# Patient Record
Sex: MALE | Race: ASIAN | NOT HISPANIC OR LATINO | URBAN - METROPOLITAN AREA
[De-identification: names, ages, dates, MRNs, and addresses within clinical notes are randomized per-mention and may not be internally consistent; named-entity substitution may affect disease eponyms.]

---

## 2023-02-21 ENCOUNTER — INPATIENT (INPATIENT)
Facility: HOSPITAL | Age: 68
LOS: 21 days | Discharge: NURSING, REHAB OR LONG-TERM CARE | End: 2023-03-15
Attending: NEUROLOGICAL SURGERY | Admitting: NEUROLOGICAL SURGERY
Payer: MEDICAID

## 2023-02-21 VITALS
HEART RATE: 74 BPM | DIASTOLIC BLOOD PRESSURE: 49 MMHG | RESPIRATION RATE: 18 BRPM | TEMPERATURE: 97 F | OXYGEN SATURATION: 99 % | SYSTOLIC BLOOD PRESSURE: 101 MMHG

## 2023-02-21 DIAGNOSIS — Z29.9 ENCOUNTER FOR PROPHYLACTIC MEASURES, UNSPECIFIED: ICD-10-CM

## 2023-02-21 DIAGNOSIS — D64.9 ANEMIA, UNSPECIFIED: ICD-10-CM

## 2023-02-21 DIAGNOSIS — G93.40 ENCEPHALOPATHY, UNSPECIFIED: ICD-10-CM

## 2023-02-21 DIAGNOSIS — N17.9 ACUTE KIDNEY FAILURE, UNSPECIFIED: ICD-10-CM

## 2023-02-21 DIAGNOSIS — Z79.899 OTHER LONG TERM (CURRENT) DRUG THERAPY: ICD-10-CM

## 2023-02-21 DIAGNOSIS — Z95.1 PRESENCE OF AORTOCORONARY BYPASS GRAFT: Chronic | ICD-10-CM

## 2023-02-21 DIAGNOSIS — E11.9 TYPE 2 DIABETES MELLITUS WITHOUT COMPLICATIONS: ICD-10-CM

## 2023-02-21 DIAGNOSIS — N18.30 CHRONIC KIDNEY DISEASE, STAGE 3 UNSPECIFIED: ICD-10-CM

## 2023-02-21 DIAGNOSIS — I25.10 ATHEROSCLEROTIC HEART DISEASE OF NATIVE CORONARY ARTERY WITHOUT ANGINA PECTORIS: ICD-10-CM

## 2023-02-21 DIAGNOSIS — M54.9 DORSALGIA, UNSPECIFIED: ICD-10-CM

## 2023-02-21 LAB
ALBUMIN SERPL ELPH-MCNC: 4 G/DL — SIGNIFICANT CHANGE UP (ref 3.3–5)
ALP SERPL-CCNC: 62 U/L — SIGNIFICANT CHANGE UP (ref 40–120)
ALT FLD-CCNC: 19 U/L — SIGNIFICANT CHANGE UP (ref 4–41)
ANION GAP SERPL CALC-SCNC: 13 MMOL/L — SIGNIFICANT CHANGE UP (ref 7–14)
ANION GAP SERPL CALC-SCNC: 9 MMOL/L — SIGNIFICANT CHANGE UP (ref 7–14)
APPEARANCE UR: CLEAR — SIGNIFICANT CHANGE UP
AST SERPL-CCNC: 22 U/L — SIGNIFICANT CHANGE UP (ref 4–40)
B-OH-BUTYR SERPL-SCNC: <0 MMOL/L — SIGNIFICANT CHANGE UP (ref 0–0.4)
BACTERIA # UR AUTO: NEGATIVE — SIGNIFICANT CHANGE UP
BASOPHILS # BLD AUTO: 0.05 K/UL — SIGNIFICANT CHANGE UP (ref 0–0.2)
BASOPHILS NFR BLD AUTO: 0.5 % — SIGNIFICANT CHANGE UP (ref 0–2)
BILIRUB SERPL-MCNC: 0.4 MG/DL — SIGNIFICANT CHANGE UP (ref 0.2–1.2)
BILIRUB UR-MCNC: NEGATIVE — SIGNIFICANT CHANGE UP
BUN SERPL-MCNC: 43 MG/DL — HIGH (ref 7–23)
BUN SERPL-MCNC: 44 MG/DL — HIGH (ref 7–23)
CALCIUM SERPL-MCNC: 9.3 MG/DL — SIGNIFICANT CHANGE UP (ref 8.4–10.5)
CALCIUM SERPL-MCNC: 9.4 MG/DL — SIGNIFICANT CHANGE UP (ref 8.4–10.5)
CHLORIDE SERPL-SCNC: 103 MMOL/L — SIGNIFICANT CHANGE UP (ref 98–107)
CHLORIDE SERPL-SCNC: 106 MMOL/L — SIGNIFICANT CHANGE UP (ref 98–107)
CO2 SERPL-SCNC: 22 MMOL/L — SIGNIFICANT CHANGE UP (ref 22–31)
CO2 SERPL-SCNC: 23 MMOL/L — SIGNIFICANT CHANGE UP (ref 22–31)
COLOR SPEC: YELLOW — SIGNIFICANT CHANGE UP
CREAT SERPL-MCNC: 2.21 MG/DL — HIGH (ref 0.5–1.3)
CREAT SERPL-MCNC: 2.31 MG/DL — HIGH (ref 0.5–1.3)
DIFF PNL FLD: NEGATIVE — SIGNIFICANT CHANGE UP
EGFR: 30 ML/MIN/1.73M2 — LOW
EGFR: 31 ML/MIN/1.73M2 — LOW
EOSINOPHIL # BLD AUTO: 0.43 K/UL — SIGNIFICANT CHANGE UP (ref 0–0.5)
EOSINOPHIL NFR BLD AUTO: 4.5 % — SIGNIFICANT CHANGE UP (ref 0–6)
EPI CELLS # UR: 3 /HPF — SIGNIFICANT CHANGE UP (ref 0–5)
FLUAV AG NPH QL: SIGNIFICANT CHANGE UP
FLUBV AG NPH QL: SIGNIFICANT CHANGE UP
GLUCOSE BLDC GLUCOMTR-MCNC: 121 MG/DL — HIGH (ref 70–99)
GLUCOSE SERPL-MCNC: 125 MG/DL — HIGH (ref 70–99)
GLUCOSE SERPL-MCNC: 172 MG/DL — HIGH (ref 70–99)
GLUCOSE UR QL: NEGATIVE — SIGNIFICANT CHANGE UP
HCT VFR BLD CALC: 35.3 % — LOW (ref 39–50)
HGB BLD-MCNC: 11.5 G/DL — LOW (ref 13–17)
HYALINE CASTS # UR AUTO: 0 /LPF — SIGNIFICANT CHANGE UP (ref 0–7)
IANC: 6.12 K/UL — SIGNIFICANT CHANGE UP (ref 1.8–7.4)
IMM GRANULOCYTES NFR BLD AUTO: 0.4 % — SIGNIFICANT CHANGE UP (ref 0–0.9)
KETONES UR-MCNC: NEGATIVE — SIGNIFICANT CHANGE UP
LEUKOCYTE ESTERASE UR-ACNC: NEGATIVE — SIGNIFICANT CHANGE UP
LYMPHOCYTES # BLD AUTO: 2.02 K/UL — SIGNIFICANT CHANGE UP (ref 1–3.3)
LYMPHOCYTES # BLD AUTO: 21.3 % — SIGNIFICANT CHANGE UP (ref 13–44)
MAGNESIUM SERPL-MCNC: 0.9 MG/DL — CRITICAL LOW (ref 1.6–2.6)
MAGNESIUM SERPL-MCNC: 1.7 MG/DL — SIGNIFICANT CHANGE UP (ref 1.6–2.6)
MCHC RBC-ENTMCNC: 29.6 PG — SIGNIFICANT CHANGE UP (ref 27–34)
MCHC RBC-ENTMCNC: 32.6 GM/DL — SIGNIFICANT CHANGE UP (ref 32–36)
MCV RBC AUTO: 90.7 FL — SIGNIFICANT CHANGE UP (ref 80–100)
MONOCYTES # BLD AUTO: 0.83 K/UL — SIGNIFICANT CHANGE UP (ref 0–0.9)
MONOCYTES NFR BLD AUTO: 8.7 % — SIGNIFICANT CHANGE UP (ref 2–14)
NEUTROPHILS # BLD AUTO: 6.12 K/UL — SIGNIFICANT CHANGE UP (ref 1.8–7.4)
NEUTROPHILS NFR BLD AUTO: 64.6 % — SIGNIFICANT CHANGE UP (ref 43–77)
NITRITE UR-MCNC: NEGATIVE — SIGNIFICANT CHANGE UP
NRBC # BLD: 0 /100 WBCS — SIGNIFICANT CHANGE UP (ref 0–0)
NRBC # FLD: 0 K/UL — SIGNIFICANT CHANGE UP (ref 0–0)
PH UR: 6 — SIGNIFICANT CHANGE UP (ref 5–8)
PHOSPHATE SERPL-MCNC: 3.6 MG/DL — SIGNIFICANT CHANGE UP (ref 2.5–4.5)
PLATELET # BLD AUTO: 267 K/UL — SIGNIFICANT CHANGE UP (ref 150–400)
POTASSIUM SERPL-MCNC: 4.2 MMOL/L — SIGNIFICANT CHANGE UP (ref 3.5–5.3)
POTASSIUM SERPL-MCNC: 4.5 MMOL/L — SIGNIFICANT CHANGE UP (ref 3.5–5.3)
POTASSIUM SERPL-SCNC: 4.2 MMOL/L — SIGNIFICANT CHANGE UP (ref 3.5–5.3)
POTASSIUM SERPL-SCNC: 4.5 MMOL/L — SIGNIFICANT CHANGE UP (ref 3.5–5.3)
PROT SERPL-MCNC: 6.9 G/DL — SIGNIFICANT CHANGE UP (ref 6–8.3)
PROT UR-MCNC: ABNORMAL
RBC # BLD: 3.89 M/UL — LOW (ref 4.2–5.8)
RBC # FLD: 13.2 % — SIGNIFICANT CHANGE UP (ref 10.3–14.5)
RBC CASTS # UR COMP ASSIST: 2 /HPF — SIGNIFICANT CHANGE UP (ref 0–4)
RSV RNA NPH QL NAA+NON-PROBE: SIGNIFICANT CHANGE UP
SARS-COV-2 RNA SPEC QL NAA+PROBE: SIGNIFICANT CHANGE UP
SODIUM SERPL-SCNC: 138 MMOL/L — SIGNIFICANT CHANGE UP (ref 135–145)
SODIUM SERPL-SCNC: 138 MMOL/L — SIGNIFICANT CHANGE UP (ref 135–145)
SP GR SPEC: 1.02 — SIGNIFICANT CHANGE UP (ref 1.01–1.05)
UROBILINOGEN FLD QL: SIGNIFICANT CHANGE UP
WBC # BLD: 9.49 K/UL — SIGNIFICANT CHANGE UP (ref 3.8–10.5)
WBC # FLD AUTO: 9.49 K/UL — SIGNIFICANT CHANGE UP (ref 3.8–10.5)
WBC UR QL: 1 /HPF — SIGNIFICANT CHANGE UP (ref 0–5)

## 2023-02-21 PROCEDURE — 72125 CT NECK SPINE W/O DYE: CPT | Mod: 26,MA

## 2023-02-21 PROCEDURE — 72170 X-RAY EXAM OF PELVIS: CPT | Mod: 26

## 2023-02-21 PROCEDURE — 99223 1ST HOSP IP/OBS HIGH 75: CPT

## 2023-02-21 PROCEDURE — 70450 CT HEAD/BRAIN W/O DYE: CPT | Mod: 26,MA

## 2023-02-21 PROCEDURE — 99285 EMERGENCY DEPT VISIT HI MDM: CPT

## 2023-02-21 PROCEDURE — 72100 X-RAY EXAM L-S SPINE 2/3 VWS: CPT | Mod: 26

## 2023-02-21 RX ORDER — DEXTROSE 50 % IN WATER 50 %
25 SYRINGE (ML) INTRAVENOUS ONCE
Refills: 0 | Status: DISCONTINUED | OUTPATIENT
Start: 2023-02-21 | End: 2023-03-06

## 2023-02-21 RX ORDER — ACETAMINOPHEN 500 MG
650 TABLET ORAL EVERY 6 HOURS
Refills: 0 | Status: DISCONTINUED | OUTPATIENT
Start: 2023-02-21 | End: 2023-03-01

## 2023-02-21 RX ORDER — INSULIN LISPRO 100/ML
VIAL (ML) SUBCUTANEOUS
Refills: 0 | Status: DISCONTINUED | OUTPATIENT
Start: 2023-02-21 | End: 2023-02-28

## 2023-02-21 RX ORDER — ONDANSETRON 8 MG/1
4 TABLET, FILM COATED ORAL EVERY 8 HOURS
Refills: 0 | Status: DISCONTINUED | OUTPATIENT
Start: 2023-02-21 | End: 2023-02-22

## 2023-02-21 RX ORDER — DEXTROSE 50 % IN WATER 50 %
12.5 SYRINGE (ML) INTRAVENOUS ONCE
Refills: 0 | Status: DISCONTINUED | OUTPATIENT
Start: 2023-02-21 | End: 2023-03-06

## 2023-02-21 RX ORDER — ACETAMINOPHEN 500 MG
975 TABLET ORAL ONCE
Refills: 0 | Status: COMPLETED | OUTPATIENT
Start: 2023-02-21 | End: 2023-02-21

## 2023-02-21 RX ORDER — CYCLOBENZAPRINE HYDROCHLORIDE 10 MG/1
10 TABLET, FILM COATED ORAL ONCE
Refills: 0 | Status: COMPLETED | OUTPATIENT
Start: 2023-02-21 | End: 2023-02-21

## 2023-02-21 RX ORDER — GLUCAGON INJECTION, SOLUTION 0.5 MG/.1ML
1 INJECTION, SOLUTION SUBCUTANEOUS ONCE
Refills: 0 | Status: DISCONTINUED | OUTPATIENT
Start: 2023-02-21 | End: 2023-02-22

## 2023-02-21 RX ORDER — DEXTROSE 50 % IN WATER 50 %
15 SYRINGE (ML) INTRAVENOUS ONCE
Refills: 0 | Status: DISCONTINUED | OUTPATIENT
Start: 2023-02-21 | End: 2023-03-06

## 2023-02-21 RX ORDER — LANOLIN ALCOHOL/MO/W.PET/CERES
3 CREAM (GRAM) TOPICAL AT BEDTIME
Refills: 0 | Status: DISCONTINUED | OUTPATIENT
Start: 2023-02-21 | End: 2023-02-22

## 2023-02-21 RX ORDER — SODIUM CHLORIDE 9 MG/ML
1000 INJECTION, SOLUTION INTRAVENOUS
Refills: 0 | Status: DISCONTINUED | OUTPATIENT
Start: 2023-02-21 | End: 2023-02-22

## 2023-02-21 RX ORDER — LIDOCAINE 4 G/100G
1 CREAM TOPICAL ONCE
Refills: 0 | Status: COMPLETED | OUTPATIENT
Start: 2023-02-21 | End: 2023-02-21

## 2023-02-21 RX ORDER — MAGNESIUM SULFATE 500 MG/ML
2 VIAL (ML) INJECTION ONCE
Refills: 0 | Status: COMPLETED | OUTPATIENT
Start: 2023-02-21 | End: 2023-02-21

## 2023-02-21 RX ADMIN — LIDOCAINE 1 PATCH: 4 CREAM TOPICAL at 13:20

## 2023-02-21 RX ADMIN — Medication 25 GRAM(S): at 15:59

## 2023-02-21 RX ADMIN — Medication 975 MILLIGRAM(S): at 13:19

## 2023-02-21 RX ADMIN — CYCLOBENZAPRINE HYDROCHLORIDE 10 MILLIGRAM(S): 10 TABLET, FILM COATED ORAL at 14:34

## 2023-02-21 RX ADMIN — Medication 975 MILLIGRAM(S): at 13:49

## 2023-02-21 NOTE — H&P ADULT - PROBLEM SELECTOR PLAN 4
FS elevated to 240 on presentation   -Decreased to 170s   -Repeat FS   -C/w ISS and FS   -NPO for now

## 2023-02-21 NOTE — H&P ADULT - PROBLEM SELECTOR PLAN 1
Patient with acute onset of encephalopathy   -Pt is currently somnolent and AAOx1-2. At baseline, pt is AAOx3 as per son   -DDx include infectious, metabolic, toxic or structural etiology   -CT head showed no acute stroke or bleeding.   -F/u with UA to rule out UTI. No fever, cough or diarrhea as per son   -F/u with FS, BMP   -Pt received flexeril 10 mg in the ED which can also contribute to his encephalopathy   -If mental status worsens, repeat CT head

## 2023-02-21 NOTE — ED ADULT NURSE NOTE - OBJECTIVE STATEMENT
A&Ox4. PMH: DM and open heart surgery. Patient came in with lower back pain. A&Ox4. PMH: DM and open heart surgery. Patient came in with lower back pain. Denies N/V, dizziness, or SOB. Respirations even and unlabored, sating 100% on RA, Pain in lower back described as a 2. 20 gauge IV placed in right AC. Safety precautions in place, bed in lowest position, and oriented to call bell. labs obtained, awaiting results.

## 2023-02-21 NOTE — H&P ADULT - ASSESSMENT
67 yo M with DMII, CAD s/p CABG, CKD, chronic back pain, and HTN presents to the ED with worsening back pain, found to have encephalopathy due to unclear etiology.

## 2023-02-21 NOTE — ED ADULT NURSE NOTE - CHIEF COMPLAINT QUOTE
Pt arrives from [facility]  2 weeks ago co lower back pain x a few years with difficulty walking pain becoming worse. .  Pt normally  ambulates with cane. pt states cant walk .

## 2023-02-21 NOTE — H&P ADULT - PROBLEM SELECTOR PLAN 2
Acute on chronic back pain   -Nontender on exam   -Xray lumbar spine showed no acute lesion or fracture   -S/p flexiril and lidocaine patch   -Tylenol PRN

## 2023-02-21 NOTE — H&P ADULT - NSHPPHYSICALEXAM_GEN_ALL_CORE
Vital Signs Last 24 Hrs  T(C): 36.3 (21 Feb 2023 18:45), Max: 36.3 (21 Feb 2023 13:12)  T(F): 97.3 (21 Feb 2023 18:45), Max: 97.4 (21 Feb 2023 13:12)  HR: 96 (21 Feb 2023 18:45) (54 - 96)  BP: 99/69 (21 Feb 2023 18:45) (99/69 - 110/42)  BP(mean): --  RR: 18 (21 Feb 2023 18:45) (18 - 18)  SpO2: 99% (21 Feb 2023 18:45) (99% - 100%)    Parameters below as of 21 Feb 2023 18:45  Patient On (Oxygen Delivery Method): room air    GENERAL: Somnolent, arousable to verbal commands, follows some commands, AAOx2  HEENT:  Atraumatic, Normocephalic,Conjunctiva and sclera clear, oral mucosa moist, clear w/o any exudate   NECK: Supple, No JVD  CHEST/LUNG: Clear to auscultation bilaterally; No wheeze  HEART: Regular rate and rhythm; No murmurs, rubs, or gallops  ABDOMEN: Soft, Nontender, Distender; Bowel sounds present  EXTREMITIES:  2+ Peripheral Pulses, No clubbing, cyanosis, or edema  PSYCH: AAOx2, somnolent   NEUROLOGY: non-focal, moving all extremities spontaneously  SKIN: No rashes or lesions

## 2023-02-21 NOTE — ED ADULT TRIAGE NOTE - CHIEF COMPLAINT QUOTE
Pt arrives from [facility]  2 weeks ago co lower back pain x a few years with difficulty walking pain becoming worse. .  Pt normally  ambulates with cane. pt states cant walk .

## 2023-02-21 NOTE — ED PROVIDER NOTE - OBJECTIVE STATEMENT
Pt is a 68y M with PMHx DM on insulin presenting for acute on chronic lower back pain exacerbation. Pt states he has had chronic back pain for past few years with occasional episodes where the pain worsens, becomes incontinent, and unable to ambulate due to pain - states that he takes medication for it and previous episodes resolved in few days. Yesterday, pt states pain worsened and he is currently unable to ambulate and remains in bed. Denies CP/SOB/change in mentation/fever/chills. Pt at baseline ambulates with cane with assist. Pt is a 68y M with PMHx DM on insulin presenting for acute on chronic lower back pain exacerbation. Pt states he has had chronic back pain for past few years with occasional episodes where the pain worsens, becomes incontinent, and unable to ambulate due to pain - states that he takes medication for it and previous episodes resolved in few days. Yesterday, pt states pain worsened and he is currently unable to ambulate and remains in bed. Denies CP/SOB/change in mentation/fever/chills. Pt at baseline ambulates with cane with assist but falls often - denies recent trauma prior to onset of current pain episode. Pt is a 68y M with PMHx DM on insulin presenting for acute on chronic lower back pain exacerbation. Pt states he has had chronic back pain for past few years with occasional episodes where the pain worsens, becomes incontinent, and unable to ambulate due to pain - states that he takes medication for it and previous episodes resolved in few days. Yesterday, pt states pain worsened and he is currently unable to ambulate and remains in bed. Denies CP/SOB/change in mentation/fever/chills. Pt at baseline ambulates with cane with assist but falls often - denies recent trauma prior to onset of current pain episode.    Michelle Randallchrissieaz DO (PGY2): 68-year-old male past medical history of diabetes on insulin presenting for acute on chronic right-sided lumbosacral pain for the past day.  As per patient and son at bedside, patient has been suffering from back pain for the last 2 years.  Patient has episodes of urinary incontinence and falls due to being unsteady on his feet.  Unclear as to whether this unsteadiness is due to pain.    Patient normally ambulates with a cane but only goes as far as a few feet. No acute change in walking status today. Pt coming in due to worsening pain. No paresthesiasPatient had a few recent falls over the past week. Pt is a 68y M with PMHx DM on insulin presenting for acute on chronic lower back pain exacerbation. Pt states he has had chronic back pain for past few years with occasional episodes where the pain worsens, becomes incontinent, and unable to ambulate due to pain - states that he takes medication for it and previous episodes resolved in few days. Yesterday, pt states pain worsened and he is currently unable to ambulate and remains in bed. Denies CP/SOB/change in mentation/fever/chills. Pt at baseline ambulates with cane with assist but falls often - denies recent trauma prior to onset of current pain episode.    Michelle Wise DO (PGY2): 68-year-old male past medical history of diabetes on insulin presenting for acute on chronic right-sided lumbosacral pain for the past day.  As per patient and son at bedside, patient has been suffering from back pain for the last 2 years.  Patient has episodes of urinary incontinence and falls due to being unsteady on his feet.  Unclear as to whether this unsteadiness is due to pain.    Patient normally ambulates with a cane but only goes as far as a few feet. No acute change in walking status today. Pt coming in due to worsening pain. No paresthesias/weakness. Patient had a few recent falls over the past week, unclear head trauma. Pt reports some lightheadedness on standing. No chest pain or sob. No n/v/d/c, abdominal pain, headache.

## 2023-02-21 NOTE — ED PROVIDER NOTE - NS ED ROS FT
Gen: No fever, normal appetite  Eyes: No eye irritation or discharge  ENT: No ear pain, congestion, sore throat  Resp: No cough or trouble breathing  Cardiovascular: No chest pain or palpitation  Rosalina+incontinent  MS: +lower back pain midline with radiation down b/l buttocks  Skin: No rashes  Neuro: No headache; no abnormal movements  Remainder negative, except as per the HPI

## 2023-02-21 NOTE — H&P ADULT - PROBLEM SELECTOR PLAN 5
Patient recently came from [facility]   -Son did not have the list of medication when called. Please f/u in the AM for the full list.

## 2023-02-21 NOTE — ED PROVIDER NOTE - PROGRESS NOTE DETAILS
Michelle Wise DO (PGY2): Patient's imaging without acute findings.  Labs showing an SANDRA.  Given patient unsteady gait and unable to walk on his own, patient needs to come into the hospital for concern for NPH with SANDRA patient will need physical therapy and pain control. Pt also with uncontrolled diabetes. Spoke with hospitalist will admit to medicine.

## 2023-02-21 NOTE — ED PROVIDER NOTE - PHYSICAL EXAMINATION
Gen: +Pt in bed unable to ambulate   HEENT: NC/AT, PERRLA, EOMI, MMM, Throat clear, no LAD   Neck: Supple  Heart: RRR, S1S2+, no murmur  Lungs: normal effort, CTAB  Abd: soft, NT, ND, BS present, no HSM  Genitourinary: +incontinence during similar episodes  Ext: +FROM but limited due to pain, sensation of b/l LE intact but R>L  Neuro: no focal deficits  Psych: AAOx3  Skin:  intact, no induration; No rash, no lesions  MSK: +pelvic girdle firm and intact but cannot assess gait as pt unstable standing up. No midline tenderness but pt states pain along lumbosacral area sacrum > lumbar region

## 2023-02-21 NOTE — ED PROVIDER NOTE - ATTENDING CONTRIBUTION TO CARE
GEN - NAD; frail, nontoxic appearing; A+O x3   HEAD - NC/AT   EYES- PERRL, EOMI  ENT: Airway patent, mmm, Oral cavity and pharynx normal. No inflammation, swelling, exudate, or lesions.  NECK: Neck supple  PULMONARY - CTA b/l, symmetric breath sounds.   CARDIAC -s1s2, RRR, no M,G,R  ABDOMEN - +BS, ND, NT, soft, no guarding, no rebound, no masses   BACK - no CVA tenderness, no midline spinal ttp or deformity, mild ttp along b/l buttock regions   EXTREMITIES - FROM, symmetric pulses, capillary refill < 2 seconds, no edema   SKIN - no rash or bruising   NEUROLOGIC - alert, speech clear, no focal deficits, unable to assess gait  a/p-Patient son at bedside translating and patient speaking in English as well, declined interpreting services.  Patient reports he has had ongoing back pain for several years, has waxing and waning acute exacerbations, occasionally becomes incontinent which then resolves, has been worked up for this before and had MRI approximately 6 months ago though does not recall what his diagnosis was but states he was told that he could have surgery to fix his pain.  He reports that this time around, he noticed his pain worsened over the last day or 2, reports it is primarily located across his buttocks/pelvis region, makes him unable to ambulate due to the pain. Baseline patient reports he uses a cane to ambulate though he notes he falls very often, last fall was just a few days ago, does not think he hit his head.  No headache, vision changes, neck pain, upper or middle back pain, bowel or bladder incontinence currently, saddle anesthesia, fevers, chills, cough, chest pain or shortness of breath, nausea vomiting or diarrhea.  No dysuria or hematuria.  On exam he appears somewhat frail, nontoxic, conversational, unlabored breathing, clear lungs, abdomen soft and nontender, no midline spinal tenderness however has some tenderness across his gluteal region, with pain reproducible to that area when he moves.  Neuro exam with full strength to both legs however unable to stand due to reproduced pain when he tries.  Plan for labs, UA, culture, CT brain, x-rays affected regions of pain, pain control as needed, evaluate for differential including but not limited to fractures, ICH, NPH, electrolyte disturbances, organ dysfunction, infection.  Anticipate admission given multiple falls with severe pain and inability to ambulate, unsafe discharge home.

## 2023-02-21 NOTE — H&P ADULT - NSHPLABSRESULTS_GEN_ALL_CORE
11.5   9.49  )-----------( 267      ( 21 Feb 2023 14:11 )             35.3     Hgb Trend: 11.5<--  02-21    138  |  103  |  44<H>  ----------------------------<  172<H>  4.5   |  22  |  2.31<H>    Ca    9.3      21 Feb 2023 14:11  Mg     0.90     02-21    TPro  6.9  /  Alb  4.0  /  TBili  0.4  /  DBili  x   /  AST  22  /  ALT  19  /  AlkPhos  62  02-21    Creatinine Trend: 2.31<--        CT head and neck as reviewed by the radiologist:     1.  CT HEAD:    No acute intracranial injury.    2.  CT CERVICAL:   No cervical vertebral fracture.

## 2023-02-21 NOTE — H&P ADULT - HISTORY OF PRESENT ILLNESS
69 yo M with DMII, CAD s/p CABG, CKD, chronic back pain, and HTN presents to the ED with worsening back pain. Patient is AAOX2 and somnolent, not able to provide any hx. Most of the information was supplemented by the son. As per son, pt is AAOx 3 and "mentally stable." He recently came from [facility] about 2 weeks ago and has " multiple medical complaints." But this morning he told his son that he is feeling weak and his back pain is getting worse. The pain is localized in the lower back region. It is burning is sensation and constant. Unclear if it radiates down the leg. It worsens with movement and walking. He can only walk "10 meters" with a cane. He is also incontinent of urine but denies any recent fever, chills, bowel incontinence, or lower extremities weakness/motor deficits. He reportedly had MRIs in the past but son does not know what it showed. He also has other chronic medical problems such as CKD, DMII and CAD and his son thinks pt needs management for them as well.   In the ED, his vitals were notable for hyperglycemia, elevated BUN/Scr, and hypomagnesemia. EKG showed bradycardia with T wave changes in the lateral leads.

## 2023-02-21 NOTE — H&P ADULT - NSICDXPASTMEDICALHX_GEN_ALL_CORE_FT
PAST MEDICAL HISTORY:  CAD (coronary artery disease)     Chronic kidney disease, unspecified CKD stage     Diabetes mellitus     Essential hypertension

## 2023-02-21 NOTE — ED PROVIDER NOTE - CLINICAL SUMMARY MEDICAL DECISION MAKING FREE TEXT BOX
68-year-old male past medical history of diabetes on insulin presenting for acute on chronic right-sided lumbosacral pain for the past day, associated with unsteady gait, urinary incontinence, multiple falls. Given hx and physical, ddx includes but is not limited to fracture, NPH, ICH, metabolic derangement. Plan for labs, XR, CT, meds, reassess

## 2023-02-22 DIAGNOSIS — I50.22 CHRONIC SYSTOLIC (CONGESTIVE) HEART FAILURE: ICD-10-CM

## 2023-02-22 DIAGNOSIS — M43.16 SPONDYLOLISTHESIS, LUMBAR REGION: ICD-10-CM

## 2023-02-22 LAB
A1C WITH ESTIMATED AVERAGE GLUCOSE RESULT: 8.3 % — HIGH (ref 4–5.6)
ALBUMIN SERPL ELPH-MCNC: 3.7 G/DL — SIGNIFICANT CHANGE UP (ref 3.3–5)
ALP SERPL-CCNC: 66 U/L — SIGNIFICANT CHANGE UP (ref 40–120)
ALT FLD-CCNC: 18 U/L — SIGNIFICANT CHANGE UP (ref 4–41)
ANION GAP SERPL CALC-SCNC: 9 MMOL/L — SIGNIFICANT CHANGE UP (ref 7–14)
AST SERPL-CCNC: 16 U/L — SIGNIFICANT CHANGE UP (ref 4–40)
BASOPHILS # BLD AUTO: 0.06 K/UL — SIGNIFICANT CHANGE UP (ref 0–0.2)
BASOPHILS NFR BLD AUTO: 0.8 % — SIGNIFICANT CHANGE UP (ref 0–2)
BILIRUB SERPL-MCNC: 0.4 MG/DL — SIGNIFICANT CHANGE UP (ref 0.2–1.2)
BUN SERPL-MCNC: 41 MG/DL — HIGH (ref 7–23)
CALCIUM SERPL-MCNC: 9.1 MG/DL — SIGNIFICANT CHANGE UP (ref 8.4–10.5)
CHLORIDE SERPL-SCNC: 105 MMOL/L — SIGNIFICANT CHANGE UP (ref 98–107)
CHOLEST SERPL-MCNC: 136 MG/DL — SIGNIFICANT CHANGE UP
CO2 SERPL-SCNC: 23 MMOL/L — SIGNIFICANT CHANGE UP (ref 22–31)
CREAT SERPL-MCNC: 2.11 MG/DL — HIGH (ref 0.5–1.3)
CULTURE RESULTS: NO GROWTH — SIGNIFICANT CHANGE UP
EGFR: 33 ML/MIN/1.73M2 — LOW
EOSINOPHIL # BLD AUTO: 0.37 K/UL — SIGNIFICANT CHANGE UP (ref 0–0.5)
EOSINOPHIL NFR BLD AUTO: 4.8 % — SIGNIFICANT CHANGE UP (ref 0–6)
ESTIMATED AVERAGE GLUCOSE: 192 — SIGNIFICANT CHANGE UP
GLUCOSE BLDC GLUCOMTR-MCNC: 123 MG/DL — HIGH (ref 70–99)
GLUCOSE BLDC GLUCOMTR-MCNC: 204 MG/DL — HIGH (ref 70–99)
GLUCOSE BLDC GLUCOMTR-MCNC: 225 MG/DL — HIGH (ref 70–99)
GLUCOSE BLDC GLUCOMTR-MCNC: 238 MG/DL — HIGH (ref 70–99)
GLUCOSE SERPL-MCNC: 234 MG/DL — HIGH (ref 70–99)
HCT VFR BLD CALC: 35.9 % — LOW (ref 39–50)
HCV AB S/CO SERPL IA: 0.1 S/CO — SIGNIFICANT CHANGE UP (ref 0–0.99)
HCV AB SERPL-IMP: SIGNIFICANT CHANGE UP
HDLC SERPL-MCNC: 29 MG/DL — LOW
HGB BLD-MCNC: 11.7 G/DL — LOW (ref 13–17)
IANC: 5.18 K/UL — SIGNIFICANT CHANGE UP (ref 1.8–7.4)
IMM GRANULOCYTES NFR BLD AUTO: 1 % — HIGH (ref 0–0.9)
LIPID PNL WITH DIRECT LDL SERPL: 75 MG/DL — SIGNIFICANT CHANGE UP
LYMPHOCYTES # BLD AUTO: 1.38 K/UL — SIGNIFICANT CHANGE UP (ref 1–3.3)
LYMPHOCYTES # BLD AUTO: 17.8 % — SIGNIFICANT CHANGE UP (ref 13–44)
MCHC RBC-ENTMCNC: 29.8 PG — SIGNIFICANT CHANGE UP (ref 27–34)
MCHC RBC-ENTMCNC: 32.6 GM/DL — SIGNIFICANT CHANGE UP (ref 32–36)
MCV RBC AUTO: 91.6 FL — SIGNIFICANT CHANGE UP (ref 80–100)
MONOCYTES # BLD AUTO: 0.69 K/UL — SIGNIFICANT CHANGE UP (ref 0–0.9)
MONOCYTES NFR BLD AUTO: 8.9 % — SIGNIFICANT CHANGE UP (ref 2–14)
NEUTROPHILS # BLD AUTO: 5.18 K/UL — SIGNIFICANT CHANGE UP (ref 1.8–7.4)
NEUTROPHILS NFR BLD AUTO: 66.7 % — SIGNIFICANT CHANGE UP (ref 43–77)
NON HDL CHOLESTEROL: 107 MG/DL — SIGNIFICANT CHANGE UP
NRBC # BLD: 0 /100 WBCS — SIGNIFICANT CHANGE UP (ref 0–0)
NRBC # FLD: 0 K/UL — SIGNIFICANT CHANGE UP (ref 0–0)
PLATELET # BLD AUTO: 253 K/UL — SIGNIFICANT CHANGE UP (ref 150–400)
POTASSIUM SERPL-MCNC: 4.4 MMOL/L — SIGNIFICANT CHANGE UP (ref 3.5–5.3)
POTASSIUM SERPL-SCNC: 4.4 MMOL/L — SIGNIFICANT CHANGE UP (ref 3.5–5.3)
PROT SERPL-MCNC: 6.7 G/DL — SIGNIFICANT CHANGE UP (ref 6–8.3)
RBC # BLD: 3.92 M/UL — LOW (ref 4.2–5.8)
RBC # FLD: 13.2 % — SIGNIFICANT CHANGE UP (ref 10.3–14.5)
SODIUM SERPL-SCNC: 137 MMOL/L — SIGNIFICANT CHANGE UP (ref 135–145)
SPECIMEN SOURCE: SIGNIFICANT CHANGE UP
TRIGL SERPL-MCNC: 162 MG/DL — HIGH
WBC # BLD: 7.76 K/UL — SIGNIFICANT CHANGE UP (ref 3.8–10.5)
WBC # FLD AUTO: 7.76 K/UL — SIGNIFICANT CHANGE UP (ref 3.8–10.5)

## 2023-02-22 PROCEDURE — 99233 SBSQ HOSP IP/OBS HIGH 50: CPT

## 2023-02-22 PROCEDURE — 99232 SBSQ HOSP IP/OBS MODERATE 35: CPT

## 2023-02-22 PROCEDURE — 76770 US EXAM ABDO BACK WALL COMP: CPT | Mod: 26

## 2023-02-22 PROCEDURE — 71045 X-RAY EXAM CHEST 1 VIEW: CPT | Mod: 26

## 2023-02-22 RX ORDER — NITROGLYCERIN 6.5 MG
0.4 CAPSULE, EXTENDED RELEASE ORAL
Refills: 0 | Status: DISCONTINUED | OUTPATIENT
Start: 2023-02-22 | End: 2023-03-15

## 2023-02-22 RX ORDER — PANTOPRAZOLE SODIUM 20 MG/1
40 TABLET, DELAYED RELEASE ORAL
Refills: 0 | Status: DISCONTINUED | OUTPATIENT
Start: 2023-02-22 | End: 2023-03-02

## 2023-02-22 RX ORDER — SACUBITRIL AND VALSARTAN 24; 26 MG/1; MG/1
1 TABLET, FILM COATED ORAL
Refills: 0 | Status: DISCONTINUED | OUTPATIENT
Start: 2023-02-22 | End: 2023-02-28

## 2023-02-22 RX ORDER — ATORVASTATIN CALCIUM 80 MG/1
1 TABLET, FILM COATED ORAL
Qty: 0 | Refills: 0 | DISCHARGE

## 2023-02-22 RX ORDER — INSULIN LISPRO 100/ML
VIAL (ML) SUBCUTANEOUS AT BEDTIME
Refills: 0 | Status: DISCONTINUED | OUTPATIENT
Start: 2023-02-22 | End: 2023-03-06

## 2023-02-22 RX ORDER — TAMSULOSIN HYDROCHLORIDE 0.4 MG/1
1 CAPSULE ORAL
Qty: 0 | Refills: 0 | DISCHARGE

## 2023-02-22 RX ORDER — ATORVASTATIN CALCIUM 80 MG/1
20 TABLET, FILM COATED ORAL AT BEDTIME
Refills: 0 | Status: DISCONTINUED | OUTPATIENT
Start: 2023-02-22 | End: 2023-02-23

## 2023-02-22 RX ORDER — HEPARIN SODIUM 5000 [USP'U]/ML
5000 INJECTION INTRAVENOUS; SUBCUTANEOUS EVERY 12 HOURS
Refills: 0 | Status: DISCONTINUED | OUTPATIENT
Start: 2023-02-22 | End: 2023-02-27

## 2023-02-22 RX ORDER — CALCIUM ACETATE 667 MG
667 TABLET ORAL
Refills: 0 | Status: DISCONTINUED | OUTPATIENT
Start: 2023-02-22 | End: 2023-02-22

## 2023-02-22 RX ORDER — NITROGLYCERIN 6.5 MG
1 CAPSULE, EXTENDED RELEASE ORAL
Qty: 0 | Refills: 0 | DISCHARGE

## 2023-02-22 RX ORDER — CALCITRIOL 0.5 UG/1
0.25 CAPSULE ORAL DAILY
Refills: 0 | Status: DISCONTINUED | OUTPATIENT
Start: 2023-02-22 | End: 2023-03-15

## 2023-02-22 RX ORDER — CALCIUM ACETATE 667 MG
667 TABLET ORAL
Refills: 0 | Status: DISCONTINUED | OUTPATIENT
Start: 2023-02-22 | End: 2023-03-15

## 2023-02-22 RX ORDER — XYLOMETAZOLINE HCL 0.1 %
0 SPRAY, NON-AEROSOL (ML) NASAL
Qty: 0 | Refills: 0 | DISCHARGE

## 2023-02-22 RX ORDER — ASPIRIN/CALCIUM CARB/MAGNESIUM 324 MG
1 TABLET ORAL
Qty: 0 | Refills: 0 | DISCHARGE

## 2023-02-22 RX ORDER — DUTASTERIDE AND TAMSULOSIN HYDROCHLORIDE CAPSULES .5; .4 MG/1; MG/1
1 CAPSULE ORAL
Qty: 0 | Refills: 0 | DISCHARGE

## 2023-02-22 RX ORDER — NEBIVOLOL HYDROCHLORIDE 5 MG/1
1 TABLET ORAL
Qty: 0 | Refills: 0 | DISCHARGE

## 2023-02-22 RX ORDER — FUROSEMIDE 40 MG
40 TABLET ORAL
Refills: 0 | Status: DISCONTINUED | OUTPATIENT
Start: 2023-02-22 | End: 2023-02-25

## 2023-02-22 RX ORDER — FUROSEMIDE 40 MG
1 TABLET ORAL
Qty: 0 | Refills: 0 | DISCHARGE

## 2023-02-22 RX ORDER — FUROSEMIDE 40 MG
40 TABLET ORAL DAILY
Refills: 0 | Status: DISCONTINUED | OUTPATIENT
Start: 2023-02-22 | End: 2023-02-22

## 2023-02-22 RX ORDER — SACUBITRIL AND VALSARTAN 24; 26 MG/1; MG/1
1 TABLET, FILM COATED ORAL
Qty: 0 | Refills: 0 | DISCHARGE

## 2023-02-22 RX ORDER — TAMSULOSIN HYDROCHLORIDE 0.4 MG/1
0.4 CAPSULE ORAL AT BEDTIME
Refills: 0 | Status: DISCONTINUED | OUTPATIENT
Start: 2023-02-22 | End: 2023-03-15

## 2023-02-22 RX ORDER — CALCIUM ACETATE 667 MG
1 TABLET ORAL
Qty: 0 | Refills: 0 | DISCHARGE

## 2023-02-22 RX ORDER — INSULIN LISPRO 100/ML
4 VIAL (ML) SUBCUTANEOUS
Refills: 0 | Status: DISCONTINUED | OUTPATIENT
Start: 2023-02-22 | End: 2023-02-23

## 2023-02-22 RX ORDER — CALCITRIOL 0.5 UG/1
1 CAPSULE ORAL
Qty: 0 | Refills: 0 | DISCHARGE

## 2023-02-22 RX ORDER — INSULIN GLARGINE 100 [IU]/ML
10 INJECTION, SOLUTION SUBCUTANEOUS AT BEDTIME
Refills: 0 | Status: DISCONTINUED | OUTPATIENT
Start: 2023-02-22 | End: 2023-02-23

## 2023-02-22 RX ADMIN — Medication 50 MILLIGRAM(S): at 17:55

## 2023-02-22 RX ADMIN — Medication 2: at 17:05

## 2023-02-22 RX ADMIN — TAMSULOSIN HYDROCHLORIDE 0.4 MILLIGRAM(S): 0.4 CAPSULE ORAL at 21:30

## 2023-02-22 RX ADMIN — LIDOCAINE 1 PATCH: 4 CREAM TOPICAL at 08:38

## 2023-02-22 RX ADMIN — Medication 667 MILLIGRAM(S): at 17:55

## 2023-02-22 RX ADMIN — SACUBITRIL AND VALSARTAN 1 TABLET(S): 24; 26 TABLET, FILM COATED ORAL at 17:55

## 2023-02-22 RX ADMIN — HEPARIN SODIUM 5000 UNIT(S): 5000 INJECTION INTRAVENOUS; SUBCUTANEOUS at 17:55

## 2023-02-22 RX ADMIN — Medication 2: at 12:20

## 2023-02-22 RX ADMIN — Medication 1 DROP(S): at 17:55

## 2023-02-22 RX ADMIN — ATORVASTATIN CALCIUM 20 MILLIGRAM(S): 80 TABLET, FILM COATED ORAL at 21:33

## 2023-02-22 RX ADMIN — INSULIN GLARGINE 10 UNIT(S): 100 INJECTION, SOLUTION SUBCUTANEOUS at 21:34

## 2023-02-22 NOTE — PATIENT PROFILE ADULT - FALL HARM RISK - HARM RISK INTERVENTIONS

## 2023-02-22 NOTE — PROGRESS NOTE ADULT - SUBJECTIVE AND OBJECTIVE BOX
Patient is a 68y old  Male who presents with a chief complaint of Back pain    SUBJECTIVE / OVERNIGHT EVENTS:     Service ID # [id number]  Spot 12A in ED  states back pain for years, worsening, radiates down b/l leg, bladder and bowel continence at times  has OP MRI from 8/2022, reviewed, asked given findings was he recommended for surgery, says he was but was told not to have [place], mentions was going to go to [place] or elsewhere but something related to visa or greencard running out  no CP, SOB, f/c/n/v  reports has DM, CKD issues, bypass  states has stent in testicles for circulation, has records at home, advised to bring in as unclear what he is referring to    MEDICATIONS  (STANDING):  artificial tears (preservative free) Ophthalmic Solution 1 Drop(s) Both EYES four times a day  atorvastatin 20 milliGRAM(s) Oral at bedtime  calcitriol   Capsule 0.25 MICROGram(s) Oral daily  calcium acetate 667 milliGRAM(s) Oral three times a day with meals  furosemide    Tablet 40 milliGRAM(s) Oral daily  heparin   Injectable 5000 Unit(s) SubCutaneous every 12 hours  insulin glargine Injectable (LANTUS) 10 Unit(s) SubCutaneous at bedtime  insulin lispro (ADMELOG) corrective regimen sliding scale   SubCutaneous at bedtime  insulin lispro (ADMELOG) corrective regimen sliding scale   SubCutaneous three times a day before meals  pregabalin 50 milliGRAM(s) Oral two times a day  sacubitril 24 mG/valsartan 26 mG 1 Tablet(s) Oral two times a day  tamsulosin 0.4 milliGRAM(s) Oral at bedtime    MEDICATIONS  (PRN):  acetaminophen     Tablet .. 650 milliGRAM(s) Oral every 6 hours PRN Temp greater or equal to 38C (100.4F), Mild Pain (1 - 3)  aluminum hydroxide/magnesium hydroxide/simethicone Suspension 30 milliLiter(s) Oral every 4 hours PRN Dyspepsia  bisacodyl 5 milliGRAM(s) Oral every 12 hours PRN Constipation  dextrose Oral Gel 15 Gram(s) Oral once PRN Blood Glucose LESS THAN 70 milliGRAM(s)/deciliter  nitroglycerin     SubLingual 0.4 milliGRAM(s) SubLingual every 5 minutes PRN Chest Pain    T(C): 36.4 (02-22-23 @ 10:09), Max: 36.9 (02-22-23 @ 02:54)  HR: 59 (02-22-23 @ 10:09) (54 - 96)  BP: 144/56 (02-22-23 @ 10:09) (99/69 - 144/56)  RR: 16 (02-22-23 @ 10:09) (16 - 18)  SpO2: 99% (02-22-23 @ 10:09) (99% - 100%)    CAPILLARY BLOOD GLUCOSE  POCT Blood Glucose.: 238 mg/dL (22 Feb 2023 11:52)  POCT Blood Glucose.: 123 mg/dL (22 Feb 2023 08:44)  POCT Blood Glucose.: 121 mg/dL (21 Feb 2023 19:15)    PHYSICAL EXAM:  GENERAL: NAD, well-developed  CHEST/LUNG: Clear to auscultation bilaterally; No wheeze  HEART: Regular rate and rhythm; No murmurs, rubs, or gallops  ABDOMEN: Soft, Nontender, Nondistended; Bowel sounds present  EXTREMITIES:   warm and well perfused, No clubbing, cyanosis, or edema  PSYCH: AAOx3  NEUROLOGY: LE 5/5, possibly reporting mild decrease in sensation in R thigh compared to L  SKIN: healed CABG and LLE graft site     LABS:                        11.7   7.76  )-----------( 253      ( 22 Feb 2023 05:13 )             35.9     02-22    137  |  105  |  41<H>  ----------------------------<  234<H>  4.4   |  23  |  2.11<H>    Ca    9.1      22 Feb 2023 05:13  Phos  3.6     02-21  Mg     1.70     02-21    TPro  6.7  /  Alb  3.7  /  TBili  0.4  /  DBili  x   /  AST  16  /  ALT  18  /  AlkPhos  66  02-22    Care Discussed with Consultants/Other Providers:

## 2023-02-22 NOTE — PROGRESS NOTE ADULT - PROBLEM SELECTOR PLAN 3
History of CKD, unknown baseline   - renal US, no hydro, L renal atrophy  - trend, renally dose meds, avoid nephrotoxins History of CKD, unknown baseline   - renal US, no hydro, L renal atrophy  - c/w calcitriol daily, phos binder daily   - trend, renally dose meds, avoid nephrotoxins

## 2023-02-22 NOTE — ED ADULT NURSE REASSESSMENT NOTE - PAIN RATING/NUMBER SCALE (0-10): ACTIVITY
0 (no pain/absence of nonverbal indicators of pain)

## 2023-02-22 NOTE — PATIENT PROFILE ADULT - FUNCTIONAL ASSESSMENT - DAILY ACTIVITY ASSESSMENT TYPE
Maternal Fetal Medicine Ultrasound    Ms. Spivey was seen in the [facility] today.           Vitals:    [phone]   BP:        Please select the imaging tab and linked document to review today's report.    I encouraged the patient to call with any questions or concerns about today's visit.      Thank you  for allowing us to participate in the care of your patient.  As always, if we can be of any further assistance, please do not hesitate to contact us.        Admission

## 2023-02-22 NOTE — PROGRESS NOTE ADULT - ASSESSMENT
68M Armenian speaking, HTN, DM2 on insulin, CKD3, systolic HF (EF 45%), CAD s/p CABG 2005, stents of unclear etiology in 2012 who presents to the ED with worsening back pain.   68M Serbian speaking, HTN, DM2 on insulin, CKD3, systolic HF (EF 45%), CAD s/p CABG 2005, stents of unclear etiology in 2012 who presents to the ED with worsening back pain in context of known L4-L5 cord compression/cauda equina since 8/2022.

## 2023-02-22 NOTE — CONSULT NOTE ADULT - ASSESSMENT
67 yo M with DMII, CAD s/p CABG, CKD, chronic back pain, and HTN presents to the ED with worsening back pain. Patient is AAOX2 and somnolent, not able to provide any hx. Most of the information was supplemented by the son. As per son, pt is AAOx 3 and "mentally stable." He recently came from [facility] about 2 weeks ago and has " multiple medical complaints." But this morning he told his son that he is feeling weak and his back pain is getting worse. The pain is localized in the lower back region. Pain is worse when standing. Denies radiculopathy. Intermittent urinary and bowel incontinence for 1 year. Was admitted to a hospital in [facility] in Aug 2022 for pain, MRI was completed that showed a L4-5 spondylolisthesis. Patient was offered surgery but told he has too may medical issues and discharged him.   Patient here here visiting his son when back pain exacerbated and came to ER for evaluation with his MRI on paper films     MRI reviewed, L4-5 disk dessication and cauda equina compression , Right grade II and Left Grade I spondylolisthesis

## 2023-02-22 NOTE — PROGRESS NOTE ADULT - PROBLEM SELECTOR PLAN 8
Has meds with him, states had them as well yesterday but was not asked for them.  All brand name Daniel.  We do not have all of them.  Meds are: Ligenta-M 500 (Linagliptin 2.5 and metoformin 500mg), [place] card MR (trimetazidine hydrochloride 35 mg),  Ecosprin 75 (aspirin 75 mg), Duralax, Ketoalfa (amio acid Keto Analogues 600 mg), Nidocard-Retard (nitroglycerin 2.6 mg), Hypophos (calcium acetate 667 mg), Tamisol D (Tamsulosin 0.4 mg and Dutasteride 0.5 mg), Mydocalm (tolperisone 50 mg), Raditrol (calcitriol 0.25 microgram),  NovoRapid 16 units w/ lunch, Manuel nordisk insulin 16am/14pm, Atova 20 (atorvastatin 20 mg), Nebita 2.5 (nevivolol 2.5 mg), Tamisol (tamsulosin 0.4 mg), Pregaba 50 (pregabalin 50 mg), Febustat 40 (febuxostat 40 mg), Lozixum (lorazapam 1 mg), Lasix 40 mg, Sabitar 50 mg, Anazol 0.1%, Tearfresh

## 2023-02-22 NOTE — PHYSICAL THERAPY INITIAL EVALUATION ADULT - PERTINENT HX OF CURRENT PROBLEM, REHAB EVAL
67 yo M with DMII, CAD s/p CABG, CKD, chronic back pain, and HTN presents to the ED with worsening back pain, found to have encephalopathy due to unclear etiology. 69 yo M with DMII, CAD s/p CABG, CKD, chronic back pain, and HTN presents to the ED with worsening back pain, found to have encephalopathy due to unclear etiology. CT HEAD: No acute intracranial injury. CT CERVICAL: No cervical vertebral fracture. X-ray of L/S and pelvis- Lumbar vertebral bodies maintain normal height and alignment. Intervertebral disc spaces are preserved. No fracture or dislocation. Joint spaces are preserved. Globular ossification versus calcification superior lateral to the left hip joint. Facet arthrosis of the lumbar spine. Retrolisthesis of L5 on S1.

## 2023-02-22 NOTE — ED ADULT NURSE REASSESSMENT NOTE - NS ED NURSE REASSESS COMMENT FT1
Break RN- Patient received in stretcher. Sleeping. Easily aroused for verbal stimuli. Respirations even and unlabored. Spontaneous movement of all extremities noted. Medicated as per MD orders. No signs of acute distress noted. Comfort and safety maintained. All current care needs met. Care plan continued Romi SIMS
Break RN: Appears comfortably laying in bed. Resp even and unlabored. No acute distress noted. Bed in lowest position, call bell in reach, wheels locked, safety maintained. Awaiting further orders.
No sx of acute distress, pt sleeping, will continue to monitor
Pt lying in stretcher comfortably. Respirations even and unlabored. No complaints at this time. Will continue to monitor.
No sx of acute distress, pt asleep
No sx of acute distress, pt taken to US for scan, will continue to monitor

## 2023-02-22 NOTE — CONSULT NOTE ADULT - SUBJECTIVE AND OBJECTIVE BOX
HPI:  69 yo M with DMII, CAD s/p CABG, CKD, chronic back pain, and HTN presents to the ED with worsening back pain. Patient is AAOX2 and somnolent, not able to provide any hx. Most of the information was supplemented by the son. As per son, pt is AAOx 3 and "mentally stable." He recently came from [facility] about 2 weeks ago and has " multiple medical complaints." But this morning he told his son that he is feeling weak and his back pain is getting worse. The pain is localized in the lower back region. It is burning is sensation and constant. Unclear if it radiates down the leg. It worsens with movement and walking. He can only walk "10 meters" with a cane. He is also incontinent of urine and bowel occasionally x 1 year but denies any recent fever, chills, or lower extremities weakness/motor deficits. He reportedly had MRIs in the past but son does not know what it showed. He also has other chronic medical problems such as CKD, DMII and CAD and his son thinks pt needs management for them as well.   In the ED, his vitals were notable for hyperglycemia, elevated BUN/Scr, and hypomagnesemia. EKG showed bradycardia with T wave changes in the lateral leads.  (2023 18:36)    PAST MEDICAL & SURGICAL HISTORY:  Diabetes mellitus      Essential hypertension      CAD (coronary artery disease)      Chronic kidney disease, unspecified CKD stage      S/P CABG x 2        Allergies    No Known Allergies    Intolerances      acetaminophen     Tablet .. 650 milliGRAM(s) Oral every 6 hours PRN  aluminum hydroxide/magnesium hydroxide/simethicone Suspension 30 milliLiter(s) Oral every 4 hours PRN  artificial tears (preservative free) Ophthalmic Solution 1 Drop(s) Both EYES four times a day  atorvastatin 20 milliGRAM(s) Oral at bedtime  bisacodyl 5 milliGRAM(s) Oral every 12 hours PRN  calcitriol   Capsule 0.25 MICROGram(s) Oral daily  calcium acetate 667 milliGRAM(s) Oral three times a day with meals  dextrose 50% Injectable 25 Gram(s) IV Push once  dextrose 50% Injectable 12.5 Gram(s) IV Push once  dextrose 50% Injectable 25 Gram(s) IV Push once  dextrose Oral Gel 15 Gram(s) Oral once PRN  furosemide    Tablet 40 milliGRAM(s) Oral daily  heparin   Injectable 5000 Unit(s) SubCutaneous every 12 hours  insulin glargine Injectable (LANTUS) 10 Unit(s) SubCutaneous at bedtime  insulin lispro (ADMELOG) corrective regimen sliding scale   SubCutaneous at bedtime  insulin lispro (ADMELOG) corrective regimen sliding scale   SubCutaneous three times a day before meals  nitroglycerin     SubLingual 0.4 milliGRAM(s) SubLingual every 5 minutes PRN  pregabalin 50 milliGRAM(s) Oral two times a day  sacubitril 24 mG/valsartan 26 mG 1 Tablet(s) Oral two times a day  tamsulosin 0.4 milliGRAM(s) Oral at bedtime    SOCIAL HISTORY:  FAMILY HISTORY:  FH: heart disease      Vital Signs Last 24 Hrs  T(C): 36.9 (2023 15:14), Max: 36.9 (2023 02:54)  T(F): 98.4 (2023 15:14), Max: 98.4 (2023 02:54)  HR: 64 (2023 15:14) (55 - 96)  BP: 117/80 (2023 15:14) (99/69 - 152/50)  BP(mean): --  RR: 16 (2023 15:14) (16 - 18)  SpO2: 100% (2023 15:14) (99% - 100%)    Parameters below as of 2023 15:14  Patient On (Oxygen Delivery Method): room air        PHYSICAL EXAM:  [place]  [id number]  Awake Alert Attentive Affect appropriate Ox3  PERRL EOMI  Tone: normal.                  Strength:     [X] Upper extremity                      Delt       Bicep    Tricep                                                  R         5/5        5/5        5/5       5/5                                               L          5/5        5/5        5/5       5/5  [X] Lower extremity                       HF          KE          KF        DF         PF    EHL                                               R        5/5        5/5        5/5       5/5       5/5      5/5                                               L         5/5        5/5       5/5       5/5        5/5       5/5  Sensory Intact to Light Touch  Reflexes WNL, No clonus, Toes down going    LABS:                          11.7   7.76  )-----------( 253      ( 2023 05:13 )             35.9     02-    137  |  105  |  41<H>  ----------------------------<  234<H>  4.4   |  23  |  2.11<H>    Ca    9.1      2023 05:13  Phos  3.6       Mg     1.70         TPro  6.7  /  Alb  3.7  /  TBili  0.4  /  DBili  x   /  AST  16  /  ALT  18  /  AlkPhos  66        Urinalysis Basic - ( 2023 19:26 )    Color: Yellow / Appearance: Clear / S.020 / pH: x  Gluc: x / Ketone: Negative  / Bili: Negative / Urobili: <2 mg/dL   Blood: x / Protein: 30 mg/dL / Nitrite: Negative   Leuk Esterase: Negative / RBC: 2 /HPF / WBC 1 /HPF   Sq Epi: x / Non Sq Epi: 3 /HPF / Bacteria: Negative        RADIOLOGY & ADDITIONAL STUDIES:

## 2023-02-22 NOTE — PROGRESS NOTE ADULT - PROBLEM SELECTOR PLAN 6
EF 45%  - c/w home entresto (called Sabitar 50 Sacubitril  24 mg and Valsartan 26 mg)  - home nevibiolol 2.5 mg as OP, holding for merrick, not carried also  - check TTE  - appears compensated EF 45%  - c/w home Entresto (called Sabitar 50 Sacubitril  24 mg and Valsartan 26 mg)  - home nebivolol 2.5 mg as OP, holding for merrick, not carried also  - check TTE  - appears compensated EF 45% per outside records   - c/w home Entresto (called Sabitar 50 Sacubitril  24 mg and Valsartan 26 mg)  - home nebivolol 2.5 mg as OP, holding for merrick, not carried also, can likely transition to metoprolol 25 ER  - check TTE  - appears compensated

## 2023-02-22 NOTE — PROGRESS NOTE ADULT - PROBLEM SELECTOR PLAN 4
HbA1c of 8.1%, at home on insulin  - start lantus 10 units qhs  - DM diet, BG checks, YIFAN HbA1c of 8.3%, at home on insulin 14 units with lunch and 14-16 BID of mixed insulin   - start lantus 10 units qhs + 4 units with meals  - DM diet, BG checks, YIFAN HbA1c of 8.3%, at home on insulin 14 units with lunch and 14-16 BID of mixed insulin + orals  - start Lantus 10 units qhs + 4 units with meals  - DM diet, BG checks, YIFAN

## 2023-02-22 NOTE — PROGRESS NOTE ADULT - PROBLEM SELECTOR PLAN 7
Has history of CABG ?2005 and ?stent  - check TTE  - no CP  - holding asa in event of need for spinal surgery  - c/w statin Has history of CABG ?2005 and ?stent  - check TTE  - no CP  - holding asa in event of need for spinal surgery  - c/w statin, LDL 75

## 2023-02-22 NOTE — PROGRESS NOTE ADULT - PROBLEM SELECTOR PLAN 9
[facility]  DM/DASH halal diet  PT erna murphy pending [facility]  DM/DASH halal diet  PT erna murphy pending  attempted to reach son at # in emergency contact --no answer

## 2023-02-22 NOTE — CONSULT NOTE ADULT - PROBLEM SELECTOR RECOMMENDATION 9
- No acute neurosurgical intervention  - Symptoms are long standing, urinary and bowel incontinence present for 1 year   - CT lumbar spine without contrast to assess bony structure  - MRI lumbar spine without gayathri  - STANDING Flexion/Extension Xray if able (per patients needs assistance with standing and walking)      Case d/w Dr. Avalos - No acute neurosurgical intervention  - Symptoms are long standing, urinary and bowel incontinence present for 1 year   - CT lumbar spine without contrast to assess bony structure  - MRI lumbar spine without gayathri  - STANDING Flexion/Extension Xray if able (per patients needs assistance with standing and walking)      Case d/w Dr. Avalos    Addendum  - Requesting standing scoliosis films   - Will eventually need surgical stabilization to correct spondylothesis however this is not urgent and patient should be better medically optimized (better glycemic control- ideally A1C 6-7, cardiac clearance, off ASA/Plavix)

## 2023-02-22 NOTE — PROGRESS NOTE ADULT - PROBLEM SELECTOR PLAN 2
Likely med related, pt A&Ox3 and conversive this am   - CTH no acute CVA or other pathology, microvascular changes   - no s/s of infection   - monitor Likely med related, pt A&Ox3 and conversive this am   - CTH no acute CVA or other pathology, microvascular changes   - no s/s of infection   - monitor, resolved Likely med related, pt A&Ox3 and conversive this am   - CTH no acute CVA or other pathology, microvascular changes (old CVA)  - no s/s of infection   - monitor, resolved

## 2023-02-22 NOTE — PROGRESS NOTE ADULT - PROBLEM SELECTOR PLAN 1
Acute on chronic reports pain for years, reports radiates down bilateral legs has intermittent bladder and bowel incontinence  - has OP MRI from [facility] from 8/21/22 has physical images not on disc, read as lumbar spondylosis with degenerative disc disease. L4-l% disc shoes dessication with diffuse posterior disc bulge causing spinal canal stenosis and cauda equina compression. Bilateral recess appear narrowed. Right grade II and L grade I neuroforaminal narrowing seen. L5-S1 disc shows disc desiccation with mild diffuse posterior disc bulge. Multilevel facet joint arthropathy with ligamentum flava hypertrophy seen. Grade I degenerative anterolisthesis of l4 over L5, suspicious for spondylolysis.   - Xray lumbar spine showed no acute lesion or fracture   - NSY covering spine, consulted  - MRI c/t/l spine w/o Acute on chronic reports pain for years, reports radiates down bilateral legs has intermittent bladder and bowel incontinence  - has OP MRI from [facility] from 8/21/22 has physical images not on disc, read as lumbar spondylosis with degenerative disc disease. L4-L5 disc shoes dessication with diffuse posterior disc bulge causing spinal canal stenosis and cauda equina compression. Bilateral recess appear narrowed. Right grade II and L grade I neuroforaminal narrowing seen. L5-S1 disc shows disc desiccation with mild diffuse posterior disc bulge. Multilevel facet joint arthropathy with ligamentum flava hypertrophy seen. Grade I degenerative anterolisthesis of l4 over L5, suspicious for spondylolysis.   - Xray lumbar spine showed no acute lesion or fracture   - NSY covering spine, consulted  - MRI c/t/l spine w/o Acute on chronic reports pain for years, reports radiates down bilateral legs has intermittent bladder and bowel incontinence  - has OP MRI from [facility] from 8/21/22 has physical images not on disc, read as lumbar spondylosis with degenerative disc disease. L4-L5 disc shows dessication with diffuse posterior disc bulge causing spinal canal stenosis and cauda equina compression. Bilateral recess appear narrowed. Right grade II and L grade I neuroforaminal narrowing seen. L5-S1 disc shows disc desiccation with mild diffuse posterior disc bulge. Multilevel facet joint arthropathy with ligamentum flava hypertrophy seen. Grade I degenerative anterolisthesis of l4 over L5, suspicious for spondylolysis.   - X-ray lumbar spine showed no acute lesion or fracture   - NSY covering spine, consulted  - MRI c/t/l spine w/o

## 2023-02-22 NOTE — PHARMACOTHERAPY INTERVENTION NOTE - COMMENTS
Name & ID: Andres, #[id number]  Reviewed medication bottles with patient. Medications are foreign and were filled in [facility].     Ligenta-M 500 (Linagliptin 2.5 and metformin 500mg) 1 tablet once daily  Ecosprin 75 (aspirin 75 mg) once daily at night  Duralax (Bisacodyl 5mg) 2 tablets daily at night  Nidocard-Retard (nitroglycerin 2.6 mg) 1 tablet twice daily  Hypophos (calcium acetate 667 mg) 1 tablet once daily  Tamisol D (Tamsulosin 0.4 mg and Dutasteride 0.5 mg) 1 tablet once daily at night  Raditrol (calcitriol 0.25 microgram) 1 tablet once daily  NovoRapid (insulin aspart) 14 units w/ lunch  NovoMix 30 insulin 14 units before breakfast, 14-16 units before dinner  Atova 20 (atorvastatin 20 mg) 1 tablet once daily  Nebita 2.5 (nebivolol 2.5 mg) 1 tablet once daily  Pregaba 50 (pregabalin 50 mg) 1 tablet twice daily  Febustat 40 (febuxostat 40 mg) 1 tablet once daily at night  Lozicum (lorazepam 1 mg) 1 tablet once daily in the morning  Lasix 40 mg 1 tablet twice daily  Sabitar 50 mg (Sacubitril 24 mg and Valsartan 26 mg) 0.5 tablet twice daily  Tearfresh (Carboxymethylcellulose 1%) as needed  Antazol 0.1% (xylometazoline) nasal spray as needed  Metacard MR (trimetazidine hydrochloride 35 mg) 1 tablet twice daily  Ketoalfa (amino acid Keto Analogues 600 mg) 2 tablets twice daily  Mydocalm (tolperisone 50 mg) 1 tablet twice daily    Please call spectra [phone] if you have any questions.

## 2023-02-22 NOTE — PHYSICAL THERAPY INITIAL EVALUATION ADULT - PATIENT PROFILE REVIEW, REHAB EVAL
yes ACTIVITY: Ambulate with Assistance; Spoke with RN Denisha Pritchard prior to PT evaluation--> Pt OK for PT consult/OOB activity./yes

## 2023-02-23 LAB
ANION GAP SERPL CALC-SCNC: 11 MMOL/L — SIGNIFICANT CHANGE UP (ref 7–14)
APTT BLD: 29.1 SEC — SIGNIFICANT CHANGE UP (ref 27–36.3)
BUN SERPL-MCNC: 39 MG/DL — HIGH (ref 7–23)
CALCIUM SERPL-MCNC: 9.9 MG/DL — SIGNIFICANT CHANGE UP (ref 8.4–10.5)
CHLORIDE SERPL-SCNC: 102 MMOL/L — SIGNIFICANT CHANGE UP (ref 98–107)
CO2 SERPL-SCNC: 22 MMOL/L — SIGNIFICANT CHANGE UP (ref 22–31)
CREAT SERPL-MCNC: 1.87 MG/DL — HIGH (ref 0.5–1.3)
EGFR: 38 ML/MIN/1.73M2 — LOW
FERRITIN SERPL-MCNC: 222 NG/ML — SIGNIFICANT CHANGE UP (ref 30–400)
GLUCOSE BLDC GLUCOMTR-MCNC: 218 MG/DL — HIGH (ref 70–99)
GLUCOSE BLDC GLUCOMTR-MCNC: 227 MG/DL — HIGH (ref 70–99)
GLUCOSE BLDC GLUCOMTR-MCNC: 231 MG/DL — HIGH (ref 70–99)
GLUCOSE BLDC GLUCOMTR-MCNC: 249 MG/DL — HIGH (ref 70–99)
GLUCOSE SERPL-MCNC: 215 MG/DL — HIGH (ref 70–99)
INR BLD: 1.02 RATIO — SIGNIFICANT CHANGE UP (ref 0.88–1.16)
IRON SATN MFR SERPL: 17 % — SIGNIFICANT CHANGE UP (ref 14–50)
IRON SATN MFR SERPL: 49 UG/DL — SIGNIFICANT CHANGE UP (ref 45–165)
MAGNESIUM SERPL-MCNC: 1.5 MG/DL — LOW (ref 1.6–2.6)
PHOSPHATE SERPL-MCNC: 3 MG/DL — SIGNIFICANT CHANGE UP (ref 2.5–4.5)
POTASSIUM SERPL-MCNC: 5 MMOL/L — SIGNIFICANT CHANGE UP (ref 3.5–5.3)
POTASSIUM SERPL-SCNC: 5 MMOL/L — SIGNIFICANT CHANGE UP (ref 3.5–5.3)
PROTHROM AB SERPL-ACNC: 11.8 SEC — SIGNIFICANT CHANGE UP (ref 10.5–13.4)
SODIUM SERPL-SCNC: 135 MMOL/L — SIGNIFICANT CHANGE UP (ref 135–145)
TIBC SERPL-MCNC: 282 UG/DL — SIGNIFICANT CHANGE UP (ref 220–430)
UIBC SERPL-MCNC: 233 UG/DL — SIGNIFICANT CHANGE UP (ref 110–370)
VIT B12 SERPL-MCNC: 891 PG/ML — SIGNIFICANT CHANGE UP (ref 200–900)

## 2023-02-23 PROCEDURE — 99223 1ST HOSP IP/OBS HIGH 75: CPT

## 2023-02-23 PROCEDURE — 93306 TTE W/DOPPLER COMPLETE: CPT | Mod: 26

## 2023-02-23 PROCEDURE — 99254 IP/OBS CNSLTJ NEW/EST MOD 60: CPT

## 2023-02-23 PROCEDURE — 99233 SBSQ HOSP IP/OBS HIGH 50: CPT

## 2023-02-23 PROCEDURE — 72083 X-RAY EXAM ENTIRE SPI 4/5 VW: CPT | Mod: 26

## 2023-02-23 RX ORDER — INSULIN GLARGINE 100 [IU]/ML
15 INJECTION, SOLUTION SUBCUTANEOUS AT BEDTIME
Refills: 0 | Status: DISCONTINUED | OUTPATIENT
Start: 2023-02-23 | End: 2023-02-24

## 2023-02-23 RX ORDER — ATORVASTATIN CALCIUM 80 MG/1
40 TABLET, FILM COATED ORAL AT BEDTIME
Refills: 0 | Status: DISCONTINUED | OUTPATIENT
Start: 2023-02-23 | End: 2023-03-15

## 2023-02-23 RX ORDER — INSULIN LISPRO 100/ML
5 VIAL (ML) SUBCUTANEOUS
Refills: 0 | Status: DISCONTINUED | OUTPATIENT
Start: 2023-02-23 | End: 2023-02-24

## 2023-02-23 RX ORDER — LANOLIN ALCOHOL/MO/W.PET/CERES
6 CREAM (GRAM) TOPICAL AT BEDTIME
Refills: 0 | Status: DISCONTINUED | OUTPATIENT
Start: 2023-02-23 | End: 2023-02-24

## 2023-02-23 RX ORDER — MAGNESIUM SULFATE 500 MG/ML
2 VIAL (ML) INJECTION ONCE
Refills: 0 | Status: COMPLETED | OUTPATIENT
Start: 2023-02-23 | End: 2023-02-23

## 2023-02-23 RX ADMIN — Medication 25 GRAM(S): at 12:39

## 2023-02-23 RX ADMIN — Medication 4 UNIT(S): at 17:27

## 2023-02-23 RX ADMIN — Medication 50 MILLIGRAM(S): at 05:32

## 2023-02-23 RX ADMIN — HEPARIN SODIUM 5000 UNIT(S): 5000 INJECTION INTRAVENOUS; SUBCUTANEOUS at 05:37

## 2023-02-23 RX ADMIN — PANTOPRAZOLE SODIUM 40 MILLIGRAM(S): 20 TABLET, DELAYED RELEASE ORAL at 05:32

## 2023-02-23 RX ADMIN — Medication 1 DROP(S): at 12:30

## 2023-02-23 RX ADMIN — Medication 2: at 08:08

## 2023-02-23 RX ADMIN — HEPARIN SODIUM 5000 UNIT(S): 5000 INJECTION INTRAVENOUS; SUBCUTANEOUS at 17:47

## 2023-02-23 RX ADMIN — Medication 50 MILLIGRAM(S): at 17:49

## 2023-02-23 RX ADMIN — CALCITRIOL 0.25 MICROGRAM(S): 0.5 CAPSULE ORAL at 12:31

## 2023-02-23 RX ADMIN — SACUBITRIL AND VALSARTAN 1 TABLET(S): 24; 26 TABLET, FILM COATED ORAL at 19:42

## 2023-02-23 RX ADMIN — Medication 1 DROP(S): at 05:33

## 2023-02-23 RX ADMIN — TAMSULOSIN HYDROCHLORIDE 0.4 MILLIGRAM(S): 0.4 CAPSULE ORAL at 22:39

## 2023-02-23 RX ADMIN — ATORVASTATIN CALCIUM 40 MILLIGRAM(S): 80 TABLET, FILM COATED ORAL at 22:41

## 2023-02-23 RX ADMIN — Medication 2: at 17:26

## 2023-02-23 RX ADMIN — Medication 1 DROP(S): at 17:46

## 2023-02-23 RX ADMIN — Medication 4 UNIT(S): at 08:09

## 2023-02-23 RX ADMIN — Medication 667 MILLIGRAM(S): at 17:45

## 2023-02-23 RX ADMIN — Medication 1 DROP(S): at 00:18

## 2023-02-23 RX ADMIN — Medication 40 MILLIGRAM(S): at 05:32

## 2023-02-23 RX ADMIN — Medication 6 MILLIGRAM(S): at 22:40

## 2023-02-23 RX ADMIN — Medication 4 UNIT(S): at 12:27

## 2023-02-23 RX ADMIN — INSULIN GLARGINE 15 UNIT(S): 100 INJECTION, SOLUTION SUBCUTANEOUS at 22:56

## 2023-02-23 RX ADMIN — Medication 2: at 12:24

## 2023-02-23 RX ADMIN — Medication 40 MILLIGRAM(S): at 15:17

## 2023-02-23 RX ADMIN — SACUBITRIL AND VALSARTAN 1 TABLET(S): 24; 26 TABLET, FILM COATED ORAL at 05:33

## 2023-02-23 NOTE — PROGRESS NOTE ADULT - PROBLEM SELECTOR PLAN 2
Likely med related, pt A&Ox3 and conversive this am   - CTH no acute CVA or other pathology, microvascular changes (old CVA)  - no s/s of infection   - monitor, resolved, remains A&Ox3 since 2/22

## 2023-02-23 NOTE — PROGRESS NOTE ADULT - PROBLEM SELECTOR PLAN 6
EF 45% per outside records   - c/w home Entresto (called Sabitar 50 Sacubitril  24 mg and Valsartan 26 mg)  - home nebivolol 2.5 mg as OP, holding for merrick, not carried also, can likely transition to metoprolol 25 ER  - TTE pending   - appears compensated  - cards eval if plan for OR as cannot assess ET

## 2023-02-23 NOTE — CONSULT NOTE ADULT - SUBJECTIVE AND OBJECTIVE BOX
Reason For Consult:   DM    HPI:  69 yo M with DMII, CAD s/p CABG, CKD, chronic back pain, and HTN presents to the ED with worsening back pain.  He recently came from [facility] about 2 weeks ago and has " multiple medical complaints."  he told his son that he is feeling weak and his back pain is getting worse.  He also has other chronic medical problems such as CKD, DMII and CAD and his son thinks pt needs management for them as well.   In the ED, his vitals were notable for hyperglycemia, elevated BUN/Scr, and hypomagnesemia.       endocrine called for DM   pt has a long standing hx of DM  offered , pt declined, provider and pt spoke in English  pt is currently on as per pharm medicine rec  Ligenta-M 500 (Linagliptin 2.5 and metformin 500mg) 1 tablet once daily  NovoRapid (insulin aspart) 14 units w/ lunch  NovoMix 30 insulin 14 units before breakfast, 14-16 units before dinner      he states he does have rice and roti with dinner however will monitor his portions.  reports that everyone in his family has DM (mother father siblings)  states he had difficulty following up with opth due to COVID   a1c 8.3  glucose in the 200s inpt   reports good appetite without n/v          PAST MEDICAL & SURGICAL HISTORY:  Diabetes mellitus      Essential hypertension      CAD (coronary artery disease)      Chronic kidney disease, unspecified CKD stage      S/P CABG x 2          FAMILY HISTORY:  FH: heart disease        Social History:  lives with family   does not report illcitis     Outpatient Medications:  Ligenta-M 500 (Linagliptin 2.5 and metformin 500mg) 1 tablet once daily  Ecosprin 75 (aspirin 75 mg) once daily at night  Duralax (Bisacodyl 5mg) 2 tablets daily at night  Nidocard-Retard (nitroglycerin 2.6 mg) 1 tablet twice daily  Hypophos (calcium acetate 667 mg) 1 tablet once daily  Tamisol D (Tamsulosin 0.4 mg and Dutasteride 0.5 mg) 1 tablet once daily at night  Raditrol (calcitriol 0.25 microgram) 1 tablet once daily  NovoRapid (insulin aspart) 14 units w/ lunch  NovoMix 30 insulin 14 units before breakfast, 14-16 units before dinner  Atova 20 (atorvastatin 20 mg) 1 tablet once daily  Nebita 2.5 (nebivolol 2.5 mg) 1 tablet once daily  Pregaba 50 (pregabalin 50 mg) 1 tablet twice daily  Febustat 40 (febuxostat 40 mg) 1 tablet once daily at night  Lozicum (lorazepam 1 mg) 1 tablet once daily in the morning  Lasix 40 mg 1 tablet twice daily  Sabitar 50 mg (Sacubitril 24 mg and Valsartan 26 mg) 0.5 tablet twice daily  Tearfresh (Carboxymethylcellulose 1%) as needed  Antazol 0.1% (xylometazoline) nasal spray as needed  Metacard MR (trimetazidine hydrochloride 35 mg) 1 tablet twice daily  Ketoalfa (amino acid Keto Analogues 600 mg) 2 tablets twice daily  Mydocalm (tolperisone 50 mg) 1 tablet twice daily      MEDICATIONS  (STANDING):  artificial tears (preservative free) Ophthalmic Solution 1 Drop(s) Both EYES four times a day  atorvastatin 40 milliGRAM(s) Oral at bedtime  calcitriol   Capsule 0.25 MICROGram(s) Oral daily  calcium acetate 667 milliGRAM(s) Oral with dinner  dextrose 50% Injectable 25 Gram(s) IV Push once  dextrose 50% Injectable 12.5 Gram(s) IV Push once  dextrose 50% Injectable 25 Gram(s) IV Push once  furosemide    Tablet 40 milliGRAM(s) Oral two times a day  heparin   Injectable 5000 Unit(s) SubCutaneous every 12 hours  insulin glargine Injectable (LANTUS) 15 Unit(s) SubCutaneous at bedtime  insulin lispro (ADMELOG) corrective regimen sliding scale   SubCutaneous at bedtime  insulin lispro (ADMELOG) corrective regimen sliding scale   SubCutaneous three times a day before meals  insulin lispro Injectable (ADMELOG) 4 Unit(s) SubCutaneous three times a day before meals  pantoprazole    Tablet 40 milliGRAM(s) Oral before breakfast  pregabalin 50 milliGRAM(s) Oral two times a day  sacubitril 24 mG/valsartan 26 mG 1 Tablet(s) Oral two times a day  tamsulosin 0.4 milliGRAM(s) Oral at bedtime    MEDICATIONS  (PRN):  acetaminophen     Tablet .. 650 milliGRAM(s) Oral every 6 hours PRN Temp greater or equal to 38C (100.4F), Mild Pain (1 - 3)  bisacodyl 5 milliGRAM(s) Oral at bedtime PRN Constipation  dextrose Oral Gel 15 Gram(s) Oral once PRN Blood Glucose LESS THAN 70 milliGRAM(s)/deciliter  nitroglycerin     SubLingual 0.4 milliGRAM(s) SubLingual every 5 minutes PRN Chest Pain      Allergies    No Known Allergies    Intolerances      Review of Systems:  Constitutional: No fever  Eyes: No blurry vision  Neuro: No tremors  HEENT: No pain  Cardiovascular: No chest pain, palpitations  Respiratory: No SOB, no cough  GI: No nausea, vomiting, abdominal pain  : No dysuria  Skin: no rash  Psych: no depression  Endocrine: no polyuria, polydipsia  Hem/lymph: no swelling  Osteoporosis: no fractures    ALL OTHER SYSTEMS REVIEWED AND NEGATIVE      PHYSICAL EXAM:  VITALS: T(C): 36.5 (02-23-23 @ 10:14)  T(F): 97.7 (02-23-23 @ 10:14), Max: 99.4 (02-22-23 @ 17:56)  HR: 96 (02-23-23 @ 10:14) (67 - 96)  BP: 126/70 (02-23-23 @ 10:14) (100/79 - 132/51)  RR:  (16 - 18)  SpO2:  (98% - 100%)  Wt(kg): --  GENERAL: NAD, well-groomed, well-developed  EYES: No proptosis, no lid lag, anicteric  HEENT:  Atraumatic, Normocephalic, moist mucous membranes  RESPIRATORY: Clear to auscultation bilaterally; No rales, rhonchi, wheezing, or rubs  CARDIOVASCULAR: Regular rate and rhythm; No murmurs; no peripheral edema  GI: Soft, nontender, non distended, normal bowel sounds  SKIN: Dry, intact, No rashes or lesions  MUSCULOSKELETAL: Full range of motion, normal strength  NEURO: sensation intact, extraocular movements intact, no tremor, normal reflexes  PSYCH: Alert and oriented x 3, normal affect, normal mood      POCT Blood Glucose.: 227 mg/dL (02-23-23 @ 16:44)  POCT Blood Glucose.: 249 mg/dL (02-23-23 @ 11:37)  POCT Blood Glucose.: 218 mg/dL (02-23-23 @ 07:34)  POCT Blood Glucose.: 225 mg/dL (02-22-23 @ 21:31)  POCT Blood Glucose.: 204 mg/dL (02-22-23 @ 16:48)  POCT Blood Glucose.: 238 mg/dL (02-22-23 @ 11:52)  POCT Blood Glucose.: 123 mg/dL (02-22-23 @ 08:44)  POCT Blood Glucose.: 121 mg/dL (02-21-23 @ 19:15)  POCT Blood Glucose.: 240 mg/dL (02-21-23 @ 11:08)                            11.7   7.76  )-----------( 253      ( 22 Feb 2023 05:13 )             35.9       02-23    135  |  102  |  39<H>  ----------------------------<  215<H>  5.0   |  22  |  1.87<H>    eGFR: 38<L>    Ca    9.9      02-23  Mg     1.50     02-23  Phos  3.0     02-23    TPro  6.7  /  Alb  3.7  /  TBili  0.4  /  DBili  x   /  AST  16  /  ALT  18  /  AlkPhos  66  02-22      Thyroid Function Tests:          02-22 Chol 136 Direct LDL -- LDL calculated 75 HDL 29<L> Trig 162<H>    Radiology:

## 2023-02-23 NOTE — PROGRESS NOTE ADULT - PROBLEM SELECTOR PLAN 3
History of CKD, unknown baseline, Cr 2.31-->1.87  - renal US, no hydro, L renal atrophy  - c/w calcitriol daily, phos binder daily   - trend, renally dose meds, avoid nephrotoxins

## 2023-02-23 NOTE — CONSULT NOTE ADULT - ASSESSMENT
67 yo M with DMII, CAD s/p CABG, CKD, chronic back pain, and HTN presents to the ED with worsening back pain.  He recently came from [facility] about 2 weeks ago and has " multiple medical complaints."  he told his son that he is feeling weak and his back pain is getting worse.   In the ED, his vitals were notable for hyperglycemia, elevated BUN/Scr, and hypomagnesemia.   endocrine called for DM, a1c 8.3  pt is currently on as per pharm medicine rec  Ligenta-M 500 (Linagliptin 2.5 and metformin 500mg) 1 tablet once daily  NovoRapid (insulin aspart) 14 units w/ lunch  NovoMix 30 insulin 14 units before breakfast, 14-16 units before dinner      1. DM  While inpatient  BG target 100-180 mg/dl, remains above goal  - Lantus  15  units qhs  - Admelog to 5 units SC Premeal/TIDAC   - Admelog  Low dose Correction Scale Premeal & seperate low dose  Correction Scale Bedtime   - Hypoglycemia protocol in place   - Carb Consistent Diet   - Nutrition consult   - Provider to RN for diabetes/insulin pen teaching         dc planning:  pt is medicaid pending   would stop his aspart with lunch   stop combo DPP4 and metformin and optmize metformin to 1000mg BID (as only taking 500mg metformin in combo pill)  and dc on 70/30 mixed insulin (he has been on mixed insulin per med rec)      2. HTN  goal BP in DM <130/80  currently on lasix   outpt mc/cr ratio          3. HLD  LDL 75  continue lipitor 40mg        Dia Azevedo MD  Attending Physician   Department of Endocrinology, Diabetes and Metabolism     weekdays: 9am to 5pm: [facility] or [facility] and or TEAMS     Nights and weekends: [phone]  Please note that this patient may be followed by a different provider tomorrow.   If no answer or after hours, please contact [phone].  For final dc reccomendations, please call [phone] or page the endocrine fellow on call.     67 yo M with DMII, CAD s/p CABG, CKD, chronic back pain, and HTN presents to the ED with worsening back pain.  He recently came from [facility] about 2 weeks ago and has " multiple medical complaints."  he told his son that he is feeling weak and his back pain is getting worse.   In the ED, his vitals were notable for hyperglycemia, elevated BUN/Scr, and hypomagnesemia.   endocrine called for DM, a1c 8.3  pt is currently on as per pharm medicine rec  Ligenta-M 500 (Linagliptin 2.5 and metformin 500mg) 1 tablet once daily  NovoRapid (insulin aspart) 14 units w/ lunch  NovoMix 30 insulin 14 units before breakfast, 14-16 units before dinner      1. DM  While inpatient  BG target 100-180 mg/dl, remains above goal  - Lantus  15  units qhs  - Admelog to 5 units SC Premeal/TIDAC   - Admelog  Low dose Correction Scale Premeal & seperate low dose  Correction Scale Bedtime   - Hypoglycemia protocol in place   - Carb Consistent Diet   - Nutrition consult   - Provider to RN for diabetes/insulin pen teaching         dc planning:  pt is medicaid pending   would stop his aspart with lunch   stop combo DPP4 and metformin (stop metformin bc of GFR)  and dc on 70/30 mixed insulin (he has been on mixed insulin per med rec) +/- DPP4 based on cost (trajenta)      2. HTN  goal BP in DM <130/80  currently on lasix   outpt mc/cr ratio          3. HLD  LDL 75  continue lipitor 40mg        Dia Azevedo MD  Attending Physician   Department of Endocrinology, Diabetes and Metabolism     weekdays: 9am to 5pm: [facility] or [facility] and or TEAMS     Nights and weekends: [phone]  Please note that this patient may be followed by a different provider tomorrow.   If no answer or after hours, please contact [phone].  For final dc reccomendations, please call [phone] or page the endocrine fellow on call.

## 2023-02-23 NOTE — CONSULT NOTE ADULT - ASSESSMENT
68M Japanese speaking, HTN, DM2 on insulin, CKD3, systolic HF (EF 45%), CAD s/p CABG 2005, stents of unclear etiology in 2012 who presents to the ED with worsening back pain in context of known L4-L5 cord compression/cauda equina since 8/2022.     #Pre-operative risk stratification  Patient is for potential surgical stabilization to correct spondylothesis   -TTE pending   -Currently not having ACS or decompensation of his heart failure  -Primary team holding ASA at this time, can continue to hold for procedure, please restart aspirin as soon as cleared from neurosurgery perspective  - c/w statin, LDL 75.  - Increase statin to atorvastatin 40mg    #HFrEF  EF 45% in 2022 in [facility]. It is most likely ischemic in the setting of history of CAD s/p CABG  -Continue entresto  -Will hold off on starting a beta blocker prior to surgery  -Please obtain TTE    Elzbieta Gordon MD  Cardiology fellow     68M Divehi speaking, HTN, DM2 on insulin, CKD3, systolic HF (EF 45%), CAD s/p CABG 2005, stents of unclear etiology in 2012 who presents to the ED with worsening back pain in context of known L4-L5 cord compression/cauda equina since 8/2022.     #Pre-operative risk stratification  Patient is for potential surgical stabilization to correct spondylothesis   -TTE pending   -Please obtain pharmacological nuclear stress test  -Currently not having ACS or decompensation of his heart failure  -Primary team holding ASA at this time, can continue to hold for procedure, please restart aspirin as soon as cleared from neurosurgery perspective  - c/w statin, LDL 75.  - Increase statin to atorvastatin 40mg    #HFrEF  EF 45% in 2022 in [facility]. It is most likely ischemic in the setting of history of CAD s/p CABG  -Continue entresto  -Will hold off on starting a beta blocker prior to surgery  -Please obtain TTE    Elzbieta Gordon MD  Cardiology fellow

## 2023-02-23 NOTE — PROGRESS NOTE ADULT - PROBLEM SELECTOR PLAN 4
HbA1c of 8.3%, at home on insulin 14 units with lunch and 14-16 BID of mixed insulin + orals  - increase Lantus 15 units qhs + 4 units with meals  - DM diet, BG checks, YIFAN  - states at baseline his HbA1c is 8 because he is old and cannot manage it tighter, states DM 40 years

## 2023-02-23 NOTE — CHART NOTE - NSCHARTNOTEFT_GEN_A_CORE
D/w NSY team, would consider IP surgery if MRI same or worse and optimized.  Cardiology consulted for optimization rec given CAD/CABG/HF unable to assess METS due to back pain/inability to exert self.

## 2023-02-23 NOTE — PROGRESS NOTE ADULT - SUBJECTIVE AND OBJECTIVE BOX
Patient is a 68y old  Male who presents with a chief complaint of Back pain    SUBJECTIVE / OVERNIGHT EVENTS:     ID Maya [id number]  States no CP, SOB, f/c/n/v  was crying on arrival to the room, states nothing to do with medical, issues in [place], daughter in laws fighting  states cannot tell if CP on exertion due to not being able to walk much, saw "good cardiologist" [place] and said heart was OK  states DM 40 years, was told to control this way    MEDICATIONS  (STANDING):  artificial tears (preservative free) Ophthalmic Solution 1 Drop(s) Both EYES four times a day  atorvastatin 20 milliGRAM(s) Oral at bedtime  calcitriol   Capsule 0.25 MICROGram(s) Oral daily  calcium acetate 667 milliGRAM(s) Oral with dinner  furosemide Tablet 40 milliGRAM(s) Oral two times a day  heparin   Injectable 5000 Unit(s) SubCutaneous every 12 hours  insulin glargine Injectable (LANTUS) 15 Unit(s) SubCutaneous at bedtime  insulin lispro (ADMELOG) corrective regimen sliding scale   SubCutaneous at bedtime  insulin lispro (ADMELOG) corrective regimen sliding scale   SubCutaneous three times a day before meals  insulin lispro Injectable (ADMELOG) 4 Unit(s) SubCutaneous three times a day before meals  magnesium sulfate  IVPB 2 Gram(s) IV Intermittent once  pantoprazole    Tablet 40 milliGRAM(s) Oral before breakfast  pregabalin 50 milliGRAM(s) Oral two times a day  sacubitril 24 mG/valsartan 26 mG 1 Tablet(s) Oral two times a day  tamsulosin 0.4 milliGRAM(s) Oral at bedtime    MEDICATIONS  (PRN):  acetaminophen     Tablet .. 650 milliGRAM(s) Oral every 6 hours PRN Temp greater or equal to 38C (100.4F), Mild Pain (1 - 3)  bisacodyl 5 milliGRAM(s) Oral at bedtime PRN Constipation  dextrose Oral Gel 15 Gram(s) Oral once PRN Blood Glucose LESS THAN 70 milliGRAM(s)/deciliter  nitroglycerin     SubLingual 0.4 milliGRAM(s) SubLingual every 5 minutes PRN Chest Pain    T(C): 36.5 (02-23-23 @ 10:14), Max: 37.4 (02-22-23 @ 17:56)  HR: 96 (02-23-23 @ 10:14) (64 - 96)  BP: 126/70 (02-23-23 @ 10:14) (100/79 - 152/50)  RR: 18 (02-23-23 @ 10:14) (16 - 18)  SpO2: 100% (02-23-23 @ 10:14) (98% - 100%)    CAPILLARY BLOOD GLUCOSE  POCT Blood Glucose.: 218 mg/dL (23 Feb 2023 07:34)  POCT Blood Glucose.: 225 mg/dL (22 Feb 2023 21:31)  POCT Blood Glucose.: 204 mg/dL (22 Feb 2023 16:48)  POCT Blood Glucose.: 238 mg/dL (22 Feb 2023 11:52)    I&O's Summary    23 Feb 2023 07:01  -  23 Feb 2023 11:16  --------------------------------------------------------  IN: 0 mL / OUT: 250 mL / NET: -250 mL    PHYSICAL EXAM:  GENERAL: NAD, well-developed  CHEST/LUNG: Clear to auscultation bilaterally; No wheeze  HEART: Regular rate and rhythm; No murmurs, rubs, or gallops  ABDOMEN: Soft, Nontender, Nondistended; Bowel sounds present  EXTREMITIES:   warm and well perfused, No clubbing, cyanosis, or edema  PSYCH: AAOx3  NEUROLOGY: LE 5/5, possibly reporting mild decrease in sensation in R thigh compared to L  SKIN: healed CABG and LLE graft site     LABS:                        11.7   7.76  )-----------( 253      ( 22 Feb 2023 05:13 )             35.9     02-23    135  |  102  |  39<H>  ----------------------------<  215<H>  5.0   |  22  |  1.87<H>    Ca    9.9      23 Feb 2023 05:53  Phos  3.0     02-23  Mg     1.50     02-23    TPro  6.7  /  Alb  3.7  /  TBili  0.4  /  DBili  x   /  AST  16  /  ALT  18  /  AlkPhos  66  02-22    PT/INR - ( 23 Feb 2023 05:53 )   PT: 11.8 sec;   INR: 1.02 ratio    PTT - ( 23 Feb 2023 05:53 )  PTT:29.1 sec    Microbiology: Culture Results:   No growth (02-21 @ 19:26)    Care Discussed with Consultants/Other Providers: GINO

## 2023-02-23 NOTE — PROGRESS NOTE ADULT - ASSESSMENT
68M Georgian speaking, HTN, DM2 on insulin, CKD3, systolic HF (EF 45%), CAD s/p CABG 2005, stents of unclear etiology in 2012 who presents to the ED with worsening back pain in context of known L4-L5 cord compression/cauda equina since 8/2022.

## 2023-02-23 NOTE — PROGRESS NOTE ADULT - PROBLEM SELECTOR PLAN 9
[facility]  DM/DASH halal diet  PT eval rec HAYDEN  dc pending imaging, NSY plan   attempted again in pt room, pt called, no answer [facility]  DM/DASH halal diet  PT eval rec HAYDEN  dc pending imaging, NSY plan   attempted again to call son in pt room with his cell, pt called, no answer

## 2023-02-23 NOTE — CONSULT NOTE ADULT - SUBJECTIVE AND OBJECTIVE BOX
Patient seen and evaluated at bedside    Chief Complaint:    HPI:  67 yo M with DMII, CAD s/p CABG, CKD, chronic back pain, and HTN presents to the ED with worsening back pain. Patient is AAOX2 and somnolent, not able to provide any hx. Most of the information was supplemented by the son. As per son, pt is AAOx 3 and "mentally stable." He recently came from [facility] about 2 weeks ago and has " multiple medical complaints." But this morning he told his son that he is feeling weak and his back pain is getting worse. The pain is localized in the lower back region. It is burning is sensation and constant. Unclear if it radiates down the leg. It worsens with movement and walking. He can only walk "10 meters" with a cane. He is also incontinent of urine but denies any recent fever, chills, bowel incontinence, or lower extremities weakness/motor deficits. Was admitted to a hospital in [facility] in Aug 2022 for pain, MRI was completed that showed a L4-5 spondylolisthesis. Patient was offered surgery but told he has too may medical issues and discharged him.   Patient here here visiting his son when back pain exacerbated and came to ER for evaluation with his MRI on paper films     MRI reviewed, L4-5 disk dessication and cauda equina compression , Right grade II and Left Grade I spondylolisthesis. Per NSG eventually need surgical stabilization to correct spondylothesis.    Cardiology was called for pre-operative risk stratification.       PMHx:   Diabetes mellitus    Essential hypertension    CAD (coronary artery disease)    Chronic kidney disease, unspecified CKD stage        PSHx:   S/P CABG x 2        Allergies:  No Known Allergies      Home Meds:    Current Medications:   acetaminophen     Tablet .. 650 milliGRAM(s) Oral every 6 hours PRN  artificial tears (preservative free) Ophthalmic Solution 1 Drop(s) Both EYES four times a day  atorvastatin 20 milliGRAM(s) Oral at bedtime  bisacodyl 5 milliGRAM(s) Oral at bedtime PRN  calcitriol   Capsule 0.25 MICROGram(s) Oral daily  calcium acetate 667 milliGRAM(s) Oral with dinner  dextrose 50% Injectable 25 Gram(s) IV Push once  dextrose 50% Injectable 12.5 Gram(s) IV Push once  dextrose 50% Injectable 25 Gram(s) IV Push once  dextrose Oral Gel 15 Gram(s) Oral once PRN  furosemide    Tablet 40 milliGRAM(s) Oral two times a day  heparin   Injectable 5000 Unit(s) SubCutaneous every 12 hours  insulin glargine Injectable (LANTUS) 15 Unit(s) SubCutaneous at bedtime  insulin lispro (ADMELOG) corrective regimen sliding scale   SubCutaneous at bedtime  insulin lispro (ADMELOG) corrective regimen sliding scale   SubCutaneous three times a day before meals  insulin lispro Injectable (ADMELOG) 4 Unit(s) SubCutaneous three times a day before meals  nitroglycerin     SubLingual 0.4 milliGRAM(s) SubLingual every 5 minutes PRN  pantoprazole    Tablet 40 milliGRAM(s) Oral before breakfast  pregabalin 50 milliGRAM(s) Oral two times a day  sacubitril 24 mG/valsartan 26 mG 1 Tablet(s) Oral two times a day  tamsulosin 0.4 milliGRAM(s) Oral at bedtime      FAMILY HISTORY:  FH: heart disease        Social History:  Smoking History:  Alcohol Use:  Drug Use:    REVIEW OF SYSTEMS:  Constitutional:     [ ] negative [ ] fevers [ ] chills [ ] weight loss [ ] weight gain  HEENT:                  [ ] negative [ ] dry eyes [ ] eye irritation [ ] postnasal drip [ ] nasal congestion  CV:                         [ ] negative  [ ] chest pain [ ] orthopnea [ ] palpitations [ ] murmur  Resp:                     [ ] negative [ ] cough [ ] shortness of breath [ ] dyspnea [ ] wheezing [ ] sputum [ ]hemoptysis  GI:                          [ ] negative [ ] nausea [ ] vomiting [ ] diarrhea [ ] constipation [ ] abd pain [ ] dysphagia   :                        [ ] negative [ ] dysuria [ ] nocturia [ ] hematuria [ ] increased urinary frequency  Musculoskeletal: [ ] negative [ ] back pain [ ] myalgias [ ] arthralgias [ ] fracture  Skin:                       [ ] negative [ ] rash [ ] itch  Neurological:        [ ] negative [ ] headache [ ] dizziness [ ] syncope [ ] weakness [ ] numbness  Psychiatric:           [ ] negative [ ] anxiety [ ] depression  Endocrine:            [ ] negative [ ] diabetes [ ] thyroid problem  Heme/Lymph:      [ ] negative [ ] anemia [ ] bleeding problem  Allergic/Immune: [ ] negative [ ] itchy eyes [ ] nasal discharge [ ] hives [ ] angioedema    [ ] All other systems negative  [ ] Unable to assess ROS due to      Physical Exam:  T(F): 97.7 (02-23), Max: 99.4 ()  HR: 96 () (64 - 96)  BP: 126/70 () (100/79 - 152/50)  RR: 18 ()  SpO2: 100% ()  GENERAL: NAD, well-developed  CHEST/LUNG: Clear to auscultation bilaterally; No wheeze  HEART: Regular rate and rhythm; No murmurs, rubs, or gallops  ABDOMEN: Soft, Nontender, Nondistended; Bowel sounds present  EXTREMITIES:   warm and well perfused, No clubbing, cyanosis, or edema  PSYCH: AAOx3  NEUROLOGY: LE 5/, possibly reporting mild decrease in sensation in R thigh compared to L  SKIN: healed CABG and LLE graft site   Cardiovascular Diagnostic Testing:    ECG: Personally reviewed:    Echo: Personally reviewed:    Stress Testing:    Cath:    Imaging:    CXR: Personally reviewed    Labs: Personally reviewed                        11.7   7.76  )-----------( 253      ( 2023 05:13 )             35.9         135  |  102  |  39<H>  ----------------------------<  215<H>  5.0   |  22  |  1.87<H>    Ca    9.9      2023 05:53  Phos  3.0       Mg     1.50         TPro  6.7  /  Alb  3.7  /  TBili  0.4  /  DBili  x   /  AST  16  /  ALT  18  /  AlkPhos  66      PT/INR - ( 2023 05:53 )   PT: 11.8 sec;   INR: 1.02 ratio         PTT - ( 2023 05:53 )  PTT:29.1 sec    Total Cholesterol: 136  LDL: --  HDL: 29  T              No Known Allergies    acetaminophen     Tablet .. 650 milliGRAM(s) Oral every 6 hours PRN  artificial tears (preservative free) Ophthalmic Solution 1 Drop(s) Both EYES four times a day  atorvastatin 20 milliGRAM(s) Oral at bedtime  bisacodyl 5 milliGRAM(s) Oral at bedtime PRN  calcitriol   Capsule 0.25 MICROGram(s) Oral daily  calcium acetate 667 milliGRAM(s) Oral with dinner  dextrose 50% Injectable 25 Gram(s) IV Push once  dextrose 50% Injectable 12.5 Gram(s) IV Push once  dextrose 50% Injectable 25 Gram(s) IV Push once  dextrose Oral Gel 15 Gram(s) Oral once PRN  furosemide    Tablet 40 milliGRAM(s) Oral two times a day  heparin   Injectable 5000 Unit(s) SubCutaneous every 12 hours  insulin glargine Injectable (LANTUS) 15 Unit(s) SubCutaneous at bedtime  insulin lispro (ADMELOG) corrective regimen sliding scale   SubCutaneous at bedtime  insulin lispro (ADMELOG) corrective regimen sliding scale   SubCutaneous three times a day before meals  insulin lispro Injectable (ADMELOG) 4 Unit(s) SubCutaneous three times a day before meals  nitroglycerin     SubLingual 0.4 milliGRAM(s) SubLingual every 5 minutes PRN  pantoprazole    Tablet 40 milliGRAM(s) Oral before breakfast  pregabalin 50 milliGRAM(s) Oral two times a day  sacubitril 24 mG/valsartan 26 mG 1 Tablet(s) Oral two times a day  tamsulosin 0.4 milliGRAM(s) Oral at bedtime    T(F): 97.7 (), Max: 99.4 ()  HR: 96 () (64 - 96)  BP: 126/70 () (100/79 - 152/50)  RR: 18 (-)  SpO2: 100% () Patient seen and evaluated at bedside    Chief Complaint:    HPI:  69 yo M with DMII, CAD s/p CABG, CKD, chronic back pain, and HTN presents to the ED with worsening back pain. Patient is AAOX2 and somnolent, not able to provide any hx. Most of the information was supplemented by the son. As per son, pt is AAOx 3 and "mentally stable." He recently came from [facility] about 2 weeks ago and has " multiple medical complaints." But this morning he told his son that he is feeling weak and his back pain is getting worse. The pain is localized in the lower back region. It is burning is sensation and constant. Unclear if it radiates down the leg. It worsens with movement and walking. He can only walk "10 meters" with a cane. He is also incontinent of urine but denies any recent fever, chills, bowel incontinence, or lower extremities weakness/motor deficits. Was admitted to a hospital in [facility] in Aug 2022 for pain, MRI was completed that showed a L4-5 spondylolisthesis. Patient was offered surgery but told he has too may medical issues and discharged him.   Patient here here visiting his son when back pain exacerbated and came to ER for evaluation with his MRI on paper films     MRI reviewed, L4-5 disk dessication and cauda equina compression , Right grade II and Left Grade I spondylolisthesis. Per NSG eventually need surgical stabilization to correct spondylothesis.    Cardiology was called for pre-operative risk stratification.   Patient has not been able to wlk for 4-6 months and so is unsure about chest pain or shortness of breath on exertion.       PMHx:   Diabetes mellitus    Essential hypertension    CAD (coronary artery disease)    Chronic kidney disease, unspecified CKD stage        PSHx:   S/P CABG x 2        Allergies:  No Known Allergies      Home Meds:    Current Medications:   acetaminophen     Tablet .. 650 milliGRAM(s) Oral every 6 hours PRN  artificial tears (preservative free) Ophthalmic Solution 1 Drop(s) Both EYES four times a day  atorvastatin 20 milliGRAM(s) Oral at bedtime  bisacodyl 5 milliGRAM(s) Oral at bedtime PRN  calcitriol   Capsule 0.25 MICROGram(s) Oral daily  calcium acetate 667 milliGRAM(s) Oral with dinner  dextrose 50% Injectable 25 Gram(s) IV Push once  dextrose 50% Injectable 12.5 Gram(s) IV Push once  dextrose 50% Injectable 25 Gram(s) IV Push once  dextrose Oral Gel 15 Gram(s) Oral once PRN  furosemide    Tablet 40 milliGRAM(s) Oral two times a day  heparin   Injectable 5000 Unit(s) SubCutaneous every 12 hours  insulin glargine Injectable (LANTUS) 15 Unit(s) SubCutaneous at bedtime  insulin lispro (ADMELOG) corrective regimen sliding scale   SubCutaneous at bedtime  insulin lispro (ADMELOG) corrective regimen sliding scale   SubCutaneous three times a day before meals  insulin lispro Injectable (ADMELOG) 4 Unit(s) SubCutaneous three times a day before meals  nitroglycerin     SubLingual 0.4 milliGRAM(s) SubLingual every 5 minutes PRN  pantoprazole    Tablet 40 milliGRAM(s) Oral before breakfast  pregabalin 50 milliGRAM(s) Oral two times a day  sacubitril 24 mG/valsartan 26 mG 1 Tablet(s) Oral two times a day  tamsulosin 0.4 milliGRAM(s) Oral at bedtime      FAMILY HISTORY:  FH: heart disease        Social History:  Smoking History: Previous smoker    REVIEW OF SYSTEMS:  Constitutional:     [ ] negative [ ] fevers [ ] chills [ ] weight loss [ ] weight gain  HEENT:                  [ ] negative [ ] dry eyes [ ] eye irritation [ ] postnasal drip [ ] nasal congestion  CV:                         [ ] negative  [ ] chest pain [ ] orthopnea [ ] palpitations [ ] murmur  Resp:                     [ ] negative [ ] cough [ ] shortness of breath [ ] dyspnea [ ] wheezing [ ] sputum [ ]hemoptysis  GI:                          [ ] negative [ ] nausea [ ] vomiting [ ] diarrhea [ ] constipation [ ] abd pain [ ] dysphagia   :                        [ ] negative [ ] dysuria [ ] nocturia [ ] hematuria [ ] increased urinary frequency  Musculoskeletal: [ ] negative [ ] back pain [ ] myalgias [ ] arthralgias [ ] fracture  Skin:                       [ ] negative [ ] rash [ ] itch  Neurological:        [ ] negative [ ] headache [ ] dizziness [ ] syncope [ ] weakness [ ] numbness  Psychiatric:           [ ] negative [ ] anxiety [ ] depression  Endocrine:            [ ] negative [ ] diabetes [ ] thyroid problem  Heme/Lymph:      [ ] negative [ ] anemia [ ] bleeding problem  Allergic/Immune: [ ] negative [ ] itchy eyes [ ] nasal discharge [ ] hives [ ] angioedema    [ ] All other systems negative  [ ] Unable to assess ROS due to      Physical Exam:  T(F): 97.7 (), Max: 99.4 ()  HR: 96 () (64 - 96)  BP: 126/70 () (100/79 - 152/50)  RR: 18 (-)  SpO2: 100% ()  GENERAL: NAD, well-developed  CHEST/LUNG: Clear to auscultation bilaterally; No wheeze  HEART: Regular rate and rhythm; No murmurs, rubs, or gallops  ABDOMEN: Soft, Nontender, Nondistended; Bowel sounds present  EXTREMITIES:   warm and well perfused, No clubbing, cyanosis, or edema  PSYCH: AAOx3  NEUROLOGY: LE 5/5, possibly reporting mild decrease in sensation in R thigh compared to L  SKIN: healed CABG and LLE graft site   Cardiovascular Diagnostic Testing:    ECG: Personally reviewed: Sinus rhythm with Q waves in the anterior and inferior leads.     Echo: Personally reviewed: EF 45% in     Imaging:    CXR: Personally reviewed    ACC: [id number] EXAM: XR CHEST PORTABLE ROUTINE 1V ORDERED BY: NOEL BARRETT    PROCEDURE DATE: 2023        INTERPRETATION: Chest one view    HISTORY: Preop    COMPARISON STUDY: None available    Frontal expiratory view of the chest shows the heart to be slightly enlarged in size. Sternal wires are present.    The lungs show mild pulmonary congestion and there is no evidence of pneumothorax nor definite pleural effusion.    IMPRESSION:  Mild congestive changes.      Labs: Personally reviewed                        11.7   7.76  )-----------( 253      ( 2023 05:13 )             35.9         135  |  102  |  39<H>  ----------------------------<  215<H>  5.0   |  22  |  1.87<H>    Ca    9.9      2023 05:53  Phos  3.0       Mg     1.50         TPro  6.7  /  Alb  3.7  /  TBili  0.4  /  DBili  x   /  AST  16  /  ALT  18  /  AlkPhos  66      PT/INR - ( 2023 05:53 )   PT: 11.8 sec;   INR: 1.02 ratio         PTT - ( 2023 05:53 )  PTT:29.1 sec    Total Cholesterol: 136  LDL: --  HDL: 29  T              No Known Allergies    acetaminophen     Tablet .. 650 milliGRAM(s) Oral every 6 hours PRN  artificial tears (preservative free) Ophthalmic Solution 1 Drop(s) Both EYES four times a day  atorvastatin 20 milliGRAM(s) Oral at bedtime  bisacodyl 5 milliGRAM(s) Oral at bedtime PRN  calcitriol   Capsule 0.25 MICROGram(s) Oral daily  calcium acetate 667 milliGRAM(s) Oral with dinner  dextrose 50% Injectable 25 Gram(s) IV Push once  dextrose 50% Injectable 12.5 Gram(s) IV Push once  dextrose 50% Injectable 25 Gram(s) IV Push once  dextrose Oral Gel 15 Gram(s) Oral once PRN  furosemide    Tablet 40 milliGRAM(s) Oral two times a day  heparin   Injectable 5000 Unit(s) SubCutaneous every 12 hours  insulin glargine Injectable (LANTUS) 15 Unit(s) SubCutaneous at bedtime  insulin lispro (ADMELOG) corrective regimen sliding scale   SubCutaneous at bedtime  insulin lispro (ADMELOG) corrective regimen sliding scale   SubCutaneous three times a day before meals  insulin lispro Injectable (ADMELOG) 4 Unit(s) SubCutaneous three times a day before meals  nitroglycerin     SubLingual 0.4 milliGRAM(s) SubLingual every 5 minutes PRN  pantoprazole    Tablet 40 milliGRAM(s) Oral before breakfast  pregabalin 50 milliGRAM(s) Oral two times a day  sacubitril 24 mG/valsartan 26 mG 1 Tablet(s) Oral two times a day  tamsulosin 0.4 milliGRAM(s) Oral at bedtime    T(F): 97.7 (), Max: 99.4 ()  HR: 96 () (64 - 96)  BP: 126/70 () (100/79 - 152/50)  RR: 18 ()  SpO2: 100% ()

## 2023-02-23 NOTE — PROGRESS NOTE ADULT - PROBLEM SELECTOR PLAN 8
Has meds with him, states had them as well yesterday but was not asked for them.  All brand name Daniel.  We do not have all of them.  Meds are: Ligenta-M 500 (Linagliptin 2.5 and metoformin 500mg), [place] card MR (trimetazidine hydrochloride 35 mg),  Ecosprin 75 (aspirin 75 mg), Duralax, Ketoalfa (amio acid Keto Analogues 600 mg), Nidocard-Retard (nitroglycerin 2.6 mg), Hypophos (calcium acetate 667 mg), Tamisol D (Tamsulosin 0.4 mg and Dutasteride 0.5 mg), Mydocalm (tolperisone 50 mg), Raditrol (calcitriol 0.25 microgram),  NovoRapid 16 units w/ lunch, Manuel nordisk insulin 16am/14pm, Atova 20 (atorvastatin 20 mg), Nebita 2.5 (nevivolol 2.5 mg), Tamisol (tamsulosin 0.4 mg), Pregaba 50 (pregabalin 50 mg), Febustat 40 (febuxostat 40 mg), Lozixum (lorazapam 1 mg), Lasix 40 mg, Sabitar 50 mg, Anazol 0.1%, Tearfresh

## 2023-02-23 NOTE — PROGRESS NOTE ADULT - PROBLEM SELECTOR PLAN 7
Has history of CABG ?2005   - TTE penidng   - no CP  - holding asa in event of need for spinal surgery  - c/w statin, LDL 75

## 2023-02-23 NOTE — PROGRESS NOTE ADULT - PROBLEM SELECTOR PLAN 1
Acute on chronic reports pain for years, reports radiates down bilateral legs has intermittent bladder and bowel incontinence  - has OP MRI from [facility] from 8/21/22: diffuse posterior disc bulge causing spinal canal stenosis and cauda equina compression   - X-ray lumbar spine showed no acute lesion or fracture   - NSY covering spine, consulted, rec appreciated  - pending MRI, CT and flexion x-rays

## 2023-02-24 ENCOUNTER — TRANSCRIPTION ENCOUNTER (OUTPATIENT)
Age: 68
End: 2023-02-24

## 2023-02-24 DIAGNOSIS — I10 ESSENTIAL (PRIMARY) HYPERTENSION: ICD-10-CM

## 2023-02-24 DIAGNOSIS — E78.5 HYPERLIPIDEMIA, UNSPECIFIED: ICD-10-CM

## 2023-02-24 LAB
ANION GAP SERPL CALC-SCNC: 12 MMOL/L — SIGNIFICANT CHANGE UP (ref 7–14)
BUN SERPL-MCNC: 43 MG/DL — HIGH (ref 7–23)
CALCIUM SERPL-MCNC: 10.4 MG/DL — SIGNIFICANT CHANGE UP (ref 8.4–10.5)
CHLORIDE SERPL-SCNC: 102 MMOL/L — SIGNIFICANT CHANGE UP (ref 98–107)
CO2 SERPL-SCNC: 21 MMOL/L — LOW (ref 22–31)
CREAT SERPL-MCNC: 2.15 MG/DL — HIGH (ref 0.5–1.3)
EGFR: 32 ML/MIN/1.73M2 — LOW
GLUCOSE BLDC GLUCOMTR-MCNC: 132 MG/DL — HIGH (ref 70–99)
GLUCOSE BLDC GLUCOMTR-MCNC: 182 MG/DL — HIGH (ref 70–99)
GLUCOSE BLDC GLUCOMTR-MCNC: 248 MG/DL — HIGH (ref 70–99)
GLUCOSE BLDC GLUCOMTR-MCNC: 290 MG/DL — HIGH (ref 70–99)
GLUCOSE SERPL-MCNC: 213 MG/DL — HIGH (ref 70–99)
MAGNESIUM SERPL-MCNC: 2 MG/DL — SIGNIFICANT CHANGE UP (ref 1.6–2.6)
PHOSPHATE SERPL-MCNC: 4.4 MG/DL — SIGNIFICANT CHANGE UP (ref 2.5–4.5)
POTASSIUM SERPL-MCNC: 4.5 MMOL/L — SIGNIFICANT CHANGE UP (ref 3.5–5.3)
POTASSIUM SERPL-SCNC: 4.5 MMOL/L — SIGNIFICANT CHANGE UP (ref 3.5–5.3)
SODIUM SERPL-SCNC: 135 MMOL/L — SIGNIFICANT CHANGE UP (ref 135–145)

## 2023-02-24 PROCEDURE — 99232 SBSQ HOSP IP/OBS MODERATE 35: CPT

## 2023-02-24 PROCEDURE — 72148 MRI LUMBAR SPINE W/O DYE: CPT | Mod: 26

## 2023-02-24 PROCEDURE — 99233 SBSQ HOSP IP/OBS HIGH 50: CPT

## 2023-02-24 PROCEDURE — 72131 CT LUMBAR SPINE W/O DYE: CPT | Mod: 26

## 2023-02-24 RX ORDER — INSULIN GLARGINE 100 [IU]/ML
20 INJECTION, SOLUTION SUBCUTANEOUS AT BEDTIME
Refills: 0 | Status: DISCONTINUED | OUTPATIENT
Start: 2023-02-24 | End: 2023-02-26

## 2023-02-24 RX ORDER — INSULIN LISPRO 100/ML
6 VIAL (ML) SUBCUTANEOUS
Refills: 0 | Status: DISCONTINUED | OUTPATIENT
Start: 2023-02-24 | End: 2023-02-26

## 2023-02-24 RX ORDER — LANOLIN ALCOHOL/MO/W.PET/CERES
6 CREAM (GRAM) TOPICAL AT BEDTIME
Refills: 0 | Status: DISCONTINUED | OUTPATIENT
Start: 2023-02-24 | End: 2023-03-15

## 2023-02-24 RX ORDER — PSYLLIUM SEED (WITH DEXTROSE)
1 POWDER (GRAM) ORAL
Refills: 0 | Status: DISCONTINUED | OUTPATIENT
Start: 2023-02-24 | End: 2023-03-15

## 2023-02-24 RX ADMIN — HEPARIN SODIUM 5000 UNIT(S): 5000 INJECTION INTRAVENOUS; SUBCUTANEOUS at 17:39

## 2023-02-24 RX ADMIN — Medication 5 UNIT(S): at 07:52

## 2023-02-24 RX ADMIN — Medication 1 DROP(S): at 23:58

## 2023-02-24 RX ADMIN — Medication 1 DROP(S): at 06:25

## 2023-02-24 RX ADMIN — Medication 50 MILLIGRAM(S): at 06:25

## 2023-02-24 RX ADMIN — Medication 50 MILLIGRAM(S): at 17:35

## 2023-02-24 RX ADMIN — ATORVASTATIN CALCIUM 40 MILLIGRAM(S): 80 TABLET, FILM COATED ORAL at 22:12

## 2023-02-24 RX ADMIN — SACUBITRIL AND VALSARTAN 1 TABLET(S): 24; 26 TABLET, FILM COATED ORAL at 11:59

## 2023-02-24 RX ADMIN — PANTOPRAZOLE SODIUM 40 MILLIGRAM(S): 20 TABLET, DELAYED RELEASE ORAL at 06:24

## 2023-02-24 RX ADMIN — CALCITRIOL 0.25 MICROGRAM(S): 0.5 CAPSULE ORAL at 12:01

## 2023-02-24 RX ADMIN — Medication 1 DROP(S): at 12:00

## 2023-02-24 RX ADMIN — INSULIN GLARGINE 20 UNIT(S): 100 INJECTION, SOLUTION SUBCUTANEOUS at 22:12

## 2023-02-24 RX ADMIN — Medication 6 MILLIGRAM(S): at 22:13

## 2023-02-24 RX ADMIN — Medication 1: at 07:52

## 2023-02-24 RX ADMIN — Medication 667 MILLIGRAM(S): at 17:38

## 2023-02-24 RX ADMIN — Medication 5 UNIT(S): at 11:59

## 2023-02-24 RX ADMIN — Medication 40 MILLIGRAM(S): at 17:38

## 2023-02-24 RX ADMIN — Medication 1 PACKET(S): at 17:37

## 2023-02-24 RX ADMIN — Medication 2: at 17:47

## 2023-02-24 RX ADMIN — Medication 6 UNIT(S): at 17:37

## 2023-02-24 RX ADMIN — TAMSULOSIN HYDROCHLORIDE 0.4 MILLIGRAM(S): 0.4 CAPSULE ORAL at 22:13

## 2023-02-24 RX ADMIN — Medication 3: at 11:58

## 2023-02-24 RX ADMIN — Medication 1 DROP(S): at 17:38

## 2023-02-24 RX ADMIN — HEPARIN SODIUM 5000 UNIT(S): 5000 INJECTION INTRAVENOUS; SUBCUTANEOUS at 06:25

## 2023-02-24 NOTE — DIETITIAN INITIAL EVALUATION ADULT - PERTINENT LABORATORY DATA
02-24    135  |  102  |  43<H>  ----------------------------<  213<H>  4.5   |  21<L>  |  2.15<H>    Ca    10.4      24 Feb 2023 05:55  Phos  4.4     02-24  Mg     2.00     02-24    POCT Blood Glucose.: 290 mg/dL (02-24-23 @ 11:37)  A1C with Estimated Average Glucose Result: 8.3 % (02-22-23 @ 05:13)

## 2023-02-24 NOTE — DIETITIAN INITIAL EVALUATION ADULT - ORAL INTAKE PTA/DIET HISTORY
Patient seen for assessment. Information obtained w/ help from  #[id number]. Reports good appetite PTA. States monitoring carbohydrate intake and certain vegetables "for his kidneys". Per diet recall, patient reports decreasing amount of rice consumed, eats roti and consumes various meats. Follows Halal diet at home. Patient unable to provide usual blood sugars at home. Noted w/ A1c 8.3% (2/22) per chart indicating adequate glycemic control for advanced age.

## 2023-02-24 NOTE — DISCHARGE NOTE PROVIDER - NSDCMRMEDTOKEN_GEN_ALL_CORE_FT
aspirin 81 mg oral delayed release tablet: 1 tab(s) orally once a day  atorvastatin 20 mg oral tablet: 1 tab(s) orally once a day (at bedtime)  bisacodyl 5 mg oral delayed release tablet: 1 tab(s) orally once a day  calcitriol 0.25 mcg oral capsule: 1 cap(s) orally once a day  calcium acetate 667 mg oral tablet: 1 tab(s) orally 3 times a day  dutasteride-tamsulosin 0.5 mg-0.4 mg oral capsule: 1 cap(s) orally once a day  febuxostat 40 mg oral tablet: 1 tab(s) orally once a day  Insulin Aspart FlexPen 100 units/mL injectable solution: 16 unit(s) injectable before lunch  Lasix 40 mg oral tablet: 1 tab(s) orally once a day  linagliptin-metformin 2.5 mg-500 mg oral tablet: 1 tab(s) orally 2 times a day  LORazepam 1 mg oral tablet: 1 tab(s) orally once a day  Nebivolol 2.5 mg oral tablet: 1 tab(s) orally once a day  nitroglycerin 2.5 mg oral capsule, extended release: 1 cap(s) orally 3 times a day  pregabalin 50 mg oral capsule: 1 cap(s) orally 2 times a day  Refresh ophthalmic solution: 1 drop(s) to each affected eye 4 times a day  sacubitril-valsartan 24 mg-26 mg oral tablet: 1 tab(s) orally 2 times a day  xylometazoline 0.1% nasal solution: nasal 2 times a day   acetaminophen 325 mg oral tablet: 2 tab(s) orally every 6 hours, As needed, Mild Pain (1 - 3)  aluminum hydroxide-magnesium hydroxide 200 mg-200 mg/5 mL oral suspension: 30 milliliter(s) orally every 6 hours, As needed, Dyspepsia  aspirin 81 mg oral delayed release tablet: 1 tab(s) orally once a day  atorvastatin 20 mg oral tablet: 1 tab(s) orally once a day (at bedtime)  bisacodyl 5 mg oral delayed release tablet: 1 tab(s) orally once a day  calcitriol 0.25 mcg oral capsule: 1 cap(s) orally once a day  calcium acetate 667 mg oral tablet: 1 tab(s) orally 3 times a day  calcium carbonate 500 mg (200 mg elemental calcium) oral tablet, chewable: 1 tab(s) orally every 4 hours, As needed, Heartburn  dutasteride-tamsulosin 0.5 mg-0.4 mg oral capsule: 1 cap(s) orally once a day  heparin: 5000 unit(s) subcutaneous every 8 hours  hydrOXYzine hydrochloride 25 mg oral tablet: 1 tab(s) orally once a day (at bedtime), As needed, sleep  insulin glargine 100 units/mL subcutaneous solution: 25 unit(s) subcutaneous once a day  insulin lispro 100 units/mL injectable solution: 7 unit(s) injectable 2 times a day  before lunch and dinner  insulin lispro 100 units/mL injectable solution: 12 unit(s) injectable once a day  before breakfast  insulin lispro 100 units/mL injectable solution: 1 unit(s) injectable 3 times a day, As Needed  isosorbide mononitrate 20 mg oral tablet: 1.5 tab(s) orally 2 times a day  Lasix 40 mg oral tablet: 1 tab(s) orally once a day  melatonin 3 mg oral tablet: 2 tab(s) orally once a day (at bedtime)  Nebivolol 2.5 mg oral tablet: 1 tab(s) orally once a day  nitroglycerin 2.5 mg oral capsule, extended release: 1 cap(s) orally 3 times a day  pantoprazole 40 mg oral delayed release tablet: 1 tab(s) orally once a day (before a meal)  polyethylene glycol 3350 oral powder for reconstitution: 17 gram(s) orally 2 times a day  pregabalin 50 mg oral capsule: 1 cap(s) orally 2 times a day  Refresh ophthalmic solution: 1 drop(s) to each affected eye 4 times a day  sacubitril-valsartan 24 mg-26 mg oral tablet: 1 tab(s) orally 2 times a day  senna leaf extract oral tablet: 2 tab(s) orally once a day (at bedtime)  sodium bicarbonate 325 mg oral tablet: 1 tab(s) orally 2 times a day  sodium biphosphate-sodium phosphate 19 g-7 g rectal enema: 1 each rectal once a day, As needed, no BM  xylometazoline 0.1% nasal solution: nasal 2 times a day

## 2023-02-24 NOTE — PROGRESS NOTE ADULT - PROBLEM SELECTOR PLAN 7
Has history of CABG ?2005  - no CP  - holding asa in event of need for spinal surgery  - c/w statin, LDL 75, increased to atorvastatin 40 mg  - pending stress test Has history of CABG ?2005, cath 2010 RSVG to distal RCA patent, RSVG to OM patent, and LIMA to LAD patent  - no CP  - holding asa in event of need for spinal surgery  - c/w statin, LDL 75, increased to atorvastatin 40 mg  - pending stress test

## 2023-02-24 NOTE — PROGRESS NOTE ADULT - SUBJECTIVE AND OBJECTIVE BOX
Patient is a 68y old  Male who presents with a chief complaint of Back pain    SUBJECTIVE / OVERNIGHT EVENTS:    K[place]  [id number]  Denies CP, SOB, f/c/n/v  States at times feels dizziness in bed, not positional, more vertiginous, last seconds    MEDICATIONS  (STANDING):  artificial tears (preservative free) Ophthalmic Solution 1 Drop(s) Both EYES four times a day  atorvastatin 40 milliGRAM(s) Oral at bedtime  calcitriol   Capsule 0.25 MICROGram(s) Oral daily  calcium acetate 667 milliGRAM(s) Oral with dinner  furosemide    Tablet 40 milliGRAM(s) Oral two times a day  heparin   Injectable 5000 Unit(s) SubCutaneous every 12 hours  insulin glargine Injectable (LANTUS) 15 Unit(s) SubCutaneous at bedtime  insulin lispro (ADMELOG) corrective regimen sliding scale SubCutaneous at bedtime  insulin lispro (ADMELOG) corrective regimen sliding scale SubCutaneous three times a day before meals  insulin lispro Injectable (ADMELOG) 5 Unit(s) SubCutaneous three times a day before meals  pantoprazole    Tablet 40 milliGRAM(s) Oral before breakfast  pregabalin 50 milliGRAM(s) Oral two times a day  sacubitril 24 mG/valsartan 26 mG 1 Tablet(s) Oral two times a day  tamsulosin 0.4 milliGRAM(s) Oral at bedtime    MEDICATIONS  (PRN):  acetaminophen     Tablet .. 650 milliGRAM(s) Oral every 6 hours PRN Temp greater or equal to 38C (100.4F), Mild Pain (1 - 3)  bisacodyl 5 milliGRAM(s) Oral at bedtime PRN Constipation  dextrose Oral Gel 15 Gram(s) Oral once PRN Blood Glucose LESS THAN 70 milliGRAM(s)/deciliter  melatonin 6 milliGRAM(s) Oral at bedtime PRN Insomnia  nitroglycerin     SubLingual 0.4 milliGRAM(s) SubLingual every 5 minutes PRN Chest Pain    T(C): 36.4 (02-24-23 @ 06:25), Max: 36.8 (02-24-23 @ 02:04)  HR: 66 (02-24-23 @ 06:25) (66 - 96)  BP: 96/58 (02-24-23 @ 06:25) (96/58 - 139/65)  RR: 18 (02-24-23 @ 06:25) (16 - 18)  SpO2: 100% (02-24-23 @ 06:25) (98% - 100%)    CAPILLARY BLOOD GLUCOSE  POCT Blood Glucose.: 182 mg/dL (24 Feb 2023 07:07)  POCT Blood Glucose.: 231 mg/dL (23 Feb 2023 22:43)  POCT Blood Glucose.: 227 mg/dL (23 Feb 2023 16:44)  POCT Blood Glucose.: 249 mg/dL (23 Feb 2023 11:37)    I&O's Summary    23 Feb 2023 07:01  -  24 Feb 2023 07:00  --------------------------------------------------------  IN: 900 mL / OUT: 953 mL / NET: -53 mL    PHYSICAL EXAM:  GENERAL: NAD, well-developed  CHEST/LUNG: Clear to auscultation bilaterally; No wheeze  HEART: Regular rate and rhythm; No murmurs, rubs, or gallops  ABDOMEN: Soft, Nontender, Nondistended; Bowel sounds present  EXTREMITIES:   warm and well perfused, No clubbing, cyanosis, or edema  PSYCH: AAOx3  NEUROLOGY: LE 5/5, no dysmetria   SKIN: healed CABG and LLE graft site       LABS:    02-24    135  |  102  |  43<H>  ----------------------------<  213<H>  4.5   |  21<L>  |  2.15<H>    Ca    10.4      24 Feb 2023 05:55  Phos  4.4     02-24  Mg     2.00     02-24    PT/INR - ( 23 Feb 2023 05:53 )   PT: 11.8 sec;   INR: 1.02 ratio    PTT - ( 23 Feb 2023 05:53 )  PTT:29.1 sec    Microbiology: Culture Results:   No growth (02-21 @ 19:26)    Care Discussed with Consultants/Other Providers: cards   Patient is a 68y old  Male who presents with a chief complaint of Back pain    SUBJECTIVE / OVERNIGHT EVENTS:    Hungarian  [id number]  Denies CP, SOB, f/c/n/v  States at times feels dizziness in bed, not positional, more vertiginous, last seconds    MEDICATIONS  (STANDING):  artificial tears (preservative free) Ophthalmic Solution 1 Drop(s) Both EYES four times a day  atorvastatin 40 milliGRAM(s) Oral at bedtime  calcitriol   Capsule 0.25 MICROGram(s) Oral daily  calcium acetate 667 milliGRAM(s) Oral with dinner  furosemide    Tablet 40 milliGRAM(s) Oral two times a day  heparin   Injectable 5000 Unit(s) SubCutaneous every 12 hours  insulin glargine Injectable (LANTUS) 15 Unit(s) SubCutaneous at bedtime  insulin lispro (ADMELOG) corrective regimen sliding scale SubCutaneous at bedtime  insulin lispro (ADMELOG) corrective regimen sliding scale SubCutaneous three times a day before meals  insulin lispro Injectable (ADMELOG) 5 Unit(s) SubCutaneous three times a day before meals  pantoprazole    Tablet 40 milliGRAM(s) Oral before breakfast  pregabalin 50 milliGRAM(s) Oral two times a day  sacubitril 24 mG/valsartan 26 mG 1 Tablet(s) Oral two times a day  tamsulosin 0.4 milliGRAM(s) Oral at bedtime    MEDICATIONS  (PRN):  acetaminophen     Tablet .. 650 milliGRAM(s) Oral every 6 hours PRN Temp greater or equal to 38C (100.4F), Mild Pain (1 - 3)  bisacodyl 5 milliGRAM(s) Oral at bedtime PRN Constipation  dextrose Oral Gel 15 Gram(s) Oral once PRN Blood Glucose LESS THAN 70 milliGRAM(s)/deciliter  melatonin 6 milliGRAM(s) Oral at bedtime PRN Insomnia  nitroglycerin     SubLingual 0.4 milliGRAM(s) SubLingual every 5 minutes PRN Chest Pain    T(C): 36.4 (02-24-23 @ 06:25), Max: 36.8 (02-24-23 @ 02:04)  HR: 66 (02-24-23 @ 06:25) (66 - 96)  BP: 96/58 (02-24-23 @ 06:25) (96/58 - 139/65)  RR: 18 (02-24-23 @ 06:25) (16 - 18)  SpO2: 100% (02-24-23 @ 06:25) (98% - 100%)    CAPILLARY BLOOD GLUCOSE  POCT Blood Glucose.: 182 mg/dL (24 Feb 2023 07:07)  POCT Blood Glucose.: 231 mg/dL (23 Feb 2023 22:43)  POCT Blood Glucose.: 227 mg/dL (23 Feb 2023 16:44)  POCT Blood Glucose.: 249 mg/dL (23 Feb 2023 11:37)    I&O's Summary    23 Feb 2023 07:01  -  24 Feb 2023 07:00  --------------------------------------------------------  IN: 900 mL / OUT: 953 mL / NET: -53 mL    PHYSICAL EXAM:  GENERAL: NAD, well-developed  CHEST/LUNG: Clear to auscultation bilaterally; No wheeze  HEART: Regular rate and rhythm; No murmurs, rubs, or gallops  ABDOMEN: Soft, Nontender, Nondistended; Bowel sounds present  EXTREMITIES:   warm and well perfused, No clubbing, cyanosis, or edema  PSYCH: AAOx3  NEUROLOGY: LE 5/5, no dysmetria   SKIN: healed CABG and LLE graft site     LABS:    02-24    135  |  102  |  43<H>  ----------------------------<  213<H>  4.5   |  21<L>  |  2.15<H>    Ca    10.4      24 Feb 2023 05:55  Phos  4.4     02-24  Mg     2.00     02-24    PT/INR - ( 23 Feb 2023 05:53 )   PT: 11.8 sec;   INR: 1.02 ratio    PTT - ( 23 Feb 2023 05:53 )  PTT:29.1 sec    Microbiology: Culture Results:   No growth (02-21 @ 19:26)    TTE  1. Mitral annular calcification, otherwise normal mitral  valve. Minimal mitral regurgitation.  2. Calcified trileaflet aortic valve with decreased  opening. Peak transaortic valve gradient equals 32 mm Hg,  mean transaortic valve gradient equals 18 mm Hg, estimated  aortic valve area equals 1 sqcm (by continuity equation),  consistent with moderate to severe aortic stenosis. Mild  aortic regurgitation.  3. Normal left ventricular internal dimensions and wall  thicknesses.  4. Mild to moderate segmental left ventricular systolic  dysfunction.  Hypokinesis of the apex, distal septum, and  distal anterior wall.  5. Mild diastolic dysfunction (Stage I).  6. Normal right ventricular size and function.  Consider AMINATA for further evaluation of the aortic valve, if  clinically indicated.    X-ray Spine  No compression fractures.  No gross scoliotic curving deformity on frontal view. Trace negative   coronal balance. Notably positive sagittal balance. No gross pelvic tilt.  Slightly narrowed L4-L5 disc space with otherwise dynamically stable   slight grade 1 anterolisthesis of L4 on L5 without spondylolysis defects.   Remaining vertebral body alignment and disc space heights maintained.  Symmetrically aligned and spaced SI joints and pubic symphysis and   unremarkable visualized hips.  Generalized osteopenia otherwise no discrete lytic or blastic lesions.  Sternal wires present.  Metallic mesh vascular stents in bilateral common iliac regions.  Also correlate with findings on already pending lumbar spine CT and MRI.    Care Discussed with Consultants/Other Providers: cards

## 2023-02-24 NOTE — DIETITIAN INITIAL EVALUATION ADULT - OTHER INFO
68 with a PMH of HTN, DM2, CKD3, systolic HF (EF 45%), CAD s/p CABG 2005, RSA s/p L renal stent, and PAD s/p PTA to R common iliac and L common iliac s/p stent who presents to the ED with worsening back pain in context of known L4-L5 cord compression/cauda equina since 8/2022 per chart.    Patient reports good appetite in house. No GI distress or chewing/swallowing difficulties reported. Has no food allergies. States UBW fluctuates between 73-78 kg. ABW is 72.7 kg (2/22) per chart indicating stable weight, will monitor. No edema or pressure injuries noted per RN flow sheet.    Reviewed importance of portion out carbohydrates, including a protein/fiber rich food every meal and small frequent meals for glycemic control.

## 2023-02-24 NOTE — CHART NOTE - NSCHARTNOTEFT_GEN_A_CORE
Discussed patient with cath attending, no need for ischemic workup at this time. There is no need for pharm stress testing.   Patient can proceed with scheduled procedure at this time.    Elzbieta [facility] MD  Cardiology fellow

## 2023-02-24 NOTE — PROGRESS NOTE ADULT - SUBJECTIVE AND OBJECTIVE BOX
Chief Complaint: DM 2 uncontrolled with hyperglycemia     History: Saw patient with wife and daughter in law at bedside. Patient spoke in english and family translated at times for some words. Patient denies complaints, tolerating eating. Agreeable to discharge on mixed insulin only. Plans to apply for Medicaid and stay in the [place] for less than a year.     MEDICATIONS  (STANDING):  artificial tears (preservative free) Ophthalmic Solution 1 Drop(s) Both EYES four times a day  atorvastatin 40 milliGRAM(s) Oral at bedtime  calcitriol   Capsule 0.25 MICROGram(s) Oral daily  calcium acetate 667 milliGRAM(s) Oral with dinner  dextrose 50% Injectable 25 Gram(s) IV Push once  dextrose 50% Injectable 12.5 Gram(s) IV Push once  dextrose 50% Injectable 25 Gram(s) IV Push once  furosemide    Tablet 40 milliGRAM(s) Oral two times a day  heparin   Injectable 5000 Unit(s) SubCutaneous every 12 hours  insulin glargine Injectable (LANTUS) 20 Unit(s) SubCutaneous at bedtime  insulin lispro (ADMELOG) corrective regimen sliding scale   SubCutaneous at bedtime  insulin lispro (ADMELOG) corrective regimen sliding scale   SubCutaneous three times a day before meals  insulin lispro Injectable (ADMELOG) 6 Unit(s) SubCutaneous three times a day before meals  melatonin 6 milliGRAM(s) Oral at bedtime  pantoprazole    Tablet 40 milliGRAM(s) Oral before breakfast  pregabalin 50 milliGRAM(s) Oral two times a day  psyllium Powder 1 Packet(s) Oral two times a day  sacubitril 24 mG/valsartan 26 mG 1 Tablet(s) Oral two times a day  tamsulosin 0.4 milliGRAM(s) Oral at bedtime    MEDICATIONS  (PRN):  acetaminophen     Tablet .. 650 milliGRAM(s) Oral every 6 hours PRN Temp greater or equal to 38C (100.4F), Mild Pain (1 - 3)  bisacodyl 5 milliGRAM(s) Oral at bedtime PRN Constipation  dextrose Oral Gel 15 Gram(s) Oral once PRN Blood Glucose LESS THAN 70 milliGRAM(s)/deciliter  nitroglycerin     SubLingual 0.4 milliGRAM(s) SubLingual every 5 minutes PRN Chest Pain          No Known Allergies        Review of Systems:  Constitutional: No fever  Eyes: No blurry vision  Neuro: No tremors  HEENT: No pain  Cardiovascular: No chest pain, palpitations  Respiratory: No SOB, no cough  GI: No nausea, vomiting, abdominal pain      PHYSICAL EXAM:  VITALS: T(C): 36.4 (02-24-23 @ 10:00)  T(F): 97.5 (02-24-23 @ 10:00), Max: 98.3 (02-24-23 @ 02:04)  HR: 73 (02-24-23 @ 10:00) (66 - 73)  BP: 133/59 (02-24-23 @ 10:00) (96/58 - 139/65)  RR:  (16 - 18)  SpO2:  (98% - 100%)  Wt(kg): --  GENERAL: NAD, laying in bed   EYES: No proptosis, no lid lag, anicteric  HEENT:  Atraumatic, Normocephalic, moist mucous membranes  RESPIRATORY: Non labored respirations   GI: Soft, nontender, non distended  NEURO: extraocular movements intact, no tremor  PSYCH: Alert and oriented x 3, normal affect, normal mood      CAPILLARY BLOOD GLUCOSE      POCT Blood Glucose.: 290 mg/dL (24 Feb 2023 11:37)  POCT Blood Glucose.: 182 mg/dL (24 Feb 2023 07:07)  POCT Blood Glucose.: 231 mg/dL (23 Feb 2023 22:43)  POCT Blood Glucose.: 227 mg/dL (23 Feb 2023 16:44)      02-24    135  |  102  |  43<H>  ----------------------------<  213<H>  4.5   |  21<L>  |  2.15<H>    eGFR: 32<L>    Ca    10.4      02-24  Mg     2.00     02-24  Phos  4.4     02-24    TPro  6.7  /  Alb  3.7  /  TBili  0.4  /  DBili  x   /  AST  16  /  ALT  18  /  AlkPhos  66  02-22          A1C with Estimated Average Glucose Result: 8.3 % (02-22-23 @ 05:13)          Diet, DASH/TLC:   Sodium & Cholesterol Restricted  Consistent Carbohydrate Evening Snack (CSTCHOSN)  Halal  No Caffeine (02-23-23 @ 15:04)

## 2023-02-24 NOTE — PROGRESS NOTE ADULT - PROBLEM SELECTOR PLAN 1
Acute on chronic reports pain for years, reports radiates down bilateral legs has intermittent bladder and bowel incontinence  - has OP MRI from [facility] from 8/21/22: diffuse posterior disc bulge causing spinal canal stenosis and cauda equina compression   - X-ray lumbar spine showed no acute lesion or fracture   - NSY covering spine, consulted, rec appreciated  - pending MRI, CT and flexion x-rays Acute on chronic reports pain for years, reports radiates down bilateral legs has intermittent bladder and bowel incontinence  - has OP MRI from [facility] from 8/21/22: diffuse posterior disc bulge causing spinal canal stenosis and cauda equina compression   - X-ray lumbar spine showed no acute lesion or fracture   - NSY covering spine, consulted, rec appreciated  - pending MRI, CT ; flexion x-rays done

## 2023-02-24 NOTE — DISCHARGE NOTE PROVIDER - CARE PROVIDER_API CALL
Heather Avalos)  Neurosurgery  [address], [address]  [place] [address]  Phone: [phone]  Fax: [phone]  Follow Up Time: 1 week

## 2023-02-24 NOTE — PROGRESS NOTE ADULT - PROBLEM SELECTOR PLAN 8
Has meds with him, states had them as well yesterday but was not asked for them.  All brand name Daniel.  We do not have all of them.  Meds are: Ligenta-M 500 (Linagliptin 2.5 and metoformin 500mg), [place] card MR (trimetazidine hydrochloride 35 mg),  Ecosprin 75 (aspirin 75 mg), Duralax, Ketoalfa (amio acid Keto Analogues 600 mg), Nidocard-Retard (nitroglycerin 2.6 mg), Hypophos (calcium acetate 667 mg), Tamisol D (Tamsulosin 0.4 mg and Dutasteride 0.5 mg), Mydocalm (tolperisone 50 mg), Raditrol (calcitriol 0.25 microgram),  NovoRapid 16 units w/ lunch, Manuel nordisk insulin 16am/14pm, Atova 20 (atorvastatin 20 mg), Nebita 2.5 (nevivolol 2.5 mg), Tamisol (tamsulosin 0.4 mg), Pregaba 50 (pregabalin 50 mg), Febustat 40 (febuxostat 40 mg), Lozixum (lorazapam 1 mg), Lasix 40 mg, Sabitar 50 mg, Anazol 0.1%, Tearfresh All brand name [place].  We do not have all of them.  Meds are: Ligenta-M 500 (Linagliptin 2.5 and metoformin 500mg), [place] card MR (trimetazidine hydrochloride 35 mg),  Ecosprin 75 (aspirin 75 mg), Duralax, Ketoalfa (amio acid Keto Analogues 600 mg), Nidocard-Retard (nitroglycerin 2.6 mg), Hypophos (calcium acetate 667 mg), Tamisol D (Tamsulosin 0.4 mg and Dutasteride 0.5 mg), Mydocalm (tolperisone 50 mg), Raditrol (calcitriol 0.25 microgram),  NovoRapid 16 units w/ lunch, Manuel nordisk insulin 16am/14pm, Atova 20 (atorvastatin 20 mg), Nebita 2.5 (nevivolol 2.5 mg), Tamisol (tamsulosin 0.4 mg), Pregaba 50 (pregabalin 50 mg), Febustat 40 (febuxostat 40 mg), Lozixum (lorazapam 1 mg), Lasix 40 mg, Sabitar 50 mg, Anazol 0.1%, Tearfresh

## 2023-02-24 NOTE — PROGRESS NOTE ADULT - ASSESSMENT
Assessment and Recommendation:   · Assessment	  69 yo M with DMII, CAD s/p CABG, CKD, chronic back pain, and HTN presents to the ED with worsening back pain.  He recently came from [facility] about 2 weeks ago.   endocrine called for DM, a1c 8.3  Outpatient regimen:   Ligenta-M 500 (Linagliptin 2.5 and metformin 500mg) 1 tablet once daily  NovoRapid (insulin aspart) 14 units w/ lunch  NovoMix 30 insulin 14 units before breakfast, 14-16 units before dinner      1. DM  While inpatient  BG target 100-180 mg/dl, remains above goal  - Agree with increase in Lantus to 20 units for this evening   - Agree with increase in Admelog to 6 units SC Pre-meal/TIDAC - HOLD if not eating   - Continue Admelog LOW correctional scale before meals and bedtime   - Hypoglycemia protocol in place   - Carb Consistent Diet   - Nutrition consult   - Pharmacy liaison following for education and coordination of care as outpatient         Discharge planning:  Medicaid pending - currently uninsured   Likely stop prior outpatient regimen and continue with mixed insulin only  Rapid acting insulin should not be ordered in conjunction with mixed insulin for risk of hypoglycemia   Consider Novolog 70/30: doses TBD close to discharge: before breakfast and before dinner via pen (see pharmacy liaison note: patient can qualify for coupon)  Can consider follow up at endocrine clinic   [facility] [place]  [address], [address] [phone]      2. HTN  goal BP in DM <130/80  currently on lasix   management as per primary team   outpt mc/cr ratio        3. HLD  LDL 75  continue lipitor 40mg if no contraindication     HAI King-BC  Nurse Practitioner   Division of Endocrinology  Pager: SILVIO pager [phone]    If out of hospital/unavailable when paged, please note: patient will be cared for by another provider on the endocrine service.  Please call the endocrine answering service for assistance to reach covering provider ([phone]). For non-urgent matters, please email [email or web address] for assistance.

## 2023-02-24 NOTE — DISCHARGE NOTE PROVIDER - NSDCCPCAREPLAN_GEN_ALL_CORE_FT
PRINCIPAL DISCHARGE DIAGNOSIS  Diagnosis: Lumbar spinal stenosis  Assessment and Plan of Treatment:       SECONDARY DISCHARGE DIAGNOSES  Diagnosis: Type 2 diabetes mellitus  Assessment and Plan of Treatment:     Diagnosis: Chronic systolic heart failure  Assessment and Plan of Treatment:     Diagnosis: CAD (coronary artery disease)  Assessment and Plan of Treatment:     Diagnosis: Stage 3 chronic kidney disease  Assessment and Plan of Treatment:

## 2023-02-24 NOTE — DISCHARGE NOTE PROVIDER - NSDCFUADDINST_GEN_ALL_CORE_FT
- You had surgery on 2/28/23 L4-5 TLIF and Cement augmentation for L4 Pedicle Fx 2/2 screws on 3/6/23    - Remove bandage from incision site on post op day 3 if it was not removed by the surgical team prior to discharge. Once removed, incision site does not need a bandage or ointment on it. If you have steri strips (small, skinny beige strips), they will eventually fall off over time. Do not pull at steri strips. If steri strips are more than care home off, you may remove them. Do not touch or scratch incision to prevent infection.    - If your incision is closed with clear sutures, these are absorbable and will dissolve over time. If you see metallic staples or black stitches, these will need to be removed in the office 7-14 days after surgery. Your surgeon will tell you at your follow up appt when your sutures/staples will be removed, if applicable.  Do not pick or scratch at incision.     - Shower daily with shampoo/soap. Avoid long soaks and do not submerge incision in water (no baths.) Allow soap and water to run over the incision. Pat incision area dry with clean towel- do not scrub. Please shower regularly to ensure incision stays clean to avoid post operative infections.  You may have a body shower daily, as long as your head incision is covered by a shower cap and does not get wet until post op day 4.     - Notify your surgeon if you notice increased redness, drainage or your incision area opening.     - Return to ER immediately for high fevers, severe headache, vomiting, lethargy or weakness    - Please call your neurosurgeon following discharge to make follow up appointment in 1 week after discharge unless otherwise specified. See contact information.    - Prescription post operative medication has been sent to [facility] in the hospital. All post operative prescriptions should be picked up before departing the hospital. You can also take over the counter tylenol for pain as needed.     - Ambulate as tolerate. Continue with all "activities of daily living." Avoid strenuous activity or heavy lifting until cleared for additional activity at your follow up appointment. You cannot drive while taking narcotics (oxycodone, valium, etc.)     - Do not return to work or school until cleared by your neurosurgeon at your follow up visit unless specified to you during your hospital stay    - Do not take any blood thinning medications such as aspirin, motrin, ibuprofen, warfarin, coumadin, plavix, heparin, lovenox, Xarelto, Eliquis etc. until cleared by your neurosurgeon    - Surgery, anesthesia, and pain medications can cause constipation. Please take over the counter stool softeners daily until regular bowel movements are achieved. Examples include Miralax, Senna, Colace, Milk of Magnesia, or Dulcolax suppositories. Please consult drug store pharmacist or pediatrician if there are question about dosing if the patient is pediatric.

## 2023-02-24 NOTE — PHARMACOTHERAPY INTERVENTION NOTE - COMMENTS
Daniel  #[id number]. Pt instructed on 70/30 mixed insulin (has NovoMix 30 at bedside), mixed peaks and risks of hypoglycemia. Pt instructed on need to mix insulin PEN (gently roll 10x). Pt verbalized understanding. Pt only has one pen with him - he will need prescription (can use free Novocare coupon for 2 boxes of Novolog Mix 70/30 if he does not have insurance at time of discharge). Pt and family at bedside educated on A1c, insulin pen administration, hypoglycemia and treatment pt verbalized understanding. Pt did not know to prime insulin pen on return demonstration - will need reinforcement. Pt knows where to inject insulin (abdomen), rotating injection site to prevent lipodystrophy, proper insulin storage, and sharps disposal. Pt encouraged for outpt f/u with medicine and endocrine - [place] he may stay in [place] and has no follow-up care.   Daniel  #[id number]. Pt instructed on 70/30 mixed insulin (has NovoMix 30 at bedside), mixed peaks and risks of hypoglycemia. Pt instructed on need to mix insulin PEN (gently roll 10x). Pt verbalized understanding. Pt only has one pen with him - he will need prescription (can use free Novocare coupon for 2 boxes of Novolog Mix 70/30 if he does not have insurance at time of discharge). Pt and family at bedside educated on A1c, insulin pen administration, hypoglycemia and treatment pt verbalized understanding. Pt did not know to prime insulin pen on return demonstration - will need reinforcement. Pt knows where to inject insulin (abdomen), rotating injection site to prevent lipodystrophy, proper insulin storage, and sharps disposal. Pt encouraged for outpt f/u with medicine and endocrine - states he may stay in [place] for 5[place]6 months and has no follow-up care.      ID: [id number]  PCN: [id number]  Grp: [id number]  BIN: [id number]

## 2023-02-24 NOTE — PROGRESS NOTE ADULT - PROBLEM SELECTOR PLAN 4
HbA1c of 8.3%, at home on insulin 14 units with lunch and 14-16 BID of mixed insulin + orals  - increase Lantus 15 units qhs + 5 units with meals  - DM diet, BG checks, YIFAN  - endo following HbA1c of 8.3%, at home on insulin 14 units with lunch and 14-16 BID of mixed insulin + orals  - increase Lantus 20 units qhs + 6 units with meals  - DM diet, BG checks, YIFAN  - endo following

## 2023-02-24 NOTE — DIETITIAN INITIAL EVALUATION ADULT - PERTINENT MEDS FT
MEDICATIONS  (STANDING):  artificial tears (preservative free) Ophthalmic Solution 1 Drop(s) Both EYES four times a day  atorvastatin 40 milliGRAM(s) Oral at bedtime  calcitriol   Capsule 0.25 MICROGram(s) Oral daily  calcium acetate 667 milliGRAM(s) Oral with dinner  dextrose 50% Injectable 25 Gram(s) IV Push once  dextrose 50% Injectable 12.5 Gram(s) IV Push once  dextrose 50% Injectable 25 Gram(s) IV Push once  furosemide    Tablet 40 milliGRAM(s) Oral two times a day  heparin   Injectable 5000 Unit(s) SubCutaneous every 12 hours  insulin glargine Injectable (LANTUS) 20 Unit(s) SubCutaneous at bedtime  insulin lispro (ADMELOG) corrective regimen sliding scale   SubCutaneous at bedtime  insulin lispro (ADMELOG) corrective regimen sliding scale   SubCutaneous three times a day before meals  insulin lispro Injectable (ADMELOG) 6 Unit(s) SubCutaneous three times a day before meals  melatonin 6 milliGRAM(s) Oral at bedtime  pantoprazole    Tablet 40 milliGRAM(s) Oral before breakfast  pregabalin 50 milliGRAM(s) Oral two times a day  psyllium Powder 1 Packet(s) Oral two times a day  sacubitril 24 mG/valsartan 26 mG 1 Tablet(s) Oral two times a day  tamsulosin 0.4 milliGRAM(s) Oral at bedtime    MEDICATIONS  (PRN):  acetaminophen     Tablet .. 650 milliGRAM(s) Oral every 6 hours PRN Temp greater or equal to 38C (100.4F), Mild Pain (1 - 3)  bisacodyl 5 milliGRAM(s) Oral at bedtime PRN Constipation  dextrose Oral Gel 15 Gram(s) Oral once PRN Blood Glucose LESS THAN 70 milliGRAM(s)/deciliter  nitroglycerin     SubLingual 0.4 milliGRAM(s) SubLingual every 5 minutes PRN Chest Pain

## 2023-02-24 NOTE — PROGRESS NOTE ADULT - PROBLEM SELECTOR PLAN 3
History of CKD, unknown baseline, Cr 2.31-->1.87-->2.15  - renal US, no hydro, L renal atrophy  - c/w calcitriol daily, phos binder daily   - trend, renally dose meds, avoid nephrotoxins History of CKD, unknown baseline.  Also L RICARDO s/p stent in 2012 (surendra). Cr 2.31-->1.87-->2.15  - renal US, no hydro, L renal atrophy  - c/w calcitriol daily, phos binder daily   - trend, renally dose meds, avoid nephrotoxins

## 2023-02-24 NOTE — PROGRESS NOTE ADULT - ASSESSMENT
68M Setswana speaking, HTN, DM2 on insulin, CKD3, systolic HF (EF 45%), CAD s/p CABG 2005, stents of unclear etiology in 2012 who presents to the ED with worsening back pain in context of known L4-L5 cord compression/cauda equina since 8/2022.  68M Kyrgyz speaking, HTN, DM2 on insulin, CKD3, systolic HF (EF 45%), CAD s/p CABG 2005, RSA s/p L renal stent, and PAD s/p PTA to R common iliac and L common iliac s/p stent who presents to the ED with worsening back pain in context of known L4-L5 cord compression/cauda equina since 8/2022.

## 2023-02-24 NOTE — DISCHARGE NOTE PROVIDER - HOSPITAL COURSE
68M with DM2 on insulin, HTN, CAD s/p CABG 2005, CKD3, chronic back pain w/ known L4-L5 disc herniation and  cauda equina presents to the ED with worsening back pain. Patient is AAOX2 and somnolent, not able to provide any hx. Most of the information was supplemented by the son. As per son, pt is AAOx 3 and "mentally stable." He recently came from [facility] about 2 weeks ago and has " multiple medical complaints." But this morning he told his son that he is feeling weak and his back pain is getting worse. The pain is localized in the lower back region. It is burning is sensation and constant. Unclear if it radiates down the leg. It worsens with movement and walking. He can only walk "10 meters" with a cane. He is also incontinent of urine but denies any recent fever, chills, bowel incontinence, or lower extremities weakness/motor deficits. He reportedly had MRIs in the past but son does not know what it showed. He also has other chronic medical problems such as CKD, DMII and CAD and his son thinks pt needs management for them as well.   In the ED, his vitals were notable for hyperglycemia, elevated BUN/Scr, and hypomagnesemia. EKG showed bradycardia with T wave changes in the lateral leads.     Hospital Course:     #Back pain: acute on chronic, known L4-5 spinal stenosis/cauda equina, chronic bowel and bladders sc    - MRI from [facility] from 8/21/22: diffuse posterior disc bulge causing spinal canal stenosis and cauda equina compression   - X-ray lumbar spine showed no acute lesion or fracture   - CT lumbar spine: Large disc herniation at L5-S1 level causing severe canal stenosis.   - MRI shows..  - NSY plan ...    # Acute encephalopathy: transient, resolved, A&Ox3  - CTH no acute CVA or other pathology, microvascular changes (old CVA)     # Stage 3 chronic kidney disease: History of CKD, unknown baseline, Cr 2.31-->1.87-->2.15  - renal US, no hydro, L renal atrophy  - c/w calcitriol daily, phos binder daily     # Type 2 diabetes mellitus: HbA1c of 8.3%, at home on insulin 14 units with lunch and 14-16 BID of mixed insulin + orals  - seen by jeffrey ghosh recs..    # Chronic systolic heart failure: EF 45% per outside records   - c/w home Entresto (called Sabitar 50 Sacubitril  24 mg/Valsartan 26 mg)  - holding BB per cards   - TTE: EF 45% Mild to moderate segmental left ventricular systolic dysfunction.  Hypokinesis of the apex, distal septum, and distal anterior wall. Mod-Sev AS.    # CAD (coronary artery disease): Has history of CABG ?2005  - no CP  - holding asa in event of need for spinal surgery  - c/w statin, LDL 75, increased to atorvastatin 40 mg  - stress test: ...    DM/DASH halal diet  PT eval rec HAYDEN     68M with DM2 on insulin, HTN, CAD s/p CABG 2005, CKD3, chronic back pain w/ known L4-L5 disc herniation and  cauda equina presents to the ED with worsening back pain. Patient is AAOX2 and somnolent, not able to provide any hx. Most of the information was supplemented by the son. As per son, pt is AAOx 3 and "mentally stable." He recently came from [facility] about 2 weeks ago and has " multiple medical complaints." But this morning he told his son that he is feeling weak and his back pain is getting worse. The pain is localized in the lower back region. It is burning is sensation and constant. Unclear if it radiates down the leg. It worsens with movement and walking. He can only walk "10 meters" with a cane. He is also incontinent of urine but denies any recent fever, chills, bowel incontinence, or lower extremities weakness/motor deficits. He reportedly had MRIs in the past but son does not know what it showed. He also has other chronic medical problems such as CKD, DMII and CAD and his son thinks pt needs management for them as well.   In the ED, his vitals were notable for hyperglycemia, elevated BUN/Scr, and hypomagnesemia. EKG showed bradycardia with T wave changes in the lateral leads.     Hospital Course:     #Back pain: acute on chronic, known L4-5 spinal stenosis/cauda equina, chronic bowel and bladders sc    - MRI from [facility] from 8/21/22: diffuse posterior disc bulge causing spinal canal stenosis and cauda equina compression   - X-ray lumbar spine showed no acute lesion or fracture   - CT lumbar spine: Large disc herniation at L5-S1 level causing severe canal stenosis.   - MRI reviewed, L4-5 disk dessication and cauda equina compression , Right grade II and Left Grade I spondylolisthesis   2/28: OR For L4-L5 TLIF  3/1; s/p L4-5 TLIF pod #1, HMV drain, requiring bola post op for bp support, albumin given x 1, sicu consulted, neuro exam stable  3/2: POD # 2 S/P L4-5 TLIF, 1 HMV in place. Off phenylepherine. CT lumbar spine, screws ok  3/3: POD # 3 S/P L4-5 TLIF. Having episodes of chest pain with elevated troponins, cardiology evaluating. transferred to telemetry floor, serial CE and EKG. 1 HMV in place  3/4: POD # 4 S/P L4-5 TLIF. No more chest pain. HMV in place. Seen by endocrine for DM management  3/5: POD # 5 S/P L4-5 TLIF. No more chest pain. HMV in place. Increased back pain with radiation to legs, MRI ordered obtained by evening with anesthesia, possible ventral epidural collection. Imaging reviewed with Dr. Avalos  3/6: pod #6 s/p L4-5 TLIF, HMV in place, mri L/s w/ sed done no major compressive hematoma, enlarged neurogenic bladder, crowder placed  3/7: PAtient noted to have new Left food drop, s/p emergent RTOR for Lef. foot drop  cement augmentation for L4 pedicle fx 2/2 to screw pod #1, post op exam stable , HMV, 2JPs's crowder, CT L/s done- screws ok, needs brace  3/9-11: Stable exam POD # 4 S/P cement augmentation for L4 pedicle fx 2/2 to screw. HMV X 1, JOVITA X 1. MRI L spine ordered. Episode of vomiting on 3/10, AXR ordered  3/12: Superficial HMV removed, Jovita removed Exam stable, MR pending, pain improved  3/13: Stable exam. Sedated MRI done  3/14: PT, d/c planning  3/15: Stable. Awaiting rehab placement    # Acute encephalopathy: transient, resolved, A&Ox3  - CTH no acute CVA or other pathology, microvascular changes (old CVA)     # Stage 3 chronic kidney disease: History of CKD, unknown baseline, Cr 2.31-->1.87-->2.15  - renal US, no hydro, L renal atrophy  - c/w calcitriol daily, phos binder daily   - lasix on hold    # Type 2 diabetes mellitus: HbA1c of 8.3%, at home on insulin 14 units with lunch and 14-16 BID of mixed insulin + orals  - seen by jeffrey ghosh recs:  stop prior outpatient regimen and continue with mixed insulin (70/30) only  Consider Novolog 70/30: doses TBD close to discharge: before breakfast and before dinner via pen (see pharmacy liaison note: patient can qualify for coupon), so could use insulin PENS if that is easier for patient  Rapid acting insulin should NOT be ordered in conjunction with mixed insulin for risk of hypoglycemia   Ensure he has glucometer, glucose test strips, lancets, alcohol swabs and insulin PEN needles vs syringes   Followup with [facility], [address] [place] [address], [phone], [phone]    # Chronic systolic heart failure: EF 45% per outside records   - c/w home Entresto (called Sabitar 50 Sacubitril  24 mg/Valsartan 26 mg)  - holding BB per cards   - TTE: EF 45% Mild to moderate segmental left ventricular systolic dysfunction.  Hypokinesis of the apex, distal septum, and distal anterior wall. Mod-Sev AS.    # CAD (coronary artery disease): Has history of CABG ?2005  - no CP  - holding asa in event of need for spinal surgery, now restarted  - c/w statin, LDL 75, increased to atorvastatin 40 mg    DM/DASH halal diet    PT eval rec rehab

## 2023-02-24 NOTE — PROGRESS NOTE ADULT - ASSESSMENT
68M Luxembourgish speaking, HTN, DM2 on insulin, CKD3, systolic HF (EF 45%), CAD s/p CABG 2005, stents of unclear etiology in 2012 who presents to the ED with worsening back pain in context of known L4-L5 cord compression/cauda equina since 8/2022.     #Pre-operative risk stratification  Patient is for potential surgical stabilization to correct spondylothesis   -TTE noted  -Please obtain pharmacological nuclear stress test  -Currently not having ACS or decompensation of his heart failure  -Primary team holding ASA at this time, can continue to hold for procedure, please restart aspirin as soon as cleared from neurosurgery perspective  - c/w statin, LDL 75.  - Increase statin to atorvastatin 40mg    #HFrEF  EF 45% in 2022 in [facility]. It is most likely ischemic in the setting of history of CAD s/p CABG  -Continue entresto  -Will hold off on starting a beta blocker prior to surgery  -Please obtain TTE    Elzbieta Gordon MD  Cardiology fellow

## 2023-02-24 NOTE — PROGRESS NOTE ADULT - PROBLEM SELECTOR PLAN 6
EF 45% per outside records   - c/w home Entresto (called Sabitar 50 Sacubitril  24 mg/Valsartan 26 mg)  - holding BB per cards   - TTE confirms EF 45% and AS  - appears compensated  - cards eval appreciated EF 45% per outside records   - c/w home Entresto (called Sabitar 50 Sacubitril  24 mg/Valsartan 26 mg)  - holding BB per cards   - TTE: EF 45% Mild to moderate segmental left ventricular systolic dysfunction.  Hypokinesis of the apex, distal septum, and distal anterior wall. Mod-Sev AS.  - appears compensated  - cards eval appreciated

## 2023-02-24 NOTE — DISCHARGE NOTE PROVIDER - DETAILS OF MALNUTRITION DIAGNOSIS/DIAGNOSES
This patient has been assessed with a concern for Malnutrition and was treated during this hospitalization for the following Nutrition diagnosis/diagnoses:     -  03/07/2023: Moderate protein-calorie malnutrition

## 2023-02-24 NOTE — PROGRESS NOTE ADULT - SUBJECTIVE AND OBJECTIVE BOX
Patient seen and examined at bedside.    68M German speaking, HTN, DM2 on insulin, CKD3, systolic HF (EF 45%), CAD s/p CABG 2005, stents of unclear etiology in 2012 who presents to the ED with worsening back pain in context of known L4-L5 cord compression/cauda equina since 8/2022.     Overnight Events:     Review Of Systems: No chest pain, shortness of breath, or palpitations            Current Meds:  acetaminophen     Tablet .. 650 milliGRAM(s) Oral every 6 hours PRN  artificial tears (preservative free) Ophthalmic Solution 1 Drop(s) Both EYES four times a day  atorvastatin 40 milliGRAM(s) Oral at bedtime  bisacodyl 5 milliGRAM(s) Oral at bedtime PRN  calcitriol   Capsule 0.25 MICROGram(s) Oral daily  calcium acetate 667 milliGRAM(s) Oral with dinner  dextrose 50% Injectable 25 Gram(s) IV Push once  dextrose 50% Injectable 12.5 Gram(s) IV Push once  dextrose 50% Injectable 25 Gram(s) IV Push once  dextrose Oral Gel 15 Gram(s) Oral once PRN  furosemide    Tablet 40 milliGRAM(s) Oral two times a day  heparin   Injectable 5000 Unit(s) SubCutaneous every 12 hours  insulin glargine Injectable (LANTUS) 15 Unit(s) SubCutaneous at bedtime  insulin lispro (ADMELOG) corrective regimen sliding scale   SubCutaneous at bedtime  insulin lispro (ADMELOG) corrective regimen sliding scale   SubCutaneous three times a day before meals  insulin lispro Injectable (ADMELOG) 5 Unit(s) SubCutaneous three times a day before meals  melatonin 6 milliGRAM(s) Oral at bedtime PRN  nitroglycerin     SubLingual 0.4 milliGRAM(s) SubLingual every 5 minutes PRN  pantoprazole    Tablet 40 milliGRAM(s) Oral before breakfast  pregabalin 50 milliGRAM(s) Oral two times a day  sacubitril 24 mG/valsartan 26 mG 1 Tablet(s) Oral two times a day  tamsulosin 0.4 milliGRAM(s) Oral at bedtime      Vitals:  T(F): 97.6 (02-24), Max: 98.3 (02-24)  HR: 66 (02-24) (66 - 96)  BP: 96/58 (02-24) (96/58 - 139/65)  RR: 18 (02-24)  SpO2: 100% (02-24)  I&O's Summary    23 Feb 2023 07:01  -  24 Feb 2023 07:00  --------------------------------------------------------  IN: 600 mL / OUT: 253 mL / NET: 347 mL        Physical Exam:  Appearance: No acute distress; well appearing  Eyes: PERRL, EOMI, pink conjunctiva  HEENT: Normal oral mucosa  Cardiovascular: RRR, S1, S2, no murmurs, rubs, or gallops; no edema; no JVD  Respiratory: Clear to auscultation bilaterally  Gastrointestinal: soft, non-tender, non-distended with normal bowel sounds  Musculoskeletal: No clubbing; no joint deformity   Neurologic: Non-focal  Lymphatic: No lymphadenopathy  Psychiatry: AAOx3, mood & affect appropriate  Skin: No rashes, ecchymoses, or cyanosis      02-24    135  |  102  |  43<H>  ----------------------------<  213<H>  4.5   |  21<L>  |  2.15<H>    Ca    10.4      24 Feb 2023 05:55  Phos  4.4     02-24  Mg     2.00     02-24      PT/INR - ( 23 Feb 2023 05:53 )   PT: 11.8 sec;   INR: 1.02 ratio         PTT - ( 23 Feb 2023 05:53 )  PTT:29.1 sec              New ECG(s): Personally reviewed    Echo:  Ejection Fraction (Visual Estimate): 45 %  ------------------------------------------------------------------------  OBSERVATIONS:  Mitral Valve: Mitral annular calcification, otherwise  normal mitral valve. Minimal mitral regurgitation.  Aortic Root: Normal aortic root.  Aortic Valve: Calcified trileaflet aortic valve with  decreased opening. Peak transaortic valve gradient equals  32 mm Hg, mean transaortic valve gradient equals 18 mm Hg,  estimated aortic valve area equals 1 sqcm (by continuity  equation), consistent with moderate to severe aortic  stenosis. Mild aortic regurgitation.  Left Atrium: Normal left atrium.  Left Ventricle: Mild to moderate segmental left ventricular  systolic dysfunction.  Hypokinesis of the apex, distal  septum, and distal anterior wall. Normal left ventricular  internal dimensions and wall thicknesses. Mild diastolic  dysfunction (Stage I).  Right Heart: Normal right atrium. Normal right ventricular  size and function. Normal tricuspid valve. Minimal  tricuspid regurgitation. Normal pulmonic valve. Minimal  pulmonic regurgitation.  Pericardium/PleuraNormal pericardium with no pericardial  effusion.  ------------------------------------------------------------------------  CONCLUSIONS:  1. Mitral annular calcification, otherwise normal mitral  valve. Minimal mitral regurgitation.  2. Calcified trileaflet aortic valve with decreased  opening. Peak transaortic valve gradient equals 32 mm Hg,  mean transaortic valve gradient equals 18 mm Hg, estimated  aortic valve area equals 1 sqcm (by continuity equation),  consistent with moderate to severe aortic stenosis. Mild  aortic regurgitation.  3. Normal left ventricular internal dimensions and wall  thicknesses.  4. Mild to moderate segmental left ventricular systolic  dysfunction.  Hypokinesis of the apex, distal septum, and  distal anterior wall.  5. Mild diastolic dysfunction (Stage I).  6. Normal right ventricular size and function.  Consider AMINATA for further evaluation of the aortic valve, if  clinically indicated.  ------------------------------------------------------------------------  Confirmed on  2/23/2023 - 15:55:50 by Erasmo Colunga M.D.,  Formerly [facility], [facility]  ------------------------------------------------------------------------

## 2023-02-25 LAB
ANION GAP SERPL CALC-SCNC: 13 MMOL/L — SIGNIFICANT CHANGE UP (ref 7–14)
BUN SERPL-MCNC: 54 MG/DL — HIGH (ref 7–23)
CALCIUM SERPL-MCNC: 10.2 MG/DL — SIGNIFICANT CHANGE UP (ref 8.4–10.5)
CHLORIDE SERPL-SCNC: 101 MMOL/L — SIGNIFICANT CHANGE UP (ref 98–107)
CO2 SERPL-SCNC: 20 MMOL/L — LOW (ref 22–31)
CREAT SERPL-MCNC: 2.48 MG/DL — HIGH (ref 0.5–1.3)
EGFR: 27 ML/MIN/1.73M2 — LOW
GLUCOSE BLDC GLUCOMTR-MCNC: 176 MG/DL — HIGH (ref 70–99)
GLUCOSE BLDC GLUCOMTR-MCNC: 183 MG/DL — HIGH (ref 70–99)
GLUCOSE BLDC GLUCOMTR-MCNC: 221 MG/DL — HIGH (ref 70–99)
GLUCOSE BLDC GLUCOMTR-MCNC: 233 MG/DL — HIGH (ref 70–99)
GLUCOSE SERPL-MCNC: 175 MG/DL — HIGH (ref 70–99)
HCT VFR BLD CALC: 38.2 % — LOW (ref 39–50)
HGB BLD-MCNC: 12.3 G/DL — LOW (ref 13–17)
MAGNESIUM SERPL-MCNC: 1.8 MG/DL — SIGNIFICANT CHANGE UP (ref 1.6–2.6)
MCHC RBC-ENTMCNC: 29.1 PG — SIGNIFICANT CHANGE UP (ref 27–34)
MCHC RBC-ENTMCNC: 32.2 GM/DL — SIGNIFICANT CHANGE UP (ref 32–36)
MCV RBC AUTO: 90.5 FL — SIGNIFICANT CHANGE UP (ref 80–100)
NRBC # BLD: 0 /100 WBCS — SIGNIFICANT CHANGE UP (ref 0–0)
NRBC # FLD: 0 K/UL — SIGNIFICANT CHANGE UP (ref 0–0)
PHOSPHATE SERPL-MCNC: 4.6 MG/DL — HIGH (ref 2.5–4.5)
PLATELET # BLD AUTO: 248 K/UL — SIGNIFICANT CHANGE UP (ref 150–400)
POTASSIUM SERPL-MCNC: 4.6 MMOL/L — SIGNIFICANT CHANGE UP (ref 3.5–5.3)
POTASSIUM SERPL-SCNC: 4.6 MMOL/L — SIGNIFICANT CHANGE UP (ref 3.5–5.3)
RBC # BLD: 4.22 M/UL — SIGNIFICANT CHANGE UP (ref 4.2–5.8)
RBC # FLD: 13 % — SIGNIFICANT CHANGE UP (ref 10.3–14.5)
SODIUM SERPL-SCNC: 134 MMOL/L — LOW (ref 135–145)
WBC # BLD: 10.03 K/UL — SIGNIFICANT CHANGE UP (ref 3.8–10.5)
WBC # FLD AUTO: 10.03 K/UL — SIGNIFICANT CHANGE UP (ref 3.8–10.5)

## 2023-02-25 PROCEDURE — 99232 SBSQ HOSP IP/OBS MODERATE 35: CPT

## 2023-02-25 PROCEDURE — 99233 SBSQ HOSP IP/OBS HIGH 50: CPT

## 2023-02-25 RX ORDER — FUROSEMIDE 40 MG
40 TABLET ORAL DAILY
Refills: 0 | Status: DISCONTINUED | OUTPATIENT
Start: 2023-02-25 | End: 2023-02-25

## 2023-02-25 RX ORDER — FUROSEMIDE 40 MG
40 TABLET ORAL DAILY
Refills: 0 | Status: DISCONTINUED | OUTPATIENT
Start: 2023-02-26 | End: 2023-02-28

## 2023-02-25 RX ADMIN — Medication 6 MILLIGRAM(S): at 22:13

## 2023-02-25 RX ADMIN — Medication 50 MILLIGRAM(S): at 05:04

## 2023-02-25 RX ADMIN — Medication 2: at 11:44

## 2023-02-25 RX ADMIN — Medication 1: at 08:06

## 2023-02-25 RX ADMIN — Medication 6 UNIT(S): at 08:06

## 2023-02-25 RX ADMIN — Medication 1 DROP(S): at 12:09

## 2023-02-25 RX ADMIN — HEPARIN SODIUM 5000 UNIT(S): 5000 INJECTION INTRAVENOUS; SUBCUTANEOUS at 17:23

## 2023-02-25 RX ADMIN — Medication 6 UNIT(S): at 17:24

## 2023-02-25 RX ADMIN — PANTOPRAZOLE SODIUM 40 MILLIGRAM(S): 20 TABLET, DELAYED RELEASE ORAL at 05:02

## 2023-02-25 RX ADMIN — HEPARIN SODIUM 5000 UNIT(S): 5000 INJECTION INTRAVENOUS; SUBCUTANEOUS at 05:03

## 2023-02-25 RX ADMIN — Medication 40 MILLIGRAM(S): at 14:41

## 2023-02-25 RX ADMIN — Medication 6 UNIT(S): at 11:44

## 2023-02-25 RX ADMIN — Medication 40 MILLIGRAM(S): at 08:11

## 2023-02-25 RX ADMIN — Medication 667 MILLIGRAM(S): at 17:23

## 2023-02-25 RX ADMIN — SACUBITRIL AND VALSARTAN 1 TABLET(S): 24; 26 TABLET, FILM COATED ORAL at 05:07

## 2023-02-25 RX ADMIN — Medication 1 PACKET(S): at 05:01

## 2023-02-25 RX ADMIN — SACUBITRIL AND VALSARTAN 1 TABLET(S): 24; 26 TABLET, FILM COATED ORAL at 17:23

## 2023-02-25 RX ADMIN — INSULIN GLARGINE 20 UNIT(S): 100 INJECTION, SOLUTION SUBCUTANEOUS at 22:14

## 2023-02-25 RX ADMIN — Medication 50 MILLIGRAM(S): at 17:21

## 2023-02-25 RX ADMIN — Medication 1 DROP(S): at 05:01

## 2023-02-25 RX ADMIN — Medication 1 PACKET(S): at 17:21

## 2023-02-25 RX ADMIN — CALCITRIOL 0.25 MICROGRAM(S): 0.5 CAPSULE ORAL at 12:10

## 2023-02-25 RX ADMIN — Medication 1: at 17:23

## 2023-02-25 RX ADMIN — ATORVASTATIN CALCIUM 40 MILLIGRAM(S): 80 TABLET, FILM COATED ORAL at 22:13

## 2023-02-25 RX ADMIN — Medication 1 DROP(S): at 17:22

## 2023-02-25 RX ADMIN — TAMSULOSIN HYDROCHLORIDE 0.4 MILLIGRAM(S): 0.4 CAPSULE ORAL at 22:14

## 2023-02-25 NOTE — PROGRESS NOTE ADULT - SUBJECTIVE AND OBJECTIVE BOX
PROGRESS NOTE:     Patient is a 68y old  Male who presents with a chief complaint of Acute renal failure     (24 Feb 2023 15:38)      SUBJECTIVE / OVERNIGHT EVENTS: no acute event overnight. Reports having trouble urinating and having BMs. Denies fever, chills.     ADDITIONAL REVIEW OF SYSTEMS:    MEDICATIONS  (STANDING):  artificial tears (preservative free) Ophthalmic Solution 1 Drop(s) Both EYES four times a day  atorvastatin 40 milliGRAM(s) Oral at bedtime  calcitriol   Capsule 0.25 MICROGram(s) Oral daily  calcium acetate 667 milliGRAM(s) Oral with dinner  dextrose 50% Injectable 25 Gram(s) IV Push once  dextrose 50% Injectable 12.5 Gram(s) IV Push once  dextrose 50% Injectable 25 Gram(s) IV Push once  heparin   Injectable 5000 Unit(s) SubCutaneous every 12 hours  insulin glargine Injectable (LANTUS) 20 Unit(s) SubCutaneous at bedtime  insulin lispro (ADMELOG) corrective regimen sliding scale   SubCutaneous at bedtime  insulin lispro (ADMELOG) corrective regimen sliding scale   SubCutaneous three times a day before meals  insulin lispro Injectable (ADMELOG) 6 Unit(s) SubCutaneous three times a day before meals  melatonin 6 milliGRAM(s) Oral at bedtime  pantoprazole    Tablet 40 milliGRAM(s) Oral before breakfast  pregabalin 50 milliGRAM(s) Oral two times a day  psyllium Powder 1 Packet(s) Oral two times a day  sacubitril 24 mG/valsartan 26 mG 1 Tablet(s) Oral two times a day  tamsulosin 0.4 milliGRAM(s) Oral at bedtime    MEDICATIONS  (PRN):  acetaminophen     Tablet .. 650 milliGRAM(s) Oral every 6 hours PRN Temp greater or equal to 38C (100.4F), Mild Pain (1 - 3)  bisacodyl Suppository 10 milliGRAM(s) Rectal daily PRN Constipation  dextrose Oral Gel 15 Gram(s) Oral once PRN Blood Glucose LESS THAN 70 milliGRAM(s)/deciliter  nitroglycerin     SubLingual 0.4 milliGRAM(s) SubLingual every 5 minutes PRN Chest Pain      CAPILLARY BLOOD GLUCOSE      POCT Blood Glucose.: 183 mg/dL (25 Feb 2023 16:27)  POCT Blood Glucose.: 221 mg/dL (25 Feb 2023 11:31)  POCT Blood Glucose.: 176 mg/dL (25 Feb 2023 07:08)  POCT Blood Glucose.: 132 mg/dL (24 Feb 2023 22:08)    I&O's Summary    24 Feb 2023 07:01  -  25 Feb 2023 07:00  --------------------------------------------------------  IN: 0 mL / OUT: 420 mL / NET: -420 mL    25 Feb 2023 07:01  -  25 Feb 2023 16:50  --------------------------------------------------------  IN: 0 mL / OUT: 560 mL / NET: -560 mL        PHYSICAL EXAM:  Vital Signs Last 24 Hrs  T(C): 36.6 (25 Feb 2023 10:21), Max: 36.8 (24 Feb 2023 17:25)  T(F): 97.9 (25 Feb 2023 10:21), Max: 98.2 (24 Feb 2023 17:25)  HR: 72 (25 Feb 2023 13:53) (64 - 84)  BP: 111/45 (25 Feb 2023 13:53) (86/46 - 126/65)  BP(mean): --  RR: 16 (25 Feb 2023 12:01) (16 - 18)  SpO2: 99% (25 Feb 2023 10:21) (95% - 99%)    Parameters below as of 25 Feb 2023 10:21  Patient On (Oxygen Delivery Method): room air        GENERAL: NAD, well-developed  CHEST/LUNG: Clear to auscultation bilaterally; No wheeze  HEART: Regular rate and rhythm; No murmurs, rubs, or gallops  ABDOMEN: Soft, Nontender, Nondistended; Bowel sounds present  EXTREMITIES:   warm and well perfused, No clubbing, cyanosis, or edema  PSYCH: AAOx3  NEUROLOGY: LE 5/5, no dysmetria   SKIN: healed CABG and LLE graft sit    LABS:                        12.3   10.03 )-----------( 248      ( 25 Feb 2023 05:24 )             38.2     02-25    134<L>  |  101  |  54<H>  ----------------------------<  175<H>  4.6   |  20<L>  |  2.48<H>    Ca    10.2      25 Feb 2023 05:24  Phos  4.6     02-25  Mg     1.80     02-25                  RADIOLOGY & ADDITIONAL TESTS:  Results Reviewed:   Imaging Personally Reviewed:  Electrocardiogram Personally Reviewed:    COORDINATION OF CARE:  Care Discussed with Consultants/Other Providers [Y/N]:  Prior or Outpatient Records Reviewed [Y/N]:

## 2023-02-25 NOTE — PROGRESS NOTE ADULT - SUBJECTIVE AND OBJECTIVE BOX
PAST 24HR EVENTS: seen & examined this AM, complains of back pain     Vital Signs Last 24 Hrs  T(C): 36.6 (25 Feb 2023 05:00), Max: 36.8 (24 Feb 2023 17:25)  T(F): 97.9 (25 Feb 2023 05:00), Max: 98.2 (24 Feb 2023 17:25)  HR: 69 (25 Feb 2023 05:00) (64 - 84)  BP: 105/59 (25 Feb 2023 05:00) (86/46 - 133/59)  BP(mean): --  RR: 16 (25 Feb 2023 05:00) (16 - 18)  SpO2: 98% (25 Feb 2023 05:00) (95% - 99%)    Parameters below as of 25 Feb 2023 05:00  Patient On (Oxygen Delivery Method): room air        MEDS:   acetaminophen     Tablet .. 650 milliGRAM(s) Oral every 6 hours PRN  artificial tears (preservative free) Ophthalmic Solution 1 Drop(s) Both EYES four times a day  atorvastatin 40 milliGRAM(s) Oral at bedtime  bisacodyl 5 milliGRAM(s) Oral at bedtime PRN  calcitriol   Capsule 0.25 MICROGram(s) Oral daily  calcium acetate 667 milliGRAM(s) Oral with dinner  dextrose 50% Injectable 25 Gram(s) IV Push once  dextrose 50% Injectable 12.5 Gram(s) IV Push once  dextrose 50% Injectable 25 Gram(s) IV Push once  dextrose Oral Gel 15 Gram(s) Oral once PRN  furosemide    Tablet 40 milliGRAM(s) Oral two times a day  heparin   Injectable 5000 Unit(s) SubCutaneous every 12 hours  insulin glargine Injectable (LANTUS) 20 Unit(s) SubCutaneous at bedtime  insulin lispro (ADMELOG) corrective regimen sliding scale   SubCutaneous at bedtime  insulin lispro (ADMELOG) corrective regimen sliding scale   SubCutaneous three times a day before meals  insulin lispro Injectable (ADMELOG) 6 Unit(s) SubCutaneous three times a day before meals  melatonin 6 milliGRAM(s) Oral at bedtime  nitroglycerin     SubLingual 0.4 milliGRAM(s) SubLingual every 5 minutes PRN  pantoprazole    Tablet 40 milliGRAM(s) Oral before breakfast  pregabalin 50 milliGRAM(s) Oral two times a day  psyllium Powder 1 Packet(s) Oral two times a day  sacubitril 24 mG/valsartan 26 mG 1 Tablet(s) Oral two times a day  tamsulosin 0.4 milliGRAM(s) Oral at bedtime      LABS:                        12.3   10.03 )-----------( 248      ( 25 Feb 2023 05:24 )             38.2     02-25    134<L>  |  101  |  54<H>  ----------------------------<  175<H>  4.6   |  20<L>  |  2.48<H>    Ca    10.2      25 Feb 2023 05:24  Phos  4.6     02-25  Mg     1.80     02-25      RADIOLOGY:   < from: MR Lumbar Spine No Cont (02.24.23 @ 15:12) >    INDIVIDUAL LEVELS:  Evaluation is somewhat limited without the presence of axial imaging  LOWER THORACIC SPINE: No spinal canal or neuroforaminal stenosis.    L1-L2: No spinal canal or neuroforaminal stenosis.  L2-L3: No spinal canal or neuroforaminal stenosis.  L3-L4: No spinal canal or neuroforaminal stenosis.  L4-L5: Grade 1 anterior listhesis L4 on L5 in combination with a   prominent disc bulge/protrusion contributes to a severe degree of central   spinal canal stenosis at this level. Disc material extends into the   neural foramina bilaterally left greater than right.  L5-S1: No spinal canal or neuroforaminal stenosis.      IMPRESSION:    Grade 1 anterior listhesis L4 on L5 in combination with disc   bulge/protrusion causes severe central canal stenosis at the L4-L5 level.        PHYSICAL EXAM:  Awake Alert Attentive Affect appropriate Ox3  PERRL EOMI  Tone: normal               Strength:     [X] Upper extremity                      Delt       Bicep    Tricep                                                  R         5/5        5/5        5/5       5/5                                               L          5/5        5/5        5/5       5/5  [X] Lower extremity                       HF          KE          KF        DF         PF    EHL                                               R        5/5        5/5        5/5       5/5       5/5      5/5                                               L         5/5        5/5       5/5       5/5        5/5       5/5  Sensory Intact to Light Touch  Reflexes WNL, No clonus, Toes down going  back pain with movement

## 2023-02-25 NOTE — PROGRESS NOTE ADULT - ASSESSMENT
67 yo M with DMII, CAD s/p CABG, CKD, chronic back pain, and HTN presents to the ED with worsening back pain. Patient is AAOX2 and somnolent, not able to provide any hx. Most of the information was supplemented by the son. As per son, pt is AAOx 3 and "mentally stable." He recently came from [facility] about 2 weeks ago and has " multiple medical complaints." But this morning he told his son that he is feeling weak and his back pain is getting worse. The pain is localized in the lower back region. Pain is worse when standing. Denies radiculopathy. Intermittent urinary and bowel incontinence for 1 year. Was admitted to a hospital in [facility] in Aug 2022 for pain, MRI was completed that showed a L4-5 spondylolisthesis. Patient was offered surgery but told he has too may medical issues and discharged him. Patient here here visiting his son when back pain exacerbated and came to ER for evaluation with his MRI on paper films     2/25 MRI at [facility] reviewed - shows L4/5 HNP, grade 1 anterior listhesis. CT done showing no movement at L4/5. Will plan for surgery ?this week?? Needs medical clearance. Cardio cleared.

## 2023-02-25 NOTE — CHART NOTE - NSCHARTNOTEFT_GEN_A_CORE
Notified by RN that patient would like provider to speak with family who is a doctor at bedside. Unfortunately, family left bedside. Patient's nephew Dr Owens ([phone]) called per request. Updates provided, plan of care reviewed. All questions answered.

## 2023-02-25 NOTE — PROGRESS NOTE ADULT - PROBLEM SELECTOR PLAN 3
History of CKD, unknown baseline.  Also L RICARDO s/p stent in 2012 (surendra). Cr 2.31-->1.87-->2.15-->2.48  - renal US, no hydro, L renal atrophy  - c/w calcitriol daily, phos binder daily   - trend, renally dose meds, avoid nephrotoxins  - decrease lasix from BID to QD given SANDRA, euvolemic on exam  - bladder scan to r/o retention

## 2023-02-25 NOTE — PROGRESS NOTE ADULT - PROBLEM SELECTOR PLAN 1
- plan for surgery likely sometime this week w Dr. Avalos   - cardio cleared, need documented medical clearance given CKD and DM   - will update team w OR plan     [id number]    Case discussed with attending neurosurgeon Dr. Avalos - plan for surgery Tuesday anisha Avalos -- L4-5 lami, possible fusion   - cardio cleared, need documented medical clearance given CKD and DM   - CBC, BMP, Coags, Type and screen x2, covid test within 3 days of OR     [id number]    Case discussed with attending neurosurgeon Dr. Avalos

## 2023-02-25 NOTE — PROGRESS NOTE ADULT - PROBLEM SELECTOR PLAN 6
EF 45% per outside records   - c/w home Entresto (called Sabitar 50 Sacubitril  24 mg/Valsartan 26 mg)  - holding BB per cards   - TTE: EF 45% Mild to moderate segmental left ventricular systolic dysfunction.  Hypokinesis of the apex, distal septum, and distal anterior wall. Mod-Sev AS.  - appears compensated  - cards eval appreciated  - decreased lasix from BID to qd given elevated Cr

## 2023-02-25 NOTE — PROGRESS NOTE ADULT - ASSESSMENT
68M Turkmen speaking, HTN, DM2 on insulin, CKD3, systolic HF (EF 45%), CAD s/p CABG 2005, RSA s/p L renal stent, and PAD s/p PTA to R common iliac and L common iliac s/p stent who presents to the ED with worsening back pain in context of known L4-L5 cord compression/cauda equina since 8/2022.

## 2023-02-25 NOTE — PROGRESS NOTE ADULT - PROBLEM SELECTOR PLAN 4
HbA1c of 8.3%, at home on insulin 14 units with lunch and 14-16 BID of mixed insulin + orals  - increased Lantus 20 units qhs + 6 units with meals  - DM diet, BG checks, YIFAN  - endo following

## 2023-02-25 NOTE — PROGRESS NOTE ADULT - PROBLEM SELECTOR PLAN 7
Has history of CABG ?2005, cath 2010 RSVG to distal RCA patent, RSVG to OM patent, and LIMA to LAD patent  - no CP  - holding asa in event of need for spinal surgery  - c/w statin, LDL 75, increased to atorvastatin 40 mg  - pending stress test

## 2023-02-25 NOTE — PROGRESS NOTE ADULT - PROBLEM SELECTOR PLAN 8
All brand name [place]ian.  We do not have all of them.  Meds are: Ligenta-M 500 (Linagliptin 2.5 and metoformin 500mg), [place] card MR (trimetazidine hydrochloride 35 mg),  Ecosprin 75 (aspirin 75 mg), Duralax, Ketoalfa (amio acid Keto Analogues 600 mg), Nidocard-Retard (nitroglycerin 2.6 mg), Hypophos (calcium acetate 667 mg), Tamisol D (Tamsulosin 0.4 mg and Dutasteride 0.5 mg), Mydocalm (tolperisone 50 mg), Raditrol (calcitriol 0.25 microgram),  NovoRapid 16 units w/ lunch, Manuel nordisk insulin 16am/14pm, Atova 20 (atorvastatin 20 mg), Nebita 2.5 (nevivolol 2.5 mg), Tamisol (tamsulosin 0.4 mg), Pregaba 50 (pregabalin 50 mg), Febustat 40 (febuxostat 40 mg), Lozixum (lorazapam 1 mg), Lasix 40 mg, Sabitar 50 mg, Anazol 0.1%, Tearfresh

## 2023-02-25 NOTE — PROGRESS NOTE ADULT - PROBLEM SELECTOR PLAN 1
Acute on chronic reports pain for years, reports radiates down bilateral legs has intermittent bladder and bowel incontinence  - has OP MRI from [facility] from 8/21/22: diffuse posterior disc bulge causing spinal canal stenosis and cauda equina compression   - X-ray lumbar spine showed no acute lesion or fracture   - NSY covering spine, consulted, rec appreciated: plan for OR on Tuesday  - MRI: Grade 1 anterior listhesis L4 on L5 in combination with disc bulge/protrusion causes severe central canal stenosis at the L4-L5 level. flexion x-rays done

## 2023-02-26 DIAGNOSIS — Z01.818 ENCOUNTER FOR OTHER PREPROCEDURAL EXAMINATION: ICD-10-CM

## 2023-02-26 LAB
ANION GAP SERPL CALC-SCNC: 11 MMOL/L — SIGNIFICANT CHANGE UP (ref 7–14)
BLD GP AB SCN SERPL QL: NEGATIVE — SIGNIFICANT CHANGE UP
BUN SERPL-MCNC: 57 MG/DL — HIGH (ref 7–23)
CALCIUM SERPL-MCNC: 9.9 MG/DL — SIGNIFICANT CHANGE UP (ref 8.4–10.5)
CHLORIDE SERPL-SCNC: 103 MMOL/L — SIGNIFICANT CHANGE UP (ref 98–107)
CO2 SERPL-SCNC: 20 MMOL/L — LOW (ref 22–31)
CREAT SERPL-MCNC: 2.33 MG/DL — HIGH (ref 0.5–1.3)
EGFR: 29 ML/MIN/1.73M2 — LOW
GLUCOSE BLDC GLUCOMTR-MCNC: 129 MG/DL — HIGH (ref 70–99)
GLUCOSE BLDC GLUCOMTR-MCNC: 152 MG/DL — HIGH (ref 70–99)
GLUCOSE BLDC GLUCOMTR-MCNC: 169 MG/DL — HIGH (ref 70–99)
GLUCOSE BLDC GLUCOMTR-MCNC: 191 MG/DL — HIGH (ref 70–99)
GLUCOSE SERPL-MCNC: 218 MG/DL — HIGH (ref 70–99)
HCT VFR BLD CALC: 36.5 % — LOW (ref 39–50)
HGB BLD-MCNC: 11.9 G/DL — LOW (ref 13–17)
MAGNESIUM SERPL-MCNC: 1.7 MG/DL — SIGNIFICANT CHANGE UP (ref 1.6–2.6)
MCHC RBC-ENTMCNC: 29.3 PG — SIGNIFICANT CHANGE UP (ref 27–34)
MCHC RBC-ENTMCNC: 32.6 GM/DL — SIGNIFICANT CHANGE UP (ref 32–36)
MCV RBC AUTO: 89.9 FL — SIGNIFICANT CHANGE UP (ref 80–100)
NRBC # BLD: 0 /100 WBCS — SIGNIFICANT CHANGE UP (ref 0–0)
NRBC # FLD: 0 K/UL — SIGNIFICANT CHANGE UP (ref 0–0)
PHOSPHATE SERPL-MCNC: 3.9 MG/DL — SIGNIFICANT CHANGE UP (ref 2.5–4.5)
PLATELET # BLD AUTO: 251 K/UL — SIGNIFICANT CHANGE UP (ref 150–400)
POTASSIUM SERPL-MCNC: 4.3 MMOL/L — SIGNIFICANT CHANGE UP (ref 3.5–5.3)
POTASSIUM SERPL-SCNC: 4.3 MMOL/L — SIGNIFICANT CHANGE UP (ref 3.5–5.3)
RBC # BLD: 4.06 M/UL — LOW (ref 4.2–5.8)
RBC # FLD: 12.9 % — SIGNIFICANT CHANGE UP (ref 10.3–14.5)
RH IG SCN BLD-IMP: POSITIVE — SIGNIFICANT CHANGE UP
SODIUM SERPL-SCNC: 134 MMOL/L — LOW (ref 135–145)
WBC # BLD: 9.78 K/UL — SIGNIFICANT CHANGE UP (ref 3.8–10.5)
WBC # FLD AUTO: 9.78 K/UL — SIGNIFICANT CHANGE UP (ref 3.8–10.5)

## 2023-02-26 PROCEDURE — 99233 SBSQ HOSP IP/OBS HIGH 50: CPT

## 2023-02-26 PROCEDURE — 99232 SBSQ HOSP IP/OBS MODERATE 35: CPT

## 2023-02-26 RX ORDER — INSULIN GLARGINE 100 [IU]/ML
22 INJECTION, SOLUTION SUBCUTANEOUS AT BEDTIME
Refills: 0 | Status: DISCONTINUED | OUTPATIENT
Start: 2023-02-26 | End: 2023-02-27

## 2023-02-26 RX ORDER — INSULIN LISPRO 100/ML
7 VIAL (ML) SUBCUTANEOUS
Refills: 0 | Status: DISCONTINUED | OUTPATIENT
Start: 2023-02-26 | End: 2023-03-02

## 2023-02-26 RX ADMIN — Medication 1 DROP(S): at 12:34

## 2023-02-26 RX ADMIN — Medication 1: at 17:39

## 2023-02-26 RX ADMIN — Medication 1 DROP(S): at 05:03

## 2023-02-26 RX ADMIN — ATORVASTATIN CALCIUM 40 MILLIGRAM(S): 80 TABLET, FILM COATED ORAL at 21:30

## 2023-02-26 RX ADMIN — Medication 40 MILLIGRAM(S): at 05:07

## 2023-02-26 RX ADMIN — Medication 1: at 07:36

## 2023-02-26 RX ADMIN — Medication 667 MILLIGRAM(S): at 17:40

## 2023-02-26 RX ADMIN — Medication 6 MILLIGRAM(S): at 21:30

## 2023-02-26 RX ADMIN — Medication 1: at 12:34

## 2023-02-26 RX ADMIN — Medication 50 MILLIGRAM(S): at 05:02

## 2023-02-26 RX ADMIN — HEPARIN SODIUM 5000 UNIT(S): 5000 INJECTION INTRAVENOUS; SUBCUTANEOUS at 05:03

## 2023-02-26 RX ADMIN — Medication 7 UNIT(S): at 17:39

## 2023-02-26 RX ADMIN — PANTOPRAZOLE SODIUM 40 MILLIGRAM(S): 20 TABLET, DELAYED RELEASE ORAL at 05:03

## 2023-02-26 RX ADMIN — INSULIN GLARGINE 22 UNIT(S): 100 INJECTION, SOLUTION SUBCUTANEOUS at 21:31

## 2023-02-26 RX ADMIN — SACUBITRIL AND VALSARTAN 1 TABLET(S): 24; 26 TABLET, FILM COATED ORAL at 18:20

## 2023-02-26 RX ADMIN — CALCITRIOL 0.25 MICROGRAM(S): 0.5 CAPSULE ORAL at 12:34

## 2023-02-26 RX ADMIN — TAMSULOSIN HYDROCHLORIDE 0.4 MILLIGRAM(S): 0.4 CAPSULE ORAL at 21:30

## 2023-02-26 RX ADMIN — HEPARIN SODIUM 5000 UNIT(S): 5000 INJECTION INTRAVENOUS; SUBCUTANEOUS at 18:20

## 2023-02-26 RX ADMIN — Medication 7 UNIT(S): at 12:35

## 2023-02-26 RX ADMIN — Medication 1 PACKET(S): at 05:03

## 2023-02-26 RX ADMIN — SACUBITRIL AND VALSARTAN 1 TABLET(S): 24; 26 TABLET, FILM COATED ORAL at 05:06

## 2023-02-26 RX ADMIN — Medication 1 DROP(S): at 18:20

## 2023-02-26 RX ADMIN — Medication 6 UNIT(S): at 07:36

## 2023-02-26 RX ADMIN — Medication 50 MILLIGRAM(S): at 18:19

## 2023-02-26 RX ADMIN — Medication 1 PACKET(S): at 18:20

## 2023-02-26 NOTE — PROGRESS NOTE ADULT - PROBLEM SELECTOR PLAN 8
All brand name Polish.  We do not have all of them.  Meds are: Ligenta-M 500 (Linagliptin 2.5 and metoformin 500mg), [place] card MR (trimetazidine hydrochloride 35 mg),  Ecosprin 75 (aspirin 75 mg), Duralax, Ketoalfa (amio acid Keto Analogues 600 mg), Nidocard-Retard (nitroglycerin 2.6 mg), Hypophos (calcium acetate 667 mg), Tamisol D (Tamsulosin 0.4 mg and Dutasteride 0.5 mg), Mydocalm (tolperisone 50 mg), Raditrol (calcitriol 0.25 microgram),  NovoRapid 16 units w/ lunch, Manuel nordisk insulin 16am/14pm, Atova 20 (atorvastatin 20 mg), Nebita 2.5 (nevivolol 2.5 mg), Tamisol (tamsulosin 0.4 mg), Pregaba 50 (pregabalin 50 mg), Febustat 40 (febuxostat 40 mg), Lozixum (lorazapam 1 mg), Lasix 40 mg, Sabitar 50 mg, Anazol 0.1%, Tearfresh Has history of CABG ?2005, cath 2010 RSVG to distal RCA patent, RSVG to OM patent, and LIMA to LAD patent  - no CP  - holding asa in event of need for spinal surgery  - c/w statin, LDL 75, increased to atorvastatin 40 mg  - pending stress test

## 2023-02-26 NOTE — PROGRESS NOTE ADULT - PROBLEM SELECTOR PLAN 7
Has history of CABG ?2005, cath 2010 RSVG to distal RCA patent, RSVG to OM patent, and LIMA to LAD patent  - no CP  - holding asa in event of need for spinal surgery  - c/w statin, LDL 75, increased to atorvastatin 40 mg  - pending stress test Patient's FS has been largely within limit. Appreciate endo assistance with adjusting insulin regimen and DM management. Cr noted to be elevated on 2/25, unclear baseline. Lasix was decreased from BID to QD on 2/25 and Cr is improving. Will need to f/u Cr on Monday to make sure it is stable prior to proceeding to OR on Tuesday

## 2023-02-26 NOTE — PROGRESS NOTE ADULT - PROBLEM SELECTOR PLAN 9
SQH  DM/DASH halal diet  PT erna BLAKE dc pending: plan for OR with NSY on Tuesday All brand name Wol[place].  We do not have all of them.  Meds are: Ligenta-M 500 (Linagliptin 2.5 and metoformin 500mg), [place] card MR (trimetazidine hydrochloride 35 mg),  Ecosprin 75 (aspirin 75 mg), Duralax, Ketoalfa (amio acid Keto Analogues 600 mg), Nidocard-Retard (nitroglycerin 2.6 mg), Hypophos (calcium acetate 667 mg), Tamisol D (Tamsulosin 0.4 mg and Dutasteride 0.5 mg), Mydocalm (tolperisone 50 mg), Raditrol (calcitriol 0.25 microgram),  NovoRapid 16 units w/ lunch, Manuel nordisk insulin 16am/14pm, Atova 20 (atorvastatin 20 mg), Nebita 2.5 (nevivolol 2.5 mg), Tamisol (tamsulosin 0.4 mg), Pregaba 50 (pregabalin 50 mg), Febustat 40 (febuxostat 40 mg), Lozixum (lorazapam 1 mg), Lasix 40 mg, Sabitar 50 mg, Anazol 0.1%, Tearfresh

## 2023-02-26 NOTE — PROGRESS NOTE ADULT - ASSESSMENT
68M Belarusian speaking, HTN, DM2 on insulin, CKD3, systolic HF (EF 45%), CAD s/p CABG 2005, RSA s/p L renal stent, and PAD s/p PTA to R common iliac and L common iliac s/p stent who presents to the ED with worsening back pain in context of known L4-L5 cord compression/cauda equina since 8/2022.

## 2023-02-26 NOTE — PROGRESS NOTE ADULT - SUBJECTIVE AND OBJECTIVE BOX
PROGRESS NOTE:     Patient is a 68y old  Male who presents with a chief complaint of Back pain (26 Feb 2023 10:29)      SUBJECTIVE / OVERNIGHT EVENTS: no acute event overnight. Denies fever, chills, abd pain. Back burning. Reports burning before and after urinating, not during, for the past year.     ADDITIONAL REVIEW OF SYSTEMS:    MEDICATIONS  (STANDING):  artificial tears (preservative free) Ophthalmic Solution 1 Drop(s) Both EYES four times a day  atorvastatin 40 milliGRAM(s) Oral at bedtime  calcitriol   Capsule 0.25 MICROGram(s) Oral daily  calcium acetate 667 milliGRAM(s) Oral with dinner  dextrose 50% Injectable 25 Gram(s) IV Push once  dextrose 50% Injectable 12.5 Gram(s) IV Push once  dextrose 50% Injectable 25 Gram(s) IV Push once  furosemide    Tablet 40 milliGRAM(s) Oral daily  heparin   Injectable 5000 Unit(s) SubCutaneous every 12 hours  insulin glargine Injectable (LANTUS) 22 Unit(s) SubCutaneous at bedtime  insulin lispro (ADMELOG) corrective regimen sliding scale   SubCutaneous at bedtime  insulin lispro (ADMELOG) corrective regimen sliding scale   SubCutaneous three times a day before meals  insulin lispro Injectable (ADMELOG) 7 Unit(s) SubCutaneous three times a day before meals  melatonin 6 milliGRAM(s) Oral at bedtime  pantoprazole    Tablet 40 milliGRAM(s) Oral before breakfast  pregabalin 50 milliGRAM(s) Oral two times a day  psyllium Powder 1 Packet(s) Oral two times a day  sacubitril 24 mG/valsartan 26 mG 1 Tablet(s) Oral two times a day  tamsulosin 0.4 milliGRAM(s) Oral at bedtime    MEDICATIONS  (PRN):  acetaminophen     Tablet .. 650 milliGRAM(s) Oral every 6 hours PRN Temp greater or equal to 38C (100.4F), Mild Pain (1 - 3)  bisacodyl Suppository 10 milliGRAM(s) Rectal daily PRN Constipation  dextrose Oral Gel 15 Gram(s) Oral once PRN Blood Glucose LESS THAN 70 milliGRAM(s)/deciliter  nitroglycerin     SubLingual 0.4 milliGRAM(s) SubLingual every 5 minutes PRN Chest Pain      CAPILLARY BLOOD GLUCOSE      POCT Blood Glucose.: 152 mg/dL (26 Feb 2023 11:42)  POCT Blood Glucose.: 191 mg/dL (26 Feb 2023 07:11)  POCT Blood Glucose.: 233 mg/dL (25 Feb 2023 22:12)    I&O's Summary    25 Feb 2023 07:01  -  26 Feb 2023 07:00  --------------------------------------------------------  IN: 360 mL / OUT: 1440 mL / NET: -1080 mL        PHYSICAL EXAM:  Vital Signs Last 24 Hrs  T(C): 36.7 (26 Feb 2023 09:56), Max: 37.1 (25 Feb 2023 17:26)  T(F): 98 (26 Feb 2023 09:56), Max: 98.7 (25 Feb 2023 17:26)  HR: 71 (26 Feb 2023 09:56) (63 - 78)  BP: 104/48 (26 Feb 2023 09:56) (104/48 - 121/55)  BP(mean): --  RR: 17 (26 Feb 2023 09:56) (16 - 18)  SpO2: 100% (26 Feb 2023 09:56) (98% - 100%)    Parameters below as of 26 Feb 2023 09:56  Patient On (Oxygen Delivery Method): room air        CONSTITUTIONAL: NAD, laying in bed comfortably  RESPIRATORY: Normal respiratory effort; lungs are clear to auscultation bilaterally  CARDIOVASCULAR: Regular rate and rhythm, normal S1 and S2, no murmur/rub/gallop; No lower extremity edema  ABDOMEN: Nontender to palpation, normoactive bowel sounds, no rebound/guarding  MUSCLOSKELETAL: no clubbing or cyanosis of digits  SKIN: no rash, erythema, lesion  PSYCH: A+O to person, place, and time; affect appropriate    LABS:                        11.9   9.78  )-----------( 251      ( 26 Feb 2023 05:05 )             36.5     02-26    134<L>  |  103  |  57<H>  ----------------------------<  218<H>  4.3   |  20<L>  |  2.33<H>    Ca    9.9      26 Feb 2023 05:05  Phos  3.9     02-26  Mg     1.70     02-26                  RADIOLOGY & ADDITIONAL TESTS:  Results Reviewed:   Imaging Personally Reviewed:  Electrocardiogram Personally Reviewed:    COORDINATION OF CARE:  Care Discussed with Consultants/Other Providers [Y/N]:  Prior or Outpatient Records Reviewed [Y/N]:

## 2023-02-26 NOTE — PROGRESS NOTE ADULT - SUBJECTIVE AND OBJECTIVE BOX
Chief Complaint: DM 2 uncontrolled with hyperglycemia     History: Glucose improved but remains above target     MEDICATIONS  (STANDING):  artificial tears (preservative free) Ophthalmic Solution 1 Drop(s) Both EYES four times a day  atorvastatin 40 milliGRAM(s) Oral at bedtime  calcitriol   Capsule 0.25 MICROGram(s) Oral daily  calcium acetate 667 milliGRAM(s) Oral with dinner  dextrose 50% Injectable 25 Gram(s) IV Push once  dextrose 50% Injectable 12.5 Gram(s) IV Push once  dextrose 50% Injectable 25 Gram(s) IV Push once  furosemide    Tablet 40 milliGRAM(s) Oral daily  heparin   Injectable 5000 Unit(s) SubCutaneous every 12 hours  insulin glargine Injectable (LANTUS) 20 Unit(s) SubCutaneous at bedtime  insulin lispro (ADMELOG) corrective regimen sliding scale   SubCutaneous three times a day before meals  insulin lispro (ADMELOG) corrective regimen sliding scale   SubCutaneous at bedtime  insulin lispro Injectable (ADMELOG) 7 Unit(s) SubCutaneous three times a day before meals  melatonin 6 milliGRAM(s) Oral at bedtime  pantoprazole    Tablet 40 milliGRAM(s) Oral before breakfast  pregabalin 50 milliGRAM(s) Oral two times a day  psyllium Powder 1 Packet(s) Oral two times a day  sacubitril 24 mG/valsartan 26 mG 1 Tablet(s) Oral two times a day  tamsulosin 0.4 milliGRAM(s) Oral at bedtime          No Known Allergies        Review of Systems:  Constitutional: No fever  Eyes: No blurry vision  Neuro: No tremors  HEENT: No pain  Cardiovascular: No chest pain, palpitations  Respiratory: No SOB, no cough  GI: No nausea, vomiting, abdominal pain      PHYSICAL EXAM:  Vital Signs Last 24 Hrs  T(C): 36.7 (26 Feb 2023 09:56), Max: 37.1 (25 Feb 2023 17:26)  T(F): 98 (26 Feb 2023 09:56), Max: 98.7 (25 Feb 2023 17:26)  HR: 71 (26 Feb 2023 09:56) (63 - 78)  BP: 104/48 (26 Feb 2023 09:56) (104/48 - 126/65)  BP(mean): --  RR: 17 (26 Feb 2023 09:56) (16 - 18)  SpO2: 100% (26 Feb 2023 09:56) (98% - 100%)    Parameters below as of 26 Feb 2023 09:56  Patient On (Oxygen Delivery Method): room air      GENERAL: NAD, laying in bed   EYES: No proptosis, no lid lag, anicteric  HEENT:  Atraumatic, Normocephalic, moist mucous membranes  RESPIRATORY: Non labored respirations   GI: Soft, nontender, non distended  NEURO: extraocular movements intact, no tremor  PSYCH: Alert and oriented x 3, normal affect, normal mood    CAPILLARY BLOOD GLUCOSE      POCT Blood Glucose.: 191 mg/dL (26 Feb 2023 07:11)  POCT Blood Glucose.: 233 mg/dL (25 Feb 2023 22:12)  POCT Blood Glucose.: 183 mg/dL (25 Feb 2023 16:27)  POCT Blood Glucose.: 221 mg/dL (25 Feb 2023 11:31)      02-26    134<L>  |  103  |  57<H>  ----------------------------<  218<H>  4.3   |  20<L>  |  2.33<H>    Ca    9.9      26 Feb 2023 05:05  Phos  3.9     02-26  Mg     1.70     02-26        A1C with Estimated Average Glucose Result: 8.3 % (02-22-23 @ 05:13)      Diet, DASH/TLC:   Sodium & Cholesterol Restricted  Consistent Carbohydrate Evening Snack (CSTCHOSN)  Halal  No Caffeine (02-23-23 @ 15:04) [Active]

## 2023-02-26 NOTE — PROGRESS NOTE ADULT - PROBLEM SELECTOR PLAN 1
Acute on chronic reports pain for years, reports radiates down bilateral legs has intermittent bladder and bowel incontinence  - has OP MRI from [facility] from 8/21/22: diffuse posterior disc bulge causing spinal canal stenosis and cauda equina compression   - X-ray lumbar spine showed no acute lesion or fracture   - NSY covering spine, consulted, rec appreciated: plan for OR on Tuesday  - MRI: Grade 1 anterior listhesis L4 on L5 in combination with disc bulge/protrusion causes severe central canal stenosis at the L4-L5 level

## 2023-02-26 NOTE — PROGRESS NOTE ADULT - PROBLEM SELECTOR PLAN 4
HbA1c of 8.3%, at home on insulin 14 units with lunch and 14-16 BID of mixed insulin + orals  - DM diet, BG checks, YIFAN  - endo following and adjusting insulin

## 2023-02-26 NOTE — PROGRESS NOTE ADULT - PROBLEM SELECTOR PLAN 6
EF 45% per outside records   - c/w home Entresto (called Sabitar 50 Sacubitril  24 mg/Valsartan 26 mg)  - holding BB per cards   - TTE: EF 45% Mild to moderate segmental left ventricular systolic dysfunction.  Hypokinesis of the apex, distal septum, and distal anterior wall. Mod-Sev AS.  - appears compensated  - cards eval appreciated  - decreased lasix from BID to qd on 2/25 given elevated Cr

## 2023-02-26 NOTE — PROGRESS NOTE ADULT - ASSESSMENT
Assessment and Recommendation:   · Assessment	  69 yo M with DMII, CAD s/p CABG, CKD, chronic back pain, and HTN presents to the ED with worsening back pain.  He recently came from [facility] about 2 weeks ago.   endocrine called for DM, a1c 8.3  Outpatient regimen:   Ligenta-M 500 (Linagliptin 2.5 and metformin 500mg) 1 tablet once daily  NovoRapid (insulin aspart) 14 units w/ lunch  NovoMix 30 insulin 14 units before breakfast, 14-16 units before dinner      1. DM  While inpatient  BG target 100-180 mg/dl, remains above goal  - Increased Lantus to 22 units for this evening   - Increased Admelog to 7 units SC Pre-meal/TIDAC - HOLD if not eating   - Continue Admelog LOW correctional scale before meals and bedtime   - Hypoglycemia protocol in place   - Carb Consistent Diet   - Nutrition consult - completed   - Pharmacy liaison following for education and coordination of care as outpatient         Discharge planning:  Medicaid pending - currently uninsured   Likely stop prior outpatient regimen and continue with mixed insulin only  Rapid acting insulin should not be ordered in conjunction with mixed insulin for risk of hypoglycemia   Consider Novolog 70/30: doses TBD close to discharge: before breakfast and before dinner via pen (see pharmacy liaison note: patient can qualify for coupon)  Can consider follow up at endocrine clinic   [facility]  [address], [address] [phone]      2. HTN  goal BP in DM <130/80  currently on lasix   management as per primary team   outpt mc/cr ratio        3. HLD  LDL 75  continue lipitor 40mg if no contraindication     HAI King-BC  Nurse Practitioner   Division of Endocrinology  Pager: SILVIO pager [phone]    If out of hospital/unavailable when paged, please note: patient will be cared for by another provider on the endocrine service.  Please call the endocrine answering service for assistance to reach covering provider ([phone]). For non-urgent matters, please email [email or web address] for assistance.

## 2023-02-27 ENCOUNTER — TRANSCRIPTION ENCOUNTER (OUTPATIENT)
Age: 68
End: 2023-02-27

## 2023-02-27 LAB
ANION GAP SERPL CALC-SCNC: 11 MMOL/L — SIGNIFICANT CHANGE UP (ref 7–14)
BUN SERPL-MCNC: 58 MG/DL — HIGH (ref 7–23)
CALCIUM SERPL-MCNC: 10.7 MG/DL — HIGH (ref 8.4–10.5)
CHLORIDE SERPL-SCNC: 100 MMOL/L — SIGNIFICANT CHANGE UP (ref 98–107)
CO2 SERPL-SCNC: 22 MMOL/L — SIGNIFICANT CHANGE UP (ref 22–31)
CREAT SERPL-MCNC: 2.32 MG/DL — HIGH (ref 0.5–1.3)
EGFR: 29 ML/MIN/1.73M2 — LOW
GLUCOSE BLDC GLUCOMTR-MCNC: 184 MG/DL — HIGH (ref 70–99)
GLUCOSE BLDC GLUCOMTR-MCNC: 188 MG/DL — HIGH (ref 70–99)
GLUCOSE SERPL-MCNC: 225 MG/DL — HIGH (ref 70–99)
HCT VFR BLD CALC: 41.3 % — SIGNIFICANT CHANGE UP (ref 39–50)
HGB BLD-MCNC: 13.4 G/DL — SIGNIFICANT CHANGE UP (ref 13–17)
MAGNESIUM SERPL-MCNC: 1.7 MG/DL — SIGNIFICANT CHANGE UP (ref 1.6–2.6)
MCHC RBC-ENTMCNC: 29.5 PG — SIGNIFICANT CHANGE UP (ref 27–34)
MCHC RBC-ENTMCNC: 32.4 GM/DL — SIGNIFICANT CHANGE UP (ref 32–36)
MCV RBC AUTO: 91 FL — SIGNIFICANT CHANGE UP (ref 80–100)
NRBC # BLD: 0 /100 WBCS — SIGNIFICANT CHANGE UP (ref 0–0)
NRBC # FLD: 0 K/UL — SIGNIFICANT CHANGE UP (ref 0–0)
PHOSPHATE SERPL-MCNC: 4.1 MG/DL — SIGNIFICANT CHANGE UP (ref 2.5–4.5)
PLATELET # BLD AUTO: 264 K/UL — SIGNIFICANT CHANGE UP (ref 150–400)
POTASSIUM SERPL-MCNC: 4.6 MMOL/L — SIGNIFICANT CHANGE UP (ref 3.5–5.3)
POTASSIUM SERPL-SCNC: 4.6 MMOL/L — SIGNIFICANT CHANGE UP (ref 3.5–5.3)
RBC # BLD: 4.54 M/UL — SIGNIFICANT CHANGE UP (ref 4.2–5.8)
RBC # FLD: 13 % — SIGNIFICANT CHANGE UP (ref 10.3–14.5)
SARS-COV-2 RNA SPEC QL NAA+PROBE: SIGNIFICANT CHANGE UP
SODIUM SERPL-SCNC: 133 MMOL/L — LOW (ref 135–145)
WBC # BLD: 9.35 K/UL — SIGNIFICANT CHANGE UP (ref 3.8–10.5)
WBC # FLD AUTO: 9.35 K/UL — SIGNIFICANT CHANGE UP (ref 3.8–10.5)

## 2023-02-27 PROCEDURE — 99232 SBSQ HOSP IP/OBS MODERATE 35: CPT

## 2023-02-27 PROCEDURE — 99233 SBSQ HOSP IP/OBS HIGH 50: CPT

## 2023-02-27 RX ORDER — SENNA PLUS 8.6 MG/1
2 TABLET ORAL AT BEDTIME
Refills: 0 | Status: DISCONTINUED | OUTPATIENT
Start: 2023-02-27 | End: 2023-03-01

## 2023-02-27 RX ORDER — INSULIN GLARGINE 100 [IU]/ML
18 INJECTION, SOLUTION SUBCUTANEOUS AT BEDTIME
Refills: 0 | Status: DISCONTINUED | OUTPATIENT
Start: 2023-02-27 | End: 2023-02-28

## 2023-02-27 RX ORDER — POLYETHYLENE GLYCOL 3350 17 G/17G
17 POWDER, FOR SOLUTION ORAL DAILY
Refills: 0 | Status: DISCONTINUED | OUTPATIENT
Start: 2023-02-27 | End: 2023-03-01

## 2023-02-27 RX ORDER — INSULIN GLARGINE 100 [IU]/ML
22 INJECTION, SOLUTION SUBCUTANEOUS AT BEDTIME
Refills: 0 | Status: DISCONTINUED | OUTPATIENT
Start: 2023-02-28 | End: 2023-03-02

## 2023-02-27 RX ADMIN — Medication 40 MILLIGRAM(S): at 05:29

## 2023-02-27 RX ADMIN — HEPARIN SODIUM 5000 UNIT(S): 5000 INJECTION INTRAVENOUS; SUBCUTANEOUS at 05:29

## 2023-02-27 RX ADMIN — CALCITRIOL 0.25 MICROGRAM(S): 0.5 CAPSULE ORAL at 12:05

## 2023-02-27 RX ADMIN — Medication 50 MILLIGRAM(S): at 17:23

## 2023-02-27 RX ADMIN — SACUBITRIL AND VALSARTAN 1 TABLET(S): 24; 26 TABLET, FILM COATED ORAL at 17:22

## 2023-02-27 RX ADMIN — Medication 1 DROP(S): at 12:05

## 2023-02-27 RX ADMIN — Medication 7 UNIT(S): at 12:04

## 2023-02-27 RX ADMIN — Medication 50 MILLIGRAM(S): at 05:30

## 2023-02-27 RX ADMIN — Medication 1 PACKET(S): at 05:29

## 2023-02-27 RX ADMIN — ATORVASTATIN CALCIUM 40 MILLIGRAM(S): 80 TABLET, FILM COATED ORAL at 22:05

## 2023-02-27 RX ADMIN — Medication 7 UNIT(S): at 07:43

## 2023-02-27 RX ADMIN — Medication 1: at 07:43

## 2023-02-27 RX ADMIN — Medication 1: at 17:20

## 2023-02-27 RX ADMIN — Medication 1: at 12:04

## 2023-02-27 RX ADMIN — Medication 6 MILLIGRAM(S): at 22:05

## 2023-02-27 RX ADMIN — Medication 1 DROP(S): at 05:28

## 2023-02-27 RX ADMIN — TAMSULOSIN HYDROCHLORIDE 0.4 MILLIGRAM(S): 0.4 CAPSULE ORAL at 22:05

## 2023-02-27 RX ADMIN — INSULIN GLARGINE 18 UNIT(S): 100 INJECTION, SOLUTION SUBCUTANEOUS at 22:26

## 2023-02-27 RX ADMIN — Medication 1 PACKET(S): at 17:21

## 2023-02-27 RX ADMIN — SACUBITRIL AND VALSARTAN 1 TABLET(S): 24; 26 TABLET, FILM COATED ORAL at 05:29

## 2023-02-27 RX ADMIN — Medication 667 MILLIGRAM(S): at 17:22

## 2023-02-27 RX ADMIN — Medication 7 UNIT(S): at 17:20

## 2023-02-27 RX ADMIN — Medication 1 DROP(S): at 17:22

## 2023-02-27 RX ADMIN — SENNA PLUS 2 TABLET(S): 8.6 TABLET ORAL at 22:05

## 2023-02-27 RX ADMIN — PANTOPRAZOLE SODIUM 40 MILLIGRAM(S): 20 TABLET, DELAYED RELEASE ORAL at 05:29

## 2023-02-27 NOTE — PROGRESS NOTE ADULT - ASSESSMENT
68M Yakut speaking, HTN, DM2 on insulin, CKD3, systolic HF (EF 45%), CAD s/p CABG 2005, RSA s/p L renal stent, and PAD s/p PTA to R common iliac and L common iliac s/p stent who presents to the ED with worsening back pain in context of known L4-L5 cord compression/cauda equina since 8/2022.

## 2023-02-27 NOTE — PROGRESS NOTE ADULT - SUBJECTIVE AND OBJECTIVE BOX
Dx: Spondylolisthesis L4-5    Patient scheduled for lumbar laminectomy, possible fusion L4-5 tomorrow under general anesthesia,    HPI:  69 yo M with DMII, CAD s/p CABG, CKD, chronic back pain, and HTN presents to the ED with worsening back pain. Patient is AAOX2 and somnolent, not able to provide any hx. Most of the information was supplemented by the son. As per son, pt is AAOx 3 and "mentally stable." He recently came from [facility] about 2 weeks ago and has " multiple medical complaints." But this morning he told his son that he is feeling weak and his back pain is getting worse. The pain is localized in the lower back region. It is burning is sensation and constant. Unclear if it radiates down the leg. It worsens with movement and walking. He can only walk "10 meters" with a cane. He is also incontinent of urine but denies any recent fever, chills, bowel incontinence, or lower extremities weakness/motor deficits. He reportedly had MRIs in the past but son does not know what it showed. He also has other chronic medical problems such as CKD, DMII and CAD and his son thinks pt needs management for them as well.   In the ED, his vitals were notable for hyperglycemia, elevated BUN/Scr, and hypomagnesemia. EKG showed bradycardia with T wave changes in the lateral leads.  (21 Feb 2023 18:36)      PAST MEDICAL & SURGICAL HISTORY:  Diabetes mellitus  Essential hypertension  CAD (coronary artery disease)  Chronic kidney disease, unspecified CKD stage  S/P CABG x 2    No Known Allergies    T(C): 37.2 (02-27-23 @ 09:15), Max: 37.2 (02-27-23 @ 09:15)  HR: 78 (02-27-23 @ 09:15) (78 - 90)  BP: 118/57 (02-27-23 @ 09:15) (110/48 - 130/66)  RR: 17 (02-27-23 @ 09:15) (16 - 17)  SpO2: 100% (02-27-23 @ 09:15) (98% - 100%)      02-27    133<L>  |  100  |  58<H>  ----------------------------<  225<H>  4.6   |  22  |  2.32<H>    Ca    10.7<H>      27 Feb 2023 06:30  Phos  4.1     02-27  Mg     1.70     02-27      CBC Full  -  ( 27 Feb 2023 06:30 )  WBC Count : 9.35 K/uL  RBC Count : 4.54 M/uL  Hemoglobin : 13.4 g/dL  Hematocrit : 41.3 %  Platelet Count - Automated : 264 K/uL  Mean Cell Volume : 91.0 fL  Mean Cell Hemoglobin : 29.5 pg  Mean Cell Hemoglobin Concentration : 32.4 gm/dL  Auto Neutrophil # : x  Auto Lymphocyte # : x  Auto Monocyte # : x  Auto Eosinophil # : x  Auto Basophil # : x  Auto Neutrophil % : x  Auto Lymphocyte % : x  Auto Monocyte % : x  Auto Eosinophil % : x  Auto Basophil % : x      Type & Screen (in past 72hrs): in blood bank 2/26  covid negative 2/27    Medical clearance: cardiac clearance in chart  Anticoagulants: none, SQH discontinued 2/27, ASA/plavix held since admission  Consent: in chart

## 2023-02-27 NOTE — PROGRESS NOTE ADULT - SUBJECTIVE AND OBJECTIVE BOX
PROGRESS NOTE:     Patient is a 68y old  Male who presents with a chief complaint of Back pain (26 Feb 2023 16:38)      SUBJECTIVE / OVERNIGHT EVENTS: no acute overnight events    ADDITIONAL REVIEW OF SYSTEMS:    MEDICATIONS  (STANDING):  artificial tears (preservative free) Ophthalmic Solution 1 Drop(s) Both EYES four times a day  atorvastatin 40 milliGRAM(s) Oral at bedtime  calcitriol   Capsule 0.25 MICROGram(s) Oral daily  calcium acetate 667 milliGRAM(s) Oral with dinner  dextrose 50% Injectable 25 Gram(s) IV Push once  dextrose 50% Injectable 12.5 Gram(s) IV Push once  dextrose 50% Injectable 25 Gram(s) IV Push once  furosemide    Tablet 40 milliGRAM(s) Oral daily  heparin   Injectable 5000 Unit(s) SubCutaneous every 12 hours  insulin glargine Injectable (LANTUS) 22 Unit(s) SubCutaneous at bedtime  insulin lispro (ADMELOG) corrective regimen sliding scale   SubCutaneous at bedtime  insulin lispro (ADMELOG) corrective regimen sliding scale   SubCutaneous three times a day before meals  insulin lispro Injectable (ADMELOG) 7 Unit(s) SubCutaneous three times a day before meals  melatonin 6 milliGRAM(s) Oral at bedtime  pantoprazole    Tablet 40 milliGRAM(s) Oral before breakfast  pregabalin 50 milliGRAM(s) Oral two times a day  psyllium Powder 1 Packet(s) Oral two times a day  sacubitril 24 mG/valsartan 26 mG 1 Tablet(s) Oral two times a day  senna 2 Tablet(s) Oral at bedtime  tamsulosin 0.4 milliGRAM(s) Oral at bedtime    MEDICATIONS  (PRN):  acetaminophen     Tablet .. 650 milliGRAM(s) Oral every 6 hours PRN Temp greater or equal to 38C (100.4F), Mild Pain (1 - 3)  bisacodyl Suppository 10 milliGRAM(s) Rectal daily PRN Constipation  dextrose Oral Gel 15 Gram(s) Oral once PRN Blood Glucose LESS THAN 70 milliGRAM(s)/deciliter  nitroglycerin     SubLingual 0.4 milliGRAM(s) SubLingual every 5 minutes PRN Chest Pain  polyethylene glycol 3350 17 Gram(s) Oral daily PRN Constipation      CAPILLARY BLOOD GLUCOSE      POCT Blood Glucose.: 184 mg/dL (27 Feb 2023 11:09)  POCT Blood Glucose.: 188 mg/dL (27 Feb 2023 07:12)  POCT Blood Glucose.: 129 mg/dL (26 Feb 2023 21:24)  POCT Blood Glucose.: 169 mg/dL (26 Feb 2023 16:43)    I&O's Summary    26 Feb 2023 07:01  -  27 Feb 2023 07:00  --------------------------------------------------------  IN: 0 mL / OUT: 1000 mL / NET: -1000 mL    27 Feb 2023 07:01  -  27 Feb 2023 14:48  --------------------------------------------------------  IN: 0 mL / OUT: 300 mL / NET: -300 mL        PHYSICAL EXAM:  Vital Signs Last 24 Hrs  T(C): 37.2 (27 Feb 2023 09:15), Max: 37.2 (27 Feb 2023 09:15)  T(F): 98.9 (27 Feb 2023 09:15), Max: 98.9 (27 Feb 2023 09:15)  HR: 78 (27 Feb 2023 09:15) (78 - 90)  BP: 118/57 (27 Feb 2023 09:15) (110/48 - 130/66)  BP(mean): --  RR: 17 (27 Feb 2023 09:15) (16 - 17)  SpO2: 100% (27 Feb 2023 09:15) (98% - 100%)    Parameters below as of 27 Feb 2023 09:15  Patient On (Oxygen Delivery Method): room air        CONSTITUTIONAL: NAD, well-developed  RESPIRATORY: Normal respiratory effort; lungs are clear to auscultation bilaterally  CARDIOVASCULAR: Regular rate and rhythm, normal S1 and S2, no murmur/rub/gallop; No lower extremity edema; Peripheral pulses are 2+ bilaterally  ABDOMEN: Nontender to palpation, normoactive bowel sounds, no rebound/guarding  PSYCH: A+O to person, place, and time; affect appropriate    LABS:                        13.4   9.35  )-----------( 264      ( 27 Feb 2023 06:30 )             41.3     02-27    133<L>  |  100  |  58<H>  ----------------------------<  225<H>  4.6   |  22  |  2.32<H>    Ca    10.7<H>      27 Feb 2023 06:30  Phos  4.1     02-27  Mg     1.70     02-27                  RADIOLOGY & ADDITIONAL TESTS:  Results Reviewed:   Imaging Personally Reviewed:  Electrocardiogram Personally Reviewed:    COORDINATION OF CARE:  Care Discussed with Consultants/Other Providers [Y/N]:  Prior or Outpatient Records Reviewed [Y/N]:

## 2023-02-27 NOTE — PROGRESS NOTE ADULT - PROBLEM SELECTOR PLAN 6
EF 45% per outside records   - c/w home Entresto (called Sabitar 50 Sacubitril  24 mg/Valsartan 26 mg)  - holding BB per cards   - TTE: EF 45% Mild to moderate segmental left ventricular systolic dysfunction.  Hypokinesis of the apex, distal septum, and distal anterior wall. Mod-Sev AS.  - appears compensated  - cards eval appreciated  - lasix 40 mg daily

## 2023-02-27 NOTE — PROGRESS NOTE ADULT - PROBLEM SELECTOR PLAN 8
Has history of CABG ?2005, cath 2010 RSVG to distal RCA patent, RSVG to OM patent, and LIMA to LAD patent  - no CP  - holding asa in event of need for spinal surgery  - c/w statin, LDL 75, increased to atorvastatin 40 mg  - per cardiology note on 2/24 no need for stress test

## 2023-02-27 NOTE — PROGRESS NOTE ADULT - PROBLEM SELECTOR PLAN 9
All brand name Wolof.  We do not have all of them.  Meds are: Ligenta-M 500 (Linagliptin 2.5 and metoformin 500mg), Addison card MR (trimetazidine hydrochloride 35 mg),  Ecosprin 75 (aspirin 75 mg), Duralax, Ketoalfa (amio acid Keto Analogues 600 mg), Nidocard-Retard (nitroglycerin 2.6 mg), Hypophos (calcium acetate 667 mg), Tamisol D (Tamsulosin 0.4 mg and Dutasteride 0.5 mg), Mydocalm (tolperisone 50 mg), Raditrol (calcitriol 0.25 microgram),  NovoRapid 16 units w/ lunch, Manuel nordisk insulin 16am/14pm, Atova 20 (atorvastatin 20 mg), Nebita 2.5 (nevivolol 2.5 mg), Tamisol (tamsulosin 0.4 mg), Pregaba 50 (pregabalin 50 mg), Febustat 40 (febuxostat 40 mg), Lozixum (lorazapam 1 mg), Lasix 40 mg, Sabitar 50 mg, Anazol 0.1%, Tearfresh

## 2023-02-27 NOTE — PROGRESS NOTE ADULT - ASSESSMENT
Assessment and Recommendation:   · Assessment	  67 yo M with DMII, CAD s/p CABG, CKD, chronic back pain, and HTN presents to the ED with worsening back pain.  He recently came from [facility] about 2 weeks ago.   endocrine called for DM, a1c 8.3  Outpatient regimen:   Ligenta-M 500 (Linagliptin 2.5 and metformin 500mg) 1 tablet once daily  NovoRapid (insulin aspart) 14 units w/ lunch  NovoMix 30 insulin 14 units before breakfast, 14-16 units before dinner      1. DM  While inpatient  BG target 100-180 mg/dl, remains above goal  Patient may be NPO after midnight  - Please decrease Lantus to 18 units for this evening if NPO. Would resume Lantus 22 units on 2/28 PM if no longer NPO  - Continue Admelog 7 units SC Pre-meal/TIDAC - HOLD if not eating   - Continue Admelog LOW correctional scale before meals and bedtime   - Hypoglycemia protocol in place   - Carb Consistent Diet   - Nutrition consult - completed   - Pharmacy liaison following for education and coordination of care as outpatient     Discharge planning:  Medicaid pending - currently uninsured   Likely stop prior outpatient regimen and continue with mixed insulin only  Rapid acting insulin should not be ordered in conjunction with mixed insulin for risk of hypoglycemia   Consider Novolog 70/30: doses TBD close to discharge: before breakfast and before dinner via pen (see pharmacy liaison note: patient can qualify for coupon)  Can likely follow up  [facility]  [address] [place] [address]  [phone]      2. HTN  goal BP in DM <130/80  currently on lasix   management as per primary team   outpt mc/cr ratio      3. HLD  LDL 75  continue lipitor 40mg if no contraindication     HAI King-BC  Nurse Practitioner   Division of Endocrinology  Pager: SILVIO pager [phone]    If out of hospital/unavailable when paged, please note: patient will be cared for by another provider on the endocrine service.  Please call the endocrine answering service for assistance to reach covering provider ([phone]). For non-urgent matters, please email [email or web address] for assistance.

## 2023-02-27 NOTE — PROGRESS NOTE ADULT - PROBLEM SELECTOR PLAN 1
Acute on chronic reports pain for years, reports radiates down bilateral legs has intermittent bladder and bowel incontinence  - has OP MRI from [facility] from 8/21/22: diffuse posterior disc bulge causing spinal canal stenosis and cauda equina compression   - X-ray lumbar spine showed no acute lesion or fracture   - NSY covering spine, consulted, rec appreciated: plan for OR on Tuesday  - MRI: Grade 1 anterior listhesis L4 on L5 in combination with disc bulge/protrusion causes severe central canal stenosis at the L4-L5 level  -NS plan to take the patient to the OR on 2/28.    -Cardiology is following the patient and per their note on 2/24, a stress test is not needed and "The patient is currently without evidence of acute ischemia, uncontrolled arrhythmia, or decompensated heart failure. There is currently no cardiac contraindication to the planned procedure."

## 2023-02-27 NOTE — PROGRESS NOTE ADULT - SUBJECTIVE AND OBJECTIVE BOX
OVERNIGHT EVENTS: seen & examined this AM, complains of back pain     Vital Signs Last 24 Hrs  T(C): 36.6 (25 Feb 2023 05:00), Max: 36.8 (24 Feb 2023 17:25)  T(F): 97.9 (25 Feb 2023 05:00), Max: 98.2 (24 Feb 2023 17:25)  HR: 69 (25 Feb 2023 05:00) (64 - 84)  BP: 105/59 (25 Feb 2023 05:00) (86/46 - 133/59)  BP(mean): --  RR: 16 (25 Feb 2023 05:00) (16 - 18)  SpO2: 98% (25 Feb 2023 05:00) (95% - 99%)    Parameters below as of 25 Feb 2023 05:00  Patient On (Oxygen Delivery Method): room air        MEDS:   acetaminophen     Tablet .. 650 milliGRAM(s) Oral every 6 hours PRN  artificial tears (preservative free) Ophthalmic Solution 1 Drop(s) Both EYES four times a day  atorvastatin 40 milliGRAM(s) Oral at bedtime  bisacodyl 5 milliGRAM(s) Oral at bedtime PRN  calcitriol   Capsule 0.25 MICROGram(s) Oral daily  calcium acetate 667 milliGRAM(s) Oral with dinner  dextrose 50% Injectable 25 Gram(s) IV Push once  dextrose 50% Injectable 12.5 Gram(s) IV Push once  dextrose 50% Injectable 25 Gram(s) IV Push once  dextrose Oral Gel 15 Gram(s) Oral once PRN  furosemide    Tablet 40 milliGRAM(s) Oral two times a day  heparin   Injectable 5000 Unit(s) SubCutaneous every 12 hours  insulin glargine Injectable (LANTUS) 20 Unit(s) SubCutaneous at bedtime  insulin lispro (ADMELOG) corrective regimen sliding scale   SubCutaneous at bedtime  insulin lispro (ADMELOG) corrective regimen sliding scale   SubCutaneous three times a day before meals  insulin lispro Injectable (ADMELOG) 6 Unit(s) SubCutaneous three times a day before meals  melatonin 6 milliGRAM(s) Oral at bedtime  nitroglycerin     SubLingual 0.4 milliGRAM(s) SubLingual every 5 minutes PRN  pantoprazole    Tablet 40 milliGRAM(s) Oral before breakfast  pregabalin 50 milliGRAM(s) Oral two times a day  psyllium Powder 1 Packet(s) Oral two times a day  sacubitril 24 mG/valsartan 26 mG 1 Tablet(s) Oral two times a day  tamsulosin 0.4 milliGRAM(s) Oral at bedtime      LABS:                        12.3   10.03 )-----------( 248      ( 25 Feb 2023 05:24 )             38.2     02-25    134<L>  |  101  |  54<H>  ----------------------------<  175<H>  4.6   |  20<L>  |  2.48<H>    Ca    10.2      25 Feb 2023 05:24  Phos  4.6     02-25  Mg     1.80     02-25      RADIOLOGY:   < from: MR Lumbar Spine No Cont (02.24.23 @ 15:12) >    INDIVIDUAL LEVELS:  Evaluation is somewhat limited without the presence of axial imaging  LOWER THORACIC SPINE: No spinal canal or neuroforaminal stenosis.    L1-L2: No spinal canal or neuroforaminal stenosis.  L2-L3: No spinal canal or neuroforaminal stenosis.  L3-L4: No spinal canal or neuroforaminal stenosis.  L4-L5: Grade 1 anterior listhesis L4 on L5 in combination with a   prominent disc bulge/protrusion contributes to a severe degree of central   spinal canal stenosis at this level. Disc material extends into the   neural foramina bilaterally left greater than right.  L5-S1: No spinal canal or neuroforaminal stenosis.      IMPRESSION:    Grade 1 anterior listhesis L4 on L5 in combination with disc   bulge/protrusion causes severe central canal stenosis at the L4-L5 level.        PHYSICAL EXAM:  Awake Alert Attentive Affect appropriate Ox3  PERRL EOMI  Tone: normal               Strength:     [X] Upper extremity                      Delt       Bicep    Tricep                                                  R         5/5        5/5        5/5       5/5                                               L          5/5        5/5        5/5       5/5  [X] Lower extremity                       HF          KE          KF        DF         PF    EHL                                               R        5/5        5/5        5/5       5/5       5/5      5/5                                               L         5/5        5/5       5/5       5/5        5/5       5/5  Sensory Intact to Light Touch  Reflexes WNL, No clonus, Toes down going  back pain with movement

## 2023-02-27 NOTE — PROGRESS NOTE ADULT - ASSESSMENT
69 yo M with DMII, CAD s/p CABG, CKD, chronic back pain, and HTN presents to the ED with worsening back pain. Patient is AAOX2 and somnolent, not able to provide any hx. Most of the information was supplemented by the son. As per son, pt is AAOx 3 and "mentally stable." He recently came from [facility] about 2 weeks ago and has " multiple medical complaints." But this morning he told his son that he is feeling weak and his back pain is getting worse. The pain is localized in the lower back region. Pain is worse when standing. Denies radiculopathy. Intermittent urinary and bowel incontinence for 1 year. Was admitted to a hospital in [facility] in Aug 2022 for pain, MRI was completed that showed a L4-5 spondylolisthesis. Patient was offered surgery but told he has too may medical issues and discharged him. Patient here here visiting his son when back pain exacerbated and came to ER for evaluation with his MRI on paper films     2/25 MRI at [facility] reviewed - shows L4/5 HNP, grade 1 anterior listhesis. CT done showing no movement at L4/5. Will plan for surgery date TBD. Needs medical clearance. Cardio cleared.    2/27  67 yo M with DMII, CAD s/p CABG, CKD, chronic back pain, and HTN presents to the ED with worsening back pain. Patient is AAOX2 and somnolent, not able to provide any hx. Most of the information was supplemented by the son. As per son, pt is AAOx 3 and "mentally stable." He recently came from [facility] about 2 weeks ago and has " multiple medical complaints." But this morning he told his son that he is feeling weak and his back pain is getting worse. The pain is localized in the lower back region. Pain is worse when standing. Denies radiculopathy. Intermittent urinary and bowel incontinence for 1 year. Was admitted to a hospital in [facility] in Aug 2022 for pain, MRI was completed that showed a L4-5 spondylolisthesis. Patient was offered surgery but told he has too may medical issues and discharged him. Patient here here visiting his son when back pain exacerbated and came to ER for evaluation with his MRI on paper films     2/25 MRI at [facility] reviewed - shows L4/5 HNP, grade 1 anterior listhesis. CT done showing no movement at L4/5. Will plan for surgery date TBD. Needs medical clearance. Cardio cleared.    2/27 Pt offered surgery to discuss with family

## 2023-02-27 NOTE — PROGRESS NOTE ADULT - PROBLEM SELECTOR PLAN 1
- plan for surgery Tuesday anisha Avalos -- L4-5 lami, possible fusion   - cardio cleared, need documented medical clearance given CKD and DM   - CBC, BMP, Coags, Type and screen x2, covid test within 3 days of OR     [id number]    Case discussed with attending neurosurgeon Dr. Avalos - OR tomorrow at 8:30 w Dr. Avalos -- L4-5 lami, possible fusion   - cardio cleared, need documented medical clearance given CKD and DM   - Pt needs Coags  - NPO after midnight    [id number]    Case discussed with attending neurosurgeon Dr. Avalos

## 2023-02-27 NOTE — PROGRESS NOTE ADULT - PROBLEM SELECTOR PLAN 7
Patient's FS has been largely within limit. Appreciate endo assistance with adjusting insulin regimen and DM management. Cr noted to be elevated on 2/25, unclear baseline. Lasix was decreased from BID to QD on 2/25 and Cr is stable 2.32 today.   Per cardiology: the patient is currently without evidence of acute ischemia, uncontrolled arrhythmia, or decompensated heart failure. There is currently no cardiac contraindication to the planned procedure.

## 2023-02-27 NOTE — PROGRESS NOTE ADULT - SUBJECTIVE AND OBJECTIVE BOX
Chief Complaint: DM 2 uncontrolled with hyperglycemia     History: Glucose improved - patient will be likely NPO after midnight tonight for OR    MEDICATIONS  (STANDING):  artificial tears (preservative free) Ophthalmic Solution 1 Drop(s) Both EYES four times a day  atorvastatin 40 milliGRAM(s) Oral at bedtime  calcitriol   Capsule 0.25 MICROGram(s) Oral daily  calcium acetate 667 milliGRAM(s) Oral with dinner  dextrose 50% Injectable 25 Gram(s) IV Push once  dextrose 50% Injectable 12.5 Gram(s) IV Push once  dextrose 50% Injectable 25 Gram(s) IV Push once  furosemide    Tablet 40 milliGRAM(s) Oral daily  heparin   Injectable 5000 Unit(s) SubCutaneous every 12 hours  insulin glargine Injectable (LANTUS) 22 Unit(s) SubCutaneous at bedtime  insulin lispro (ADMELOG) corrective regimen sliding scale   SubCutaneous at bedtime  insulin lispro (ADMELOG) corrective regimen sliding scale   SubCutaneous three times a day before meals  insulin lispro Injectable (ADMELOG) 7 Unit(s) SubCutaneous three times a day before meals  melatonin 6 milliGRAM(s) Oral at bedtime  pantoprazole    Tablet 40 milliGRAM(s) Oral before breakfast  pregabalin 50 milliGRAM(s) Oral two times a day  psyllium Powder 1 Packet(s) Oral two times a day  sacubitril 24 mG/valsartan 26 mG 1 Tablet(s) Oral two times a day  senna 2 Tablet(s) Oral at bedtime  tamsulosin 0.4 milliGRAM(s) Oral at bedtime        No Known Allergies        Review of Systems:  Constitutional: No fever  Eyes: No blurry vision  Neuro: No tremors  HEENT: No pain  Cardiovascular: No chest pain, palpitations  Respiratory: No SOB, no cough  GI: No nausea, vomiting, abdominal pain      PHYSICAL EXAM:  Vital Signs Last 24 Hrs  T(C): 37.2 (27 Feb 2023 09:15), Max: 37.2 (27 Feb 2023 09:15)  T(F): 98.9 (27 Feb 2023 09:15), Max: 98.9 (27 Feb 2023 09:15)  HR: 78 (27 Feb 2023 09:15) (78 - 90)  BP: 118/57 (27 Feb 2023 09:15) (110/48 - 130/66)  BP(mean): --  RR: 17 (27 Feb 2023 09:15) (16 - 17)  SpO2: 100% (27 Feb 2023 09:15) (98% - 100%)    Parameters below as of 27 Feb 2023 09:15  Patient On (Oxygen Delivery Method): room air      GENERAL: NAD, laying in bed   EYES: No proptosis, no lid lag, anicteric  HEENT:  Atraumatic, Normocephalic, moist mucous membranes  RESPIRATORY: Non labored respirations   GI: Soft, nontender, non distended  PSYCH: Alert and oriented x 3, normal affect, normal mood    CAPILLARY BLOOD GLUCOSE      POCT Blood Glucose.: 184 mg/dL (27 Feb 2023 11:09)  POCT Blood Glucose.: 188 mg/dL (27 Feb 2023 07:12)  POCT Blood Glucose.: 129 mg/dL (26 Feb 2023 21:24)  POCT Blood Glucose.: 169 mg/dL (26 Feb 2023 16:43)      02-27    133<L>  |  100  |  58<H>  ----------------------------<  225<H>  4.6   |  22  |  2.32<H>    Ca    10.7<H>      27 Feb 2023 06:30  Phos  4.1     02-27  Mg     1.70     02-27      A1C with Estimated Average Glucose Result: 8.3 % (02-22-23 @ 05:13)      Diet, DASH/TLC:   Sodium & Cholesterol Restricted  Consistent Carbohydrate Evening Snack (CSTCHOSN)  Halal  No Caffeine (02-23-23 @ 15:04) [Active]

## 2023-02-27 NOTE — PROGRESS NOTE ADULT - PROBLEM SELECTOR PLAN 4
HbA1c of 8.3%, at home on insulin 14 units with lunch and 14-16 BID of mixed insulin + orals  - DM diet, BG checks, YIFAN  - endo following and adjusting insulin HbA1c of 8.3%, at home on insulin 14 units with lunch and 14-16 BID of mixed insulin + orals  - DM diet, BG checks, YIFAN  - endo following and adjusting insulin; will d/w endo if should have his basal insulin adjusted for possible surgery

## 2023-02-27 NOTE — CHART NOTE - NSCHARTNOTEFT_GEN_A_CORE
D/w patient, his wife using  ID [id number] and offered both operative vs nonop management. Explained with conservative mgmt he would be likely to stay in similar pain or slightly improve, but the condition is not life threatening. Given his symptoms of spinal stenosis, surgery would be likely to alleviate his symptoms, however likely carries an approximately 15% risk of serious medical complications including PE, infection, wound breakdown, pneumonia. This was explained thoroughly to the patient and family, they wish to proceed with surgery instead of conservative management.

## 2023-02-28 ENCOUNTER — APPOINTMENT (OUTPATIENT)
Dept: NEUROSURGERY | Facility: HOSPITAL | Age: 68
End: 2023-02-28

## 2023-02-28 ENCOUNTER — TRANSCRIPTION ENCOUNTER (OUTPATIENT)
Age: 68
End: 2023-02-28

## 2023-02-28 LAB
ANION GAP SERPL CALC-SCNC: 12 MMOL/L — SIGNIFICANT CHANGE UP (ref 7–14)
APTT BLD: 28.1 SEC — SIGNIFICANT CHANGE UP (ref 27–36.3)
BLD GP AB SCN SERPL QL: NEGATIVE — SIGNIFICANT CHANGE UP
BUN SERPL-MCNC: 61 MG/DL — HIGH (ref 7–23)
CALCIUM SERPL-MCNC: 10.1 MG/DL — SIGNIFICANT CHANGE UP (ref 8.4–10.5)
CHLORIDE SERPL-SCNC: 100 MMOL/L — SIGNIFICANT CHANGE UP (ref 98–107)
CO2 SERPL-SCNC: 21 MMOL/L — LOW (ref 22–31)
CREAT SERPL-MCNC: 2.33 MG/DL — HIGH (ref 0.5–1.3)
EGFR: 29 ML/MIN/1.73M2 — LOW
GLUCOSE BLDC GLUCOMTR-MCNC: 182 MG/DL — HIGH (ref 70–99)
GLUCOSE SERPL-MCNC: 164 MG/DL — HIGH (ref 70–99)
HCT VFR BLD CALC: 34.4 % — LOW (ref 39–50)
HCT VFR BLD CALC: 39.5 % — SIGNIFICANT CHANGE UP (ref 39–50)
HGB BLD-MCNC: 11.3 G/DL — LOW (ref 13–17)
HGB BLD-MCNC: 12.9 G/DL — LOW (ref 13–17)
INR BLD: 0.98 RATIO — SIGNIFICANT CHANGE UP (ref 0.88–1.16)
MAGNESIUM SERPL-MCNC: 1.6 MG/DL — SIGNIFICANT CHANGE UP (ref 1.6–2.6)
MCHC RBC-ENTMCNC: 29.3 PG — SIGNIFICANT CHANGE UP (ref 27–34)
MCHC RBC-ENTMCNC: 29.8 PG — SIGNIFICANT CHANGE UP (ref 27–34)
MCHC RBC-ENTMCNC: 32.7 GM/DL — SIGNIFICANT CHANGE UP (ref 32–36)
MCHC RBC-ENTMCNC: 32.8 GM/DL — SIGNIFICANT CHANGE UP (ref 32–36)
MCV RBC AUTO: 89.8 FL — SIGNIFICANT CHANGE UP (ref 80–100)
MCV RBC AUTO: 90.8 FL — SIGNIFICANT CHANGE UP (ref 80–100)
NRBC # BLD: 0 /100 WBCS — SIGNIFICANT CHANGE UP (ref 0–0)
NRBC # BLD: 0 /100 WBCS — SIGNIFICANT CHANGE UP (ref 0–0)
NRBC # FLD: 0 K/UL — SIGNIFICANT CHANGE UP (ref 0–0)
NRBC # FLD: 0 K/UL — SIGNIFICANT CHANGE UP (ref 0–0)
PHOSPHATE SERPL-MCNC: 3.9 MG/DL — SIGNIFICANT CHANGE UP (ref 2.5–4.5)
PLATELET # BLD AUTO: 246 K/UL — SIGNIFICANT CHANGE UP (ref 150–400)
PLATELET # BLD AUTO: 260 K/UL — SIGNIFICANT CHANGE UP (ref 150–400)
POTASSIUM SERPL-MCNC: 4 MMOL/L — SIGNIFICANT CHANGE UP (ref 3.5–5.3)
POTASSIUM SERPL-SCNC: 4 MMOL/L — SIGNIFICANT CHANGE UP (ref 3.5–5.3)
PROTHROM AB SERPL-ACNC: 11.4 SEC — SIGNIFICANT CHANGE UP (ref 10.5–13.4)
RBC # BLD: 3.79 M/UL — LOW (ref 4.2–5.8)
RBC # BLD: 4.4 M/UL — SIGNIFICANT CHANGE UP (ref 4.2–5.8)
RBC # FLD: 12.9 % — SIGNIFICANT CHANGE UP (ref 10.3–14.5)
RBC # FLD: 13 % — SIGNIFICANT CHANGE UP (ref 10.3–14.5)
RH IG SCN BLD-IMP: POSITIVE — SIGNIFICANT CHANGE UP
SODIUM SERPL-SCNC: 133 MMOL/L — LOW (ref 135–145)
WBC # BLD: 12.23 K/UL — HIGH (ref 3.8–10.5)
WBC # BLD: 14.8 K/UL — HIGH (ref 3.8–10.5)
WBC # FLD AUTO: 12.23 K/UL — HIGH (ref 3.8–10.5)
WBC # FLD AUTO: 14.8 K/UL — HIGH (ref 3.8–10.5)

## 2023-02-28 PROCEDURE — 99291 CRITICAL CARE FIRST HOUR: CPT | Mod: 25

## 2023-02-28 PROCEDURE — 22633 ARTHRD CMBN 1NTRSPC LUMBAR: CPT

## 2023-02-28 PROCEDURE — 22214 INCIS 1 VERTEBRAL SEG LUMBAR: CPT

## 2023-02-28 PROCEDURE — 12345: CPT | Mod: NC

## 2023-02-28 PROCEDURE — 22840 INSERT SPINE FIXATION DEVICE: CPT

## 2023-02-28 PROCEDURE — 22853 INSJ BIOMECHANICAL DEVICE: CPT

## 2023-02-28 PROCEDURE — 72100 X-RAY EXAM L-S SPINE 2/3 VWS: CPT | Mod: 26

## 2023-02-28 DEVICE — SURGIFOAM PAD 8CM X 12.5CM X 10MM (100): Type: IMPLANTABLE DEVICE | Status: FUNCTIONAL

## 2023-02-28 DEVICE — SCREW VIPER CORT FIX 6X50MM: Type: IMPLANTABLE DEVICE | Status: FUNCTIONAL

## 2023-02-28 DEVICE — SCREW ST MIS SNGL INNER VIPER: Type: IMPLANTABLE DEVICE | Status: FUNCTIONAL

## 2023-02-28 DEVICE — BONE WAX 2.5GM: Type: IMPLANTABLE DEVICE | Status: FUNCTIONAL

## 2023-02-28 DEVICE — ROD PRE-LORDOSED W/LINE 35MM: Type: IMPLANTABLE DEVICE | Status: FUNCTIONAL

## 2023-02-28 DEVICE — IMPLANTABLE DEVICE: Type: IMPLANTABLE DEVICE | Status: FUNCTIONAL

## 2023-02-28 RX ORDER — OXYCODONE HYDROCHLORIDE 5 MG/1
5 TABLET ORAL ONCE
Refills: 0 | Status: DISCONTINUED | OUTPATIENT
Start: 2023-02-28 | End: 2023-02-28

## 2023-02-28 RX ORDER — ONDANSETRON 8 MG/1
4 TABLET, FILM COATED ORAL ONCE
Refills: 0 | Status: DISCONTINUED | OUTPATIENT
Start: 2023-02-28 | End: 2023-03-01

## 2023-02-28 RX ORDER — OXYCODONE HYDROCHLORIDE 5 MG/1
5 TABLET ORAL EVERY 6 HOURS
Refills: 0 | Status: DISCONTINUED | OUTPATIENT
Start: 2023-02-28 | End: 2023-03-01

## 2023-02-28 RX ORDER — CHLORHEXIDINE GLUCONATE 213 G/1000ML
1 SOLUTION TOPICAL ONCE
Refills: 0 | Status: COMPLETED | OUTPATIENT
Start: 2023-02-28 | End: 2023-02-28

## 2023-02-28 RX ORDER — ACETAMINOPHEN 500 MG
1000 TABLET ORAL ONCE
Refills: 0 | Status: COMPLETED | OUTPATIENT
Start: 2023-02-28 | End: 2023-02-28

## 2023-02-28 RX ORDER — ALBUMIN HUMAN 25 %
250 VIAL (ML) INTRAVENOUS ONCE
Refills: 0 | Status: COMPLETED | OUTPATIENT
Start: 2023-02-28 | End: 2023-02-28

## 2023-02-28 RX ORDER — DIAZEPAM 5 MG
5 TABLET ORAL ONCE
Refills: 0 | Status: DISCONTINUED | OUTPATIENT
Start: 2023-02-28 | End: 2023-02-28

## 2023-02-28 RX ORDER — INSULIN LISPRO 100/ML
VIAL (ML) SUBCUTANEOUS EVERY 6 HOURS
Refills: 0 | Status: ACTIVE | OUTPATIENT
Start: 2023-02-28 | End: 2024-01-27

## 2023-02-28 RX ORDER — SODIUM CHLORIDE 9 MG/ML
1000 INJECTION, SOLUTION INTRAVENOUS
Refills: 0 | Status: DISCONTINUED | OUTPATIENT
Start: 2023-02-28 | End: 2023-03-02

## 2023-02-28 RX ORDER — PHENYLEPHRINE HYDROCHLORIDE 10 MG/ML
0.5 INJECTION INTRAVENOUS
Qty: 40 | Refills: 0 | Status: DISCONTINUED | OUTPATIENT
Start: 2023-02-28 | End: 2023-03-01

## 2023-02-28 RX ORDER — PHENYLEPHRINE HYDROCHLORIDE 10 MG/ML
0.5 INJECTION INTRAVENOUS
Qty: 40 | Refills: 0 | Status: DISCONTINUED | OUTPATIENT
Start: 2023-02-28 | End: 2023-02-28

## 2023-02-28 RX ORDER — OXYCODONE HYDROCHLORIDE 5 MG/1
10 TABLET ORAL EVERY 6 HOURS
Refills: 0 | Status: DISCONTINUED | OUTPATIENT
Start: 2023-02-28 | End: 2023-03-01

## 2023-02-28 RX ORDER — INSULIN LISPRO 100/ML
VIAL (ML) SUBCUTANEOUS
Refills: 0 | Status: DISCONTINUED | OUTPATIENT
Start: 2023-02-28 | End: 2023-03-03

## 2023-02-28 RX ORDER — HYDROMORPHONE HYDROCHLORIDE 2 MG/ML
0.5 INJECTION INTRAMUSCULAR; INTRAVENOUS; SUBCUTANEOUS
Refills: 0 | Status: DISCONTINUED | OUTPATIENT
Start: 2023-02-28 | End: 2023-03-01

## 2023-02-28 RX ADMIN — Medication 125 MILLILITER(S): at 22:21

## 2023-02-28 RX ADMIN — INSULIN GLARGINE 22 UNIT(S): 100 INJECTION, SOLUTION SUBCUTANEOUS at 22:12

## 2023-02-28 RX ADMIN — ATORVASTATIN CALCIUM 40 MILLIGRAM(S): 80 TABLET, FILM COATED ORAL at 22:13

## 2023-02-28 RX ADMIN — Medication 667 MILLIGRAM(S): at 18:55

## 2023-02-28 RX ADMIN — CHLORHEXIDINE GLUCONATE 1 APPLICATION(S): 213 SOLUTION TOPICAL at 06:31

## 2023-02-28 RX ADMIN — Medication 1 DROP(S): at 18:48

## 2023-02-28 RX ADMIN — Medication 1 DROP(S): at 05:41

## 2023-02-28 RX ADMIN — Medication 40 MILLIGRAM(S): at 05:40

## 2023-02-28 RX ADMIN — PHENYLEPHRINE HYDROCHLORIDE 13.6 MICROGRAM(S)/KG/MIN: 10 INJECTION INTRAVENOUS at 15:58

## 2023-02-28 RX ADMIN — HYDROMORPHONE HYDROCHLORIDE 0.5 MILLIGRAM(S): 2 INJECTION INTRAMUSCULAR; INTRAVENOUS; SUBCUTANEOUS at 16:41

## 2023-02-28 RX ADMIN — HYDROMORPHONE HYDROCHLORIDE 0.5 MILLIGRAM(S): 2 INJECTION INTRAMUSCULAR; INTRAVENOUS; SUBCUTANEOUS at 17:30

## 2023-02-28 RX ADMIN — Medication 125 MILLILITER(S): at 19:31

## 2023-02-28 RX ADMIN — Medication 1000 MILLIGRAM(S): at 21:35

## 2023-02-28 RX ADMIN — TAMSULOSIN HYDROCHLORIDE 0.4 MILLIGRAM(S): 0.4 CAPSULE ORAL at 22:13

## 2023-02-28 RX ADMIN — Medication 50 MILLIGRAM(S): at 05:43

## 2023-02-28 RX ADMIN — SACUBITRIL AND VALSARTAN 1 TABLET(S): 24; 26 TABLET, FILM COATED ORAL at 19:56

## 2023-02-28 RX ADMIN — SACUBITRIL AND VALSARTAN 1 TABLET(S): 24; 26 TABLET, FILM COATED ORAL at 05:41

## 2023-02-28 RX ADMIN — Medication 1 DROP(S): at 00:37

## 2023-02-28 RX ADMIN — OXYCODONE HYDROCHLORIDE 5 MILLIGRAM(S): 5 TABLET ORAL at 19:56

## 2023-02-28 RX ADMIN — Medication 5 MILLIGRAM(S): at 17:09

## 2023-02-28 RX ADMIN — OXYCODONE HYDROCHLORIDE 5 MILLIGRAM(S): 5 TABLET ORAL at 18:46

## 2023-02-28 RX ADMIN — SENNA PLUS 2 TABLET(S): 8.6 TABLET ORAL at 22:12

## 2023-02-28 RX ADMIN — PANTOPRAZOLE SODIUM 40 MILLIGRAM(S): 20 TABLET, DELAYED RELEASE ORAL at 05:40

## 2023-02-28 RX ADMIN — PHENYLEPHRINE HYDROCHLORIDE 13.6 MICROGRAM(S)/KG/MIN: 10 INJECTION INTRAVENOUS at 21:01

## 2023-02-28 RX ADMIN — Medication 50 MILLIGRAM(S): at 18:46

## 2023-02-28 RX ADMIN — SODIUM CHLORIDE 75 MILLILITER(S): 9 INJECTION, SOLUTION INTRAVENOUS at 22:00

## 2023-02-28 NOTE — CONSULT NOTE ADULT - SUBJECTIVE AND OBJECTIVE BOX
SICU CONSULT NOTE    HPI  CARLOS MOLINA is a 68y Male     PAST MEDICAL HISTORY: Diabetes mellitus    Essential hypertension    CAD (coronary artery disease)    Chronic kidney disease, unspecified CKD stage        PAST SURGICAL HISTORY: S/P CABG x 2        FAMILY HISTORY: FH: heart disease        SOCIAL HISTORY:    CODE STATUS:     HOME MEDICATIONS:    ALLERGIES: No Known Allergies      VITAL SIGNS:  ICU Vital Signs Last 24 Hrs  T(C): 36.4 (28 Feb 2023 20:00), Max: 37 (27 Feb 2023 21:01)  T(F): 97.6 (28 Feb 2023 20:00), Max: 98.6 (27 Feb 2023 21:01)  HR: 93 (28 Feb 2023 20:15) (73 - 108)  BP: 100/50 (28 Feb 2023 20:15) (94/50 - 136/50)  BP(mean): 59 (28 Feb 2023 20:15) (59 - 89)  ABP: 107/50 (28 Feb 2023 19:20) (97/36 - 134/55)  ABP(mean): 66 (28 Feb 2023 19:20) (55 - 84)  RR: 18 (28 Feb 2023 20:15) (14 - 19)  SpO2: 100% (28 Feb 2023 20:15) (95% - 100%)    O2 Parameters below as of 28 Feb 2023 16:00  Patient On (Oxygen Delivery Method): room air            NEURO  Exam:  acetaminophen     Tablet .. 650 milliGRAM(s) Oral every 6 hours PRN Temp greater or equal to 38C (100.4F), Mild Pain (1 - 3)  acetaminophen   IVPB .. 1000 milliGRAM(s) IV Intermittent once  HYDROmorphone  Injectable 0.5 milliGRAM(s) IV Push every 10 minutes PRN Moderate Pain (4 - 6)  melatonin 6 milliGRAM(s) Oral at bedtime  ondansetron Injectable 4 milliGRAM(s) IV Push once PRN Nausea and/or Vomiting  oxyCODONE    IR 5 milliGRAM(s) Oral every 6 hours PRN Moderate Pain (4 - 6)  oxyCODONE    IR 10 milliGRAM(s) Oral every 6 hours PRN Severe Pain (7 - 10)  pregabalin 50 milliGRAM(s) Oral two times a day      RESPIRATORY  Mechanical Ventilation:   ABG - ( 28 Feb 2023 14:04 )  pH: 7.25  /  pCO2: 50    /  pO2: 238   / HCO3: 22    / Base Excess: -5.5  /  SaO2: 97.3    Lactate: x                Exam:      CARDIOVASCULAR    Exam:  Cardiac Rhythm:  furosemide    Tablet 40 milliGRAM(s) Oral daily  nitroglycerin     SubLingual 0.4 milliGRAM(s) SubLingual every 5 minutes PRN Chest Pain  phenylephrine    Infusion 0.499 MICROgram(s)/kG/Min IV Continuous <Continuous>  sacubitril 24 mG/valsartan 26 mG 1 Tablet(s) Oral two times a day      GI/NUTRITION  Exam:  Diet:  bisacodyl Suppository 10 milliGRAM(s) Rectal daily PRN Constipation  pantoprazole    Tablet 40 milliGRAM(s) Oral before breakfast  polyethylene glycol 3350 17 Gram(s) Oral daily PRN Constipation  psyllium Powder 1 Packet(s) Oral two times a day  senna 2 Tablet(s) Oral at bedtime      GENITOURINARY/RENAL  calcitriol   Capsule 0.25 MICROGram(s) Oral daily  calcium acetate 667 milliGRAM(s) Oral with dinner  lactated ringers. 1000 milliLiter(s) IV Continuous <Continuous>  tamsulosin 0.4 milliGRAM(s) Oral at bedtime      02-27 @ 07:01  -  02-28 @ 07:00  --------------------------------------------------------  IN:  Total IN: 0 mL    OUT:    Voided (mL): 1620 mL  Total OUT: 1620 mL    Total NET: -1620 mL      02-28 @ 07:01  -  02-28 @ 20:31  --------------------------------------------------------  IN:  Total IN: 0 mL    OUT:    Accordian (mL): 60 mL    Indwelling Catheter - Urethral (mL): 500 mL  Total OUT: 560 mL    Total NET: -560 mL        Weight (kg): 72.7 (02-28 @ 06:20)  02-28    133<L>  |  100  |  61<H>  ----------------------------<  164<H>  4.0   |  21<L>  |  2.33<H>    Ca    10.1      28 Feb 2023 06:00  Phos  3.9     02-28  Mg     1.60     02-28      [x] Millard catheter, indication: urine output monitoring in critically ill patient    HEMATOLOGIC  [ ] VTE Prophylaxis:                          11.3   14.80 )-----------( 246      ( 28 Feb 2023 15:45 )             34.4     PT/INR - ( 28 Feb 2023 06:00 )   PT: 11.4 sec;   INR: 0.98 ratio         PTT - ( 28 Feb 2023 06:00 )  PTT:28.1 sec  Transfusion: [ ] PRBC	[ ] Platelets	[ ] FFP	[ ] Cryoprecipitate      INFECTIOUS DISEASES    RECENT CULTURES:      ENDOCRINE  atorvastatin 40 milliGRAM(s) Oral at bedtime  dextrose 50% Injectable 25 Gram(s) IV Push once  dextrose 50% Injectable 12.5 Gram(s) IV Push once  dextrose 50% Injectable 25 Gram(s) IV Push once  dextrose Oral Gel 15 Gram(s) Oral once PRN  insulin glargine Injectable (LANTUS) 22 Unit(s) SubCutaneous at bedtime  insulin lispro (ADMELOG) corrective regimen sliding scale   SubCutaneous at bedtime  insulin lispro (ADMELOG) corrective regimen sliding scale   SubCutaneous every 6 hours  insulin lispro Injectable (ADMELOG) 7 Unit(s) SubCutaneous three times a day before meals    CAPILLARY BLOOD GLUCOSE      POCT Blood Glucose.: 129 mg/dL (28 Feb 2023 17:21)  POCT Blood Glucose.: 143 mg/dL (28 Feb 2023 15:24)  POCT Blood Glucose.: 148 mg/dL (28 Feb 2023 06:07)  POCT Blood Glucose.: 156 mg/dL (27 Feb 2023 22:14)      PATIENT CARE ACCESS DEVICES:  [x] Peripheral IV  [ ] Central Venous Line	[ ] R	[ ] L	[ ] IJ	[ ] Fem	[ ] SC	Placed:   [ ] Arterial Line		[ ] R	[ ] L	[ ] Fem	[ ] Rad	[ ] Ax	Placed:   [ ] PICC:					[ ] Mediport  [x] Urinary Catheter, Date Placed: 2/28/2023   [x] Necessity of urinary, arterial, and venous catheters discussed    OTHER MEDICATIONS: artificial tears (preservative free) Ophthalmic Solution 1 Drop(s) Both EYES four times a day      IMAGING STUDIES: SICU CONSULT NOTE    HPI  CARLOS MOLINA is a 68y Male with DMII, CAD s/p CABG, CKD, chronic back pain, and HTN presents to the ED with worsening back pain. Patient is AAOX2 and somnolent, not able to provide any hx. Most of the information was supplemented by the son. As per son, pt is AAOx 3 and "mentally stable." He recently came from [facility] about 2 weeks ago and has " multiple medical complaints." But this morning he told his son that he is feeling weak and his back pain is getting worse. The pain is localized in the lower back region. It is burning is sensation and constant. Unclear if it radiates down the leg. It worsens with movement and walking. He can only walk "10 meters" with a cane. He is also incontinent of urine but denies any recent fever, chills, bowel incontinence, or lower extremities weakness/motor deficits. He reportedly had MRIs in the past but son does not know what it showed. He also has other chronic medical problems such as CKD, DMII and CAD and his son thinks pt needs management for them as well.   In the ED, his vitals were notable for hyperglycemia, elevated BUN/Scr, and hypomagnesemia. EKG showed bradycardia with T wave changes in the lateral leads.  (21 Feb 2023 18:36)    Patient is s/p L4-L5 TLIF for spondylolisthesis. Was on phenylephrine gtt in OR, attempted to wean off around 6pm, with resultant       PAST MEDICAL HISTORY: Diabetes mellitus    Essential hypertension    CAD (coronary artery disease)    Chronic kidney disease, unspecified CKD stage        PAST SURGICAL HISTORY: S/P CABG x 2        FAMILY HISTORY: FH: heart disease        SOCIAL HISTORY:    CODE STATUS:     HOME MEDICATIONS:    ALLERGIES: No Known Allergies      VITAL SIGNS:  ICU Vital Signs Last 24 Hrs  T(C): 36.4 (28 Feb 2023 20:00), Max: 37 (27 Feb 2023 21:01)  T(F): 97.6 (28 Feb 2023 20:00), Max: 98.6 (27 Feb 2023 21:01)  HR: 93 (28 Feb 2023 20:15) (73 - 108)  BP: 100/50 (28 Feb 2023 20:15) (94/50 - 136/50)  BP(mean): 59 (28 Feb 2023 20:15) (59 - 89)  ABP: 107/50 (28 Feb 2023 19:20) (97/36 - 134/55)  ABP(mean): 66 (28 Feb 2023 19:20) (55 - 84)  RR: 18 (28 Feb 2023 20:15) (14 - 19)  SpO2: 100% (28 Feb 2023 20:15) (95% - 100%)    O2 Parameters below as of 28 Feb 2023 16:00  Patient On (Oxygen Delivery Method): room air            NEURO  Exam:  acetaminophen     Tablet .. 650 milliGRAM(s) Oral every 6 hours PRN Temp greater or equal to 38C (100.4F), Mild Pain (1 - 3)  acetaminophen   IVPB .. 1000 milliGRAM(s) IV Intermittent once  HYDROmorphone  Injectable 0.5 milliGRAM(s) IV Push every 10 minutes PRN Moderate Pain (4 - 6)  melatonin 6 milliGRAM(s) Oral at bedtime  ondansetron Injectable 4 milliGRAM(s) IV Push once PRN Nausea and/or Vomiting  oxyCODONE    IR 5 milliGRAM(s) Oral every 6 hours PRN Moderate Pain (4 - 6)  oxyCODONE    IR 10 milliGRAM(s) Oral every 6 hours PRN Severe Pain (7 - 10)  pregabalin 50 milliGRAM(s) Oral two times a day      RESPIRATORY  Mechanical Ventilation:   ABG - ( 28 Feb 2023 14:04 )  pH: 7.25  /  pCO2: 50    /  pO2: 238   / HCO3: 22    / Base Excess: -5.5  /  SaO2: 97.3    Lactate: x                Exam:      CARDIOVASCULAR    Exam:  Cardiac Rhythm:  furosemide    Tablet 40 milliGRAM(s) Oral daily  nitroglycerin     SubLingual 0.4 milliGRAM(s) SubLingual every 5 minutes PRN Chest Pain  phenylephrine    Infusion 0.499 MICROgram(s)/kG/Min IV Continuous <Continuous>  sacubitril 24 mG/valsartan 26 mG 1 Tablet(s) Oral two times a day      GI/NUTRITION  Exam:  Diet:  bisacodyl Suppository 10 milliGRAM(s) Rectal daily PRN Constipation  pantoprazole    Tablet 40 milliGRAM(s) Oral before breakfast  polyethylene glycol 3350 17 Gram(s) Oral daily PRN Constipation  psyllium Powder 1 Packet(s) Oral two times a day  senna 2 Tablet(s) Oral at bedtime      GENITOURINARY/RENAL  calcitriol   Capsule 0.25 MICROGram(s) Oral daily  calcium acetate 667 milliGRAM(s) Oral with dinner  lactated ringers. 1000 milliLiter(s) IV Continuous <Continuous>  tamsulosin 0.4 milliGRAM(s) Oral at bedtime      02-27 @ 07:01  -  02-28 @ 07:00  --------------------------------------------------------  IN:  Total IN: 0 mL    OUT:    Voided (mL): 1620 mL  Total OUT: 1620 mL    Total NET: -1620 mL      02-28 @ 07:01  -  02-28 @ 20:31  --------------------------------------------------------  IN:  Total IN: 0 mL    OUT:    Accordian (mL): 60 mL    Indwelling Catheter - Urethral (mL): 500 mL  Total OUT: 560 mL    Total NET: -560 mL        Weight (kg): 72.7 (02-28 @ 06:20)  02-28    133<L>  |  100  |  61<H>  ----------------------------<  164<H>  4.0   |  21<L>  |  2.33<H>    Ca    10.1      28 Feb 2023 06:00  Phos  3.9     02-28  Mg     1.60     02-28      [x] Millard catheter, indication: urine output monitoring in critically ill patient    HEMATOLOGIC  [ ] VTE Prophylaxis:                          11.3   14.80 )-----------( 246      ( 28 Feb 2023 15:45 )             34.4     PT/INR - ( 28 Feb 2023 06:00 )   PT: 11.4 sec;   INR: 0.98 ratio         PTT - ( 28 Feb 2023 06:00 )  PTT:28.1 sec  Transfusion: [ ] PRBC	[ ] Platelets	[ ] FFP	[ ] Cryoprecipitate      INFECTIOUS DISEASES    RECENT CULTURES:      ENDOCRINE  atorvastatin 40 milliGRAM(s) Oral at bedtime  dextrose 50% Injectable 25 Gram(s) IV Push once  dextrose 50% Injectable 12.5 Gram(s) IV Push once  dextrose 50% Injectable 25 Gram(s) IV Push once  dextrose Oral Gel 15 Gram(s) Oral once PRN  insulin glargine Injectable (LANTUS) 22 Unit(s) SubCutaneous at bedtime  insulin lispro (ADMELOG) corrective regimen sliding scale   SubCutaneous at bedtime  insulin lispro (ADMELOG) corrective regimen sliding scale   SubCutaneous every 6 hours  insulin lispro Injectable (ADMELOG) 7 Unit(s) SubCutaneous three times a day before meals    CAPILLARY BLOOD GLUCOSE      POCT Blood Glucose.: 129 mg/dL (28 Feb 2023 17:21)  POCT Blood Glucose.: 143 mg/dL (28 Feb 2023 15:24)  POCT Blood Glucose.: 148 mg/dL (28 Feb 2023 06:07)  POCT Blood Glucose.: 156 mg/dL (27 Feb 2023 22:14)      PATIENT CARE ACCESS DEVICES:  [x] Peripheral IV  [ ] Central Venous Line	[ ] R	[ ] L	[ ] IJ	[ ] Fem	[ ] SC	Placed:   [ ] Arterial Line		[ ] R	[ ] L	[ ] Fem	[ ] Rad	[ ] Ax	Placed:   [ ] PICC:					[ ] Mediport  [x] Urinary Catheter, Date Placed: 2/28/2023   [x] Necessity of urinary, arterial, and venous catheters discussed    OTHER MEDICATIONS: artificial tears (preservative free) Ophthalmic Solution 1 Drop(s) Both EYES four times a day      IMAGING STUDIES: SICU CONSULT NOTE    HPI  CARLOS MOLINA is a 68y Male with DMII, CAD s/p CABG, CKD, chronic back pain, and HTN presents to the ED with worsening back pain. Patient is AAOX2 and somnolent, not able to provide any hx. Most of the information was supplemented by the son. As per son, pt is AAOx 3 and "mentally stable." He recently came from [facility] about 2 weeks ago and has " multiple medical complaints." But this morning he told his son that he is feeling weak and his back pain is getting worse. The pain is localized in the lower back region. It is burning is sensation and constant. Unclear if it radiates down the leg. It worsens with movement and walking. He can only walk "10 meters" with a cane. He is also incontinent of urine but denies any recent fever, chills, bowel incontinence, or lower extremities weakness/motor deficits. He reportedly had MRIs in the past but son does not know what it showed. He also has other chronic medical problems such as CKD, DMII and CAD and his son thinks pt needs management for them as well.   In the ED, his vitals were notable for hyperglycemia, elevated BUN/Scr, and hypomagnesemia. EKG showed bradycardia with T wave changes in the lateral leads.  (21 Feb 2023 18:36)    Patient is s/p L4-L5 TLIF for spondylolisthesis. Was on phenylephrine gtt in OR, attempted to wean off around 6pm, with resultant drop in MAP. SICU consulted for hemodynamic management in postoperative period.       PAST MEDICAL HISTORY: Diabetes mellitus    Essential hypertension    CAD (coronary artery disease)    Chronic kidney disease, unspecified CKD stage        PAST SURGICAL HISTORY: S/P CABG x 2        FAMILY HISTORY: FH: heart disease    HOME MEDICATIONS:  Home Medications:  aspirin 81 mg oral delayed release tablet: 1 tab(s) orally once a day (22 Feb 2023 12:06)  atorvastatin 20 mg oral tablet: 1 tab(s) orally once a day (at bedtime) (22 Feb 2023 12:10)  bisacodyl 5 mg oral delayed release tablet: 1 tab(s) orally once a day (22 Feb 2023 12:06)  calcitriol 0.25 mcg oral capsule: 1 cap(s) orally once a day (22 Feb 2023 12:08)  calcium acetate 667 mg oral tablet: 1 tab(s) orally 3 times a day (22 Feb 2023 12:07)  dutasteride-tamsulosin 0.5 mg-0.4 mg oral capsule: 1 cap(s) orally once a day (22 Feb 2023 12:08)  febuxostat 40 mg oral tablet: 1 tab(s) orally once a day (22 Feb 2023 12:11)  Insulin Aspart FlexPen 100 units/mL injectable solution: 16 unit(s) injectable before lunch (22 Feb 2023 12:09)  Lasix 40 mg oral tablet: 1 tab(s) orally once a day (22 Feb 2023 12:11)  linagliptin-metformin 2.5 mg-500 mg oral tablet: 1 tab(s) orally 2 times a day (22 Feb 2023 12:05)  LORazepam 1 mg oral tablet: 1 tab(s) orally once a day (22 Feb 2023 12:11)  Nebivolol 2.5 mg oral tablet: 1 tab(s) orally once a day (22 Feb 2023 12:10)  nitroglycerin 2.5 mg oral capsule, extended release: 1 cap(s) orally 3 times a day (22 Feb 2023 12:07)  pregabalin 50 mg oral capsule: 1 cap(s) orally 2 times a day (22 Feb 2023 12:10)  Refresh ophthalmic solution: 1 drop(s) to each affected eye 4 times a day (22 Feb 2023 12:12)  sacubitril-valsartan 24 mg-26 mg oral tablet: 1 tab(s) orally 2 times a day (22 Feb 2023 12:12)  xylometazoline 0.1% nasal solution: nasal 2 times a day (22 Feb 2023 12:12)      ALLERGIES: No Known Allergies      VITAL SIGNS:  ICU Vital Signs Last 24 Hrs  T(C): 36.4 (28 Feb 2023 20:00), Max: 37 (27 Feb 2023 21:01)  T(F): 97.6 (28 Feb 2023 20:00), Max: 98.6 (27 Feb 2023 21:01)  HR: 93 (28 Feb 2023 20:15) (73 - 108)  BP: 100/50 (28 Feb 2023 20:15) (94/50 - 136/50)  BP(mean): 59 (28 Feb 2023 20:15) (59 - 89)  ABP: 107/50 (28 Feb 2023 19:20) (97/36 - 134/55)  ABP(mean): 66 (28 Feb 2023 19:20) (55 - 84)  RR: 18 (28 Feb 2023 20:15) (14 - 19)  SpO2: 100% (28 Feb 2023 20:15) (95% - 100%)    O2 Parameters below as of 28 Feb 2023 16:00  Patient On (Oxygen Delivery Method): room air            NEURO  Exam: AOx3, moving all extremities, 5/5 strength   acetaminophen     Tablet .. 650 milliGRAM(s) Oral every 6 hours PRN Temp greater or equal to 38C (100.4F), Mild Pain (1 - 3)  acetaminophen   IVPB .. 1000 milliGRAM(s) IV Intermittent once  HYDROmorphone  Injectable 0.5 milliGRAM(s) IV Push every 10 minutes PRN Moderate Pain (4 - 6)  melatonin 6 milliGRAM(s) Oral at bedtime  ondansetron Injectable 4 milliGRAM(s) IV Push once PRN Nausea and/or Vomiting  oxyCODONE    IR 5 milliGRAM(s) Oral every 6 hours PRN Moderate Pain (4 - 6)  oxyCODONE    IR 10 milliGRAM(s) Oral every 6 hours PRN Severe Pain (7 - 10)  pregabalin 50 milliGRAM(s) Oral two times a day      RESPIRATORY  Mechanical Ventilation: N/A  ABG - ( 28 Feb 2023 14:04 )  pH: 7.25  /  pCO2: 50    /  pO2: 238   / HCO3: 22    / Base Excess: -5.5  /  SaO2: 97.3    Lactate: x        Exam: saturating well on RA, bilateral chest rise, no increased WOB      CARDIOVASCULAR    Exam: warm, well perfused   Cardiac Rhythm: normal sinus rhythm appreciated on cardiac monitor   furosemide    Tablet 40 milliGRAM(s) Oral daily  nitroglycerin     SubLingual 0.4 milliGRAM(s) SubLingual every 5 minutes PRN Chest Pain  phenylephrine    Infusion 0.499 MICROgram(s)/kG/Min IV Continuous <Continuous>  sacubitril 24 mG/valsartan 26 mG 1 Tablet(s) Oral two times a day      GI/NUTRITION  Exam: soft, nontender, nondistended   Diet: DASH/TLC  bisacodyl Suppository 10 milliGRAM(s) Rectal daily PRN Constipation  pantoprazole    Tablet 40 milliGRAM(s) Oral before breakfast  polyethylene glycol 3350 17 Gram(s) Oral daily PRN Constipation  psyllium Powder 1 Packet(s) Oral two times a day  senna 2 Tablet(s) Oral at bedtime      GENITOURINARY/RENAL  calcitriol   Capsule 0.25 MICROGram(s) Oral daily  calcium acetate 667 milliGRAM(s) Oral with dinner  lactated ringers. 1000 milliLiter(s) IV Continuous <Continuous>  tamsulosin 0.4 milliGRAM(s) Oral at bedtime      02-27 @ 07:01  -  02-28 @ 07:00  --------------------------------------------------------  IN:  Total IN: 0 mL    OUT:    Voided (mL): 1620 mL  Total OUT: 1620 mL    Total NET: -1620 mL      02-28 @ 07:01  -  02-28 @ 20:31  --------------------------------------------------------  IN:  Total IN: 0 mL    OUT:    Accordian (mL): 60 mL    Indwelling Catheter - Urethral (mL): 500 mL  Total OUT: 560 mL    Total NET: -560 mL        Weight (kg): 72.7 (02-28 @ 06:20)  02-28    133<L>  |  100  |  61<H>  ----------------------------<  164<H>  4.0   |  21<L>  |  2.33<H>    Ca    10.1      28 Feb 2023 06:00  Phos  3.9     02-28  Mg     1.60     02-28      [x] Millard catheter, indication: urine output monitoring in critically ill patient    HEMATOLOGIC  [ ] VTE Prophylaxis: holding VTE ppx in setting of recent spinal surgery                           11.3   14.80 )-----------( 246      ( 28 Feb 2023 15:45 )             34.4     PT/INR - ( 28 Feb 2023 06:00 )   PT: 11.4 sec;   INR: 0.98 ratio         PTT - ( 28 Feb 2023 06:00 )  PTT:28.1 sec  Transfusion: [ ] PRBC	[ ] Platelets	[ ] FFP	[ ] Cryoprecipitate      INFECTIOUS DISEASES    RECENT CULTURES:      ENDOCRINE  atorvastatin 40 milliGRAM(s) Oral at bedtime  dextrose 50% Injectable 25 Gram(s) IV Push once  dextrose 50% Injectable 12.5 Gram(s) IV Push once  dextrose 50% Injectable 25 Gram(s) IV Push once  dextrose Oral Gel 15 Gram(s) Oral once PRN  insulin glargine Injectable (LANTUS) 22 Unit(s) SubCutaneous at bedtime  insulin lispro (ADMELOG) corrective regimen sliding scale   SubCutaneous at bedtime  insulin lispro (ADMELOG) corrective regimen sliding scale   SubCutaneous every 6 hours  insulin lispro Injectable (ADMELOG) 7 Unit(s) SubCutaneous three times a day before meals    CAPILLARY BLOOD GLUCOSE      POCT Blood Glucose.: 129 mg/dL (28 Feb 2023 17:21)  POCT Blood Glucose.: 143 mg/dL (28 Feb 2023 15:24)  POCT Blood Glucose.: 148 mg/dL (28 Feb 2023 06:07)  POCT Blood Glucose.: 156 mg/dL (27 Feb 2023 22:14)      PATIENT CARE ACCESS DEVICES:  [x] Peripheral IV  [ ] Central Venous Line	[ ] R	[ ] L	[ ] IJ	[ ] Fem	[ ] SC	Placed:   [ ] Arterial Line		[ ] R	[ ] L	[ ] Fem	[ ] Rad	[ ] Ax	Placed:   [ ] PICC:					[ ] Mediport  [x] Urinary Catheter, Date Placed: 2/28/2023   [x] Necessity of urinary, arterial, and venous catheters discussed    OTHER MEDICATIONS: artificial tears (preservative free) Ophthalmic Solution 1 Drop(s) Both EYES four times a day      IMAGING STUDIES:

## 2023-02-28 NOTE — CONSULT NOTE ADULT - ASSESSMENT
PLAN:    NEUROLOGIC:    RESPIRATORY:    CARDIOVASCULAR:    GI/NUTRITION:    /RENAL:    HEMATOLOGIC:    INFECTIOUS DISEASE:    ENDOCRINE:    LINES:    DISPO:  - SICU     68M DM2, CAD s/p CABG, CKD, chronic back pain, and HTN p/w worsening back pain, found to have spondylolisthesis of lumbar spine. Patient is now s/p L4-L5 TLIF, requiring phenylephrine in OR, with continued need for blood pressure support in PACU. SICU consulted for blood pressure management in postoperative period.     PLAN:    NEUROLOGIC: s/p TLIF L4-L5  - AOx3, Albanian speaking  - Pain control with ATC acetaminophen, oxycodone, dilaudid PRN   - CT lumbar spine, standing XRs pending for AM     RESPIRATORY:  - Maintain SaO2 > 92%, on RA     CARDIOVASCULAR:  - Restart phenylephrine gtt for MAP > 65   - Holding entresto     GI/NUTRITION:  - DASH diet   - Bowel regimen with miralax and senna   - Protonix qd     /RENAL: s/p albumin 250cc in PACU; UOP 75cc/hr  - Continue LR at 75cc/hr  - Millard in place, monitor UOP   - Replete electrolytes PRN   - Continue calcitriol, calcium   - Continue flomax   - Holding lasix     HEMATOLOGIC:  - Trend h/h  - Holding VTE ppx in setting of recent spinal surgery     INFECTIOUS DISEASE:  - Trend WBC, monitor for fevers  - Monitor off abx     ENDOCRINE:  - Monitor glucose  - ISS, admelog 7 tid, lantus 22 qhs; endocrinology team following   - Continue atorvastatin     LINES:  - PIV  - Millard    DISPO:  - SICU

## 2023-02-28 NOTE — PROGRESS NOTE ADULT - ASSESSMENT
68M Hebrew speaking, HTN, DM2 on insulin, CKD3, systolic HF (EF 45%), CAD s/p CABG 2005, RSA s/p L renal stent, and PAD s/p PTA to R common iliac and L common iliac s/p stent who presents to the ED with worsening back pain in context of known L4-L5 cord compression/cauda equina since 8/2022.

## 2023-02-28 NOTE — PROGRESS NOTE ADULT - SUBJECTIVE AND OBJECTIVE BOX
PROGRESS NOTE:     Patient is a 68y old  Male who presents with a chief complaint of Back pain (27 Feb 2023 17:02)      SUBJECTIVE / OVERNIGHT EVENTS: seen in the PACU, disposition pending as patient required pressors on arrival to PACU; vomiting during exam    ADDITIONAL REVIEW OF SYSTEMS:    MEDICATIONS  (STANDING):  acetaminophen   IVPB .. 1000 milliGRAM(s) IV Intermittent once  artificial tears (preservative free) Ophthalmic Solution 1 Drop(s) Both EYES four times a day  atorvastatin 40 milliGRAM(s) Oral at bedtime  calcitriol   Capsule 0.25 MICROGram(s) Oral daily  calcium acetate 667 milliGRAM(s) Oral with dinner  dextrose 50% Injectable 25 Gram(s) IV Push once  dextrose 50% Injectable 12.5 Gram(s) IV Push once  dextrose 50% Injectable 25 Gram(s) IV Push once  furosemide    Tablet 40 milliGRAM(s) Oral daily  insulin glargine Injectable (LANTUS) 22 Unit(s) SubCutaneous at bedtime  insulin lispro (ADMELOG) corrective regimen sliding scale   SubCutaneous at bedtime  insulin lispro (ADMELOG) corrective regimen sliding scale   SubCutaneous every 6 hours  insulin lispro Injectable (ADMELOG) 7 Unit(s) SubCutaneous three times a day before meals  lactated ringers. 1000 milliLiter(s) (75 mL/Hr) IV Continuous <Continuous>  melatonin 6 milliGRAM(s) Oral at bedtime  oxyCODONE    IR 5 milliGRAM(s) Oral once  pantoprazole    Tablet 40 milliGRAM(s) Oral before breakfast  phenylephrine    Infusion 0.5 MICROgram(s)/kG/Min (13.6 mL/Hr) IV Continuous <Continuous>  pregabalin 50 milliGRAM(s) Oral two times a day  psyllium Powder 1 Packet(s) Oral two times a day  sacubitril 24 mG/valsartan 26 mG 1 Tablet(s) Oral two times a day  senna 2 Tablet(s) Oral at bedtime  tamsulosin 0.4 milliGRAM(s) Oral at bedtime    MEDICATIONS  (PRN):  acetaminophen     Tablet .. 650 milliGRAM(s) Oral every 6 hours PRN Temp greater or equal to 38C (100.4F), Mild Pain (1 - 3)  bisacodyl Suppository 10 milliGRAM(s) Rectal daily PRN Constipation  dextrose Oral Gel 15 Gram(s) Oral once PRN Blood Glucose LESS THAN 70 milliGRAM(s)/deciliter  HYDROmorphone  Injectable 0.5 milliGRAM(s) IV Push every 10 minutes PRN Moderate Pain (4 - 6)  nitroglycerin     SubLingual 0.4 milliGRAM(s) SubLingual every 5 minutes PRN Chest Pain  ondansetron Injectable 4 milliGRAM(s) IV Push once PRN Nausea and/or Vomiting  oxyCODONE    IR 5 milliGRAM(s) Oral every 6 hours PRN Moderate Pain (4 - 6)  oxyCODONE    IR 10 milliGRAM(s) Oral every 6 hours PRN Severe Pain (7 - 10)  polyethylene glycol 3350 17 Gram(s) Oral daily PRN Constipation      CAPILLARY BLOOD GLUCOSE      POCT Blood Glucose.: 129 mg/dL (28 Feb 2023 17:21)  POCT Blood Glucose.: 143 mg/dL (28 Feb 2023 15:24)  POCT Blood Glucose.: 148 mg/dL (28 Feb 2023 06:07)  POCT Blood Glucose.: 156 mg/dL (27 Feb 2023 22:14)    I&O's Summary    27 Feb 2023 07:01  -  28 Feb 2023 07:00  --------------------------------------------------------  IN: 0 mL / OUT: 1620 mL / NET: -1620 mL    28 Feb 2023 07:01  -  28 Feb 2023 18:36  --------------------------------------------------------  IN: 0 mL / OUT: 485 mL / NET: -485 mL        PHYSICAL EXAM:  Vital Signs Last 24 Hrs  T(C): 36.2 (28 Feb 2023 18:00), Max: 37 (27 Feb 2023 21:01)  T(F): 97.2 (28 Feb 2023 18:00), Max: 98.6 (27 Feb 2023 21:01)  HR: 90 (28 Feb 2023 18:15) (73 - 95)  BP: 106/52 (28 Feb 2023 18:15) (94/50 - 136/50)  BP(mean): 64 (28 Feb 2023 18:15) (60 - 76)  RR: 18 (28 Feb 2023 18:15) (14 - 19)  SpO2: 100% (28 Feb 2023 18:15) (95% - 100%)    Parameters below as of 28 Feb 2023 16:00  Patient On (Oxygen Delivery Method): room air        exam deferred: Pt vomiting, being assessed by PACU, anesthesiology team     LABS:                        11.3   14.80 )-----------( 246      ( 28 Feb 2023 15:45 )             34.4     02-28    133<L>  |  100  |  61<H>  ----------------------------<  164<H>  4.0   |  21<L>  |  2.33<H>    Ca    10.1      28 Feb 2023 06:00  Phos  3.9     02-28  Mg     1.60     02-28      PT/INR - ( 28 Feb 2023 06:00 )   PT: 11.4 sec;   INR: 0.98 ratio         PTT - ( 28 Feb 2023 06:00 )  PTT:28.1 sec            RADIOLOGY & ADDITIONAL TESTS:  Results Reviewed:   Imaging Personally Reviewed:  Electrocardiogram Personally Reviewed:    COORDINATION OF CARE:  Care Discussed with Consultants/Other Providers [Y/N]:  Prior or Outpatient Records Reviewed [Y/N]:

## 2023-02-28 NOTE — CHART NOTE - NSCHARTNOTEFT_GEN_A_CORE
Pt recovering in PACU s/p L4-5 lami/fusion and arrived from OR on Phenylephrine gtt. Pt able to be weaned off pressors after fluid resuscitation and was progressing as expected from anesthesia recovery. While signed out from anesthesia and awaiting transport to floor, pt noted to be hypotensive again with BP 80/39. Phenylephrine gtt  restarted, additional albumin 5% 250cc given and surgical team contacted by RN. SICU consulted and accepted. Pt to remain in PACU under SICU level of care.

## 2023-02-28 NOTE — PROGRESS NOTE ADULT - PROBLEM SELECTOR PLAN 4
HbA1c of 8.3%, at home on insulin 14 units with lunch and 14-16 BID of mixed insulin + orals  - adjust insulin based on diet

## 2023-03-01 LAB
ANION GAP SERPL CALC-SCNC: 16 MMOL/L — HIGH (ref 7–14)
BUN SERPL-MCNC: 51 MG/DL — HIGH (ref 7–23)
CALCIUM SERPL-MCNC: 9.3 MG/DL — SIGNIFICANT CHANGE UP (ref 8.4–10.5)
CHLORIDE SERPL-SCNC: 98 MMOL/L — SIGNIFICANT CHANGE UP (ref 98–107)
CO2 SERPL-SCNC: 20 MMOL/L — LOW (ref 22–31)
CREAT SERPL-MCNC: 2.17 MG/DL — HIGH (ref 0.5–1.3)
EGFR: 32 ML/MIN/1.73M2 — LOW
GLUCOSE BLDC GLUCOMTR-MCNC: 113 MG/DL — HIGH (ref 70–99)
GLUCOSE BLDC GLUCOMTR-MCNC: 165 MG/DL — HIGH (ref 70–99)
GLUCOSE BLDC GLUCOMTR-MCNC: 193 MG/DL — HIGH (ref 70–99)
GLUCOSE BLDC GLUCOMTR-MCNC: 195 MG/DL — HIGH (ref 70–99)
GLUCOSE BLDC GLUCOMTR-MCNC: 215 MG/DL — HIGH (ref 70–99)
GLUCOSE SERPL-MCNC: 178 MG/DL — HIGH (ref 70–99)
HCT VFR BLD CALC: 34.5 % — LOW (ref 39–50)
HGB BLD-MCNC: 11.3 G/DL — LOW (ref 13–17)
MAGNESIUM SERPL-MCNC: 1.4 MG/DL — LOW (ref 1.6–2.6)
MCHC RBC-ENTMCNC: 29.8 PG — SIGNIFICANT CHANGE UP (ref 27–34)
MCHC RBC-ENTMCNC: 32.8 GM/DL — SIGNIFICANT CHANGE UP (ref 32–36)
MCV RBC AUTO: 91 FL — SIGNIFICANT CHANGE UP (ref 80–100)
NRBC # BLD: 0 /100 WBCS — SIGNIFICANT CHANGE UP (ref 0–0)
NRBC # FLD: 0 K/UL — SIGNIFICANT CHANGE UP (ref 0–0)
PHOSPHATE SERPL-MCNC: 4.6 MG/DL — HIGH (ref 2.5–4.5)
PLATELET # BLD AUTO: 249 K/UL — SIGNIFICANT CHANGE UP (ref 150–400)
POTASSIUM SERPL-MCNC: 4.3 MMOL/L — SIGNIFICANT CHANGE UP (ref 3.5–5.3)
POTASSIUM SERPL-SCNC: 4.3 MMOL/L — SIGNIFICANT CHANGE UP (ref 3.5–5.3)
RBC # BLD: 3.79 M/UL — LOW (ref 4.2–5.8)
RBC # FLD: 13.1 % — SIGNIFICANT CHANGE UP (ref 10.3–14.5)
SODIUM SERPL-SCNC: 134 MMOL/L — LOW (ref 135–145)
WBC # BLD: 13.31 K/UL — HIGH (ref 3.8–10.5)
WBC # FLD AUTO: 13.31 K/UL — HIGH (ref 3.8–10.5)

## 2023-03-01 PROCEDURE — 72131 CT LUMBAR SPINE W/O DYE: CPT | Mod: 26

## 2023-03-01 PROCEDURE — 99291 CRITICAL CARE FIRST HOUR: CPT | Mod: GC

## 2023-03-01 RX ORDER — MAGNESIUM SULFATE 500 MG/ML
2 VIAL (ML) INJECTION ONCE
Refills: 0 | Status: DISCONTINUED | OUTPATIENT
Start: 2023-03-01 | End: 2023-03-01

## 2023-03-01 RX ORDER — ACETAMINOPHEN 500 MG
975 TABLET ORAL EVERY 6 HOURS
Refills: 0 | Status: DISCONTINUED | OUTPATIENT
Start: 2023-03-01 | End: 2023-03-06

## 2023-03-01 RX ORDER — HYDROMORPHONE HYDROCHLORIDE 2 MG/ML
0.5 INJECTION INTRAMUSCULAR; INTRAVENOUS; SUBCUTANEOUS EVERY 4 HOURS
Refills: 0 | Status: DISCONTINUED | OUTPATIENT
Start: 2023-03-01 | End: 2023-03-07

## 2023-03-01 RX ORDER — MAGNESIUM SULFATE 500 MG/ML
4 VIAL (ML) INJECTION ONCE
Refills: 0 | Status: DISCONTINUED | OUTPATIENT
Start: 2023-03-01 | End: 2023-03-01

## 2023-03-01 RX ORDER — MAGNESIUM SULFATE 500 MG/ML
4 VIAL (ML) INJECTION ONCE
Refills: 0 | Status: COMPLETED | OUTPATIENT
Start: 2023-03-01 | End: 2023-03-01

## 2023-03-01 RX ORDER — OXYCODONE HYDROCHLORIDE 5 MG/1
10 TABLET ORAL EVERY 4 HOURS
Refills: 0 | Status: DISCONTINUED | OUTPATIENT
Start: 2023-03-01 | End: 2023-03-05

## 2023-03-01 RX ORDER — OXYCODONE HYDROCHLORIDE 5 MG/1
5 TABLET ORAL EVERY 4 HOURS
Refills: 0 | Status: DISCONTINUED | OUTPATIENT
Start: 2023-03-01 | End: 2023-03-05

## 2023-03-01 RX ORDER — SENNA PLUS 8.6 MG/1
2 TABLET ORAL AT BEDTIME
Refills: 0 | Status: DISCONTINUED | OUTPATIENT
Start: 2023-03-01 | End: 2023-03-15

## 2023-03-01 RX ORDER — PHENYLEPHRINE HYDROCHLORIDE 10 MG/ML
0.5 INJECTION INTRAVENOUS
Qty: 160 | Refills: 0 | Status: DISCONTINUED | OUTPATIENT
Start: 2023-03-01 | End: 2023-03-02

## 2023-03-01 RX ORDER — HEPARIN SODIUM 5000 [USP'U]/ML
5000 INJECTION INTRAVENOUS; SUBCUTANEOUS EVERY 8 HOURS
Refills: 0 | Status: DISCONTINUED | OUTPATIENT
Start: 2023-03-01 | End: 2023-03-06

## 2023-03-01 RX ORDER — POLYETHYLENE GLYCOL 3350 17 G/17G
17 POWDER, FOR SOLUTION ORAL DAILY
Refills: 0 | Status: DISCONTINUED | OUTPATIENT
Start: 2023-03-01 | End: 2023-03-07

## 2023-03-01 RX ADMIN — Medication 650 MILLIGRAM(S): at 04:53

## 2023-03-01 RX ADMIN — Medication 50 MILLIGRAM(S): at 17:08

## 2023-03-01 RX ADMIN — Medication 50 MILLIGRAM(S): at 06:33

## 2023-03-01 RX ADMIN — Medication 975 MILLIGRAM(S): at 11:59

## 2023-03-01 RX ADMIN — HEPARIN SODIUM 5000 UNIT(S): 5000 INJECTION INTRAVENOUS; SUBCUTANEOUS at 14:38

## 2023-03-01 RX ADMIN — Medication 1: at 17:07

## 2023-03-01 RX ADMIN — Medication 10 MILLIGRAM(S): at 11:59

## 2023-03-01 RX ADMIN — Medication 667 MILLIGRAM(S): at 17:08

## 2023-03-01 RX ADMIN — Medication 975 MILLIGRAM(S): at 17:42

## 2023-03-01 RX ADMIN — PHENYLEPHRINE HYDROCHLORIDE 6.82 MICROGRAM(S)/KG/MIN: 10 INJECTION INTRAVENOUS at 04:54

## 2023-03-01 RX ADMIN — SODIUM CHLORIDE 75 MILLILITER(S): 9 INJECTION, SOLUTION INTRAVENOUS at 17:43

## 2023-03-01 RX ADMIN — TAMSULOSIN HYDROCHLORIDE 0.4 MILLIGRAM(S): 0.4 CAPSULE ORAL at 22:24

## 2023-03-01 RX ADMIN — OXYCODONE HYDROCHLORIDE 5 MILLIGRAM(S): 5 TABLET ORAL at 07:58

## 2023-03-01 RX ADMIN — SODIUM CHLORIDE 75 MILLILITER(S): 9 INJECTION, SOLUTION INTRAVENOUS at 04:54

## 2023-03-01 RX ADMIN — CALCITRIOL 0.25 MICROGRAM(S): 0.5 CAPSULE ORAL at 11:58

## 2023-03-01 RX ADMIN — SENNA PLUS 2 TABLET(S): 8.6 TABLET ORAL at 22:23

## 2023-03-01 RX ADMIN — Medication 1 PACKET(S): at 06:34

## 2023-03-01 RX ADMIN — Medication 7 UNIT(S): at 17:06

## 2023-03-01 RX ADMIN — Medication 975 MILLIGRAM(S): at 12:30

## 2023-03-01 RX ADMIN — OXYCODONE HYDROCHLORIDE 5 MILLIGRAM(S): 5 TABLET ORAL at 08:30

## 2023-03-01 RX ADMIN — Medication 6 MILLIGRAM(S): at 22:25

## 2023-03-01 RX ADMIN — OXYCODONE HYDROCHLORIDE 10 MILLIGRAM(S): 5 TABLET ORAL at 17:37

## 2023-03-01 RX ADMIN — INSULIN GLARGINE 22 UNIT(S): 100 INJECTION, SOLUTION SUBCUTANEOUS at 22:29

## 2023-03-01 RX ADMIN — Medication 1 DROP(S): at 00:14

## 2023-03-01 RX ADMIN — Medication 1 PACKET(S): at 17:08

## 2023-03-01 RX ADMIN — PANTOPRAZOLE SODIUM 40 MILLIGRAM(S): 20 TABLET, DELAYED RELEASE ORAL at 07:58

## 2023-03-01 RX ADMIN — OXYCODONE HYDROCHLORIDE 10 MILLIGRAM(S): 5 TABLET ORAL at 17:07

## 2023-03-01 RX ADMIN — HEPARIN SODIUM 5000 UNIT(S): 5000 INJECTION INTRAVENOUS; SUBCUTANEOUS at 22:25

## 2023-03-01 RX ADMIN — HYDROMORPHONE HYDROCHLORIDE 0.5 MILLIGRAM(S): 2 INJECTION INTRAMUSCULAR; INTRAVENOUS; SUBCUTANEOUS at 22:25

## 2023-03-01 RX ADMIN — ATORVASTATIN CALCIUM 40 MILLIGRAM(S): 80 TABLET, FILM COATED ORAL at 22:24

## 2023-03-01 RX ADMIN — Medication 0: at 22:29

## 2023-03-01 RX ADMIN — Medication 650 MILLIGRAM(S): at 05:23

## 2023-03-01 RX ADMIN — Medication 1: at 11:58

## 2023-03-01 RX ADMIN — Medication 1 DROP(S): at 06:34

## 2023-03-01 RX ADMIN — Medication 7 UNIT(S): at 07:58

## 2023-03-01 RX ADMIN — Medication 7 UNIT(S): at 11:57

## 2023-03-01 RX ADMIN — Medication 25 GRAM(S): at 06:33

## 2023-03-01 RX ADMIN — POLYETHYLENE GLYCOL 3350 17 GRAM(S): 17 POWDER, FOR SOLUTION ORAL at 11:58

## 2023-03-01 RX ADMIN — HYDROMORPHONE HYDROCHLORIDE 0.5 MILLIGRAM(S): 2 INJECTION INTRAMUSCULAR; INTRAVENOUS; SUBCUTANEOUS at 22:40

## 2023-03-01 NOTE — PHYSICAL THERAPY INITIAL EVALUATION ADULT - PASSIVE RANGE OF MOTION EXAMINATION, REHAB EVAL
Right LE Passive ROM was WFL (within functional limits)
bilateral upper extremity Passive ROM was WFL (within functional limits)/bilateral lower extremity Passive ROM was WFL (within functional limits)

## 2023-03-01 NOTE — PROGRESS NOTE ADULT - ASSESSMENT
3/1; s/p L4-5 TLIF pod #1, HMV drain, requiring bola post op for bp support, albumin given x 1, sicu consulted, neuro exam stable

## 2023-03-01 NOTE — PROGRESS NOTE ADULT - ASSESSMENT
68M DM2, CAD s/p CABG, CKD, chronic back pain, and HTN p/w worsening back pain, found to have spondylolisthesis of lumbar spine. Patient is now s/p L4-L5 TLIF, requiring phenylephrine in OR, with continued need for blood pressure support in PACU. SICU consulted for blood pressure management in postoperative period.     PLAN:    NEUROLOGIC: s/p TLIF L4-L5  - AOx3, Japanese speaking  - Pain control with ATC acetaminophen, oxycodone, dilaudid PRN   - CT lumbar spine, standing XRs pending for AM     RESPIRATORY:  - Maintain SaO2 > 92%, on RA     CARDIOVASCULAR:  - Restart phenylephrine gtt for MAP > 65   - Holding entresto     GI/NUTRITION:  - DASH diet   - Bowel regimen with miralax and senna   - Protonix qd     /RENAL: s/p albumin 250cc in PACU; UOP 75cc/hr  - Continue LR at 75cc/hr  - Millard in place, monitor UOP   - Replete electrolytes PRN   - Continue calcitriol, calcium   - Continue flomax   - Holding lasix     HEMATOLOGIC:  - Trend h/h  - Holding VTE ppx in setting of recent spinal surgery     INFECTIOUS DISEASE:  - Trend WBC, monitor for fevers  - Monitor off abx     ENDOCRINE:  - Monitor glucose  - ISS, admelog 7 tid, lantus 22 qhs; endocrinology team following   - Continue atorvastatin     LINES:  - PIV  - Millard    DISPO:  - SICU

## 2023-03-01 NOTE — PHYSICAL THERAPY INITIAL EVALUATION ADULT - RANGE OF MOTION EXAMINATION, REHAB EVAL
bilateral upper extremity ROM was WFL (within functional limits)/Left LE ROM was WFL (within functional limits)
bilateral upper extremity ROM was WFL (within functional limits)/bilateral lower extremity ROM was WFL (within functional limits)

## 2023-03-01 NOTE — PHYSICAL THERAPY INITIAL EVALUATION ADULT - MANUAL MUSCLE TESTING RESULTS, REHAB EVAL
bilateral upper extremities: grossly at least 3/5, Left Lower Extremity: grossly at least 3/5, right lower extremity: hip: 2+/5, knee: 2+/5, ankle: 2-/5/grossly assessed due to
Bilateral UE and LE 3+/5

## 2023-03-01 NOTE — PHYSICAL THERAPY INITIAL EVALUATION ADULT - CRITERIA FOR SKILLED THERAPEUTIC INTERVENTIONS
impairments found/risk reduction/prevention/rehab potential/anticipated discharge recommendation
impairments found/functional limitations in following categories/risk reduction/prevention/rehab potential

## 2023-03-01 NOTE — PHYSICAL THERAPY INITIAL EVALUATION ADULT - DIAGNOSIS, PT EVAL
decreased functional mobility
Pt admitted for L/S pain and Encephalopathy; pt presents with decreased strength and decreased balance.

## 2023-03-01 NOTE — PROGRESS NOTE ADULT - SUBJECTIVE AND OBJECTIVE BOX
PAST 24HR EVENTS: s/p L4-5 TLIF, requiring bola post op for bp support, albumin given x 1, sicu consulted, neuro exam stable    HPI: 68y Male HPI:  69 yo M with DMII, CAD s/p CABG, CKD, chronic back pain, and HTN presents to the ED with worsening back pain. Patient is AAOX2 and somnolent, not able to provide any hx. Most of the information was supplemented by the son. As per son, pt is AAOx 3 and "mentally stable." He recently came from [facility] about 2 weeks ago and has " multiple medical complaints." But this morning he told his son that he is feeling weak and his back pain is getting worse. The pain is localized in the lower back region. It is burning is sensation and constant. Unclear if it radiates down the leg. It worsens with movement and walking. He can only walk "10 meters" with a cane. He is also incontinent of urine but denies any recent fever, chills, bowel incontinence, or lower extremities weakness/motor deficits. He reportedly had MRIs in the past but son does not know what it showed. He also has other chronic medical problems such as CKD, DMII and CAD and his son thinks pt needs management for them as well.   In the ED, his vitals were notable for hyperglycemia, elevated BUN/Scr, and hypomagnesemia. EKG showed bradycardia with T wave changes in the lateral leads.  (21 Feb 2023 18:36)        Vital Signs Last 24 Hrs  T(C): 36.5 (01 Mar 2023 00:42), Max: 37 (01 Mar 2023 00:00)  T(F): 97.7 (01 Mar 2023 00:42), Max: 98.6 (01 Mar 2023 00:00)  HR: 74 (01 Mar 2023 05:00) (69 - 108)  BP: 110/48 (01 Mar 2023 05:00) (77/44 - 138/59)  BP(mean): 67 (01 Mar 2023 05:00) (49 - 89)  RR: 20 (01 Mar 2023 05:00) (13 - 20)  SpO2: 100% (01 Mar 2023 05:00) (95% - 100%)    Parameters below as of 01 Mar 2023 05:00  Patient On (Oxygen Delivery Method): room air          MEDS:   acetaminophen     Tablet .. 650 milliGRAM(s) Oral every 6 hours PRN  artificial tears (preservative free) Ophthalmic Solution 1 Drop(s) Both EYES four times a day  atorvastatin 40 milliGRAM(s) Oral at bedtime  bisacodyl Suppository 10 milliGRAM(s) Rectal daily PRN  calcitriol   Capsule 0.25 MICROGram(s) Oral daily  calcium acetate 667 milliGRAM(s) Oral with dinner  dextrose 50% Injectable 25 Gram(s) IV Push once  dextrose 50% Injectable 12.5 Gram(s) IV Push once  dextrose 50% Injectable 25 Gram(s) IV Push once  dextrose Oral Gel 15 Gram(s) Oral once PRN  insulin glargine Injectable (LANTUS) 22 Unit(s) SubCutaneous at bedtime  insulin lispro (ADMELOG) corrective regimen sliding scale   SubCutaneous three times a day before meals  insulin lispro (ADMELOG) corrective regimen sliding scale   SubCutaneous at bedtime  insulin lispro Injectable (ADMELOG) 7 Unit(s) SubCutaneous three times a day before meals  lactated ringers. 1000 milliLiter(s) IV Continuous <Continuous>  magnesium sulfate  IVPB 4 Gram(s) IV Intermittent once  melatonin 6 milliGRAM(s) Oral at bedtime  nitroglycerin     SubLingual 0.4 milliGRAM(s) SubLingual every 5 minutes PRN  oxyCODONE    IR 5 milliGRAM(s) Oral every 6 hours PRN  oxyCODONE    IR 10 milliGRAM(s) Oral every 6 hours PRN  pantoprazole    Tablet 40 milliGRAM(s) Oral before breakfast  phenylephrine    Infusion 0.5 MICROgram(s)/kG/Min IV Continuous <Continuous>  polyethylene glycol 3350 17 Gram(s) Oral daily PRN  pregabalin 50 milliGRAM(s) Oral two times a day  psyllium Powder 1 Packet(s) Oral two times a day  senna 2 Tablet(s) Oral at bedtime  tamsulosin 0.4 milliGRAM(s) Oral at bedtime      LABS:                        11.3   13.31 )-----------( 249      ( 01 Mar 2023 01:20 )             34.5     03-01    134<L>  |  98  |  51<H>  ----------------------------<  178<H>  4.3   |  20<L>  |  2.17<H>    Ca    9.3      01 Mar 2023 01:20  Phos  4.6     03-01  Mg     1.40     03-01      PT/INR - ( 28 Feb 2023 06:00 )   PT: 11.4 sec;   INR: 0.98 ratio         PTT - ( 28 Feb 2023 06:00 )  PTT:28.1 sec    RADIOLOGY:       PHYSICAL EXAM: awake, a&ox3,fc  perrl  salmon 5/5  silt   hmv: 185cc

## 2023-03-01 NOTE — PHYSICAL THERAPY INITIAL EVALUATION ADULT - ADDITIONAL COMMENTS
?poor historian. Pt lives with his family in a house, +stairs. prior to admission Pt ambulated with a straight cane.     Pt. left comfortable in bed with all tubes/lines intact, call bell in reach and in NAD.
Pt is a poor historian; information regarding pt's prior level of function needs to be verified. Pt reports that he lives in an apartment with his wife, son, and daughter in law with ~7 steps to enter; (+)bilateral handrails. Prior to hospital admission pt was ambulating with assistance using a single axis cane. Pt reports one recent fall although pt unsure of exactly when it happened. ( Radha #[id number] used)    Pt left comfortable on stretcher, NAD, all lines intact, all precautions maintained, and RN aware.

## 2023-03-01 NOTE — PHYSICAL THERAPY INITIAL EVALUATION ADULT - NSPTDISCHREC_GEN_A_CORE
Rehabilitation to improve strength and balance for return to prior level of function.
Anticipated discharge to rehab facility to address current functional limitation to optimize safety to allow pt. to reach their optimal level of function.

## 2023-03-01 NOTE — PROGRESS NOTE ADULT - PROBLEM SELECTOR PLAN 1
- sicu for bp management on phenylephrine gtt  - CT L/s this am  - standing xrays when able  - HMV drain     d/w attending

## 2023-03-01 NOTE — PHYSICAL THERAPY INITIAL EVALUATION ADULT - GENERAL OBSERVATIONS, REHAB EVAL
Pt encountered in semisupine position on stretcher in the ED, no distress, AxOx3, with +IV. Pt agreeable to participate in PT evaluation.
Pt received semi-supine, +crowder, +IV, +monitor lines, hemovac, all lines/tubes intact, NAD. HR: 90bpm, SpO2: 97%, [facility]  used throughout, [id number], Anne

## 2023-03-01 NOTE — PHYSICAL THERAPY INITIAL EVALUATION ADULT - GAIT DEVIATIONS NOTED, PT EVAL
decreased tk/increased time in double stance/decreased step length/decreased stride length
right toe clearance decreased/decreased tk

## 2023-03-01 NOTE — PROGRESS NOTE ADULT - SUBJECTIVE AND OBJECTIVE BOX
24 HOUR EVENTS:  - POCUS with some B lines  - 250cc albumin x2 (1 anesthesia, 1 SICU)  - Restarted on bola gtt     SUBJECTIVE/ROS:  Patient seen at bedside this AM.     [ ] Due to altered mental status/intubation, subjective information were not able to be obtained from the patient. History was obtained, to the extent possible, from review of the chart and collateral sources of information.    OBJECTIVE:    VITALS:  Vital Signs Last 24 Hrs  T(C): 37 (01 Mar 2023 00:00), Max: 37 (01 Mar 2023 00:00)  T(F): 98.6 (01 Mar 2023 00:00), Max: 98.6 (01 Mar 2023 00:00)  HR: 81 (01 Mar 2023 00:00) (73 - 108)  BP: 134/59 (01 Mar 2023 00:00) (77/44 - 138/59)  BP(mean): 79 (01 Mar 2023 00:00) (49 - 89)  RR: 16 (01 Mar 2023 00:00) (13 - 20)  SpO2: 100% (01 Mar 2023 00:00) (95% - 100%)    Parameters below as of 28 Feb 2023 21:45  Patient On (Oxygen Delivery Method): room air        I&O's Summary    27 Feb 2023 07:01  -  28 Feb 2023 07:00  --------------------------------------------------------  IN: 0 mL / OUT: 1620 mL / NET: -1620 mL    28 Feb 2023 07:01  -  01 Mar 2023 00:07  --------------------------------------------------------  IN: 637.5 mL / OUT: 950 mL / NET: -312.5 mL        PHYSICAL EXAM:    NEURO  Exam: awake, alert, oriented    RESPIRATORY  Exam: no increased WOB on RA  Mechanical Ventilation: N/A    CARDIOVASCULAR  Exam: warm, well-perfused  Cardiac Rhythm: normal sinus rhythm noted on cardiac monitor    GI/NUTRITION  Exam: soft, non-distended, non-tender    GENITOURINARY  Exam: crowder in place draining pale urine     ACCESS DEVICES:  [x] Peripheral IV  [ ] Central Venous Line	[ ] R	[ ] L	[ ] IJ	[ ] Fem	[ ] SC	Placed:   [ ] Arterial Line		[ ] R	[ ] L	[ ] Fem	[ ] Rad	[ ] Ax	Placed:   [ ] PICC:					[ ] Mediport  [x] Urinary Catheter, Date Placed: 2/28/2023  [x] Necessity of urinary, arterial, and venous catheters discussed      LABS:                        11.3   14.80 )-----------( 246      ( 28 Feb 2023 15:45 )             34.4   02-28    133<L>  |  100  |  61<H>  ----------------------------<  164<H>  4.0   |  21<L>  |  2.33<H>    Ca    10.1      28 Feb 2023 06:00  Phos  3.9     02-28  Mg     1.60     02-28      CAPILLARY BLOOD GLUCOSE      POCT Blood Glucose.: 182 mg/dL (28 Feb 2023 21:40)  POCT Blood Glucose.: 129 mg/dL (28 Feb 2023 17:21)  POCT Blood Glucose.: 143 mg/dL (28 Feb 2023 15:24)  POCT Blood Glucose.: 148 mg/dL (28 Feb 2023 06:07)      MEDICATIONS:   MEDICATIONS  (STANDING):  artificial tears (preservative free) Ophthalmic Solution 1 Drop(s) Both EYES four times a day  atorvastatin 40 milliGRAM(s) Oral at bedtime  calcitriol   Capsule 0.25 MICROGram(s) Oral daily  calcium acetate 667 milliGRAM(s) Oral with dinner  dextrose 50% Injectable 25 Gram(s) IV Push once  dextrose 50% Injectable 12.5 Gram(s) IV Push once  dextrose 50% Injectable 25 Gram(s) IV Push once  insulin glargine Injectable (LANTUS) 22 Unit(s) SubCutaneous at bedtime  insulin lispro (ADMELOG) corrective regimen sliding scale   SubCutaneous at bedtime  insulin lispro (ADMELOG) corrective regimen sliding scale   SubCutaneous three times a day before meals  insulin lispro Injectable (ADMELOG) 7 Unit(s) SubCutaneous three times a day before meals  lactated ringers. 1000 milliLiter(s) (75 mL/Hr) IV Continuous <Continuous>  melatonin 6 milliGRAM(s) Oral at bedtime  pantoprazole    Tablet 40 milliGRAM(s) Oral before breakfast  phenylephrine    Infusion 0.499 MICROgram(s)/kG/Min (13.6 mL/Hr) IV Continuous <Continuous>  pregabalin 50 milliGRAM(s) Oral two times a day  psyllium Powder 1 Packet(s) Oral two times a day  senna 2 Tablet(s) Oral at bedtime  tamsulosin 0.4 milliGRAM(s) Oral at bedtime    MEDICATIONS  (PRN):  acetaminophen     Tablet .. 650 milliGRAM(s) Oral every 6 hours PRN Temp greater or equal to 38C (100.4F), Mild Pain (1 - 3)  bisacodyl Suppository 10 milliGRAM(s) Rectal daily PRN Constipation  dextrose Oral Gel 15 Gram(s) Oral once PRN Blood Glucose LESS THAN 70 milliGRAM(s)/deciliter  HYDROmorphone  Injectable 0.5 milliGRAM(s) IV Push every 10 minutes PRN Moderate Pain (4 - 6)  nitroglycerin     SubLingual 0.4 milliGRAM(s) SubLingual every 5 minutes PRN Chest Pain  ondansetron Injectable 4 milliGRAM(s) IV Push once PRN Nausea and/or Vomiting  oxyCODONE    IR 5 milliGRAM(s) Oral every 6 hours PRN Moderate Pain (4 - 6)  oxyCODONE    IR 10 milliGRAM(s) Oral every 6 hours PRN Severe Pain (7 - 10)  polyethylene glycol 3350 17 Gram(s) Oral daily PRN Constipation      IMAGING:

## 2023-03-02 DIAGNOSIS — Z98.1 ARTHRODESIS STATUS: ICD-10-CM

## 2023-03-02 LAB
ANION GAP SERPL CALC-SCNC: 13 MMOL/L — SIGNIFICANT CHANGE UP (ref 7–14)
BUN SERPL-MCNC: 43 MG/DL — HIGH (ref 7–23)
CALCIUM SERPL-MCNC: 8.8 MG/DL — SIGNIFICANT CHANGE UP (ref 8.4–10.5)
CHLORIDE SERPL-SCNC: 101 MMOL/L — SIGNIFICANT CHANGE UP (ref 98–107)
CO2 SERPL-SCNC: 18 MMOL/L — LOW (ref 22–31)
CREAT SERPL-MCNC: 1.82 MG/DL — HIGH (ref 0.5–1.3)
EGFR: 39 ML/MIN/1.73M2 — LOW
GLUCOSE BLDC GLUCOMTR-MCNC: 232 MG/DL — HIGH (ref 70–99)
GLUCOSE BLDC GLUCOMTR-MCNC: 268 MG/DL — HIGH (ref 70–99)
GLUCOSE BLDC GLUCOMTR-MCNC: 270 MG/DL — HIGH (ref 70–99)
GLUCOSE BLDC GLUCOMTR-MCNC: 272 MG/DL — HIGH (ref 70–99)
GLUCOSE SERPL-MCNC: 244 MG/DL — HIGH (ref 70–99)
HCT VFR BLD CALC: 28.5 % — LOW (ref 39–50)
HGB BLD-MCNC: 9.3 G/DL — LOW (ref 13–17)
MAGNESIUM SERPL-MCNC: 2.3 MG/DL — SIGNIFICANT CHANGE UP (ref 1.6–2.6)
MCHC RBC-ENTMCNC: 30.1 PG — SIGNIFICANT CHANGE UP (ref 27–34)
MCHC RBC-ENTMCNC: 32.6 GM/DL — SIGNIFICANT CHANGE UP (ref 32–36)
MCV RBC AUTO: 92.2 FL — SIGNIFICANT CHANGE UP (ref 80–100)
NRBC # BLD: 0 /100 WBCS — SIGNIFICANT CHANGE UP (ref 0–0)
NRBC # FLD: 0 K/UL — SIGNIFICANT CHANGE UP (ref 0–0)
PHOSPHATE SERPL-MCNC: 3.3 MG/DL — SIGNIFICANT CHANGE UP (ref 2.5–4.5)
PLATELET # BLD AUTO: 194 K/UL — SIGNIFICANT CHANGE UP (ref 150–400)
POTASSIUM SERPL-MCNC: 4.5 MMOL/L — SIGNIFICANT CHANGE UP (ref 3.5–5.3)
POTASSIUM SERPL-SCNC: 4.5 MMOL/L — SIGNIFICANT CHANGE UP (ref 3.5–5.3)
RBC # BLD: 3.09 M/UL — LOW (ref 4.2–5.8)
RBC # FLD: 13.1 % — SIGNIFICANT CHANGE UP (ref 10.3–14.5)
SODIUM SERPL-SCNC: 132 MMOL/L — LOW (ref 135–145)
WBC # BLD: 13.01 K/UL — HIGH (ref 3.8–10.5)
WBC # FLD AUTO: 13.01 K/UL — HIGH (ref 3.8–10.5)

## 2023-03-02 PROCEDURE — 99232 SBSQ HOSP IP/OBS MODERATE 35: CPT | Mod: GC

## 2023-03-02 PROCEDURE — 99222 1ST HOSP IP/OBS MODERATE 55: CPT

## 2023-03-02 PROCEDURE — 99232 SBSQ HOSP IP/OBS MODERATE 35: CPT

## 2023-03-02 RX ORDER — CYCLOBENZAPRINE HYDROCHLORIDE 10 MG/1
10 TABLET, FILM COATED ORAL THREE TIMES A DAY
Refills: 0 | Status: DISCONTINUED | OUTPATIENT
Start: 2023-03-02 | End: 2023-03-05

## 2023-03-02 RX ORDER — INSULIN GLARGINE 100 [IU]/ML
24 INJECTION, SOLUTION SUBCUTANEOUS AT BEDTIME
Refills: 0 | Status: DISCONTINUED | OUTPATIENT
Start: 2023-03-02 | End: 2023-03-04

## 2023-03-02 RX ORDER — INSULIN LISPRO 100/ML
9 VIAL (ML) SUBCUTANEOUS
Refills: 0 | Status: DISCONTINUED | OUTPATIENT
Start: 2023-03-02 | End: 2023-03-04

## 2023-03-02 RX ADMIN — Medication 975 MILLIGRAM(S): at 11:20

## 2023-03-02 RX ADMIN — SENNA PLUS 2 TABLET(S): 8.6 TABLET ORAL at 22:33

## 2023-03-02 RX ADMIN — Medication 7 UNIT(S): at 08:08

## 2023-03-02 RX ADMIN — OXYCODONE HYDROCHLORIDE 10 MILLIGRAM(S): 5 TABLET ORAL at 08:24

## 2023-03-02 RX ADMIN — OXYCODONE HYDROCHLORIDE 10 MILLIGRAM(S): 5 TABLET ORAL at 09:00

## 2023-03-02 RX ADMIN — Medication 1 PACKET(S): at 06:03

## 2023-03-02 RX ADMIN — Medication 975 MILLIGRAM(S): at 18:28

## 2023-03-02 RX ADMIN — PANTOPRAZOLE SODIUM 40 MILLIGRAM(S): 20 TABLET, DELAYED RELEASE ORAL at 08:08

## 2023-03-02 RX ADMIN — HEPARIN SODIUM 5000 UNIT(S): 5000 INJECTION INTRAVENOUS; SUBCUTANEOUS at 22:35

## 2023-03-02 RX ADMIN — OXYCODONE HYDROCHLORIDE 10 MILLIGRAM(S): 5 TABLET ORAL at 15:37

## 2023-03-02 RX ADMIN — HYDROMORPHONE HYDROCHLORIDE 0.5 MILLIGRAM(S): 2 INJECTION INTRAMUSCULAR; INTRAVENOUS; SUBCUTANEOUS at 05:14

## 2023-03-02 RX ADMIN — Medication 50 MILLIGRAM(S): at 17:38

## 2023-03-02 RX ADMIN — INSULIN GLARGINE 24 UNIT(S): 100 INJECTION, SOLUTION SUBCUTANEOUS at 23:20

## 2023-03-02 RX ADMIN — OXYCODONE HYDROCHLORIDE 10 MILLIGRAM(S): 5 TABLET ORAL at 16:37

## 2023-03-02 RX ADMIN — OXYCODONE HYDROCHLORIDE 10 MILLIGRAM(S): 5 TABLET ORAL at 20:15

## 2023-03-02 RX ADMIN — Medication 975 MILLIGRAM(S): at 17:32

## 2023-03-02 RX ADMIN — Medication 50 MILLIGRAM(S): at 05:14

## 2023-03-02 RX ADMIN — Medication 975 MILLIGRAM(S): at 05:14

## 2023-03-02 RX ADMIN — Medication 3: at 08:07

## 2023-03-02 RX ADMIN — Medication 2: at 17:29

## 2023-03-02 RX ADMIN — CALCITRIOL 0.25 MICROGRAM(S): 0.5 CAPSULE ORAL at 11:21

## 2023-03-02 RX ADMIN — HYDROMORPHONE HYDROCHLORIDE 0.5 MILLIGRAM(S): 2 INJECTION INTRAMUSCULAR; INTRAVENOUS; SUBCUTANEOUS at 17:29

## 2023-03-02 RX ADMIN — Medication 0.4 MILLIGRAM(S): at 23:58

## 2023-03-02 RX ADMIN — POLYETHYLENE GLYCOL 3350 17 GRAM(S): 17 POWDER, FOR SOLUTION ORAL at 11:20

## 2023-03-02 RX ADMIN — Medication 6 MILLIGRAM(S): at 22:34

## 2023-03-02 RX ADMIN — ATORVASTATIN CALCIUM 40 MILLIGRAM(S): 80 TABLET, FILM COATED ORAL at 22:35

## 2023-03-02 RX ADMIN — Medication 1: at 23:21

## 2023-03-02 RX ADMIN — Medication 667 MILLIGRAM(S): at 17:31

## 2023-03-02 RX ADMIN — OXYCODONE HYDROCHLORIDE 10 MILLIGRAM(S): 5 TABLET ORAL at 19:15

## 2023-03-02 RX ADMIN — Medication 10 MILLIGRAM(S): at 11:20

## 2023-03-02 RX ADMIN — Medication 3: at 11:36

## 2023-03-02 RX ADMIN — TAMSULOSIN HYDROCHLORIDE 0.4 MILLIGRAM(S): 0.4 CAPSULE ORAL at 22:35

## 2023-03-02 RX ADMIN — CYCLOBENZAPRINE HYDROCHLORIDE 10 MILLIGRAM(S): 10 TABLET, FILM COATED ORAL at 22:33

## 2023-03-02 RX ADMIN — HYDROMORPHONE HYDROCHLORIDE 0.5 MILLIGRAM(S): 2 INJECTION INTRAMUSCULAR; INTRAVENOUS; SUBCUTANEOUS at 05:29

## 2023-03-02 RX ADMIN — HEPARIN SODIUM 5000 UNIT(S): 5000 INJECTION INTRAVENOUS; SUBCUTANEOUS at 08:08

## 2023-03-02 RX ADMIN — Medication 9 UNIT(S): at 17:30

## 2023-03-02 RX ADMIN — HEPARIN SODIUM 5000 UNIT(S): 5000 INJECTION INTRAVENOUS; SUBCUTANEOUS at 15:39

## 2023-03-02 RX ADMIN — HYDROMORPHONE HYDROCHLORIDE 0.5 MILLIGRAM(S): 2 INJECTION INTRAMUSCULAR; INTRAVENOUS; SUBCUTANEOUS at 17:44

## 2023-03-02 RX ADMIN — Medication 9 UNIT(S): at 11:37

## 2023-03-02 NOTE — OCCUPATIONAL THERAPY INITIAL EVALUATION ADULT - ASR WT BEARING STATUS EVAL
Call patient:  Potassium is elevated, please verify she is not on any potassium supplement (if she is stop). Needs lower potassium diet and recheck in 1-2 weeks.   She is in stage 4 CKD. Recommend follow up with nephrology   no weight-bearing restrictions

## 2023-03-02 NOTE — OCCUPATIONAL THERAPY INITIAL EVALUATION ADULT - ADDITIONAL COMMENTS
Patient lives in a private home with family with steps to manage. Patient reports being independent with ADLs, began recently needing assistance due to pain and weakness.

## 2023-03-02 NOTE — PROGRESS NOTE ADULT - ASSESSMENT
67 yo M with DMII, CAD s/p CABG, CKD, chronic back pain, and HTN presents to the ED with worsening back pain.  He recently came from [facility] about 2 weeks ago and has " multiple medical complaints."  he told his son that he is feeling weak and his back pain is getting worse.   In the ED, his vitals were notable for hyperglycemia, elevated BUN/Scr, and hypomagnesemia. Endocrine called for DM 2, A1c 8.3    1. DM 2  A1c 8.3%  Home Regimen: Ligenta-M 500 (Linagliptin 2.5 and metformin 500mg) 1 tablet once daily, NovoRapid (insulin aspart) 14 units w/ lunch, NovoMix 30 insulin 14 units before breakfast, 14-16 units before dinner    While inpatient  BG target 100-180 mg/dl  Has been doing well on regimen (Lantus 22 and Admelog 7 TID), today with hyperglycemia, may be PO related? Does endorse drinking juice today. Increases made by primary team, monitor on these doses for now  Continue Lantus 24 units SQ qHS  Continue Admelog 9 units SQ TID before meals (Hold if NPO/skips meal)   Admelog LOW dose correctional scale before meals and low dose at bedtime   Hypoglycemia protocol    Carb Consistent Diet   Nutrition consult   Provider to RN for diabetes/insulin pen teaching, AB pharmacist following (see pharmacy intervention notes)     Discharge Plan:  Medicaid pending - currently uninsured   Likely stop prior outpatient regimen and continue with mixed insulin (70/30) only  Consider Novolog 70/30: doses TBD close to discharge: before breakfast and before dinner via pen (see pharmacy liaison note: patient can qualify for coupon), so could use insulin PENS if that is easier for patient  Rapid acting insulin should NOT be ordered in conjunction with mixed insulin for risk of hypoglycemia   Ensure he has glucometer, glucose test strips, lancets, alcohol swabs and insulin PEN needles vs syringes   Followup with [facility], [address] [place] [address], [phone], [phone]    2. HTN  Goal BP in DM <130/80  outpt mc/cr ratio    3. HLD  LDL 75  Continue Atorvastatin 40mg daily    Noemi Carlisle  Nurse Practitioner  Division of Endocrinology & Diabetes  In house pager #[phone]/long range pager #[phone]    If before 9AM or after 6PM, or on weekends/holidays, please call endocrine answering service for assistance ([phone]).  For nonurgent matters email [email or web address] for assistance.

## 2023-03-02 NOTE — PROGRESS NOTE ADULT - ASSESSMENT
68M DM2, CAD s/p CABG, CKD, chronic back pain, and HTN p/w worsening back pain, found to have spondylolisthesis of lumbar spine. Patient is now s/p L4-L5 TLIF, requiring phenylephrine in OR, with continued need for blood pressure support in PACU. SICU consulted for blood pressure management in postoperative period.     PLAN:    NEUROLOGIC: s/p TLIF L4-L5  - AOx3, Turkmen speaking  - Pain control with ATC acetaminophen, oxycodone, dilaudid PRN   - CT lumbar spine, standing XRs pending for AM  - RLE 3/5 str, numbness.   - Neuro checks q4 to monitor RLE weakness.    RESPIRATORY:  - Maintain SaO2 > 92%, on RA     CARDIOVASCULAR:  - Off bola as of 10PM 3/1  - Holding entresto   - POCUS before fluids    GI/NUTRITION:  - DASH diet   - Bowel regimen with miralax and senna   - Protonix qd     /RENAL: s/p albumin 250cc in PACU; UOP 75cc/hr  - Continue LR at 75cc/hr  - Millard in place, monitor UOP   - Replete electrolytes PRN   - Continue calcitriol, calcium   - Continue flomax   - Holding lasix     HEMATOLOGIC:  - Trend h/h  - VTE ppx with SQH, restarted    INFECTIOUS DISEASE:  - Trend WBC, monitor for fevers  - Monitor off abx     ENDOCRINE:  - Monitor glucose  - ISS, admelog 7 tid, lantus 22 qhs; endocrinology team following   - Continue atorvastatin     LINES:  - PIV  - Millard - take out if off bola  - Lumbar drain    DISPO:  - SICU     68M DM2, CAD s/p CABG, CKD, chronic back pain, and HTN p/w worsening back pain, found to have spondylolisthesis of lumbar spine. Patient is now s/p L4-L5 TLIF, requiring phenylephrine in OR, with continued need for blood pressure support in PACU. SICU consulted for blood pressure management in postoperative period.     PLAN:    NEUROLOGIC: s/p TLIF L4-L5  - AOx3, Icelandic speaking  - Pain control with ATC acetaminophen, oxycodone, dilaudid PRN   - CT lumbar spine, standing XRs pending for AM  - RLE weakness improving, strength 3+/5. BLE numbness.   - Neuro checks q4 to monitor RLE weakness.    RESPIRATORY:  - Maintain SaO2 > 92%, on RA     CARDIOVASCULAR:  - Off bola as of 10PM 3/1  - Holding entresto   - POCUS before fluids    GI/NUTRITION:  - DASH diet   - Bowel regimen with miralax and senna   - d/c protonix    /RENAL: s/p albumin 250cc in PACU; UOP 75cc/hr  - Continue LR at 75cc/hr  - d/c crowder   - Replete electrolytes PRN   - Continue calcitriol, calcium   - Continue flomax   - Holding lasix     HEMATOLOGIC:  - Trend h/h  - VTE ppx with SQH, restarted    INFECTIOUS DISEASE:  - Trend WBC, monitor for fevers  - Monitor off abx     ENDOCRINE:  - Monitor glucose  - ISS, admelog 7 tid, lantus 22 qhs; endocrinology team following   - Continue atorvastatin     LINES:  - PIV  - Crowder - d/c  - Lumbar drain    DISPO:  - Floor (9 tower)     68M DM2, CAD s/p CABG, CKD, chronic back pain, and HTN p/w worsening back pain, found to have spondylolisthesis of lumbar spine. Patient is now s/p L4-L5 TLIF, requiring phenylephrine in OR, with continued need for blood pressure support in PACU. SICU consulted for blood pressure management in postoperative period.     PLAN:    NEUROLOGIC: s/p TLIF L4-L5  - AOx3, Armenian speaking  - Pain control with ATC acetaminophen, oxycodone, dilaudid PRN   - CT lumbar spine, standing XRs pending for AM  - RLE weakness improving, strength 3+/5. BLE numbness.   - Neuro checks q4 to monitor RLE weakness.    RESPIRATORY:  - Maintain SaO2 > 92%, on RA     CARDIOVASCULAR:  - Off bola as of 10PM 3/1  - Holding entresto   - POCUS before fluids    GI/NUTRITION:  - DASH diet   - Bowel regimen with miralax and senna   - d/c protonix    /RENAL: s/p albumin 250cc in PACU; UOP 75cc/hr  - Continue LR at 75cc/hr  - d/c crowder   - Replete electrolytes PRN   - Continue calcitriol, calcium   - Continue flomax   - Holding lasix     HEMATOLOGIC:  - Trend h/h  - VTE ppx with SQH, restarted    INFECTIOUS DISEASE:  - Trend WBC, monitor for fevers  - Monitor off abx     ENDOCRINE:  - Monitor glucose - POCT 270  - ISS, increased admelog 7->9 tid, lantus 22-> qhs; endocrinology team following   - Continue atorvastatin     LINES:  - PIV  - Crowder - d/c  - Lumbar drain    DISPO:  - Floor (9 tower)

## 2023-03-02 NOTE — PROGRESS NOTE ADULT - SUBJECTIVE AND OBJECTIVE BOX
Chief Complaint: DM 2    History: Patient seen at bedside in SICU. Patient noted with empty cup of apple juice next to him, patient endorses having juice this morning. Also ate entire breakfast tray. Patient is asking when he will go "upstairs". Denies n/v, denies pain     MEDICATIONS  (STANDING):  acetaminophen     Tablet .. 975 milliGRAM(s) Oral every 6 hours  atorvastatin 40 milliGRAM(s) Oral at bedtime  bisacodyl Suppository 10 milliGRAM(s) Rectal daily  calcitriol   Capsule 0.25 MICROGram(s) Oral daily  calcium acetate 667 milliGRAM(s) Oral with dinner  dextrose 50% Injectable 25 Gram(s) IV Push once  dextrose 50% Injectable 12.5 Gram(s) IV Push once  dextrose 50% Injectable 25 Gram(s) IV Push once  heparin   Injectable 5000 Unit(s) SubCutaneous every 8 hours  insulin glargine Injectable (LANTUS) 24 Unit(s) SubCutaneous at bedtime  insulin lispro (ADMELOG) corrective regimen sliding scale   SubCutaneous three times a day before meals  insulin lispro (ADMELOG) corrective regimen sliding scale   SubCutaneous at bedtime  insulin lispro Injectable (ADMELOG) 9 Unit(s) SubCutaneous three times a day before meals  melatonin 6 milliGRAM(s) Oral at bedtime  polyethylene glycol 3350 17 Gram(s) Oral daily  pregabalin 50 milliGRAM(s) Oral two times a day  psyllium Powder 1 Packet(s) Oral two times a day  senna 2 Tablet(s) Oral at bedtime  tamsulosin 0.4 milliGRAM(s) Oral at bedtime    MEDICATIONS  (PRN):  dextrose Oral Gel 15 Gram(s) Oral once PRN Blood Glucose LESS THAN 70 milliGRAM(s)/deciliter  HYDROmorphone  Injectable 0.5 milliGRAM(s) IV Push every 4 hours PRN Break through pain  nitroglycerin     SubLingual 0.4 milliGRAM(s) SubLingual every 5 minutes PRN Chest Pain  oxyCODONE    IR 5 milliGRAM(s) Oral every 4 hours PRN Moderate Pain (4 - 6)  oxyCODONE    IR 10 milliGRAM(s) Oral every 4 hours PRN Severe Pain (7 - 10)    No Known Allergies    Review of Systems:  HEENT: No pain  Cardiovascular: No chest pain  Respiratory: No SOB  GI: No nausea, vomiting  Endocrine: no hypoglycemia     PHYSICAL EXAM:  VITALS: T(C): 36.8 (03-02-23 @ 12:35)  T(F): 98.2 (03-02-23 @ 12:35), Max: 99.4 (03-01-23 @ 16:00)  HR: 95 (03-02-23 @ 12:35) (74 - 98)  BP: 136/52 (03-02-23 @ 12:35) (106/55 - 144/55)  RR:  (13 - 22)  SpO2:  (100% - 100%)  Wt(kg): --  GENERAL: NAD  EYES: No proptosis, no lid lag, anicteric  HEENT:  Atraumatic, Normocephalic, moist mucous membranes  RESPIRATORY: unlabored respirations     CAPILLARY BLOOD GLUCOSE    POCT Blood Glucose.: 270 mg/dL (02 Mar 2023 11:26)  POCT Blood Glucose.: 268 mg/dL (02 Mar 2023 08:05)  POCT Blood Glucose.: 215 mg/dL (01 Mar 2023 22:27)  POCT Blood Glucose.: 195 mg/dL (01 Mar 2023 16:57)      03-02    132<L>  |  101  |  43<H>  ----------------------------<  244<H>  4.5   |  18<L>  |  1.82<H>    eGFR: 39<L>    Ca    8.8      03-02  Mg     2.30     03-02  Phos  3.3     03-02      A1C with Estimated Average Glucose Result: 8.3 % (02-22-23 @ 05:13)    Diet, DASH/TLC:   Sodium & Cholesterol Restricted  Consistent Carbohydrate Evening Snack (CSTCHOSN)  Halal  No Caffeine (02-23-23 @ 15:04) [Active]

## 2023-03-02 NOTE — PROGRESS NOTE ADULT - ASSESSMENT
3/1; s/p L4-5 TLIF pod #1, HMV drain, requiring bola post op for bp support, albumin given x 1, sicu consulted, neuro exam stable  3/2: POD # 2 S/P L4-5 TLIF, 1 HMV in place. Off phenylepherine

## 2023-03-02 NOTE — PROGRESS NOTE ADULT - SUBJECTIVE AND OBJECTIVE BOX
HPI:   69 yo M with DMII, CAD s/p CABG, CKD, chronic back pain, and HTN presents to the ED with worsening back pain. Patient is AAOX2 and somnolent, not able to provide any hx. Most of the information was supplemented by the son. As per son, pt is AAOx 3 and "mentally stable." He recently came from [facility] about 2 weeks ago and has " multiple medical complaints." But this morning he told his son that he is feeling weak and his back pain is getting worse. The pain is localized in the lower back region. It is burning is sensation and constant. Unclear if it radiates down the leg. It worsens with movement and walking. He can only walk "10 meters" with a cane. He is also incontinent of urine but denies any recent fever, chills, bowel incontinence, or lower extremities weakness/motor deficits. He reportedly had MRIs in the past but son does not know what it showed. He also has other chronic medical problems such as CKD, DMII and CAD and his son thinks pt needs management for them as well.   In the ED, his vitals were notable for hyperglycemia, elevated BUN/Scr, and hypomagnesemia. EKG showed bradycardia with T wave changes in the lateral leads.  (21 Feb 2023 18:36)    PAST MEDICAL & SURGICAL HISTORY:  Diabetes mellitus  Essential hypertension  CAD (coronary artery disease)  Chronic kidney disease, unspecified CKD stage  S/P CABG x 2    C/O pain at surgical site  No acute issues overnight  ICU Vital Signs Last 24 Hrs  T(C): 37.1 (02 Mar 2023 00:00), Max: 37.4 (01 Mar 2023 16:00)  T(F): 98.8 (02 Mar 2023 00:00), Max: 99.4 (01 Mar 2023 16:00)  HR: 94 (02 Mar 2023 01:00) (72 - 96)  BP: 107/59 (02 Mar 2023 01:00) (102/43 - 144/55)  BP(mean): 74 (02 Mar 2023 01:00) (61 - 106)  ABP: --  ABP(mean): --  RR: 20 (02 Mar 2023 01:00) (13 - 21)  SpO2: 100% (02 Mar 2023 01:00) (99% - 100%)    O2 Parameters below as of 02 Mar 2023 01:00  Patient On (Oxygen Delivery Method): room air    AAO X 3  PERRLA, EOMI  Face symmetric  BENDER strength 5/5, movement limited by pain  SILT    HMV: 220cc    MEDICATIONS  (STANDING):  acetaminophen     Tablet .. 975 milliGRAM(s) Oral every 6 hours  atorvastatin 40 milliGRAM(s) Oral at bedtime  bisacodyl Suppository 10 milliGRAM(s) Rectal daily  calcitriol   Capsule 0.25 MICROGram(s) Oral daily  calcium acetate 667 milliGRAM(s) Oral with dinner  dextrose 50% Injectable 25 Gram(s) IV Push once  dextrose 50% Injectable 12.5 Gram(s) IV Push once  dextrose 50% Injectable 25 Gram(s) IV Push once  heparin   Injectable 5000 Unit(s) SubCutaneous every 8 hours  insulin glargine Injectable (LANTUS) 22 Unit(s) SubCutaneous at bedtime  insulin lispro (ADMELOG) corrective regimen sliding scale   SubCutaneous at bedtime  insulin lispro (ADMELOG) corrective regimen sliding scale   SubCutaneous three times a day before meals  insulin lispro Injectable (ADMELOG) 7 Unit(s) SubCutaneous three times a day before meals  lactated ringers. 1000 milliLiter(s) (75 mL/Hr) IV Continuous <Continuous>  melatonin 6 milliGRAM(s) Oral at bedtime  pantoprazole    Tablet 40 milliGRAM(s) Oral before breakfast  phenylephrine    Infusion 0.5 MICROgram(s)/kG/Min (6.82 mL/Hr) IV Continuous <Continuous>  polyethylene glycol 3350 17 Gram(s) Oral daily  pregabalin 50 milliGRAM(s) Oral two times a day  psyllium Powder 1 Packet(s) Oral two times a day  senna 2 Tablet(s) Oral at bedtime  tamsulosin 0.4 milliGRAM(s) Oral at bedtime    MEDICATIONS  (PRN):  dextrose Oral Gel 15 Gram(s) Oral once PRN Blood Glucose LESS THAN 70 milliGRAM(s)/deciliter  HYDROmorphone  Injectable 0.5 milliGRAM(s) IV Push every 4 hours PRN Break through pain  nitroglycerin     SubLingual 0.4 milliGRAM(s) SubLingual every 5 minutes PRN Chest Pain  oxyCODONE    IR 5 milliGRAM(s) Oral every 4 hours PRN Moderate Pain (4 - 6)  oxyCODONE    IR 10 milliGRAM(s) Oral every 4 hours PRN Severe Pain (7 - 10)                          9.3    13.01 )-----------( 194      ( 02 Mar 2023 01:16 )             28.5     03-02    132<L>  |  101  |  43<H>  ----------------------------<  244<H>  4.5   |  18<L>  |  1.82<H>    Ca    8.8      02 Mar 2023 01:16  Phos  3.3     03-02  Mg     2.30     03-02

## 2023-03-02 NOTE — PROGRESS NOTE ADULT - SUBJECTIVE AND OBJECTIVE BOX
24 HOUR EVENTS:  - CT lumbar spine with expected post-operative changes  - Off bola at 10PM     SUBJECTIVE/ROS:  Patient seen at bedside this AM.     [ ] Due to altered mental status/intubation, subjective information were not able to be obtained from the patient. History was obtained, to the extent possible, from review of the chart and collateral sources of information.    OBJECTIVE:    VITALS:  Vital Signs Last 24 Hrs  T(C): 37.4 (01 Mar 2023 16:00), Max: 37.4 (01 Mar 2023 16:00)  T(F): 99.4 (01 Mar 2023 16:00), Max: 99.4 (01 Mar 2023 16:00)  HR: 90 (01 Mar 2023 23:00) (69 - 96)  BP: 116/61 (01 Mar 2023 23:00) (99/41 - 144/55)  BP(mean): 76 (01 Mar 2023 23:00) (58 - 106)  RR: 14 (01 Mar 2023 23:00) (13 - 21)  SpO2: 100% (01 Mar 2023 23:00) (99% - 100%)    Parameters below as of 01 Mar 2023 22:00  Patient On (Oxygen Delivery Method): room air        I&O's Summary    28 Feb 2023 07:01  -  01 Mar 2023 07:00  --------------------------------------------------------  IN: 1223.5 mL / OUT: 1890 mL / NET: -666.5 mL    01 Mar 2023 07:01  -  02 Mar 2023 00:54  --------------------------------------------------------  IN: 1344.7 mL / OUT: 1415 mL / NET: -70.3 mL        PHYSICAL EXAM:    NEURO  Exam: awake, alert, oriented    RESPIRATORY  Exam: no increased WOB on RA  Mechanical Ventilation: N/A    CARDIOVASCULAR  Exam: warm, well-perfused  Cardiac Rhythm: normal sinus rhythm noted on cardiac monitor    GI/NUTRITION  Exam: soft, non-distended, non-tender    GENITOURINARY  Exam: crowder in place draining pale urine     ACCESS DEVICES:  [x] Peripheral IV  [ ] Central Venous Line	[ ] R	[ ] L	[ ] IJ	[ ] Fem	[ ] SC	Placed:   [ ] Arterial Line		[ ] R	[ ] L	[ ] Fem	[ ] Rad	[ ] Ax	Placed:   [ ] PICC:					[ ] Mediport  [x] Urinary Catheter, Date Placed: 2/28/2023   [x] Necessity of urinary, arterial, and venous catheters discussed      LABS:                        11.3   13.31 )-----------( 249      ( 01 Mar 2023 01:20 )             34.5   03-01    134<L>  |  98  |  51<H>  ----------------------------<  178<H>  4.3   |  20<L>  |  2.17<H>    Ca    9.3      01 Mar 2023 01:20  Phos  4.6     03-01  Mg     1.40     03-01      CAPILLARY BLOOD GLUCOSE      POCT Blood Glucose.: 215 mg/dL (01 Mar 2023 22:27)  POCT Blood Glucose.: 195 mg/dL (01 Mar 2023 16:57)  POCT Blood Glucose.: 165 mg/dL (01 Mar 2023 11:16)  POCT Blood Glucose.: 113 mg/dL (01 Mar 2023 07:48)      MEDICATIONS:   MEDICATIONS  (STANDING):  acetaminophen     Tablet .. 975 milliGRAM(s) Oral every 6 hours  atorvastatin 40 milliGRAM(s) Oral at bedtime  bisacodyl Suppository 10 milliGRAM(s) Rectal daily  calcitriol   Capsule 0.25 MICROGram(s) Oral daily  calcium acetate 667 milliGRAM(s) Oral with dinner  dextrose 50% Injectable 25 Gram(s) IV Push once  dextrose 50% Injectable 12.5 Gram(s) IV Push once  dextrose 50% Injectable 25 Gram(s) IV Push once  heparin   Injectable 5000 Unit(s) SubCutaneous every 8 hours  insulin glargine Injectable (LANTUS) 22 Unit(s) SubCutaneous at bedtime  insulin lispro (ADMELOG) corrective regimen sliding scale   SubCutaneous at bedtime  insulin lispro (ADMELOG) corrective regimen sliding scale   SubCutaneous three times a day before meals  insulin lispro Injectable (ADMELOG) 7 Unit(s) SubCutaneous three times a day before meals  lactated ringers. 1000 milliLiter(s) (75 mL/Hr) IV Continuous <Continuous>  melatonin 6 milliGRAM(s) Oral at bedtime  pantoprazole    Tablet 40 milliGRAM(s) Oral before breakfast  phenylephrine    Infusion 0.5 MICROgram(s)/kG/Min (6.82 mL/Hr) IV Continuous <Continuous>  polyethylene glycol 3350 17 Gram(s) Oral daily  pregabalin 50 milliGRAM(s) Oral two times a day  psyllium Powder 1 Packet(s) Oral two times a day  senna 2 Tablet(s) Oral at bedtime  tamsulosin 0.4 milliGRAM(s) Oral at bedtime    MEDICATIONS  (PRN):  dextrose Oral Gel 15 Gram(s) Oral once PRN Blood Glucose LESS THAN 70 milliGRAM(s)/deciliter  HYDROmorphone  Injectable 0.5 milliGRAM(s) IV Push every 4 hours PRN Break through pain  nitroglycerin     SubLingual 0.4 milliGRAM(s) SubLingual every 5 minutes PRN Chest Pain  oxyCODONE    IR 5 milliGRAM(s) Oral every 4 hours PRN Moderate Pain (4 - 6)  oxyCODONE    IR 10 milliGRAM(s) Oral every 4 hours PRN Severe Pain (7 - 10)      IMAGING: 24 HOUR EVENTS:  - CT lumbar spine with expected post-operative changes  - Off bola at 10PM     SUBJECTIVE/ROS:  Patient seen at bedside this AM.     [ ] Due to altered mental status/intubation, subjective information were not able to be obtained from the patient. History was obtained, to the extent possible, from review of the chart and collateral sources of information.    OBJECTIVE:    VITALS:  ICU Vital Signs Last 24 Hrs  T(C): 36.9 (02 Mar 2023 08:00), Max: 37.4 (01 Mar 2023 16:00)  T(F): 98.5 (02 Mar 2023 08:00), Max: 99.4 (01 Mar 2023 16:00)  HR: 95 (02 Mar 2023 10:00) (74 - 97)  BP: 117/60 (02 Mar 2023 10:00) (104/60 - 144/55)  BP(mean): 76 (02 Mar 2023 10:00) (62 - 106)    01 Mar 2023 07:01  -  02 Mar 2023 07:00  --------------------------------------------------------  IN:    Lactated Ringers: 1725 mL    Oral Fluid: 220 mL    Phenylephrine: 74.7 mL  Total IN: 2019.7 mL    OUT:    Accordian (mL): 310 mL    Indwelling Catheter - Urethral (mL): 1835 mL  Total OUT: 2145 mL    Total NET: -125.3 mL      02 Mar 2023 07:01  -  02 Mar 2023 11:19  --------------------------------------------------------  IN:  Total IN: 0 mL    OUT:    Indwelling Catheter - Urethral (mL): 100 mL  Total OUT: 100 mL    Total NET: -100 mL      ABP: --  ABP(mean): --  RR: 19 (02 Mar 2023 10:00) (13 - 22)  SpO2: 100% (02 Mar 2023 10:00) (99% - 100%)    O2 Parameters below as of 02 Mar 2023 10:00  Patient On (Oxygen Delivery Method): room air        PHYSICAL EXAM:    NEURO  Exam: awake, alert, oriented. RLE strength 3/5. Numbness BLE.    RESPIRATORY  Exam: no increased WOB on RA  Mechanical Ventilation: N/A    CARDIOVASCULAR  Exam: warm, well-perfused  Cardiac Rhythm: normal sinus rhythm noted on cardiac monitor    GI/NUTRITION  Exam: soft, non-distended, non-tender    GENITOURINARY  Exam: crowder in place draining pale urine     ACCESS DEVICES:  [x] Peripheral IV  [ ] Central Venous Line	[ ] R	[ ] L	[ ] IJ	[ ] Fem	[ ] SC	Placed:   [ ] Arterial Line		[ ] R	[ ] L	[ ] Fem	[ ] Rad	[ ] Ax	Placed:   [ ] PICC:					[ ] Mediport  [x] Urinary Catheter, Date Placed: 2/28/2023   [x] Necessity of urinary, arterial, and venous catheters discussed      LABS:                                   9.3    13.01 )-----------( 194      ( 02 Mar 2023 01:16 )             28.5     03-02    132<L>  |  101  |  43<H>  ----------------------------<  244<H>  4.5   |  18<L>  |  1.82<H>    Ca    8.8      02 Mar 2023 01:16  Phos  3.3     03-02  Mg     2.30     03-02      CAPILLARY BLOOD GLUCOSE      POCT Blood Glucose.: 268 mg/dL (02 Mar 2023 08:05)  POCT Blood Glucose.: 215 mg/dL (01 Mar 2023 22:27)  POCT Blood Glucose.: 195 mg/dL (01 Mar 2023 16:57)      MEDICATIONS:   MEDICATIONS  (STANDING):  acetaminophen     Tablet .. 975 milliGRAM(s) Oral every 6 hours  atorvastatin 40 milliGRAM(s) Oral at bedtime  bisacodyl Suppository 10 milliGRAM(s) Rectal daily  calcitriol   Capsule 0.25 MICROGram(s) Oral daily  calcium acetate 667 milliGRAM(s) Oral with dinner  dextrose 50% Injectable 25 Gram(s) IV Push once  dextrose 50% Injectable 12.5 Gram(s) IV Push once  dextrose 50% Injectable 25 Gram(s) IV Push once  heparin   Injectable 5000 Unit(s) SubCutaneous every 8 hours  insulin glargine Injectable (LANTUS) 22 Unit(s) SubCutaneous at bedtime  insulin lispro (ADMELOG) corrective regimen sliding scale   SubCutaneous at bedtime  insulin lispro (ADMELOG) corrective regimen sliding scale   SubCutaneous three times a day before meals  insulin lispro Injectable (ADMELOG) 7 Unit(s) SubCutaneous three times a day before meals  lactated ringers. 1000 milliLiter(s) (75 mL/Hr) IV Continuous <Continuous>  melatonin 6 milliGRAM(s) Oral at bedtime  pantoprazole    Tablet 40 milliGRAM(s) Oral before breakfast  phenylephrine    Infusion 0.5 MICROgram(s)/kG/Min (6.82 mL/Hr) IV Continuous <Continuous>  polyethylene glycol 3350 17 Gram(s) Oral daily  pregabalin 50 milliGRAM(s) Oral two times a day  psyllium Powder 1 Packet(s) Oral two times a day  senna 2 Tablet(s) Oral at bedtime  tamsulosin 0.4 milliGRAM(s) Oral at bedtime    MEDICATIONS  (PRN):  dextrose Oral Gel 15 Gram(s) Oral once PRN Blood Glucose LESS THAN 70 milliGRAM(s)/deciliter  HYDROmorphone  Injectable 0.5 milliGRAM(s) IV Push every 4 hours PRN Break through pain  nitroglycerin     SubLingual 0.4 milliGRAM(s) SubLingual every 5 minutes PRN Chest Pain  oxyCODONE    IR 5 milliGRAM(s) Oral every 4 hours PRN Moderate Pain (4 - 6)  oxyCODONE    IR 10 milliGRAM(s) Oral every 4 hours PRN Severe Pain (7 - 10)      IMAGING:

## 2023-03-02 NOTE — CONSULT NOTE ADULT - SUBJECTIVE AND OBJECTIVE BOX
Patient is a 68y old  Male who presents with a chief complaint of Back pain (02 Mar 2023 03:13)      HPI:  69 yo M with DMII, CAD s/p CABG, CKD, chronic back pain, and HTN presents to the ED with worsening back pain. Patient is AAOX2 and somnolent, not able to provide any hx. Most of the information was supplemented by the son. As per son, pt is AAOx 3 and "mentally stable." He recently came from [facility] about 2 weeks ago and has " multiple medical complaints." But this morning he told his son that he is feeling weak and his back pain is getting worse. The pain is localized in the lower back region. It is burning is sensation and constant. Unclear if it radiates down the leg. It worsens with movement and walking. He can only walk "10 meters" with a cane. He is also incontinent of urine but denies any recent fever, chills, bowel incontinence, or lower extremities weakness/motor deficits. He reportedly had MRIs in the past but son does not know what it showed. He also has other chronic medical problems such as CKD, DMII and CAD and his son thinks pt needs management for them as well.   In the ED, his vitals were notable for hyperglycemia, elevated BUN/Scr, and hypomagnesemia. EKG showed bradycardia with T wave changes in the lateral leads.  (21 Feb 2023 18:36)    s/p L4-5 transforaminal lumbar interbody fusion on 2/28    REVIEW OF SYSTEMS     PAST MEDICAL & SURGICAL HISTORY  Diabetes mellitus    Essential hypertension    CAD (coronary artery disease)    Chronic kidney disease, unspecified CKD stage    S/P CABG x 2        SOCIAL HISTORY  Smoking - Denied  EtOH - Denied   Drugs - Denied    FUNCTIONAL HISTORY  Lives with family in house with stairs  Independent with cane    CURRENT FUNCTIONAL STATUS  3/1   Bed Mobility: Rolling/Turning:     · Level of [place]	moderate assist (50% patients effort)  · Physical Assist/Nonphysical Assist	1 person assist; verbal cues  · Assistive Device	bed rails    Bed Mobility: Sit to Supine:     · Level of [place]	moderate assist (50% patients effort)  · Physical Assist/Nonphysical Assist	1 person assist; verbal cues  · Assistive Device	bed rails    Bed Mobility: Supine to Sit:     · Level of [place]	moderate assist (50% patients effort)  · Physical Assist/Nonphysical Assist	1 person assist; verbal cues  · Assistive Device	bed rails    Bed Mobility Analysis:     · Impairments Contributing to Impaired Bed Mobility	decreased strength    Transfer: Bed to Chair:     Transfer Skill: Bed to Chair   · Assistive Device	radha steady used to transfer Pt    Transfer: Chair to Bed:     · Assistive Device	radha steady used to transfer Pt    Transfer: Sit to Stand:     · Level of [place]	minimum assist (75% patients effort)  · Physical Assist/Nonphysical Assist	2 person assist; verbal cues  · Weight-Bearing Restrictions	weight-bearing as tolerated  · Assistive Device	rolling walker    Transfer: Stand to Sit:     · Level of [place]	minimum assist (75% patients effort)  · Physical Assist/Nonphysical Assist	2 person assist  · Weight-Bearing Restrictions	weight-bearing as tolerated  · Assistive Device	rolling walker    Sit/Stand Transfer Safety Analysis:     · Impairments Contributing to Impaired Transfers	impaired balance; decreased strength    Gait Skills:     · Level of [place]	minimum assist (75% patients effort)  · Physical Assist/Nonphysical Assist	2 person assist; verbal cues  · Weight-Bearing Restrictions	weight-bearing as tolerated  · Assistive Device	rolling walker  · Gait Distance	4 lateral steps    Gait Analysis:     · Gait Pattern Used	swing-to gait  · Gait Deviations Noted	decreased tk; right toe clearance decreased  · Impairments Contributing to Gait Deviations	decreased strength; + right knee buckling,      FAMILY HISTORY   FH: heart disease        RECENT LABS/IMAGING  CBC Full  -  ( 02 Mar 2023 01:16 )  WBC Count : 13.01 K/uL  RBC Count : 3.09 M/uL  Hemoglobin : 9.3 g/dL  Hematocrit : 28.5 %  Platelet Count - Automated : 194 K/uL  Mean Cell Volume : 92.2 fL  Mean Cell Hemoglobin : 30.1 pg  Mean Cell Hemoglobin Concentration : 32.6 gm/dL  Auto Neutrophil # : x  Auto Lymphocyte # : x  Auto Monocyte # : x  Auto Eosinophil # : x  Auto Basophil # : x  Auto Neutrophil % : x  Auto Lymphocyte % : x  Auto Monocyte % : x  Auto Eosinophil % : x  Auto Basophil % : x    03-02    132<L>  |  101  |  43<H>  ----------------------------<  244<H>  4.5   |  18<L>  |  1.82<H>    Ca    8.8      02 Mar 2023 01:16  Phos  3.3     03-02  Mg     2.30     03-02          VITALS  T(C): 36.8 (03-02-23 @ 12:35), Max: 37.4 (03-01-23 @ 16:00)  HR: 95 (03-02-23 @ 12:35) (74 - 98)  BP: 136/52 (03-02-23 @ 12:35) (104/60 - 144/55)  RR: 18 (03-02-23 @ 12:35) (13 - 22)  SpO2: 100% (03-02-23 @ 12:35) (99% - 100%)  Wt(kg): --    ALLERGIES  No Known Allergies      MEDICATIONS   acetaminophen     Tablet .. 975 milliGRAM(s) Oral every 6 hours  atorvastatin 40 milliGRAM(s) Oral at bedtime  bisacodyl Suppository 10 milliGRAM(s) Rectal daily  calcitriol   Capsule 0.25 MICROGram(s) Oral daily  calcium acetate 667 milliGRAM(s) Oral with dinner  dextrose 50% Injectable 25 Gram(s) IV Push once  dextrose 50% Injectable 12.5 Gram(s) IV Push once  dextrose 50% Injectable 25 Gram(s) IV Push once  dextrose Oral Gel 15 Gram(s) Oral once PRN  heparin   Injectable 5000 Unit(s) SubCutaneous every 8 hours  HYDROmorphone  Injectable 0.5 milliGRAM(s) IV Push every 4 hours PRN  insulin glargine Injectable (LANTUS) 24 Unit(s) SubCutaneous at bedtime  insulin lispro (ADMELOG) corrective regimen sliding scale   SubCutaneous three times a day before meals  insulin lispro (ADMELOG) corrective regimen sliding scale   SubCutaneous at bedtime  insulin lispro Injectable (ADMELOG) 9 Unit(s) SubCutaneous three times a day before meals  melatonin 6 milliGRAM(s) Oral at bedtime  nitroglycerin     SubLingual 0.4 milliGRAM(s) SubLingual every 5 minutes PRN  oxyCODONE    IR 5 milliGRAM(s) Oral every 4 hours PRN  oxyCODONE    IR 10 milliGRAM(s) Oral every 4 hours PRN  polyethylene glycol 3350 17 Gram(s) Oral daily  pregabalin 50 milliGRAM(s) Oral two times a day  psyllium Powder 1 Packet(s) Oral two times a day  senna 2 Tablet(s) Oral at bedtime  tamsulosin 0.4 milliGRAM(s) Oral at bedtime      ----------------------------------------------------------------------------------------  PHYSICAL EXAM  Constitutional - NAD, Comfortable  HEENT - NCAT, EOMI  Neck - Supple, No limited ROM  Chest - CTA bilaterally, No wheeze, No rhonchi, No crackles  Cardiovascular - RRR, S1S2, No murmurs  Abdomen - BS+, Soft, NTND  Extremities - No C/C/E, No calf tenderness   Neurologic Exam -                    Cognitive - Awake, Alert, AAO to self, place, date, year, situation     Communication - Fluent, No dysarthria     Cranial Nerves - CN 2-12 intact     Motor - No focal deficits                    LEFT    UE - ShAB 5/5, EF 5/5, EE 5/5, WE 5/5,  5/5                    RIGHT UE - ShAB 5/5, EF 5/5, EE 5/5, WE 5/5,  5/5                    LEFT    LE - HF 5/5, KE 5/5, DF 5/5, PF 5/5                    RIGHT LE - HF 5/5, KE 5/5, DF 5/5, PF 5/5        Sensory - Intact to LT     Reflexes - DTR Intact, No primitive reflexive     Coordination - FTN intact     OculoVestibular - No saccades, No nystagmus, VOR         Balance - WNL Static  Psychiatric - Mood stable, Affect WNL  ----------------------------------------------------------------------------------------  ASSESSMENT/PLAN  69 yo m s/p TLIF    Pain - dilaudid iv prn, oxy ir prn, bowel regimen  diet: dash/tlc  DVT PPX - heparin  continue bedside PT  OT eval pending  Rehab -     incomplete note, consult in progress Patient is a 68y old  Male who presents with a chief complaint of Back pain (02 Mar 2023 03:13)      HPI:  67 yo M with DMII, CAD s/p CABG, CKD, chronic back pain, and HTN presents to the ED with worsening back pain. Patient is AAOX2 and somnolent, not able to provide any hx. Most of the information was supplemented by the son. As per son, pt is AAOx 3 and "mentally stable." He recently came from [facility] about 2 weeks ago and has " multiple medical complaints." But this morning he told his son that he is feeling weak and his back pain is getting worse. The pain is localized in the lower back region. It is burning is sensation and constant. Unclear if it radiates down the leg. It worsens with movement and walking. He can only walk "10 meters" with a cane. He is also incontinent of urine but denies any recent fever, chills, bowel incontinence, or lower extremities weakness/motor deficits. He reportedly had MRIs in the past but son does not know what it showed. He also has other chronic medical problems such as CKD, DMII and CAD and his son thinks pt needs management for them as well.   In the ED, his vitals were notable for hyperglycemia, elevated BUN/Scr, and hypomagnesemia. EKG showed bradycardia with T wave changes in the lateral leads.  (21 Feb 2023 18:36)    s/p L4-5 transforaminal lumbar interbody fusion on 2/28  reports pain, numbness and weakness b/l LE    REVIEW OF SYSTEMS   weakness  numbness  pain    PAST MEDICAL & SURGICAL HISTORY  Diabetes mellitus    Essential hypertension    CAD (coronary artery disease)    Chronic kidney disease, unspecified CKD stage    S/P CABG x 2         FUNCTIONAL HISTORY  Lives with family in house with stairs  Independent with cane    CURRENT FUNCTIONAL STATUS  3/1   Bed Mobility: Rolling/Turning:     · Level of [place]	moderate assist (50% patients effort)  · Physical Assist/Nonphysical Assist	1 person assist; verbal cues  · Assistive Device	bed rails    Bed Mobility: Sit to Supine:     · Level of [place]	moderate assist (50% patients effort)  · Physical Assist/Nonphysical Assist	1 person assist; verbal cues  · Assistive Device	bed rails    Bed Mobility: Supine to Sit:     · Level of [place]	moderate assist (50% patients effort)  · Physical Assist/Nonphysical Assist	1 person assist; verbal cues  · Assistive Device	bed rails    Bed Mobility Analysis:     · Impairments Contributing to Impaired Bed Mobility	decreased strength    Transfer: Bed to Chair:     Transfer Skill: Bed to Chair   · Assistive Device	radha steady used to transfer Pt    Transfer: Chair to Bed:     · Assistive Device	radha steady used to transfer Pt    Transfer: Sit to Stand:     · Level of [place]	minimum assist (75% patients effort)  · Physical Assist/Nonphysical Assist	2 person assist; verbal cues  · Weight-Bearing Restrictions	weight-bearing as tolerated  · Assistive Device	rolling walker    Transfer: Stand to Sit:     · Level of [place]	minimum assist (75% patients effort)  · Physical Assist/Nonphysical Assist	2 person assist  · Weight-Bearing Restrictions	weight-bearing as tolerated  · Assistive Device	rolling walker    Sit/Stand Transfer Safety Analysis:     · Impairments Contributing to Impaired Transfers	impaired balance; decreased strength    Gait Skills:     · Level of [place]	minimum assist (75% patients effort)  · Physical Assist/Nonphysical Assist	2 person assist; verbal cues  · Weight-Bearing Restrictions	weight-bearing as tolerated  · Assistive Device	rolling walker  · Gait Distance	4 lateral steps    Gait Analysis:     · Gait Pattern Used	swing-to gait  · Gait Deviations Noted	decreased tk; right toe clearance decreased  · Impairments Contributing to Gait Deviations	decreased strength; + right knee buckling,      FAMILY HISTORY   FH: heart disease        RECENT LABS/IMAGING  CBC Full  -  ( 02 Mar 2023 01:16 )  WBC Count : 13.01 K/uL  RBC Count : 3.09 M/uL  Hemoglobin : 9.3 g/dL  Hematocrit : 28.5 %  Platelet Count - Automated : 194 K/uL  Mean Cell Volume : 92.2 fL  Mean Cell Hemoglobin : 30.1 pg  Mean Cell Hemoglobin Concentration : 32.6 gm/dL  Auto Neutrophil # : x  Auto Lymphocyte # : x  Auto Monocyte # : x  Auto Eosinophil # : x  Auto Basophil # : x  Auto Neutrophil % : x  Auto Lymphocyte % : x  Auto Monocyte % : x  Auto Eosinophil % : x  Auto Basophil % : x    03-02    132<L>  |  101  |  43<H>  ----------------------------<  244<H>  4.5   |  18<L>  |  1.82<H>    Ca    8.8      02 Mar 2023 01:16  Phos  3.3     03-02  Mg     2.30     03-02          VITALS  T(C): 36.8 (03-02-23 @ 12:35), Max: 37.4 (03-01-23 @ 16:00)  HR: 95 (03-02-23 @ 12:35) (74 - 98)  BP: 136/52 (03-02-23 @ 12:35) (104/60 - 144/55)  RR: 18 (03-02-23 @ 12:35) (13 - 22)  SpO2: 100% (03-02-23 @ 12:35) (99% - 100%)  Wt(kg): --    ALLERGIES  No Known Allergies      MEDICATIONS   acetaminophen     Tablet .. 975 milliGRAM(s) Oral every 6 hours  atorvastatin 40 milliGRAM(s) Oral at bedtime  bisacodyl Suppository 10 milliGRAM(s) Rectal daily  calcitriol   Capsule 0.25 MICROGram(s) Oral daily  calcium acetate 667 milliGRAM(s) Oral with dinner  dextrose 50% Injectable 25 Gram(s) IV Push once  dextrose 50% Injectable 12.5 Gram(s) IV Push once  dextrose 50% Injectable 25 Gram(s) IV Push once  dextrose Oral Gel 15 Gram(s) Oral once PRN  heparin   Injectable 5000 Unit(s) SubCutaneous every 8 hours  HYDROmorphone  Injectable 0.5 milliGRAM(s) IV Push every 4 hours PRN  insulin glargine Injectable (LANTUS) 24 Unit(s) SubCutaneous at bedtime  insulin lispro (ADMELOG) corrective regimen sliding scale   SubCutaneous three times a day before meals  insulin lispro (ADMELOG) corrective regimen sliding scale   SubCutaneous at bedtime  insulin lispro Injectable (ADMELOG) 9 Unit(s) SubCutaneous three times a day before meals  melatonin 6 milliGRAM(s) Oral at bedtime  nitroglycerin     SubLingual 0.4 milliGRAM(s) SubLingual every 5 minutes PRN  oxyCODONE    IR 5 milliGRAM(s) Oral every 4 hours PRN  oxyCODONE    IR 10 milliGRAM(s) Oral every 4 hours PRN  polyethylene glycol 3350 17 Gram(s) Oral daily  pregabalin 50 milliGRAM(s) Oral two times a day  psyllium Powder 1 Packet(s) Oral two times a day  senna 2 Tablet(s) Oral at bedtime  tamsulosin 0.4 milliGRAM(s) Oral at bedtime      ----------------------------------------------------------------------------------------  PHYSICAL EXAM  Constitutional - NAD, Comfortable  HEENT - NCAT, EOMI  Neck - Supple, No limited ROM  Chest - no respiratory distress  Cardiovascular - mild tachycardia  Abdomen -  Soft, NTND  Extremities - No C/C/E, No calf tenderness   Neurologic Exam -                    Cognitive - Awake, Alert, AAO to self, place, date, year, situation     Communication - Fluent, No dysarthria     Cranial Nerves - CN 2-12 intact     Motor -                      LEFT    UE - ShAB 5/5, EF 5/5, EE 5/5, WE 5/5,  5/5                    RIGHT UE - ShAB 5/5, EF 5/5, EE 5/5, WE 5/5,  5/5                    LEFT    LE - HF 1+/5, KE 1+/5, DF 1+/5, PF 1+5                    RIGHT LE - HF 1+/5, KE 1+/5, DF 1+/5, PF 1+        Sensory - impaired sensation b/l LE       Balance - WNL Static  Psychiatric - Mood stable, Affect WNL  ----------------------------------------------------------------------------------------  ASSESSMENT/PLAN  67 yo m s/p TLIF    Pain - dilaudid iv prn, oxy ir prn, bowel regimen  diet: dash/tlc  DVT PPX - heparin  continue bedside PT  OT eval pending  Rehab -   recommend inpatient rehab, likely acute  patient moved to [place] recently from [facility], has been staying with his son.

## 2023-03-03 DIAGNOSIS — R07.9 CHEST PAIN, UNSPECIFIED: ICD-10-CM

## 2023-03-03 DIAGNOSIS — I20.9 ANGINA PECTORIS, UNSPECIFIED: ICD-10-CM

## 2023-03-03 LAB
ANION GAP SERPL CALC-SCNC: 11 MMOL/L — SIGNIFICANT CHANGE UP (ref 7–14)
BUN SERPL-MCNC: 37 MG/DL — HIGH (ref 7–23)
CALCIUM SERPL-MCNC: 8.9 MG/DL — SIGNIFICANT CHANGE UP (ref 8.4–10.5)
CHLORIDE SERPL-SCNC: 101 MMOL/L — SIGNIFICANT CHANGE UP (ref 98–107)
CK MB BLD-MCNC: 1.6 % — SIGNIFICANT CHANGE UP (ref 0–2.5)
CK MB BLD-MCNC: 1.7 % — SIGNIFICANT CHANGE UP (ref 0–2.5)
CK MB BLD-MCNC: 1.9 % — SIGNIFICANT CHANGE UP (ref 0–2.5)
CK MB CFR SERPL CALC: 5.9 NG/ML — SIGNIFICANT CHANGE UP
CK MB CFR SERPL CALC: 6.2 NG/ML — SIGNIFICANT CHANGE UP
CK MB CFR SERPL CALC: 6.3 NG/ML — SIGNIFICANT CHANGE UP
CK SERPL-CCNC: 328 U/L — HIGH (ref 30–200)
CK SERPL-CCNC: 365 U/L — HIGH (ref 30–200)
CK SERPL-CCNC: 368 U/L — HIGH (ref 30–200)
CO2 SERPL-SCNC: 21 MMOL/L — LOW (ref 22–31)
CREAT SERPL-MCNC: 1.8 MG/DL — HIGH (ref 0.5–1.3)
EGFR: 40 ML/MIN/1.73M2 — LOW
GLUCOSE BLDC GLUCOMTR-MCNC: 160 MG/DL — HIGH (ref 70–99)
GLUCOSE BLDC GLUCOMTR-MCNC: 176 MG/DL — HIGH (ref 70–99)
GLUCOSE BLDC GLUCOMTR-MCNC: 215 MG/DL — HIGH (ref 70–99)
GLUCOSE BLDC GLUCOMTR-MCNC: 266 MG/DL — HIGH (ref 70–99)
GLUCOSE BLDC GLUCOMTR-MCNC: 306 MG/DL — HIGH (ref 70–99)
GLUCOSE SERPL-MCNC: 219 MG/DL — HIGH (ref 70–99)
HCT VFR BLD CALC: 27.9 % — LOW (ref 39–50)
HCT VFR BLD CALC: 31.1 % — LOW (ref 39–50)
HGB BLD-MCNC: 10 G/DL — LOW (ref 13–17)
HGB BLD-MCNC: 9.3 G/DL — LOW (ref 13–17)
MCHC RBC-ENTMCNC: 29.6 PG — SIGNIFICANT CHANGE UP (ref 27–34)
MCHC RBC-ENTMCNC: 30.6 PG — SIGNIFICANT CHANGE UP (ref 27–34)
MCHC RBC-ENTMCNC: 32.2 GM/DL — SIGNIFICANT CHANGE UP (ref 32–36)
MCHC RBC-ENTMCNC: 33.3 GM/DL — SIGNIFICANT CHANGE UP (ref 32–36)
MCV RBC AUTO: 91.8 FL — SIGNIFICANT CHANGE UP (ref 80–100)
MCV RBC AUTO: 92 FL — SIGNIFICANT CHANGE UP (ref 80–100)
NRBC # BLD: 0 /100 WBCS — SIGNIFICANT CHANGE UP (ref 0–0)
NRBC # BLD: 0 /100 WBCS — SIGNIFICANT CHANGE UP (ref 0–0)
NRBC # FLD: 0 K/UL — SIGNIFICANT CHANGE UP (ref 0–0)
NRBC # FLD: 0 K/UL — SIGNIFICANT CHANGE UP (ref 0–0)
PLATELET # BLD AUTO: 224 K/UL — SIGNIFICANT CHANGE UP (ref 150–400)
PLATELET # BLD AUTO: 231 K/UL — SIGNIFICANT CHANGE UP (ref 150–400)
POTASSIUM SERPL-MCNC: 4.2 MMOL/L — SIGNIFICANT CHANGE UP (ref 3.5–5.3)
POTASSIUM SERPL-SCNC: 4.2 MMOL/L — SIGNIFICANT CHANGE UP (ref 3.5–5.3)
RBC # BLD: 3.04 M/UL — LOW (ref 4.2–5.8)
RBC # BLD: 3.38 M/UL — LOW (ref 4.2–5.8)
RBC # FLD: 12.9 % — SIGNIFICANT CHANGE UP (ref 10.3–14.5)
RBC # FLD: 12.9 % — SIGNIFICANT CHANGE UP (ref 10.3–14.5)
SODIUM SERPL-SCNC: 133 MMOL/L — LOW (ref 135–145)
TROPONIN T, HIGH SENSITIVITY RESULT: 72 NG/L — CRITICAL HIGH
TROPONIN T, HIGH SENSITIVITY RESULT: 72 NG/L — CRITICAL HIGH
TROPONIN T, HIGH SENSITIVITY RESULT: 74 NG/L — CRITICAL HIGH
TROPONIN T, HIGH SENSITIVITY RESULT: 83 NG/L — CRITICAL HIGH
TROPONIN T, HIGH SENSITIVITY RESULT: 84 NG/L — CRITICAL HIGH
WBC # BLD: 13.05 K/UL — HIGH (ref 3.8–10.5)
WBC # BLD: 13.17 K/UL — HIGH (ref 3.8–10.5)
WBC # FLD AUTO: 13.05 K/UL — HIGH (ref 3.8–10.5)
WBC # FLD AUTO: 13.17 K/UL — HIGH (ref 3.8–10.5)

## 2023-03-03 PROCEDURE — 99233 SBSQ HOSP IP/OBS HIGH 50: CPT

## 2023-03-03 PROCEDURE — 93010 ELECTROCARDIOGRAM REPORT: CPT | Mod: 76,77

## 2023-03-03 PROCEDURE — 99232 SBSQ HOSP IP/OBS MODERATE 35: CPT

## 2023-03-03 RX ORDER — SACUBITRIL AND VALSARTAN 24; 26 MG/1; MG/1
1 TABLET, FILM COATED ORAL
Refills: 0 | Status: DISCONTINUED | OUTPATIENT
Start: 2023-03-03 | End: 2023-03-12

## 2023-03-03 RX ORDER — ISOSORBIDE MONONITRATE 60 MG/1
30 TABLET, EXTENDED RELEASE ORAL
Refills: 0 | Status: DISCONTINUED | OUTPATIENT
Start: 2023-03-03 | End: 2023-03-15

## 2023-03-03 RX ORDER — ASPIRIN/CALCIUM CARB/MAGNESIUM 324 MG
81 TABLET ORAL DAILY
Refills: 0 | Status: DISCONTINUED | OUTPATIENT
Start: 2023-03-03 | End: 2023-03-06

## 2023-03-03 RX ORDER — FUROSEMIDE 40 MG
40 TABLET ORAL DAILY
Refills: 0 | Status: DISCONTINUED | OUTPATIENT
Start: 2023-03-03 | End: 2023-03-06

## 2023-03-03 RX ORDER — INSULIN LISPRO 100/ML
VIAL (ML) SUBCUTANEOUS
Refills: 0 | Status: DISCONTINUED | OUTPATIENT
Start: 2023-03-03 | End: 2023-03-06

## 2023-03-03 RX ADMIN — Medication 81 MILLIGRAM(S): at 12:35

## 2023-03-03 RX ADMIN — Medication 1 PACKET(S): at 17:55

## 2023-03-03 RX ADMIN — Medication 9 UNIT(S): at 08:25

## 2023-03-03 RX ADMIN — OXYCODONE HYDROCHLORIDE 5 MILLIGRAM(S): 5 TABLET ORAL at 20:24

## 2023-03-03 RX ADMIN — INSULIN GLARGINE 24 UNIT(S): 100 INJECTION, SOLUTION SUBCUTANEOUS at 22:56

## 2023-03-03 RX ADMIN — OXYCODONE HYDROCHLORIDE 10 MILLIGRAM(S): 5 TABLET ORAL at 07:56

## 2023-03-03 RX ADMIN — ISOSORBIDE MONONITRATE 30 MILLIGRAM(S): 60 TABLET, EXTENDED RELEASE ORAL at 17:56

## 2023-03-03 RX ADMIN — HEPARIN SODIUM 5000 UNIT(S): 5000 INJECTION INTRAVENOUS; SUBCUTANEOUS at 16:10

## 2023-03-03 RX ADMIN — TAMSULOSIN HYDROCHLORIDE 0.4 MILLIGRAM(S): 0.4 CAPSULE ORAL at 23:00

## 2023-03-03 RX ADMIN — HYDROMORPHONE HYDROCHLORIDE 0.5 MILLIGRAM(S): 2 INJECTION INTRAMUSCULAR; INTRAVENOUS; SUBCUTANEOUS at 20:51

## 2023-03-03 RX ADMIN — Medication 667 MILLIGRAM(S): at 17:56

## 2023-03-03 RX ADMIN — CALCITRIOL 0.25 MICROGRAM(S): 0.5 CAPSULE ORAL at 12:33

## 2023-03-03 RX ADMIN — Medication 975 MILLIGRAM(S): at 18:00

## 2023-03-03 RX ADMIN — Medication 9 UNIT(S): at 12:34

## 2023-03-03 RX ADMIN — HEPARIN SODIUM 5000 UNIT(S): 5000 INJECTION INTRAVENOUS; SUBCUTANEOUS at 22:58

## 2023-03-03 RX ADMIN — HYDROMORPHONE HYDROCHLORIDE 0.5 MILLIGRAM(S): 2 INJECTION INTRAMUSCULAR; INTRAVENOUS; SUBCUTANEOUS at 10:25

## 2023-03-03 RX ADMIN — HYDROMORPHONE HYDROCHLORIDE 0.5 MILLIGRAM(S): 2 INJECTION INTRAMUSCULAR; INTRAVENOUS; SUBCUTANEOUS at 21:21

## 2023-03-03 RX ADMIN — Medication 975 MILLIGRAM(S): at 12:33

## 2023-03-03 RX ADMIN — Medication 0.4 MILLIGRAM(S): at 01:57

## 2023-03-03 RX ADMIN — Medication 1 PACKET(S): at 06:53

## 2023-03-03 RX ADMIN — OXYCODONE HYDROCHLORIDE 10 MILLIGRAM(S): 5 TABLET ORAL at 16:10

## 2023-03-03 RX ADMIN — Medication 6 MILLIGRAM(S): at 23:00

## 2023-03-03 RX ADMIN — HEPARIN SODIUM 5000 UNIT(S): 5000 INJECTION INTRAVENOUS; SUBCUTANEOUS at 06:58

## 2023-03-03 RX ADMIN — Medication 975 MILLIGRAM(S): at 06:53

## 2023-03-03 RX ADMIN — Medication 50 MILLIGRAM(S): at 18:01

## 2023-03-03 RX ADMIN — Medication 975 MILLIGRAM(S): at 13:27

## 2023-03-03 RX ADMIN — HYDROMORPHONE HYDROCHLORIDE 0.5 MILLIGRAM(S): 2 INJECTION INTRAMUSCULAR; INTRAVENOUS; SUBCUTANEOUS at 10:58

## 2023-03-03 RX ADMIN — SACUBITRIL AND VALSARTAN 1 TABLET(S): 24; 26 TABLET, FILM COATED ORAL at 17:56

## 2023-03-03 RX ADMIN — OXYCODONE HYDROCHLORIDE 10 MILLIGRAM(S): 5 TABLET ORAL at 17:00

## 2023-03-03 RX ADMIN — POLYETHYLENE GLYCOL 3350 17 GRAM(S): 17 POWDER, FOR SOLUTION ORAL at 12:33

## 2023-03-03 RX ADMIN — Medication 3: at 08:24

## 2023-03-03 RX ADMIN — Medication 4: at 17:54

## 2023-03-03 RX ADMIN — OXYCODONE HYDROCHLORIDE 10 MILLIGRAM(S): 5 TABLET ORAL at 08:35

## 2023-03-03 RX ADMIN — ATORVASTATIN CALCIUM 40 MILLIGRAM(S): 80 TABLET, FILM COATED ORAL at 23:00

## 2023-03-03 RX ADMIN — Medication 50 MILLIGRAM(S): at 06:53

## 2023-03-03 RX ADMIN — Medication 40 MILLIGRAM(S): at 16:11

## 2023-03-03 RX ADMIN — Medication 2: at 12:34

## 2023-03-03 RX ADMIN — ISOSORBIDE MONONITRATE 30 MILLIGRAM(S): 60 TABLET, EXTENDED RELEASE ORAL at 06:54

## 2023-03-03 RX ADMIN — Medication 975 MILLIGRAM(S): at 17:55

## 2023-03-03 RX ADMIN — SENNA PLUS 2 TABLET(S): 8.6 TABLET ORAL at 23:00

## 2023-03-03 RX ADMIN — Medication 9 UNIT(S): at 17:54

## 2023-03-03 RX ADMIN — OXYCODONE HYDROCHLORIDE 10 MILLIGRAM(S): 5 TABLET ORAL at 02:02

## 2023-03-03 RX ADMIN — Medication 975 MILLIGRAM(S): at 06:46

## 2023-03-03 RX ADMIN — OXYCODONE HYDROCHLORIDE 5 MILLIGRAM(S): 5 TABLET ORAL at 19:54

## 2023-03-03 NOTE — PROGRESS NOTE ADULT - ASSESSMENT
69 yo M with DMII, CAD s/p CABG, CKD, chronic back pain, and HTN presents to the ED with worsening back pain.  He recently came from [facility] about 2 weeks ago and has " multiple medical complaints."  he told his son that he is feeling weak and his back pain is getting worse.   In the ED, his vitals were notable for hyperglycemia, elevated BUN/Scr, and hypomagnesemia. Endocrine called for DM 2, A1c 8.3    1. DM 2  A1c 8.3%  Home Regimen: Ligenta-M 500 (Linagliptin 2.5 and metformin 500mg) 1 tablet once daily, NovoRapid (insulin aspart) 14 units w/ lunch, NovoMix 30 insulin 14 units before breakfast, 14-16 units before dinner    While inpatient  BG target 100-180 mg/dl  Increase Admelog correctional scale to MODERATE dose before meals, continue low dose at bedtime  Continue Lantus 24 units SQ qHS  Continue Admelog 9 units SQ TID before meals (Hold if NPO/skips meal)   Hypoglycemia protocol    Carb Consistent Diet   Nutrition consult   Provider to RN for diabetes/insulin pen teaching, AB pharmacist following (see pharmacy intervention notes)     Discharge Plan:  Medicaid pending - currently uninsured   Likely stop prior outpatient regimen and continue with mixed insulin (70/30) only  Consider Novolog 70/30: doses TBD close to discharge: before breakfast and before dinner via pen (see pharmacy liaison note: patient can qualify for coupon), so could use insulin PENS if that is easier for patient  Rapid acting insulin should NOT be ordered in conjunction with mixed insulin for risk of hypoglycemia   Ensure he has glucometer, glucose test strips, lancets, alcohol swabs and insulin PEN needles vs syringes   Followup with [facility], [address] [place] [address], [phone], [phone]    2. HTN  Goal BP in DM <130/80  outpt mc/cr ratio    3. HLD  LDL 75  Continue Atorvastatin 40mg daily    Noemi Carlisle  Nurse Practitioner  Division of Endocrinology & Diabetes  In house pager #[phone]/long range pager #[phone]    If before 9AM or after 6PM, or on weekends/holidays, please call endocrine answering service for assistance ([phone]).  For nonurgent matters email [email or web address] for assistance.

## 2023-03-03 NOTE — CONSULT NOTE ADULT - PROBLEM SELECTOR RECOMMENDATION 9
Admit to telemetry  Consider giving Aspirin 81mg daily if bleeding risk from surgical perspective allow  Consider adding isosorbide mononitrate ir 30mg BID   Continue monitoring Cardiac enzymes Troponin, CK, CKMB q8h x3 with serial ECG  Cardiology to follow

## 2023-03-03 NOTE — PROVIDER CONTACT NOTE (OTHER) - ASSESSMENT
Pt is a&o x4, on RA, VSS /70, O2 97%, HR 93. Pt complains of right sided chest pain and states this happens frequently at home. PRN Nitroglycerin given.

## 2023-03-03 NOTE — PROGRESS NOTE ADULT - PROBLEM SELECTOR PLAN 7
All brand name Romanian.  We do not have all of them.  Meds are: Ligenta-M 500 (Linagliptin 2.5 and metoformin 500mg), Craig card MR (trimetazidine hydrochloride 35 mg),  Ecosprin 75 (aspirin 75 mg), Duralax, Ketoalfa (amio acid Keto Analogues 600 mg), Nidocard-Retard (nitroglycerin 2.6 mg), Hypophos (calcium acetate 667 mg), Tamisol D (Tamsulosin 0.4 mg and Dutasteride 0.5 mg), Mydocalm (tolperisone 50 mg), Raditrol (calcitriol 0.25 microgram),  NovoRapid 16 units w/ lunch, Manuel nordisk insulin 16am/14pm, Atova 20 (atorvastatin 20 mg), Nebita 2.5 (nevivolol 2.5 mg), Tamisol (tamsulosin 0.4 mg), Pregaba 50 (pregabalin 50 mg), Febustat 40 (febuxostat 40 mg), Lozixum (lorazapam 1 mg), Lasix 40 mg, Sabitar 50 mg, Anazol 0.1%, Tearfresh

## 2023-03-03 NOTE — CONSULT NOTE ADULT - ASSESSMENT
69 yo M with DMII, CAD s/p CABG, CKD, chronic back pain, and HTN presents to the ED with worsening back pain on 2/21 and is nw POD 3 from L4-L5 TLIF with drain in place.  Patient with non-radiating chest pain at 23:45 on 3/2/23 relieved with 2 SL Nitro.  Follow up ECG shows no significant ischemic changes from ECG on 2/24/23.  Troponins elevated but flat 72-74 in setting of elevated creatinine.  CK elevated but flat 365-368 with negative CKMBs.  Patietn likely with anginal pain versus ACS

## 2023-03-03 NOTE — PROGRESS NOTE ADULT - SUBJECTIVE AND OBJECTIVE BOX
[facility] Division of Hospital Medicine  Sara Diez M.D  Pager [phone]  Available via MS Teams    SUBJECTIVE / OVERNIGHT EVENTS: Denies any Chest pain or SOB, fevers or chills. States that he has pain from his hips down to his feet able to move extremities. he denies any chest pain, SOB, fevers or chills.    ID Vicki #[id number]    ADDITIONAL REVIEW OF SYSTEMS:    MEDICATIONS  (STANDING):  acetaminophen     Tablet .. 975 milliGRAM(s) Oral every 6 hours  aspirin  chewable 81 milliGRAM(s) Oral daily  atorvastatin 40 milliGRAM(s) Oral at bedtime  bisacodyl Suppository 10 milliGRAM(s) Rectal daily  calcitriol   Capsule 0.25 MICROGram(s) Oral daily  calcium acetate 667 milliGRAM(s) Oral with dinner  dextrose 50% Injectable 25 Gram(s) IV Push once  dextrose 50% Injectable 12.5 Gram(s) IV Push once  dextrose 50% Injectable 25 Gram(s) IV Push once  heparin   Injectable 5000 Unit(s) SubCutaneous every 8 hours  insulin glargine Injectable (LANTUS) 24 Unit(s) SubCutaneous at bedtime  insulin lispro (ADMELOG) corrective regimen sliding scale   SubCutaneous three times a day before meals  insulin lispro (ADMELOG) corrective regimen sliding scale   SubCutaneous at bedtime  insulin lispro Injectable (ADMELOG) 9 Unit(s) SubCutaneous three times a day before meals  isosorbide    mononitrate Tablet (ISMO) 30 milliGRAM(s) Oral two times a day  melatonin 6 milliGRAM(s) Oral at bedtime  polyethylene glycol 3350 17 Gram(s) Oral daily  pregabalin 50 milliGRAM(s) Oral two times a day  psyllium Powder 1 Packet(s) Oral two times a day  senna 2 Tablet(s) Oral at bedtime  tamsulosin 0.4 milliGRAM(s) Oral at bedtime    MEDICATIONS  (PRN):  cyclobenzaprine 10 milliGRAM(s) Oral three times a day PRN Muscle Spasm  dextrose Oral Gel 15 Gram(s) Oral once PRN Blood Glucose LESS THAN 70 milliGRAM(s)/deciliter  HYDROmorphone  Injectable 0.5 milliGRAM(s) IV Push every 4 hours PRN Break through pain  nitroglycerin     SubLingual 0.4 milliGRAM(s) SubLingual every 5 minutes PRN Chest Pain  oxyCODONE    IR 5 milliGRAM(s) Oral every 4 hours PRN Moderate Pain (4 - 6)  oxyCODONE    IR 10 milliGRAM(s) Oral every 4 hours PRN Severe Pain (7 - 10)      I&O's Summary    02 Mar 2023 07:01  -  03 Mar 2023 07:00  --------------------------------------------------------  IN: 360 mL / OUT: 791 mL / NET: -431 mL    03 Mar 2023 07:01  -  03 Mar 2023 15:08  --------------------------------------------------------  IN: 300 mL / OUT: 0 mL / NET: 300 mL        PHYSICAL EXAM:  Vital Signs Last 24 Hrs  T(C): 37.1 (03 Mar 2023 12:00), Max: 37.7 (02 Mar 2023 17:36)  T(F): 98.7 (03 Mar 2023 12:00), Max: 99.8 (02 Mar 2023 17:36)  HR: 100 (03 Mar 2023 12:00) (79 - 103)  BP: 118/58 (03 Mar 2023 12:00) (109/57 - 158/52)  BP(mean): 83 (02 Mar 2023 18:10) (83 - 83)  RR: 18 (03 Mar 2023 12:00) (17 - 18)  SpO2: 100% (03 Mar 2023 12:00) (96% - 100%)    Parameters below as of 03 Mar 2023 12:00  Patient On (Oxygen Delivery Method): room air      CONSTITUTIONAL: NAD, well-developed, well-groomed  EYES: conjunctiva and sclera clear  ENMT: Moist oral mucosa  RESPIRATORY: Normal respiratory effort; lungs are clear to auscultation bilaterally  CARDIOVASCULAR: Regular rate and rhythm, normal S1 and S2, no murmur/rub/gallop; No lower extremity edema; Peripheral pulses are 2+ bilaterally  ABDOMEN: Nontender to palpation, normoactive bowel sounds, no rebound/guarding; No hepatosplenomegaly  MUSCULOSKELETAL: no clubbing or cyanosis of digits; no joint swelling or tenderness to palpation  PSYCH: A+O to person, place, and time; affect appropriate  NEUROLOGY: CN 2-12 are intact and symmetric; no gross sensory deficits   SKIN: No rashes; no palpable lesions    LABS:                        9.3    13.17 )-----------( 224      ( 03 Mar 2023 10:30 )             27.9     03-03    133<L>  |  101  |  37<H>  ----------------------------<  219<H>  4.2   |  21<L>  |  1.80<H>    Ca    8.9      03 Mar 2023 10:30  Phos  3.3     03-02  Mg     2.30     03-02        CARDIAC MARKERS ( 03 Mar 2023 06:32 )  x     / x     / 328 U/L / x     / 6.2 ng/mL  CARDIAC MARKERS ( 03 Mar 2023 02:28 )  x     / x     / 368 U/L / x     / 6.3 ng/mL  CARDIAC MARKERS ( 03 Mar 2023 00:50 )  x     / x     / 365 U/L / x     / 5.9 ng/mL          COVID-19 PCR: NotDetec (27 Feb 2023 07:59)      RADIOLOGY & ADDITIONAL TESTS:  New Results Reviewed Today: Yes       COMMUNICATION:  Care Discussed with Consultants/Other Providers and Details of Discussion: Discussed with neurosurgery PA  Discussions with Patient/Family: wife at bedside   =

## 2023-03-03 NOTE — CHART NOTE - NSCHARTNOTEFT_GEN_A_CORE
No additional cardiac testing. Continue current meds.  Cardiology will signoff at this time, please call with any questions.     Elzbieta Gordon MD  Cardiology fellow

## 2023-03-03 NOTE — PROGRESS NOTE ADULT - SUBJECTIVE AND OBJECTIVE BOX
HPI:   69 yo M with DMII, CAD s/p CABG, CKD, chronic back pain, and HTN presents to the ED with worsening back pain. Patient is AAOX2 and somnolent, not able to provide any hx. Most of the information was supplemented by the son. As per son, pt is AAOx 3 and "mentally stable." He recently came from [facility] about 2 weeks ago and has " multiple medical complaints." But this morning he told his son that he is feeling weak and his back pain is getting worse. The pain is localized in the lower back region. It is burning is sensation and constant. Unclear if it radiates down the leg. It worsens with movement and walking. He can only walk "10 meters" with a cane. He is also incontinent of urine but denies any recent fever, chills, bowel incontinence, or lower extremities weakness/motor deficits. He reportedly had MRIs in the past but son does not know what it showed. He also has other chronic medical problems such as CKD, DMII and CAD and his son thinks pt needs management for them as well.   In the ED, his vitals were notable for hyperglycemia, elevated BUN/Scr, and hypomagnesemia. EKG showed bradycardia with T wave changes in the lateral leads.  (21 Feb 2023 18:36)    PAST MEDICAL & SURGICAL HISTORY:  Diabetes mellitus  Essential hypertension  CAD (coronary artery disease)  Chronic kidney disease, unspecified CKD stage  S/P CABG x 2    C/O chest pain similar to episodes he has at home, relieved with sublingual nitroglycerine  Denies SOB  Vital Signs Last 24 Hrs  T(C): 37 (03 Mar 2023 01:51), Max: 37.7 (02 Mar 2023 17:36)  T(F): 98.6 (03 Mar 2023 01:51), Max: 99.8 (02 Mar 2023 17:36)  HR: 94 (03 Mar 2023 01:51) (79 - 98)  BP: 151/62 (03 Mar 2023 01:51) (106/55 - 158/52)  BP(mean): 83 (02 Mar 2023 18:10) (65 - 93)  RR: 17 (03 Mar 2023 01:51) (17 - 22)  SpO2: 100% (03 Mar 2023 01:51) (96% - 100%)    Parameters below as of 03 Mar 2023 01:51  Patient On (Oxygen Delivery Method): room air    AAO X 3  PERRLA, EOMI  Face symmetric  BENDER strength 5/5, movement limited by pain  SILT    HMV: 41cc    MEDICATIONS  (STANDING):  acetaminophen     Tablet .. 975 milliGRAM(s) Oral every 6 hours  atorvastatin 40 milliGRAM(s) Oral at bedtime  bisacodyl Suppository 10 milliGRAM(s) Rectal daily  calcitriol   Capsule 0.25 MICROGram(s) Oral daily  calcium acetate 667 milliGRAM(s) Oral with dinner  dextrose 50% Injectable 25 Gram(s) IV Push once  dextrose 50% Injectable 12.5 Gram(s) IV Push once  dextrose 50% Injectable 25 Gram(s) IV Push once  heparin   Injectable 5000 Unit(s) SubCutaneous every 8 hours  insulin glargine Injectable (LANTUS) 24 Unit(s) SubCutaneous at bedtime  insulin lispro (ADMELOG) corrective regimen sliding scale   SubCutaneous three times a day before meals  insulin lispro (ADMELOG) corrective regimen sliding scale   SubCutaneous at bedtime  insulin lispro Injectable (ADMELOG) 9 Unit(s) SubCutaneous three times a day before meals  melatonin 6 milliGRAM(s) Oral at bedtime  polyethylene glycol 3350 17 Gram(s) Oral daily  pregabalin 50 milliGRAM(s) Oral two times a day  psyllium Powder 1 Packet(s) Oral two times a day  senna 2 Tablet(s) Oral at bedtime  tamsulosin 0.4 milliGRAM(s) Oral at bedtime    MEDICATIONS  (PRN):  cyclobenzaprine 10 milliGRAM(s) Oral three times a day PRN Muscle Spasm  dextrose Oral Gel 15 Gram(s) Oral once PRN Blood Glucose LESS THAN 70 milliGRAM(s)/deciliter  HYDROmorphone  Injectable 0.5 milliGRAM(s) IV Push every 4 hours PRN Break through pain  nitroglycerin     SubLingual 0.4 milliGRAM(s) SubLingual every 5 minutes PRN Chest Pain  oxyCODONE    IR 5 milliGRAM(s) Oral every 4 hours PRN Moderate Pain (4 - 6)  oxyCODONE    IR 10 milliGRAM(s) Oral every 4 hours PRN Severe Pain (7 - 10)                          9.3    13.01 )-----------( 194      ( 02 Mar 2023 01:16 )             28.5     03-02    132<L>  |  101  |  43<H>  ----------------------------<  244<H>  4.5   |  18<L>  |  1.82<H>    Ca    8.8      02 Mar 2023 01:16  Phos  3.3     03-02  Mg     2.30     03-02    Troponin T, High Sensitivity (03.03.23 @ 00:50)    Troponin T, High Sensitivity Result: 72: SPOKE WITH TULIO GARZA RN  Rapid changes upward or downward in high-sensitivity troponin levels  strongly suggest acute myocardial injury. Hemolysis may falsely lower  results. Renal impairment may increase results.  Normal: <6 - 14 ng/L  Indeterminate: 15 - 51 ng/L  Elevated: >51 ng/L  Please see "[email or web address] on the [employer] intranet for  more information. ng/L    CKMB Mass Assay (03.03.23 @ 00:50)    CKMB Units: 5.9 ng/mL    CPK Mass Assay %: 1.6 %

## 2023-03-03 NOTE — PROGRESS NOTE ADULT - PROBLEM SELECTOR PLAN 1
s/p OR on 2/28 with L4-L5 spinal fusion with neurosurgery, has drain in place  - drain output per Nsgyx  - PT following awaiting recs   - c/w pain control per primary team

## 2023-03-03 NOTE — PROGRESS NOTE ADULT - PROBLEM SELECTOR PLAN 2
EF 45% per outside records; TTE: EF 45% Mild to moderate segmental left ventricular systolic dysfunction.  Hypokinesis of the apex, distal septum, and distal anterior wall. Mod-Sev AS.  - c/w home Entresto (called Sabitar 50 Sacubitril  24 mg/Valsartan 26 mg), restarted   - resume 40 IV lasix with hold parameters   - holding BB per cards   - had CP trop 73-74, cards consulted, low concern for ACS at this time.   - no need for further trend troponin at this time unless patient with active chest pain; if so would obtain troponin x2 and STAT EKG to evaluate   - appears compensated  - cards eval appreciated

## 2023-03-03 NOTE — PROGRESS NOTE ADULT - SUBJECTIVE AND OBJECTIVE BOX
Chief Complaint: DM 2 with hyperglycemia     History: Patient seen at bedside. Patient was transferred to telemetry unit due to c/o chest pain overnight. At time of visit, he is c/o pain in b/l LE  Tolerating PO, hyperglycemia noted overnight and this morning, now improving to 170s at lunch    MEDICATIONS  (STANDING):  acetaminophen     Tablet .. 975 milliGRAM(s) Oral every 6 hours  aspirin  chewable 81 milliGRAM(s) Oral daily  atorvastatin 40 milliGRAM(s) Oral at bedtime  bisacodyl Suppository 10 milliGRAM(s) Rectal daily  calcitriol   Capsule 0.25 MICROGram(s) Oral daily  calcium acetate 667 milliGRAM(s) Oral with dinner  dextrose 50% Injectable 25 Gram(s) IV Push once  dextrose 50% Injectable 12.5 Gram(s) IV Push once  dextrose 50% Injectable 25 Gram(s) IV Push once  heparin   Injectable 5000 Unit(s) SubCutaneous every 8 hours  insulin glargine Injectable (LANTUS) 24 Unit(s) SubCutaneous at bedtime  insulin lispro (ADMELOG) corrective regimen sliding scale   SubCutaneous three times a day before meals  insulin lispro (ADMELOG) corrective regimen sliding scale   SubCutaneous at bedtime  insulin lispro Injectable (ADMELOG) 9 Unit(s) SubCutaneous three times a day before meals  isosorbide    mononitrate Tablet (ISMO) 30 milliGRAM(s) Oral two times a day  melatonin 6 milliGRAM(s) Oral at bedtime  polyethylene glycol 3350 17 Gram(s) Oral daily  pregabalin 50 milliGRAM(s) Oral two times a day  psyllium Powder 1 Packet(s) Oral two times a day  senna 2 Tablet(s) Oral at bedtime  tamsulosin 0.4 milliGRAM(s) Oral at bedtime    MEDICATIONS  (PRN):  cyclobenzaprine 10 milliGRAM(s) Oral three times a day PRN Muscle Spasm  dextrose Oral Gel 15 Gram(s) Oral once PRN Blood Glucose LESS THAN 70 milliGRAM(s)/deciliter  HYDROmorphone  Injectable 0.5 milliGRAM(s) IV Push every 4 hours PRN Break through pain  nitroglycerin     SubLingual 0.4 milliGRAM(s) SubLingual every 5 minutes PRN Chest Pain  oxyCODONE    IR 5 milliGRAM(s) Oral every 4 hours PRN Moderate Pain (4 - 6)  oxyCODONE    IR 10 milliGRAM(s) Oral every 4 hours PRN Severe Pain (7 - 10)    No Known Allergies    Review of Systems:  HEENT: No pain  Cardiovascular: +chest pain  Respiratory: No SOB  GI: No nausea, vomiting  Extremities: +pain b/l LE    PHYSICAL EXAM:  VITALS: T(C): 37.1 (03-03-23 @ 12:00)  T(F): 98.7 (03-03-23 @ 12:00), Max: 99.8 (03-02-23 @ 17:36)  HR: 100 (03-03-23 @ 12:00) (79 - 103)  BP: 118/58 (03-03-23 @ 12:00) (109/57 - 158/52)  RR:  (17 - 18)  SpO2:  (96% - 100%)  Wt(kg): --  GENERAL: NAD  EYES: No proptosis, no lid lag, anicteric  HEENT:  Atraumatic, Normocephalic, moist mucous membranes  RESPIRATORY: unlabored respirations     CAPILLARY BLOOD GLUCOSE    POCT Blood Glucose.: 176 mg/dL (03 Mar 2023 12:09)  POCT Blood Glucose.: 266 mg/dL (03 Mar 2023 08:15)  POCT Blood Glucose.: 306 mg/dL (03 Mar 2023 04:00)  POCT Blood Glucose.: 272 mg/dL (02 Mar 2023 22:56)  POCT Blood Glucose.: 232 mg/dL (02 Mar 2023 16:51)      03-03    133<L>  |  101  |  37<H>  ----------------------------<  219<H>  4.2   |  21<L>  |  1.80<H>    eGFR: 40<L>    Ca    8.9      03-03  Mg     2.30     03-02  Phos  3.3     03-02      A1C with Estimated Average Glucose Result: 8.3 % (02-22-23 @ 05:13)    Diet, DASH/TLC:   Sodium & Cholesterol Restricted  Consistent Carbohydrate Evening Snack (CSTCHOSN)  Halal  No Caffeine (02-23-23 @ 15:04) [Active]

## 2023-03-03 NOTE — PROGRESS NOTE ADULT - PROBLEM SELECTOR PLAN 6
HbA1c of 8.3%, at home on insulin 14 units with lunch and 14-16 BID of mixed insulin + orals  - endocrine consulted, increased insulin to 24 units qhs and 14 units with meals   - FS improving, continue to monitor   - f/u endo recs regarding insulin regimen

## 2023-03-03 NOTE — PROGRESS NOTE ADULT - ASSESSMENT
68M Malay speaking, HTN, DM2 on insulin, CKD3, systolic HF (EF 45%), CAD s/p CABG 2005, RSA s/p L renal stent, and PAD s/p PTA to R common iliac and L common iliac s/p stent who presents to the ED with worsening back pain in context of known L4-L5 cord compression/cauda equina since 8/2022. now s/p procedure on requiring SICU stay for pressors not stable for floor. Medicine following for comanagement

## 2023-03-03 NOTE — PROGRESS NOTE ADULT - ASSESSMENT
3/1; s/p L4-5 TLIF pod #1, HMV drain, requiring bola post op for bp support, albumin given x 1, sicu consulted, neuro exam stable  3/2: POD # 2 S/P L4-5 TLIF, 1 HMV in place. Off phenylepherine  3/3: POD # 3 S/P L4-5 TLIF. Having episodes of chest pain with elevated troponins, cardiology evaluating. Will transfer to telemetry floor, serial CE and EKG. 1 HMV in place

## 2023-03-03 NOTE — CONSULT NOTE ADULT - SUBJECTIVE AND OBJECTIVE BOX
HISTORY OF PRESENT ILLNESS:  67 yo M with DMII, CAD s/p CABG, CKD, chronic back pain, and HTN presents to the ED with worsening back pain on 2/21 and is nw POD 3 from L4-L5 TLIF with drain in place.  Patient with non-radiating chest pain at 23:45 on 3/2/23 relieved with 2 SL Nitro.  Follow up ECG shows no significant ischemic changes from ECG on 2/24/23.  Troponins elevated but flat 72-74 in setting of elevated creatinine.  CK elevated but flat 365-368 with negative CKMBs.    Allergies    No Known Allergies    Intolerances    	    MEDICATIONS:  heparin   Injectable 5000 Unit(s) SubCutaneous every 8 hours  nitroglycerin     SubLingual 0.4 milliGRAM(s) SubLingual every 5 minutes PRN        acetaminophen     Tablet .. 975 milliGRAM(s) Oral every 6 hours  cyclobenzaprine 10 milliGRAM(s) Oral three times a day PRN  HYDROmorphone  Injectable 0.5 milliGRAM(s) IV Push every 4 hours PRN  melatonin 6 milliGRAM(s) Oral at bedtime  oxyCODONE    IR 5 milliGRAM(s) Oral every 4 hours PRN  oxyCODONE    IR 10 milliGRAM(s) Oral every 4 hours PRN  pregabalin 50 milliGRAM(s) Oral two times a day    bisacodyl Suppository 10 milliGRAM(s) Rectal daily  polyethylene glycol 3350 17 Gram(s) Oral daily  psyllium Powder 1 Packet(s) Oral two times a day  senna 2 Tablet(s) Oral at bedtime    atorvastatin 40 milliGRAM(s) Oral at bedtime  dextrose 50% Injectable 25 Gram(s) IV Push once  dextrose 50% Injectable 12.5 Gram(s) IV Push once  dextrose 50% Injectable 25 Gram(s) IV Push once  dextrose Oral Gel 15 Gram(s) Oral once PRN  insulin glargine Injectable (LANTUS) 24 Unit(s) SubCutaneous at bedtime  insulin lispro (ADMELOG) corrective regimen sliding scale   SubCutaneous three times a day before meals  insulin lispro (ADMELOG) corrective regimen sliding scale   SubCutaneous at bedtime  insulin lispro Injectable (ADMELOG) 9 Unit(s) SubCutaneous three times a day before meals    calcitriol   Capsule 0.25 MICROGram(s) Oral daily  calcium acetate 667 milliGRAM(s) Oral with dinner  tamsulosin 0.4 milliGRAM(s) Oral at bedtime      PAST MEDICAL & SURGICAL HISTORY:  Diabetes mellitus      Essential hypertension      CAD (coronary artery disease)      Chronic kidney disease, unspecified CKD stage      S/P CABG x 2          FAMILY HISTORY:  FH: heart disease        SOCIAL HISTORY:    [ ] Non-smoker  [ ] Smoker  [ ] Alcohol    REVIEW OF SYSTEMS:  See HPI, otherwise complete 10 point review of systems negative    PHYSICAL EXAM:  T(C): 37 (03-03-23 @ 01:51), Max: 37.7 (03-02-23 @ 17:36)  HR: 94 (03-03-23 @ 01:51) (79 - 98)  BP: 151/62 (03-03-23 @ 01:51) (106/55 - 158/52)  RR: 17 (03-03-23 @ 01:51) (17 - 22)  SpO2: 100% (03-03-23 @ 01:51) (96% - 100%)  Wt(kg): --  I&O's Summary    01 Mar 2023 07:01  -  02 Mar 2023 07:00  --------------------------------------------------------  IN: 2019.7 mL / OUT: 2145 mL / NET: -125.3 mL    02 Mar 2023 07:01  -  03 Mar 2023 04:39  --------------------------------------------------------  IN: 360 mL / OUT: 791 mL / NET: -431 mL        Appearance: No Acute Distress	  HEENT:  Normal oral mucosa, PERRL, EOMI	  Cardiovascular: Normal S1 S2, No JVD, No murmurs/rubs/gallops  Respiratory: Lungs clear to auscultation bilaterally  Gastrointestinal:  Soft, Non-tender, + BS	  Skin: No rashes, No ecchymoses, No cyanosis	  Neurologic: Non-focal  Extremities: No clubbing, cyanosis or edema  Vascular: Peripheral pulses palpable 2+ bilaterally  Psychiatry: A & O x 3, Mood & affect appropriate    LABS:	 	    CBC Full  -  ( 02 Mar 2023 01:16 )  WBC Count : 13.01 K/uL  Hemoglobin : 9.3 g/dL  Hematocrit : 28.5 %  Platelet Count - Automated : 194 K/uL  Mean Cell Volume : 92.2 fL  Mean Cell Hemoglobin : 30.1 pg  Mean Cell Hemoglobin Concentration : 32.6 gm/dL  Auto Neutrophil # : x  Auto Lymphocyte # : x  Auto Monocyte # : x  Auto Eosinophil # : x  Auto Basophil # : x  Auto Neutrophil % : x  Auto Lymphocyte % : x  Auto Monocyte % : x  Auto Eosinophil % : x  Auto Basophil % : x    03-02    132<L>  |  101  |  43<H>  ----------------------------<  244<H>  4.5   |  18<L>  |  1.82<H>    Ca    8.8      02 Mar 2023 01:16  Phos  3.3     03-02  Mg     2.30     03-02        proBNP:   Lipid Profile:   HgA1c:   TSH:       CARDIAC MARKERS:            TELEMETRY: 	    ECG:  	  RADIOLOGY:  OTHER: 	    PREVIOUS DIAGNOSTIC TESTING:    [ ] Echocardiogram:  [ ] Catheterization:  [ ] Stress Test:

## 2023-03-03 NOTE — PROGRESS NOTE ADULT - PROBLEM SELECTOR PLAN 3
Has history of CABG ?2005, cath 2010 RSVG to distal RCA patent, RSVG to OM patent, and LIMA to LAD patent  - no current chest pain, Cards following trops are flat, now concern for ACS at this time   - c/w atorvastatin 40 mg  - per cardiology note on 2/24 no need for stress test

## 2023-03-04 LAB
ANION GAP SERPL CALC-SCNC: 15 MMOL/L — HIGH (ref 7–14)
ANION GAP SERPL CALC-SCNC: 17 MMOL/L — HIGH (ref 7–14)
B-OH-BUTYR SERPL-SCNC: <0 MMOL/L — SIGNIFICANT CHANGE UP (ref 0–0.4)
BUN SERPL-MCNC: 40 MG/DL — HIGH (ref 7–23)
BUN SERPL-MCNC: 48 MG/DL — HIGH (ref 7–23)
CALCIUM SERPL-MCNC: 9.2 MG/DL — SIGNIFICANT CHANGE UP (ref 8.4–10.5)
CALCIUM SERPL-MCNC: 9.3 MG/DL — SIGNIFICANT CHANGE UP (ref 8.4–10.5)
CHLORIDE SERPL-SCNC: 95 MMOL/L — LOW (ref 98–107)
CHLORIDE SERPL-SCNC: 95 MMOL/L — LOW (ref 98–107)
CO2 SERPL-SCNC: 18 MMOL/L — LOW (ref 22–31)
CO2 SERPL-SCNC: 21 MMOL/L — LOW (ref 22–31)
CREAT SERPL-MCNC: 1.82 MG/DL — HIGH (ref 0.5–1.3)
CREAT SERPL-MCNC: 2.13 MG/DL — HIGH (ref 0.5–1.3)
EGFR: 33 ML/MIN/1.73M2 — LOW
EGFR: 39 ML/MIN/1.73M2 — LOW
GAS PNL BLDV: SIGNIFICANT CHANGE UP
GLUCOSE BLDC GLUCOMTR-MCNC: 164 MG/DL — HIGH (ref 70–99)
GLUCOSE BLDC GLUCOMTR-MCNC: 252 MG/DL — HIGH (ref 70–99)
GLUCOSE BLDC GLUCOMTR-MCNC: 304 MG/DL — HIGH (ref 70–99)
GLUCOSE BLDC GLUCOMTR-MCNC: 321 MG/DL — HIGH (ref 70–99)
GLUCOSE SERPL-MCNC: 183 MG/DL — HIGH (ref 70–99)
GLUCOSE SERPL-MCNC: 263 MG/DL — HIGH (ref 70–99)
HCT VFR BLD CALC: 31.8 % — LOW (ref 39–50)
HGB BLD-MCNC: 10.2 G/DL — LOW (ref 13–17)
MAGNESIUM SERPL-MCNC: 2.1 MG/DL — SIGNIFICANT CHANGE UP (ref 1.6–2.6)
MCHC RBC-ENTMCNC: 29.2 PG — SIGNIFICANT CHANGE UP (ref 27–34)
MCHC RBC-ENTMCNC: 32.1 GM/DL — SIGNIFICANT CHANGE UP (ref 32–36)
MCV RBC AUTO: 91.1 FL — SIGNIFICANT CHANGE UP (ref 80–100)
NRBC # BLD: 0 /100 WBCS — SIGNIFICANT CHANGE UP (ref 0–0)
NRBC # FLD: 0 K/UL — SIGNIFICANT CHANGE UP (ref 0–0)
PHOSPHATE SERPL-MCNC: 3.5 MG/DL — SIGNIFICANT CHANGE UP (ref 2.5–4.5)
PLATELET # BLD AUTO: 283 K/UL — SIGNIFICANT CHANGE UP (ref 150–400)
POTASSIUM SERPL-MCNC: 4.3 MMOL/L — SIGNIFICANT CHANGE UP (ref 3.5–5.3)
POTASSIUM SERPL-MCNC: 4.4 MMOL/L — SIGNIFICANT CHANGE UP (ref 3.5–5.3)
POTASSIUM SERPL-SCNC: 4.3 MMOL/L — SIGNIFICANT CHANGE UP (ref 3.5–5.3)
POTASSIUM SERPL-SCNC: 4.4 MMOL/L — SIGNIFICANT CHANGE UP (ref 3.5–5.3)
RBC # BLD: 3.49 M/UL — LOW (ref 4.2–5.8)
RBC # FLD: 12.7 % — SIGNIFICANT CHANGE UP (ref 10.3–14.5)
SODIUM SERPL-SCNC: 130 MMOL/L — LOW (ref 135–145)
SODIUM SERPL-SCNC: 131 MMOL/L — LOW (ref 135–145)
WBC # BLD: 13.16 K/UL — HIGH (ref 3.8–10.5)
WBC # FLD AUTO: 13.16 K/UL — HIGH (ref 3.8–10.5)

## 2023-03-04 PROCEDURE — 99232 SBSQ HOSP IP/OBS MODERATE 35: CPT

## 2023-03-04 RX ORDER — INSULIN LISPRO 100/ML
12 VIAL (ML) SUBCUTANEOUS
Refills: 0 | Status: DISCONTINUED | OUTPATIENT
Start: 2023-03-05 | End: 2023-03-06

## 2023-03-04 RX ORDER — INSULIN GLARGINE 100 [IU]/ML
27 INJECTION, SOLUTION SUBCUTANEOUS AT BEDTIME
Refills: 0 | Status: DISCONTINUED | OUTPATIENT
Start: 2023-03-04 | End: 2023-03-05

## 2023-03-04 RX ORDER — GABAPENTIN 400 MG/1
300 CAPSULE ORAL THREE TIMES A DAY
Refills: 0 | Status: DISCONTINUED | OUTPATIENT
Start: 2023-03-04 | End: 2023-03-05

## 2023-03-04 RX ORDER — DEXAMETHASONE 0.5 MG/5ML
4 ELIXIR ORAL EVERY 6 HOURS
Refills: 0 | Status: DISCONTINUED | OUTPATIENT
Start: 2023-03-04 | End: 2023-03-06

## 2023-03-04 RX ORDER — INSULIN GLARGINE 100 [IU]/ML
30 INJECTION, SOLUTION SUBCUTANEOUS AT BEDTIME
Refills: 0 | Status: DISCONTINUED | OUTPATIENT
Start: 2023-03-04 | End: 2023-03-04

## 2023-03-04 RX ADMIN — Medication 1 MILLIGRAM(S): at 15:14

## 2023-03-04 RX ADMIN — Medication 975 MILLIGRAM(S): at 01:08

## 2023-03-04 RX ADMIN — ATORVASTATIN CALCIUM 40 MILLIGRAM(S): 80 TABLET, FILM COATED ORAL at 22:11

## 2023-03-04 RX ADMIN — Medication 8: at 08:32

## 2023-03-04 RX ADMIN — GABAPENTIN 300 MILLIGRAM(S): 400 CAPSULE ORAL at 22:11

## 2023-03-04 RX ADMIN — HYDROMORPHONE HYDROCHLORIDE 0.5 MILLIGRAM(S): 2 INJECTION INTRAMUSCULAR; INTRAVENOUS; SUBCUTANEOUS at 09:13

## 2023-03-04 RX ADMIN — Medication 1 PACKET(S): at 19:24

## 2023-03-04 RX ADMIN — CALCITRIOL 0.25 MICROGRAM(S): 0.5 CAPSULE ORAL at 11:56

## 2023-03-04 RX ADMIN — Medication 975 MILLIGRAM(S): at 12:26

## 2023-03-04 RX ADMIN — Medication 975 MILLIGRAM(S): at 19:23

## 2023-03-04 RX ADMIN — Medication 50 MILLIGRAM(S): at 06:37

## 2023-03-04 RX ADMIN — Medication 40 MILLIGRAM(S): at 06:36

## 2023-03-04 RX ADMIN — OXYCODONE HYDROCHLORIDE 10 MILLIGRAM(S): 5 TABLET ORAL at 15:04

## 2023-03-04 RX ADMIN — ISOSORBIDE MONONITRATE 30 MILLIGRAM(S): 60 TABLET, EXTENDED RELEASE ORAL at 06:39

## 2023-03-04 RX ADMIN — Medication 50 MILLIGRAM(S): at 19:29

## 2023-03-04 RX ADMIN — Medication 1 PACKET(S): at 06:40

## 2023-03-04 RX ADMIN — CYCLOBENZAPRINE HYDROCHLORIDE 10 MILLIGRAM(S): 10 TABLET, FILM COATED ORAL at 06:38

## 2023-03-04 RX ADMIN — HYDROMORPHONE HYDROCHLORIDE 0.5 MILLIGRAM(S): 2 INJECTION INTRAMUSCULAR; INTRAVENOUS; SUBCUTANEOUS at 17:01

## 2023-03-04 RX ADMIN — Medication 975 MILLIGRAM(S): at 23:43

## 2023-03-04 RX ADMIN — OXYCODONE HYDROCHLORIDE 10 MILLIGRAM(S): 5 TABLET ORAL at 23:43

## 2023-03-04 RX ADMIN — POLYETHYLENE GLYCOL 3350 17 GRAM(S): 17 POWDER, FOR SOLUTION ORAL at 11:58

## 2023-03-04 RX ADMIN — OXYCODONE HYDROCHLORIDE 10 MILLIGRAM(S): 5 TABLET ORAL at 08:29

## 2023-03-04 RX ADMIN — HYDROMORPHONE HYDROCHLORIDE 0.5 MILLIGRAM(S): 2 INJECTION INTRAMUSCULAR; INTRAVENOUS; SUBCUTANEOUS at 09:43

## 2023-03-04 RX ADMIN — Medication 975 MILLIGRAM(S): at 11:56

## 2023-03-04 RX ADMIN — SACUBITRIL AND VALSARTAN 1 TABLET(S): 24; 26 TABLET, FILM COATED ORAL at 19:24

## 2023-03-04 RX ADMIN — SACUBITRIL AND VALSARTAN 1 TABLET(S): 24; 26 TABLET, FILM COATED ORAL at 06:39

## 2023-03-04 RX ADMIN — OXYCODONE HYDROCHLORIDE 10 MILLIGRAM(S): 5 TABLET ORAL at 09:00

## 2023-03-04 RX ADMIN — Medication 8: at 12:00

## 2023-03-04 RX ADMIN — Medication 1: at 22:09

## 2023-03-04 RX ADMIN — HEPARIN SODIUM 5000 UNIT(S): 5000 INJECTION INTRAVENOUS; SUBCUTANEOUS at 22:10

## 2023-03-04 RX ADMIN — TAMSULOSIN HYDROCHLORIDE 0.4 MILLIGRAM(S): 0.4 CAPSULE ORAL at 22:11

## 2023-03-04 RX ADMIN — Medication 81 MILLIGRAM(S): at 11:56

## 2023-03-04 RX ADMIN — Medication 975 MILLIGRAM(S): at 06:36

## 2023-03-04 RX ADMIN — HEPARIN SODIUM 5000 UNIT(S): 5000 INJECTION INTRAVENOUS; SUBCUTANEOUS at 06:37

## 2023-03-04 RX ADMIN — Medication 4 MILLIGRAM(S): at 22:11

## 2023-03-04 RX ADMIN — Medication 975 MILLIGRAM(S): at 00:38

## 2023-03-04 RX ADMIN — Medication 975 MILLIGRAM(S): at 07:00

## 2023-03-04 RX ADMIN — OXYCODONE HYDROCHLORIDE 10 MILLIGRAM(S): 5 TABLET ORAL at 14:34

## 2023-03-04 RX ADMIN — HYDROMORPHONE HYDROCHLORIDE 0.5 MILLIGRAM(S): 2 INJECTION INTRAMUSCULAR; INTRAVENOUS; SUBCUTANEOUS at 16:31

## 2023-03-04 RX ADMIN — Medication 667 MILLIGRAM(S): at 19:25

## 2023-03-04 RX ADMIN — Medication 6 MILLIGRAM(S): at 22:10

## 2023-03-04 RX ADMIN — Medication 9 UNIT(S): at 11:59

## 2023-03-04 RX ADMIN — HYDROMORPHONE HYDROCHLORIDE 0.5 MILLIGRAM(S): 2 INJECTION INTRAMUSCULAR; INTRAVENOUS; SUBCUTANEOUS at 07:00

## 2023-03-04 RX ADMIN — Medication 2: at 19:21

## 2023-03-04 RX ADMIN — ISOSORBIDE MONONITRATE 30 MILLIGRAM(S): 60 TABLET, EXTENDED RELEASE ORAL at 19:24

## 2023-03-04 RX ADMIN — HYDROMORPHONE HYDROCHLORIDE 0.5 MILLIGRAM(S): 2 INJECTION INTRAMUSCULAR; INTRAVENOUS; SUBCUTANEOUS at 02:38

## 2023-03-04 RX ADMIN — INSULIN GLARGINE 27 UNIT(S): 100 INJECTION, SOLUTION SUBCUTANEOUS at 23:20

## 2023-03-04 RX ADMIN — Medication 975 MILLIGRAM(S): at 19:53

## 2023-03-04 NOTE — PROGRESS NOTE ADULT - ASSESSMENT
67 yo M with DMII, CAD s/p CABG, CKD, chronic back pain, and HTN presents to the ED with worsening back pain.  He recently came from [facility] about 2 weeks ago and has " multiple medical complaints."  he told his son that he is feeling weak and his back pain is getting worse.   In the ED, his vitals were notable for hyperglycemia, elevated BUN/Scr, and hypomagnesemia. Endocrine called for DM 2, A1c 8.3    1. DM 2  A1c 8.3%  Home Regimen: Ligenta-M 500 (Linagliptin 2.5 and metformin 500mg) 1 tablet once daily, NovoRapid (insulin aspart) 14 units w/ lunch, NovoMix 30 insulin 14 units before breakfast, 14-16 units before dinner    While inpatient  BG target 100-180 mg/dl: above goal this am pt eating in between meals. Pt advised to avoid eating overnight and in between meals.   Contineu Admelog correctional scale MODERATE dose before meals, continue low dose at bedtime  Increase Lantus slightly to 27 units SQ qHS as pt will be NPO and now started on dexamethasone 6mg p.o. q 6 hours as per neuro sx starting tonight   Increase Admelog to 12 units SQ TID before meals (Hold if NPO/skips meal)   Chek FS before meals and at bedtime; please check 3 am FS   Hypoglycemia protocol    Carb Consistent Diet   Nutrition consult   Provider to RN for diabetes/insulin pen teaching, AB pharmacist following (see pharmacy intervention notes)   Please notify Endocrine if any changes in steroids as this may require insulin adjustment     Discharge Plan:  Medicaid pending - currently uninsured   Likely stop prior outpatient regimen and continue with mixed insulin (70/30) only  Consider Novolog 70/30: doses TBD close to discharge: before breakfast and before dinner via pen (see pharmacy liaison note: patient can qualify for coupon), so could use insulin PENS if that is easier for patient  Rapid acting insulin should NOT be ordered in conjunction with mixed insulin for risk of hypoglycemia   Ensure he has glucometer, glucose test strips, lancets, alcohol swabs and insulin PEN needles vs syringes   Followup with [facility], 23-[facility] [place], [phone], [phone]    2. HTN  Goal BP in DM <130/80  outpt mc/cr ratio    3. HLD  LDL 75 above goal; LDL <70   Continue Atorvastatin 40mg daily    4. Metabolic Acidosis   Anion gap 18 Repeat Lantus Anion gap 15 BHB <0.0  Pt received Lantus as prescribed  Trend Anion gap   Team to r/o other reason for metabolic acidosis  Defer to Sx team    D/w Neuro Sx [id number]     Princess Sanchez  Nurse Practitioner  Division of Endocrinology & Diabetes  In house pager #[phone]    If before 9AM or after 6PM, or on weekends/holidays, please call endocrine answering service for assistance ([phone]).For nonurgent matters email [email or web address] for assistance.

## 2023-03-04 NOTE — PROGRESS NOTE ADULT - SUBJECTIVE AND OBJECTIVE BOX
Chief Complaint: Type 2 DM     History: Pt seen at bedside. Id  [id number]. Pt seen at bedside. Pt tolerating oral diet. Pt denies nausea and vomiting/any signs of hypoglycemia. Pt reports an adequate appetite.     MEDICATIONS  (STANDING):  acetaminophen     Tablet .. 975 milliGRAM(s) Oral every 6 hours  aspirin  chewable 81 milliGRAM(s) Oral daily  atorvastatin 40 milliGRAM(s) Oral at bedtime  bisacodyl Suppository 10 milliGRAM(s) Rectal daily  calcitriol   Capsule 0.25 MICROGram(s) Oral daily  calcium acetate 667 milliGRAM(s) Oral with dinner  dexAMETHasone     Tablet 4 milliGRAM(s) Oral every 6 hours  dextrose 50% Injectable 25 Gram(s) IV Push once  dextrose 50% Injectable 12.5 Gram(s) IV Push once  dextrose 50% Injectable 25 Gram(s) IV Push once  furosemide    Tablet 40 milliGRAM(s) Oral daily  gabapentin 300 milliGRAM(s) Oral three times a day  heparin   Injectable 5000 Unit(s) SubCutaneous every 8 hours  insulin glargine Injectable (LANTUS) 30 Unit(s) SubCutaneous at bedtime  insulin lispro (ADMELOG) corrective regimen sliding scale   SubCutaneous three times a day before meals  insulin lispro (ADMELOG) corrective regimen sliding scale   SubCutaneous at bedtime  insulin lispro Injectable (ADMELOG) 9 Unit(s) SubCutaneous three times a day before meals  isosorbide    mononitrate Tablet (ISMO) 30 milliGRAM(s) Oral two times a day  melatonin 6 milliGRAM(s) Oral at bedtime  polyethylene glycol 3350 17 Gram(s) Oral daily  pregabalin 50 milliGRAM(s) Oral two times a day  psyllium Powder 1 Packet(s) Oral two times a day  sacubitril 24 mG/valsartan 26 mG 1 Tablet(s) Oral two times a day  senna 2 Tablet(s) Oral at bedtime  tamsulosin 0.4 milliGRAM(s) Oral at bedtime    MEDICATIONS  (PRN):  cyclobenzaprine 10 milliGRAM(s) Oral three times a day PRN Muscle Spasm  dextrose Oral Gel 15 Gram(s) Oral once PRN Blood Glucose LESS THAN 70 milliGRAM(s)/deciliter  HYDROmorphone  Injectable 0.5 milliGRAM(s) IV Push every 4 hours PRN Break through pain  nitroglycerin     SubLingual 0.4 milliGRAM(s) SubLingual every 5 minutes PRN Chest Pain  oxyCODONE    IR 5 milliGRAM(s) Oral every 4 hours PRN Moderate Pain (4 - 6)  oxyCODONE    IR 10 milliGRAM(s) Oral every 4 hours PRN Severe Pain (7 - 10)      Allergies: No Known Allergies    Review of Systems:  Respiratory: No SOB, no cough  GI: No nausea, vomiting, abdominal pain  Endocrine: no polyuria, polydipsia      PHYSICAL EXAM:  VITALS: T(C): 37.2 (03-04-23 @ 20:07)  T(F): 98.9 (03-04-23 @ 20:07), Max: 99.5 (03-04-23 @ 17:40)  HR: 100 (03-04-23 @ 20:07) (96 - 107)  BP: 101/55 (03-04-23 @ 20:07) (101/55 - 140/55)  RR:  (17 - 18)  SpO2:  (94% - 100%)  Wt(kg): --  GENERAL: NAD, well-groomed, well-developed  RESPIRATORY: No labored breathing   GI: Soft, nontender, non distended  PSYCH: Alert and oriented x 3, normal affect, normal mood      CAPILLARY BLOOD GLUCOSE  POCT Blood Glucose.: 164 mg/dL (04 Mar 2023 17:32)  POCT Blood Glucose.: 321 mg/dL (04 Mar 2023 11:44)  POCT Blood Glucose.: 304 mg/dL (04 Mar 2023 08:17)  POCT Blood Glucose.: 160 mg/dL (03 Mar 2023 21:57)    A1C with Estimated Average Glucose (02.22.23 @ 05:13)    A1C with Estimated Average Glucose Result: 8.3    Estimated Average Glucose: 192      03-04    131<L>  |  95<L>  |  48<H>  ----------------------------<  183<H>  4.4   |  21<L>  |  2.13<H>    eGFR: 33<L>    Ca    9.2      03-04  Mg     2.10     03-04  Phos  3.5     03-04    Diet, Regular (03-04-23 @ 17:12) [Active]  Diet, NPO after Midnight:      NPO Start Date: 04-Mar-2023,   NPO Start Time: 23:59  Except Medications (03-04-23 @ 17:12) [Active]

## 2023-03-04 NOTE — PROGRESS NOTE ADULT - SUBJECTIVE AND OBJECTIVE BOX
HPI:   67 yo M with DMII, CAD s/p CABG, CKD, chronic back pain, and HTN presents to the ED with worsening back pain. Patient is AAOX2 and somnolent, not able to provide any hx. Most of the information was supplemented by the son. As per son, pt is AAOx 3 and "mentally stable." He recently came from [facility] about 2 weeks ago and has " multiple medical complaints." But this morning he told his son that he is feeling weak and his back pain is getting worse. The pain is localized in the lower back region. It is burning is sensation and constant. Unclear if it radiates down the leg. It worsens with movement and walking. He can only walk "10 meters" with a cane. He is also incontinent of urine but denies any recent fever, chills, bowel incontinence, or lower extremities weakness/motor deficits. He reportedly had MRIs in the past but son does not know what it showed. He also has other chronic medical problems such as CKD, DMII and CAD and his son thinks pt needs management for them as well.   In the ED, his vitals were notable for hyperglycemia, elevated BUN/Scr, and hypomagnesemia. EKG showed bradycardia with T wave changes in the lateral leads.  (21 Feb 2023 18:36)    PAST MEDICAL & SURGICAL HISTORY:  Diabetes mellitus  Essential hypertension  CAD (coronary artery disease)  Chronic kidney disease, unspecified CKD stage  S/P CABG x 2    No further episodes of chest pain  Endocrine consulted for DM management  Hospitalist reconsulted for medical comanagement  Vital Signs Last 24 Hrs  T(C): 37.1 (04 Mar 2023 00:00), Max: 37.2 (03 Mar 2023 04:00)  T(F): 98.8 (04 Mar 2023 00:00), Max: 99 (03 Mar 2023 04:00)  HR: 96 (04 Mar 2023 00:00) (89 - 103)  BP: 116/52 (04 Mar 2023 00:00) (102/51 - 135/63)  BP(mean): --  RR: 18 (04 Mar 2023 00:00) (17 - 18)  SpO2: 100% (04 Mar 2023 00:00) (97% - 100%)    Parameters below as of 04 Mar 2023 00:00  Patient On (Oxygen Delivery Method): room air    AAO X 3  PERRLA, EOMI  Face symmetric  BENDER strength 5/5, movement limited by pain  SILT    HMV: no output recorded    MEDICATIONS  (STANDING):  acetaminophen     Tablet .. 975 milliGRAM(s) Oral every 6 hours  aspirin  chewable 81 milliGRAM(s) Oral daily  atorvastatin 40 milliGRAM(s) Oral at bedtime  bisacodyl Suppository 10 milliGRAM(s) Rectal daily  calcitriol   Capsule 0.25 MICROGram(s) Oral daily  calcium acetate 667 milliGRAM(s) Oral with dinner  dextrose 50% Injectable 25 Gram(s) IV Push once  dextrose 50% Injectable 12.5 Gram(s) IV Push once  dextrose 50% Injectable 25 Gram(s) IV Push once  furosemide    Tablet 40 milliGRAM(s) Oral daily  heparin   Injectable 5000 Unit(s) SubCutaneous every 8 hours  insulin glargine Injectable (LANTUS) 24 Unit(s) SubCutaneous at bedtime  insulin lispro (ADMELOG) corrective regimen sliding scale   SubCutaneous three times a day before meals  insulin lispro (ADMELOG) corrective regimen sliding scale   SubCutaneous at bedtime  insulin lispro Injectable (ADMELOG) 9 Unit(s) SubCutaneous three times a day before meals  isosorbide    mononitrate Tablet (ISMO) 30 milliGRAM(s) Oral two times a day  melatonin 6 milliGRAM(s) Oral at bedtime  polyethylene glycol 3350 17 Gram(s) Oral daily  pregabalin 50 milliGRAM(s) Oral two times a day  psyllium Powder 1 Packet(s) Oral two times a day  sacubitril 24 mG/valsartan 26 mG 1 Tablet(s) Oral two times a day  senna 2 Tablet(s) Oral at bedtime  tamsulosin 0.4 milliGRAM(s) Oral at bedtime    MEDICATIONS  (PRN):  cyclobenzaprine 10 milliGRAM(s) Oral three times a day PRN Muscle Spasm  dextrose Oral Gel 15 Gram(s) Oral once PRN Blood Glucose LESS THAN 70 milliGRAM(s)/deciliter  HYDROmorphone  Injectable 0.5 milliGRAM(s) IV Push every 4 hours PRN Break through pain  nitroglycerin     SubLingual 0.4 milliGRAM(s) SubLingual every 5 minutes PRN Chest Pain  oxyCODONE    IR 5 milliGRAM(s) Oral every 4 hours PRN Moderate Pain (4 - 6)  oxyCODONE    IR 10 milliGRAM(s) Oral every 4 hours PRN Severe Pain (7 - 10)                          9.3    13.17 )-----------( 224      ( 03 Mar 2023 10:30 )             27.9     03-03    133<L>  |  101  |  37<H>  ----------------------------<  219<H>  4.2   |  21<L>  |  1.80<H>    Ca    8.9      03 Mar 2023 10:30

## 2023-03-04 NOTE — PROGRESS NOTE ADULT - ASSESSMENT
3/1; s/p L4-5 TLIF pod #1, HMV drain, requiring bola post op for bp support, albumin given x 1, sicu consulted, neuro exam stable  3/2: POD # 2 S/P L4-5 TLIF, 1 HMV in place. Off phenylepherine  3/3: POD # 3 S/P L4-5 TLIF. Having episodes of chest pain with elevated troponins, cardiology evaluating. Will transfer to telemetry floor, serial CE and EKG. 1 HMV in place  3/4: POD # 4 S/P L4-5 TLIF. No more chest pain. HMV in place. Seen by endocrine for DM management

## 2023-03-05 ENCOUNTER — TRANSCRIPTION ENCOUNTER (OUTPATIENT)
Age: 68
End: 2023-03-05

## 2023-03-05 DIAGNOSIS — E87.1 HYPO-OSMOLALITY AND HYPONATREMIA: ICD-10-CM

## 2023-03-05 LAB
ANION GAP SERPL CALC-SCNC: 18 MMOL/L — HIGH (ref 7–14)
BUN SERPL-MCNC: 56 MG/DL — HIGH (ref 7–23)
CALCIUM SERPL-MCNC: 9.2 MG/DL — SIGNIFICANT CHANGE UP (ref 8.4–10.5)
CHLORIDE SERPL-SCNC: 94 MMOL/L — LOW (ref 98–107)
CO2 SERPL-SCNC: 17 MMOL/L — LOW (ref 22–31)
CREAT SERPL-MCNC: 2.25 MG/DL — HIGH (ref 0.5–1.3)
EGFR: 31 ML/MIN/1.73M2 — LOW
GLUCOSE BLDC GLUCOMTR-MCNC: 118 MG/DL — HIGH (ref 70–99)
GLUCOSE BLDC GLUCOMTR-MCNC: 182 MG/DL — HIGH (ref 70–99)
GLUCOSE BLDC GLUCOMTR-MCNC: 193 MG/DL — HIGH (ref 70–99)
GLUCOSE BLDC GLUCOMTR-MCNC: 216 MG/DL — HIGH (ref 70–99)
GLUCOSE BLDC GLUCOMTR-MCNC: 220 MG/DL — HIGH (ref 70–99)
GLUCOSE BLDC GLUCOMTR-MCNC: 224 MG/DL — HIGH (ref 70–99)
GLUCOSE BLDC GLUCOMTR-MCNC: 226 MG/DL — HIGH (ref 70–99)
GLUCOSE SERPL-MCNC: 220 MG/DL — HIGH (ref 70–99)
HCT VFR BLD CALC: 30.6 % — LOW (ref 39–50)
HGB BLD-MCNC: 9.9 G/DL — LOW (ref 13–17)
MCHC RBC-ENTMCNC: 30 PG — SIGNIFICANT CHANGE UP (ref 27–34)
MCHC RBC-ENTMCNC: 32.4 GM/DL — SIGNIFICANT CHANGE UP (ref 32–36)
MCV RBC AUTO: 92.7 FL — SIGNIFICANT CHANGE UP (ref 80–100)
NRBC # BLD: 0 /100 WBCS — SIGNIFICANT CHANGE UP (ref 0–0)
NRBC # FLD: 0 K/UL — SIGNIFICANT CHANGE UP (ref 0–0)
PLATELET # BLD AUTO: 322 K/UL — SIGNIFICANT CHANGE UP (ref 150–400)
POTASSIUM SERPL-MCNC: 3.9 MMOL/L — SIGNIFICANT CHANGE UP (ref 3.5–5.3)
POTASSIUM SERPL-SCNC: 3.9 MMOL/L — SIGNIFICANT CHANGE UP (ref 3.5–5.3)
RBC # BLD: 3.3 M/UL — LOW (ref 4.2–5.8)
RBC # FLD: 12.9 % — SIGNIFICANT CHANGE UP (ref 10.3–14.5)
SARS-COV-2 RNA SPEC QL NAA+PROBE: SIGNIFICANT CHANGE UP
SODIUM SERPL-SCNC: 129 MMOL/L — LOW (ref 135–145)
WBC # BLD: 11.86 K/UL — HIGH (ref 3.8–10.5)
WBC # FLD AUTO: 11.86 K/UL — HIGH (ref 3.8–10.5)

## 2023-03-05 PROCEDURE — 99232 SBSQ HOSP IP/OBS MODERATE 35: CPT

## 2023-03-05 PROCEDURE — 72148 MRI LUMBAR SPINE W/O DYE: CPT | Mod: 26

## 2023-03-05 RX ORDER — HYDROMORPHONE HYDROCHLORIDE 2 MG/ML
2 INJECTION INTRAMUSCULAR; INTRAVENOUS; SUBCUTANEOUS EVERY 4 HOURS
Refills: 0 | Status: DISCONTINUED | OUTPATIENT
Start: 2023-03-05 | End: 2023-03-06

## 2023-03-05 RX ORDER — INSULIN GLARGINE 100 [IU]/ML
30 INJECTION, SOLUTION SUBCUTANEOUS AT BEDTIME
Refills: 0 | Status: DISCONTINUED | OUTPATIENT
Start: 2023-03-05 | End: 2023-03-06

## 2023-03-05 RX ORDER — SODIUM CHLORIDE 9 MG/ML
500 INJECTION INTRAMUSCULAR; INTRAVENOUS; SUBCUTANEOUS ONCE
Refills: 0 | Status: COMPLETED | OUTPATIENT
Start: 2023-03-05 | End: 2023-03-05

## 2023-03-05 RX ORDER — CYCLOBENZAPRINE HYDROCHLORIDE 10 MG/1
10 TABLET, FILM COATED ORAL EVERY 8 HOURS
Refills: 0 | Status: COMPLETED | OUTPATIENT
Start: 2023-03-05 | End: 2023-03-07

## 2023-03-05 RX ORDER — ISOSORBIDE MONONITRATE 60 MG/1
30 TABLET, EXTENDED RELEASE ORAL
Refills: 0 | Status: DISCONTINUED | OUTPATIENT
Start: 2023-03-05 | End: 2023-03-05

## 2023-03-05 RX ORDER — SODIUM CHLORIDE 9 MG/ML
2 INJECTION INTRAMUSCULAR; INTRAVENOUS; SUBCUTANEOUS DAILY
Refills: 0 | Status: DISCONTINUED | OUTPATIENT
Start: 2023-03-05 | End: 2023-03-06

## 2023-03-05 RX ORDER — HYDROMORPHONE HYDROCHLORIDE 2 MG/ML
4 INJECTION INTRAMUSCULAR; INTRAVENOUS; SUBCUTANEOUS EVERY 4 HOURS
Refills: 0 | Status: DISCONTINUED | OUTPATIENT
Start: 2023-03-05 | End: 2023-03-06

## 2023-03-05 RX ADMIN — HEPARIN SODIUM 5000 UNIT(S): 5000 INJECTION INTRAVENOUS; SUBCUTANEOUS at 04:41

## 2023-03-05 RX ADMIN — INSULIN GLARGINE 30 UNIT(S): 100 INJECTION, SOLUTION SUBCUTANEOUS at 22:18

## 2023-03-05 RX ADMIN — Medication 81 MILLIGRAM(S): at 12:51

## 2023-03-05 RX ADMIN — Medication 975 MILLIGRAM(S): at 12:48

## 2023-03-05 RX ADMIN — Medication 667 MILLIGRAM(S): at 20:55

## 2023-03-05 RX ADMIN — Medication 1 PACKET(S): at 04:42

## 2023-03-05 RX ADMIN — Medication 40 MILLIGRAM(S): at 04:41

## 2023-03-05 RX ADMIN — ISOSORBIDE MONONITRATE 30 MILLIGRAM(S): 60 TABLET, EXTENDED RELEASE ORAL at 20:54

## 2023-03-05 RX ADMIN — SODIUM CHLORIDE 2 GRAM(S): 9 INJECTION INTRAMUSCULAR; INTRAVENOUS; SUBCUTANEOUS at 12:51

## 2023-03-05 RX ADMIN — OXYCODONE HYDROCHLORIDE 10 MILLIGRAM(S): 5 TABLET ORAL at 04:39

## 2023-03-05 RX ADMIN — GABAPENTIN 300 MILLIGRAM(S): 400 CAPSULE ORAL at 04:41

## 2023-03-05 RX ADMIN — Medication 975 MILLIGRAM(S): at 13:18

## 2023-03-05 RX ADMIN — CYCLOBENZAPRINE HYDROCHLORIDE 10 MILLIGRAM(S): 10 TABLET, FILM COATED ORAL at 22:17

## 2023-03-05 RX ADMIN — TAMSULOSIN HYDROCHLORIDE 0.4 MILLIGRAM(S): 0.4 CAPSULE ORAL at 22:18

## 2023-03-05 RX ADMIN — Medication 4 MILLIGRAM(S): at 10:07

## 2023-03-05 RX ADMIN — Medication 4: at 08:55

## 2023-03-05 RX ADMIN — ISOSORBIDE MONONITRATE 30 MILLIGRAM(S): 60 TABLET, EXTENDED RELEASE ORAL at 04:40

## 2023-03-05 RX ADMIN — SODIUM CHLORIDE 1000 MILLILITER(S): 9 INJECTION INTRAMUSCULAR; INTRAVENOUS; SUBCUTANEOUS at 20:50

## 2023-03-05 RX ADMIN — Medication 4 MILLIGRAM(S): at 16:05

## 2023-03-05 RX ADMIN — Medication 1 PACKET(S): at 22:13

## 2023-03-05 RX ADMIN — OXYCODONE HYDROCHLORIDE 10 MILLIGRAM(S): 5 TABLET ORAL at 10:07

## 2023-03-05 RX ADMIN — Medication 975 MILLIGRAM(S): at 04:41

## 2023-03-05 RX ADMIN — Medication 4 MILLIGRAM(S): at 02:04

## 2023-03-05 RX ADMIN — CYCLOBENZAPRINE HYDROCHLORIDE 10 MILLIGRAM(S): 10 TABLET, FILM COATED ORAL at 16:04

## 2023-03-05 RX ADMIN — Medication 975 MILLIGRAM(S): at 00:15

## 2023-03-05 RX ADMIN — OXYCODONE HYDROCHLORIDE 10 MILLIGRAM(S): 5 TABLET ORAL at 05:15

## 2023-03-05 RX ADMIN — SENNA PLUS 2 TABLET(S): 8.6 TABLET ORAL at 22:14

## 2023-03-05 RX ADMIN — CALCITRIOL 0.25 MICROGRAM(S): 0.5 CAPSULE ORAL at 12:52

## 2023-03-05 RX ADMIN — Medication 975 MILLIGRAM(S): at 05:15

## 2023-03-05 RX ADMIN — Medication 50 MILLIGRAM(S): at 04:40

## 2023-03-05 RX ADMIN — Medication 4 MILLIGRAM(S): at 22:16

## 2023-03-05 RX ADMIN — Medication 50 MILLIGRAM(S): at 20:53

## 2023-03-05 RX ADMIN — SACUBITRIL AND VALSARTAN 1 TABLET(S): 24; 26 TABLET, FILM COATED ORAL at 04:40

## 2023-03-05 RX ADMIN — SACUBITRIL AND VALSARTAN 1 TABLET(S): 24; 26 TABLET, FILM COATED ORAL at 20:55

## 2023-03-05 RX ADMIN — ATORVASTATIN CALCIUM 40 MILLIGRAM(S): 80 TABLET, FILM COATED ORAL at 22:15

## 2023-03-05 RX ADMIN — Medication 2: at 21:17

## 2023-03-05 RX ADMIN — Medication 6 MILLIGRAM(S): at 22:16

## 2023-03-05 RX ADMIN — OXYCODONE HYDROCHLORIDE 10 MILLIGRAM(S): 5 TABLET ORAL at 00:15

## 2023-03-05 NOTE — PROGRESS NOTE ADULT - PROBLEM SELECTOR PLAN 4
- Na range 129-134 throughout hospitalization  - stable  - if continues to downtrend <129, would send serum osm, urine osm, urine Na for evaluation - Na range 129-134 throughout hospitalization  - stable  - if continues to downtrend <129, would send serum osm, urine osm, urine Na for evaluation  - suspect combination of NPO status and SIADH; would not fluid restrict at current levels

## 2023-03-05 NOTE — PROGRESS NOTE ADULT - ASSESSMENT
68M [place]ian speaking, HTN, DM2 on insulin, CKD3, systolic HF (EF 45%), CAD s/p CABG 2005, RSA s/p L renal stent, and PAD s/p PTA to R common iliac and L common iliac s/p stent who presents to the ED with worsening back pain in context of known L4-L5 cord compression/cauda equina since 8/2022. now s/p procedure on requiring SICU stay for pressors not stable for floor. Medicine following for comanagement.

## 2023-03-05 NOTE — PROGRESS NOTE ADULT - SUBJECTIVE AND OBJECTIVE BOX
HPI:   69 yo M with DMII, CAD s/p CABG, CKD, chronic back pain, and HTN presents to the ED with worsening back pain. Patient is AAOX2 and somnolent, not able to provide any hx. Most of the information was supplemented by the son. As per son, pt is AAOx 3 and "mentally stable." He recently came from [facility] about 2 weeks ago and has " multiple medical complaints." But this morning he told his son that he is feeling weak and his back pain is getting worse. The pain is localized in the lower back region. It is burning is sensation and constant. Unclear if it radiates down the leg. It worsens with movement and walking. He can only walk "10 meters" with a cane. He is also incontinent of urine but denies any recent fever, chills, bowel incontinence, or lower extremities weakness/motor deficits. He reportedly had MRIs in the past but son does not know what it showed. He also has other chronic medical problems such as CKD, DMII and CAD and his son thinks pt needs management for them as well.   In the ED, his vitals were notable for hyperglycemia, elevated BUN/Scr, and hypomagnesemia. EKG showed bradycardia with T wave changes in the lateral leads.  (21 Feb 2023 18:36)    PAST MEDICAL & SURGICAL HISTORY:  Diabetes mellitus  Essential hypertension  CAD (coronary artery disease)  Chronic kidney disease, unspecified CKD stage  S/P CABG x 2    C/O increased back pain with radiation to legs  Vital Signs Last 24 Hrs  T(C): 36.7 (05 Mar 2023 00:20), Max: 37.5 (04 Mar 2023 17:40)  T(F): 98 (05 Mar 2023 00:20), Max: 99.5 (04 Mar 2023 17:40)  HR: 87 (05 Mar 2023 00:20) (87 - 107)  BP: 102/51 (05 Mar 2023 00:20) (101/55 - 140/55)  BP(mean): --  RR: 18 (05 Mar 2023 00:20) (17 - 18)  SpO2: 98% (05 Mar 2023 00:20) (94% - 100%)    Parameters below as of 05 Mar 2023 00:20  Patient On (Oxygen Delivery Method): room air    AAO X 3  PERRLA, EOMI  Face symmetric  BENDER strength 5/5, movement limited by pain  SILT    HMV: 140cc    MEDICATIONS  (STANDING):  acetaminophen     Tablet .. 975 milliGRAM(s) Oral every 6 hours  aspirin  chewable 81 milliGRAM(s) Oral daily  atorvastatin 40 milliGRAM(s) Oral at bedtime  bisacodyl Suppository 10 milliGRAM(s) Rectal daily  calcitriol   Capsule 0.25 MICROGram(s) Oral daily  calcium acetate 667 milliGRAM(s) Oral with dinner  dexAMETHasone     Tablet 4 milliGRAM(s) Oral every 6 hours  dextrose 50% Injectable 25 Gram(s) IV Push once  dextrose 50% Injectable 12.5 Gram(s) IV Push once  dextrose 50% Injectable 25 Gram(s) IV Push once  furosemide    Tablet 40 milliGRAM(s) Oral daily  gabapentin 300 milliGRAM(s) Oral three times a day  heparin   Injectable 5000 Unit(s) SubCutaneous every 8 hours  insulin glargine Injectable (LANTUS) 27 Unit(s) SubCutaneous at bedtime  insulin lispro (ADMELOG) corrective regimen sliding scale   SubCutaneous three times a day before meals  insulin lispro (ADMELOG) corrective regimen sliding scale   SubCutaneous at bedtime  insulin lispro Injectable (ADMELOG) 12 Unit(s) SubCutaneous three times a day before meals  isosorbide    mononitrate Tablet (ISMO) 30 milliGRAM(s) Oral two times a day  melatonin 6 milliGRAM(s) Oral at bedtime  polyethylene glycol 3350 17 Gram(s) Oral daily  pregabalin 50 milliGRAM(s) Oral two times a day  psyllium Powder 1 Packet(s) Oral two times a day  sacubitril 24 mG/valsartan 26 mG 1 Tablet(s) Oral two times a day  senna 2 Tablet(s) Oral at bedtime  tamsulosin 0.4 milliGRAM(s) Oral at bedtime    MEDICATIONS  (PRN):  cyclobenzaprine 10 milliGRAM(s) Oral three times a day PRN Muscle Spasm  dextrose Oral Gel 15 Gram(s) Oral once PRN Blood Glucose LESS THAN 70 milliGRAM(s)/deciliter  HYDROmorphone  Injectable 0.5 milliGRAM(s) IV Push every 4 hours PRN Break through pain  nitroglycerin     SubLingual 0.4 milliGRAM(s) SubLingual every 5 minutes PRN Chest Pain  oxyCODONE    IR 10 milliGRAM(s) Oral every 4 hours PRN Severe Pain (7 - 10)  oxyCODONE    IR 5 milliGRAM(s) Oral every 4 hours PRN Moderate Pain (4 - 6)

## 2023-03-05 NOTE — PROGRESS NOTE ADULT - PROBLEM SELECTOR PLAN 2
EF 45% per outside records; TTE: EF 45% Mild to moderate segmental left ventricular systolic dysfunction.  Hypokinesis of the apex, distal septum, and distal anterior wall. Mod-Sev AS.  - c/w home Entresto (called Sabitar 50 Sacubitril  24 mg/Valsartan 26 mg), restarted   - c/w 40 PO lasix with hold parameters   - holding BB per cards   - had CP trop 73-74, cards consulted, low concern for ACS at this time.   - no need for further trend troponin at this time unless patient with active chest pain; if so would obtain troponin x2 and STAT EKG to evaluate   - appears compensated  - cards eval appreciated

## 2023-03-05 NOTE — PROGRESS NOTE ADULT - PROBLEM SELECTOR PLAN 1
s/p OR on 2/28 with L4-L5 spinal fusion with neurosurgery, has drain in place  - drain output per [facility]  - PT/OT/PM&R following and recommending rehab, likely acute  - c/w pain control per primary team s/p OR on 2/28 with L4-L5 spinal fusion with neurosurgery, has drain in place  - drain output per Nsgyx  - PT/OT/PM&R following and recommending rehab, likely acute  - c/w pain control per primary team and pain mgmt

## 2023-03-05 NOTE — PROGRESS NOTE ADULT - PROBLEM SELECTOR PLAN 7
HbA1c of 8.3%, at home on insulin 14 units with lunch and 14-16 BID of mixed insulin + orals  - management per endocrine  - now on steroids, anticipate increased insulin requirements

## 2023-03-05 NOTE — PROGRESS NOTE ADULT - ASSESSMENT
69 yo M with DMII, CAD s/p CABG, CKD, chronic back pain, and HTN presents to the ED with worsening back pain.  He recently came from [facility] about 2 weeks ago and has " multiple medical complaints."  he told his son that he is feeling weak and his back pain is getting worse.   In the ED, his vitals were notable for hyperglycemia, elevated BUN/Scr, and hypomagnesemia. Endocrine called for DM 2, A1c 8.3    1. DM 2  A1c 8.3%  Home Regimen: Ligenta-M 500 (Linagliptin 2.5 and metformin 500mg) 1 tablet once daily, NovoRapid (insulin aspart) 14 units w/ lunch, NovoMix 30 insulin 14 units before breakfast, 14-16 units before dinner    While inpatient  BG target 100-180 mg/dl: above goal this am pt eating in between meals. Pt advised to avoid eating overnight and in between meals.   Contineu Admelog correctional scale MODERATE dose before meals, continue low dose at bedtime  Increase Lantus slightly to 27 units SQ qHS as pt will be NPO and now started on dexamethasone 6mg p.o. q 6 hours as per neuro sx starting tonight   Increase Admelog to 12 units SQ TID before meals (Hold if NPO/skips meal)   Chek FS before meals and at bedtime; please check 3 am FS   Hypoglycemia protocol    Carb Consistent Diet   Nutrition consult   Provider to RN for diabetes/insulin pen teaching, AB pharmacist following (see pharmacy intervention notes)   Please notify Endocrine if any changes in steroids as this may require insulin adjustment     Discharge Plan:  Medicaid pending - currently uninsured   Likely stop prior outpatient regimen and continue with mixed insulin (70/30) only  Consider Novolog 70/30: doses TBD close to discharge: before breakfast and before dinner via pen (see pharmacy liaison note: patient can qualify for coupon), so could use insulin PENS if that is easier for patient  Rapid acting insulin should NOT be ordered in conjunction with mixed insulin for risk of hypoglycemia   Ensure he has glucometer, glucose test strips, lancets, alcohol swabs and insulin PEN needles vs syringes   Followup with , [address] [place] [address], [phone], [phone]    2. HTN  Goal BP in DM <130/80  On Entresto   outpt mc/cr ratio  Titration as per primary team     3. HLD  LDL 75 above goal; LDL <70   Continue Atorvastatin 40mg daily    4. Metabolic Acidosis   Anion gap 18 Repeat Lantus   Pt received Lantus as prescribed  Trend Anion gap   Team to r/o other reason for metabolic acidosis  Defer to Sx team    D/w Neuro Sx [id number]     Princess Sanchez  Nurse Practitioner  Division of Endocrinology & Diabetes  In house pager #[phone]    If before 9AM or after 6PM, or on weekends/holidays, please call endocrine answering service for assistance ([phone]).For nonurgent matters email [email or web address] for assistance.    67 yo M with DMII, CAD s/p CABG, CKD, chronic back pain, and HTN presents to the ED with worsening back pain.  He recently came from [facility] about 2 weeks ago and has " multiple medical complaints."  he told his son that he is feeling weak and his back pain is getting worse.   In the ED, his vitals were notable for hyperglycemia, elevated BUN/Scr, and hypomagnesemia. Endocrine called for DM 2, A1c 8.3    1. DM 2  A1c 8.3%  Home Regimen: Ligenta-M 500 (Linagliptin 2.5 and metformin 500mg) 1 tablet once daily, NovoRapid (insulin aspart) 14 units w/ lunch, NovoMix 30 insulin 14 units before breakfast, 14-16 units before dinner    While inpatient  Started on dexamethasone 6mg p.o. q 6 hours: Please notify Endocrine if any changes in steroids as this may require insulin adjustment   BG target 100-180 mg/dl: above goal this am pt eating in between meals. Pt advised to avoid eating overnight and in between meals.   Continue Admelog correctional scale MODERATE dose before meals, continue low dose at bedtime  Increase Lantus slightly to 30 units SQ qH (ok to continue while NPO)  Continue Admelog 12 units SQ TID before meals (Hold if NPO/skips meal)   Chek FS before meals and at bedtime; please check 3 am FS   Hypoglycemia protocol    Change diet Carb Consistent Diet d/w Neuro sx ; NPO today for MRI with sedation if not performed today will be NPO tonight  Nutrition consult   Provider to RN for diabetes/insulin pen teaching, AB pharmacist following (see pharmacy intervention notes)       Discharge Plan:  Medicaid pending - currently uninsured   Likely stop prior outpatient regimen and continue with mixed insulin (70/30) only  Consider Novolog 70/30: doses TBD close to discharge: before breakfast and before dinner via pen (see pharmacy liaison note: patient can qualify for coupon), so could use insulin PENS if that is easier for patient  Rapid acting insulin should NOT be ordered in conjunction with mixed insulin for risk of hypoglycemia   Ensure he has glucometer, glucose test strips, lancets, alcohol swabs and insulin PEN needles vs syringes   Followup with [facility], [address] [place] [address], [phone], [phone]    2. HTN  Goal BP in DM <130/80  On Entresto   outpt mc/cr ratio  Titration as per primary team     3. HLD  LDL 75 above goal; LDL <70   Continue Atorvastatin 40mg daily    4. Metabolic Acidosis   Anion gap 18 Repeat Lantus   Pt received Lantus as prescribed  Trend Anion gap   Team to r/o other reason for metabolic acidosis  Defer to Sx team    D/w Neuro Sx [id number]     Princess Sanchez  Nurse Practitioner  Division of Endocrinology & Diabetes  In house pager #[phone]    If before 9AM or after 6PM, or on weekends/holidays, please call endocrine answering service for assistance ([phone]).For nonurgent matters email [email or web address] for assistance.    67 yo M with DMII, CAD s/p CABG, CKD, chronic back pain, and HTN presents to the ED with worsening back pain.  He recently came from [facility] about 2 weeks ago and has " multiple medical complaints."  he told his son that he is feeling weak and his back pain is getting worse.   In the ED, his vitals were notable for hyperglycemia, elevated BUN/Scr, and hypomagnesemia. Endocrine called for DM 2, A1c 8.3    1. DM 2  A1c 8.3%  Home Regimen: Ligenta-M 500 (Linagliptin 2.5 and metformin 500mg) 1 tablet once daily, NovoRapid (insulin aspart) 14 units w/ lunch, NovoMix 30 insulin 14 units before breakfast, 14-16 units before dinner    While inpatient  Started on dexamethasone 4mg p.o. q 6 hours: Please notify Endocrine if any changes in steroids as this may require insulin adjustment   BG target 100-180 mg/dl: above goal this am pt eating in between meals. Pt advised to avoid eating overnight and in between meals.   Continue Admelog correctional scale MODERATE dose before meals, continue low dose at bedtime  Increase Lantus to 30 units SQ qH (ok to continue while NPO)  Continue Admelog 12 units SQ TID before meals (Hold if NPO/skips meal); will likely receive first dose in am as pt was npo today   Chek FS before meals and at bedtime; please check 3 am FS   Hypoglycemia protocol    Carb Consistent Diet   Nutrition consult   Provider to RN for diabetes/insulin pen teaching, AB pharmacist following (see pharmacy intervention notes)       Discharge Plan:  Medicaid pending - currently uninsured   Likely stop prior outpatient regimen and continue with mixed insulin (70/30) only  Consider Novolog 70/30: doses TBD close to discharge: before breakfast and before dinner via pen (see pharmacy liaison note: patient can qualify for coupon), so could use insulin PENS if that is easier for patient  Rapid acting insulin should NOT be ordered in conjunction with mixed insulin for risk of hypoglycemia   Ensure he has glucometer, glucose test strips, lancets, alcohol swabs and insulin PEN needles vs syringes   Followup with [facility], [address] [place] [address], [phone], [phone]    2. HTN  Goal BP in DM <130/80  On Entresto   outpt mc/cr ratio  Titration as per primary team     3. HLD  LDL 75 above goal; LDL <70   Continue Atorvastatin 40mg daily    4. Metabolic Acidosis   Anion gap 18   Pt received Lantus as prescribed  Trend Anion gap   Team to r/o other reason for metabolic acidosis  Defer to Sx team and medicine   D/w medical attending     D/w Neuro Sx [id number]     Princess Sanchez  Nurse Practitioner  Division of Endocrinology & Diabetes  In house pager #[phone]    If before 9AM or after 6PM, or on weekends/holidays, please call endocrine answering service for assistance ([phone]).For nonurgent matters email [email or web address] for assistance.    69 yo M with DMII, CAD s/p CABG, CKD, chronic back pain, and HTN presents to the ED with worsening back pain.  He recently came from [facility] about 2 weeks ago and has " multiple medical complaints."  he told his son that he is feeling weak and his back pain is getting worse.   In the ED, his vitals were notable for hyperglycemia, elevated BUN/Scr, and hypomagnesemia. Endocrine called for DM 2, A1c 8.3    1. DM 2  A1c 8.3%  Home Regimen: Ligenta-M 500 (Linagliptin 2.5 and metformin 500mg) 1 tablet once daily, NovoRapid (insulin aspart) 14 units w/ lunch, NovoMix 30 insulin 14 units before breakfast, 14-16 units before dinner    While inpatient  Started on dexamethasone 4mg p.o. q 6 hours: Please notify Endocrine if any changes in steroids as this may require insulin adjustment   BG target 100-180 mg/dl: above goal this am pt eating in between meals. Pt advised to avoid eating overnight and in between meals. Pt didn't receive insulin for dinner yesterday as pt was npo as per RN pt was eating after dinner on 3/4  Continue Admelog correctional scale MODERATE dose before meals, continue low dose at bedtime  Increase Lantus to 30 units SQ qH (ok to continue while NPO)  Continue Admelog 12 units SQ TID before meals (Hold if NPO/skips meal); will likely receive first dose in am as pt was npo today   Chek FS before meals and at bedtime; please check 3 am FS   Hypoglycemia protocol    Carb Consistent Diet   Nutrition consult   Provider to RN for diabetes/insulin pen teaching, AB pharmacist following (see pharmacy intervention notes)       Discharge Plan:  Medicaid pending - currently uninsured   Likely stop prior outpatient regimen and continue with mixed insulin (70/30) only  Consider Novolog 70/30: doses TBD close to discharge: before breakfast and before dinner via pen (see pharmacy liaison note: patient can qualify for coupon), so could use insulin PENS if that is easier for patient  Rapid acting insulin should NOT be ordered in conjunction with mixed insulin for risk of hypoglycemia   Ensure he has glucometer, glucose test strips, lancets, alcohol swabs and insulin PEN needles vs syringes   Followup with [facility], [address] [place] [address], [phone], [phone]    2. HTN  Goal BP in DM <130/80  On Entresto   outpt mc/cr ratio  Titration as per primary team     3. HLD  LDL 75 above goal; LDL <70   Continue Atorvastatin 40mg daily    4. Metabolic Acidosis   Anion gap 18   Pt received Lantus as prescribed  Trend Anion gap   Team to r/o other reason for metabolic acidosis  Defer to Sx team and medicine   D/w medical attending     D/w Neuro Sx [id number]     Princess Sanchez  Nurse Practitioner  Division of Endocrinology & Diabetes  In house pager #[phone]    If before 9AM or after 6PM, or on weekends/holidays, please call endocrine answering service for assistance ([phone]).For nonurgent matters email [email or web address] for assistance.    67 yo M with DMII, CAD s/p CABG, CKD, chronic back pain, and HTN presents to the ED with worsening back pain.  He recently came from [facility] about 2 weeks ago and has " multiple medical complaints."  he told his son that he is feeling weak and his back pain is getting worse.   In the ED, his vitals were notable for hyperglycemia, elevated BUN/Scr, and hypomagnesemia. Endocrine called for DM 2, A1c 8.3    1. DM 2  A1c 8.3%  Home Regimen: Ligenta-M 500 (Linagliptin 2.5 and metformin 500mg) 1 tablet once daily, NovoRapid (insulin aspart) 14 units w/ lunch, NovoMix 30 insulin 14 units before breakfast, 14-16 units before dinner    While inpatient  Started on dexamethasone 4mg p.o. q 6 hours: Please notify Endocrine if any changes in steroids as this may require insulin adjustment   BG target 100-180 mg/dl: above goal this am pt eating in between meals. Pt advised to avoid eating overnight and in between meals. Pt didn't receive insulin for dinner yesterday as pt was npo as per RN pt was eating after dinner on 3/4  Continue Admelog correctional scale MODERATE dose before meals, continue low dose at bedtime  Increase Lantus to 30 units SQ qH (ok to continue while NPO)  Continue Admelog 12 units SQ TID before meals (Hold if NPO/skips meal); will likely receive first dose in am as pt was npo today   Chek FS before meals and at bedtime; please check 3 am FS   Hypoglycemia protocol    Carb Consistent Diet   Nutrition consult   Provider to RN for diabetes/insulin pen teaching, AB pharmacist following (see pharmacy intervention notes)       Discharge Plan:  Medicaid pending - currently uninsured   Likely stop prior outpatient regimen and continue with mixed insulin (70/30) only  Consider Novolog 70/30: doses TBD close to discharge: before breakfast and before dinner via pen (see pharmacy liaison note: patient can qualify for coupon), so could use insulin PENS if that is easier for patient  Rapid acting insulin should NOT be ordered in conjunction with mixed insulin for risk of hypoglycemia   Ensure he has glucometer, glucose test strips, lancets, alcohol swabs and insulin PEN needles vs syringes   Followup with [facility], [address] [place] [address], [phone], [phone]    2. HTN  Goal BP in DM <130/80  On Entresto   outpt mc/cr ratio  Titration as per primary team     3. HLD  LDL 75 above goal; LDL <70   Continue Atorvastatin 40mg daily    4. Metabolic Acidosis   Anion gap 18   Pt received Lantus as prescribed  Trend Anion gap   Team to r/o other reason for metabolic acidosis  Defer to Sx team and medicine   Advised team to repeat labs in afternoon as per medical attending will repeat in am  D/w medical attending     D/w Neuro Sx [id number]     Princess Sanchez  Nurse Practitioner  Division of Endocrinology & Diabetes  In house pager #[phone]    If before 9AM or after 6PM, or on weekends/holidays, please call endocrine answering service for assistance ([phone]).For nonurgent matters email [email or web address] for assistance.

## 2023-03-05 NOTE — PROGRESS NOTE ADULT - ASSESSMENT
3/1; s/p L4-5 TLIF pod #1, HMV drain, requiring bola post op for bp support, albumin given x 1, sicu consulted, neuro exam stable  3/2: POD # 2 S/P L4-5 TLIF, 1 HMV in place. Off phenylepherine  3/3: POD # 3 S/P L4-5 TLIF. Having episodes of chest pain with elevated troponins, cardiology evaluating. Will transfer to telemetry floor, serial CE and EKG. 1 HMV in place  3/4: POD # 4 S/P L4-5 TLIF. No more chest pain. HMV in place. Seen by endocrine for DM management  3/5: POD # 5 S/P L4-5 TLIF. No more chest pain. HMV in place. Increased back pain with radiation to legs, MRI ordered with anesthesia

## 2023-03-05 NOTE — PROGRESS NOTE ADULT - SUBJECTIVE AND OBJECTIVE BOX
[facility] Department of Hospital Medicine  Amanda DO Shauna  Available on MS Teams  Pager: [phone]    Patient is a 68y old  Male who presents with a chief complaint of Back pain (05 Mar 2023 02:33)    Subjective:    VITAL SIGNS:  T(C): 36.7 (03-05-23 @ 08:10), Max: 37.5 (03-04-23 @ 17:40)  T(F): 98 (03-05-23 @ 08:10), Max: 99.5 (03-04-23 @ 17:40)  HR: 91 (03-05-23 @ 08:10) (87 - 100)  BP: 129/57 (03-05-23 @ 08:10) (101/55 - 140/55)  BP(mean): --  RR: 17 (03-05-23 @ 08:10) (17 - 18)  SpO2: 100% (03-05-23 @ 08:10) (94% - 100%)  Wt(kg): --    PHYSICAL EXAM:  Constitutional: WDWN resting comfortably in bed; NAD  Head: NC/AT  Eyes: PERRL, EOMI, anicteric sclera  ENT: no nasal discharge; MMM  Neck: supple; no JVD  Respiratory: CTA B/L; no W/R/R  Cardiac: +S1/S2; RRR; no M/R/G  Gastrointestinal: soft, NT/ND; no rebound or guarding; +BSx4  Extremities: WWP, no clubbing or cyanosis; no peripheral edema  Musculoskeletal: NROM x4; no joint swelling, tenderness or erythema  Vascular: 2+ radial, DP/PT pulses B/L  Dermatologic: skin warm, dry and intact; no rashes, wounds, or scars  Neurologic: AAOx3; CNII-XII grossly intact; no focal deficits  Psychiatric: affect and characteristics of appearance, verbalizations, behaviors are appropriate    MEDICATIONS  (STANDING):  acetaminophen     Tablet .. 975 milliGRAM(s) Oral every 6 hours  aspirin  chewable 81 milliGRAM(s) Oral daily  atorvastatin 40 milliGRAM(s) Oral at bedtime  bisacodyl Suppository 10 milliGRAM(s) Rectal daily  calcitriol   Capsule 0.25 MICROGram(s) Oral daily  calcium acetate 667 milliGRAM(s) Oral with dinner  dexAMETHasone     Tablet 4 milliGRAM(s) Oral every 6 hours  dextrose 50% Injectable 25 Gram(s) IV Push once  dextrose 50% Injectable 12.5 Gram(s) IV Push once  dextrose 50% Injectable 25 Gram(s) IV Push once  furosemide    Tablet 40 milliGRAM(s) Oral daily  gabapentin 300 milliGRAM(s) Oral three times a day  heparin   Injectable 5000 Unit(s) SubCutaneous every 8 hours  insulin glargine Injectable (LANTUS) 27 Unit(s) SubCutaneous at bedtime  insulin lispro (ADMELOG) corrective regimen sliding scale   SubCutaneous three times a day before meals  insulin lispro (ADMELOG) corrective regimen sliding scale   SubCutaneous at bedtime  insulin lispro Injectable (ADMELOG) 12 Unit(s) SubCutaneous three times a day before meals  isosorbide    mononitrate Tablet (ISMO) 30 milliGRAM(s) Oral two times a day  melatonin 6 milliGRAM(s) Oral at bedtime  polyethylene glycol 3350 17 Gram(s) Oral daily  pregabalin 50 milliGRAM(s) Oral two times a day  psyllium Powder 1 Packet(s) Oral two times a day  sacubitril 24 mG/valsartan 26 mG 1 Tablet(s) Oral two times a day  senna 2 Tablet(s) Oral at bedtime  sodium chloride 2 Gram(s) Oral daily  tamsulosin 0.4 milliGRAM(s) Oral at bedtime    MEDICATIONS  (PRN):  dextrose Oral Gel 15 Gram(s) Oral once PRN Blood Glucose LESS THAN 70 milliGRAM(s)/deciliter  HYDROmorphone  Injectable 0.5 milliGRAM(s) IV Push every 4 hours PRN Break through pain  nitroglycerin     SubLingual 0.4 milliGRAM(s) SubLingual every 5 minutes PRN Chest Pain  oxyCODONE    IR 10 milliGRAM(s) Oral every 4 hours PRN Severe Pain (7 - 10)  oxyCODONE    IR 5 milliGRAM(s) Oral every 4 hours PRN Moderate Pain (4 - 6)    LABS:                        9.9    11.86 )-----------( 322      ( 05 Mar 2023 05:40 )             30.6     03-05    129<L>  |  94<L>  |  56<H>  ----------------------------<  220<H>  3.9   |  17<L>  |  2.25<H>    Ca    9.2      05 Mar 2023 05:40  Phos  3.5     03-04  Mg     2.10     03-04          CAPILLARY BLOOD GLUCOSE      POCT Blood Glucose.: 216 mg/dL (05 Mar 2023 08:45)      RADIOLOGY & ADDITIONAL TESTS: Reviewed.         [facility] Department of Hospital Medicine  Amanda Villatoro DO  Available on MS Teams  Pager: [phone]    Patient is a 68y old  Male who presents with a chief complaint of Back pain (05 Mar 2023 02:33)    Subjective:  Pt seen and examined at bedside with family present. Spoke mostly to pt's niece. Pt able to speak simple english, requested to have niece assist with translation. Mostly complains of LLE pain unchanged from prior. Has been NPO today for MRI. Per RN, pt went for MRI yesterday but could not tolerate staying still despite multiple doses of pain medication and study was cancelled. Family concerned about NPO status, want MRI to be done as soon as possible. In general, pt and family say his appetite has been "decent".     VITAL SIGNS:  T(C): 36.7 (03-05-23 @ 08:10), Max: 37.5 (03-04-23 @ 17:40)  T(F): 98 (03-05-23 @ 08:10), Max: 99.5 (03-04-23 @ 17:40)  HR: 91 (03-05-23 @ 08:10) (87 - 100)  BP: 129/57 (03-05-23 @ 08:10) (101/55 - 140/55)  BP(mean): --  RR: 17 (03-05-23 @ 08:10) (17 - 18)  SpO2: 100% (03-05-23 @ 08:10) (94% - 100%)  Wt(kg): --    PHYSICAL EXAM:  Constitutional: WDWN resting comfortably in bed; NAD  Head: NC/AT  Eyes: PERRL, EOMI, anicteric sclera  ENT: no nasal discharge; MMM  Neck: supple; no JVD  Respiratory: CTA B/L; no W/R/R  Cardiac: +S1/S2; RRR; no M/R/G  Gastrointestinal: soft, NT/ND; no rebound or guarding; +BSx4  Extremities: WWP, no clubbing or cyanosis; no peripheral edema  Musculoskeletal: NROM x4; no joint swelling, tenderness or erythema  Vascular: 2+ radial, DP/PT pulses B/L  Dermatologic: skin warm, dry and intact; no rashes, wounds, or scars  Neurologic: AAOx3; CNII-XII grossly intact; no focal deficits  Psychiatric: affect and characteristics of appearance, verbalizations, behaviors are appropriate    MEDICATIONS  (STANDING):  acetaminophen     Tablet .. 975 milliGRAM(s) Oral every 6 hours  aspirin  chewable 81 milliGRAM(s) Oral daily  atorvastatin 40 milliGRAM(s) Oral at bedtime  bisacodyl Suppository 10 milliGRAM(s) Rectal daily  calcitriol   Capsule 0.25 MICROGram(s) Oral daily  calcium acetate 667 milliGRAM(s) Oral with dinner  dexAMETHasone     Tablet 4 milliGRAM(s) Oral every 6 hours  dextrose 50% Injectable 25 Gram(s) IV Push once  dextrose 50% Injectable 12.5 Gram(s) IV Push once  dextrose 50% Injectable 25 Gram(s) IV Push once  furosemide    Tablet 40 milliGRAM(s) Oral daily  gabapentin 300 milliGRAM(s) Oral three times a day  heparin   Injectable 5000 Unit(s) SubCutaneous every 8 hours  insulin glargine Injectable (LANTUS) 27 Unit(s) SubCutaneous at bedtime  insulin lispro (ADMELOG) corrective regimen sliding scale   SubCutaneous three times a day before meals  insulin lispro (ADMELOG) corrective regimen sliding scale   SubCutaneous at bedtime  insulin lispro Injectable (ADMELOG) 12 Unit(s) SubCutaneous three times a day before meals  isosorbide    mononitrate Tablet (ISMO) 30 milliGRAM(s) Oral two times a day  melatonin 6 milliGRAM(s) Oral at bedtime  polyethylene glycol 3350 17 Gram(s) Oral daily  pregabalin 50 milliGRAM(s) Oral two times a day  psyllium Powder 1 Packet(s) Oral two times a day  sacubitril 24 mG/valsartan 26 mG 1 Tablet(s) Oral two times a day  senna 2 Tablet(s) Oral at bedtime  sodium chloride 2 Gram(s) Oral daily  tamsulosin 0.4 milliGRAM(s) Oral at bedtime    MEDICATIONS  (PRN):  dextrose Oral Gel 15 Gram(s) Oral once PRN Blood Glucose LESS THAN 70 milliGRAM(s)/deciliter  HYDROmorphone  Injectable 0.5 milliGRAM(s) IV Push every 4 hours PRN Break through pain  nitroglycerin     SubLingual 0.4 milliGRAM(s) SubLingual every 5 minutes PRN Chest Pain  oxyCODONE    IR 10 milliGRAM(s) Oral every 4 hours PRN Severe Pain (7 - 10)  oxyCODONE    IR 5 milliGRAM(s) Oral every 4 hours PRN Moderate Pain (4 - 6)    LABS:                        9.9    11.86 )-----------( 322      ( 05 Mar 2023 05:40 )             30.6     03-05    129<L>  |  94<L>  |  56<H>  ----------------------------<  220<H>  3.9   |  17<L>  |  2.25<H>    Ca    9.2      05 Mar 2023 05:40  Phos  3.5     03-04  Mg     2.10     03-04          CAPILLARY BLOOD GLUCOSE      POCT Blood Glucose.: 216 mg/dL (05 Mar 2023 08:45)      RADIOLOGY & ADDITIONAL TESTS: Reviewed.

## 2023-03-05 NOTE — PROGRESS NOTE ADULT - PROBLEM SELECTOR PLAN 8
All brand name Polish.  We do not have all of them.  Meds are: Ligenta-M 500 (Linagliptin 2.5 and metoformin 500mg), [place] card MR (trimetazidine hydrochloride 35 mg),  Ecosprin 75 (aspirin 75 mg), Duralax, Ketoalfa (amio acid Keto Analogues 600 mg), Nidocard-Retard (nitroglycerin 2.6 mg), Hypophos (calcium acetate 667 mg), Tamisol D (Tamsulosin 0.4 mg and Dutasteride 0.5 mg), Mydocalm (tolperisone 50 mg), Raditrol (calcitriol 0.25 microgram),  NovoRapid 16 units w/ lunch, Manuel nordisk insulin 16am/14pm, Atova 20 (atorvastatin 20 mg), Nebita 2.5 (nevivolol 2.5 mg), Tamisol (tamsulosin 0.4 mg), Pregaba 50 (pregabalin 50 mg), Febustat 40 (febuxostat 40 mg), Lozixum (lorazapam 1 mg), Lasix 40 mg, Sabitar 50 mg, Anazol 0.1%, Tearfresh

## 2023-03-05 NOTE — PROGRESS NOTE ADULT - SUBJECTIVE AND OBJECTIVE BOX
Chief Complaint: DM 2    History: Pt seen at bedside. Pt tolerating oral diet. Pt denies nausea and vomiting/any signs of hypoglycemia. Pt tolerating oral diet.  Pt deferred translation to family for [facility]    MEDICATIONS  (STANDING):  acetaminophen     Tablet .. 975 milliGRAM(s) Oral every 6 hours  aspirin  chewable 81 milliGRAM(s) Oral daily  atorvastatin 40 milliGRAM(s) Oral at bedtime  bisacodyl Suppository 10 milliGRAM(s) Rectal daily  calcitriol   Capsule 0.25 MICROGram(s) Oral daily  calcium acetate 667 milliGRAM(s) Oral with dinner  cyclobenzaprine 10 milliGRAM(s) Oral every 8 hours  dexAMETHasone     Tablet 4 milliGRAM(s) Oral every 6 hours  dextrose 50% Injectable 25 Gram(s) IV Push once  dextrose 50% Injectable 12.5 Gram(s) IV Push once  dextrose 50% Injectable 25 Gram(s) IV Push once  furosemide    Tablet 40 milliGRAM(s) Oral daily  heparin   Injectable 5000 Unit(s) SubCutaneous every 8 hours  insulin glargine Injectable (LANTUS) 27 Unit(s) SubCutaneous at bedtime  insulin lispro (ADMELOG) corrective regimen sliding scale   SubCutaneous at bedtime  insulin lispro (ADMELOG) corrective regimen sliding scale   SubCutaneous three times a day before meals  insulin lispro Injectable (ADMELOG) 12 Unit(s) SubCutaneous three times a day before meals  isosorbide    mononitrate Tablet (ISMO) 30 milliGRAM(s) Oral two times a day  melatonin 6 milliGRAM(s) Oral at bedtime  polyethylene glycol 3350 17 Gram(s) Oral daily  pregabalin 50 milliGRAM(s) Oral two times a day  psyllium Powder 1 Packet(s) Oral two times a day  sacubitril 24 mG/valsartan 26 mG 1 Tablet(s) Oral two times a day  senna 2 Tablet(s) Oral at bedtime  sodium chloride 2 Gram(s) Oral daily  tamsulosin 0.4 milliGRAM(s) Oral at bedtime    MEDICATIONS  (PRN):  dextrose Oral Gel 15 Gram(s) Oral once PRN Blood Glucose LESS THAN 70 milliGRAM(s)/deciliter  HYDROmorphone   Tablet 2 milliGRAM(s) Oral every 4 hours PRN Moderate Pain (4 - 6)  HYDROmorphone   Tablet 4 milliGRAM(s) Oral every 4 hours PRN Severe Pain (7 - 10)  HYDROmorphone  Injectable 0.5 milliGRAM(s) IV Push every 4 hours PRN Break through pain  nitroglycerin     SubLingual 0.4 milliGRAM(s) SubLingual every 5 minutes PRN Chest Pain      Allergies: No Known Allergies    Review of Systems:  Respiratory: No SOB, no cough  GI: No nausea, vomiting, abdominal pain  Endocrine: no polyuria, polydipsia    PHYSICAL EXAM:  VITALS: T(C): 36.9 (03-05-23 @ 16:19)  T(F): 98.5 (03-05-23 @ 16:19), Max: 99.5 (03-04-23 @ 17:40)  HR: 100 (03-05-23 @ 16:19) (87 - 100)  BP: 119/69 (03-05-23 @ 16:19) (101/55 - 140/55)  RR:  (17 - 18)  SpO2:  (94% - 100%)  Wt(kg): --  GENERAL: NAD, well-groomed, well-developed  RESPIRATORY: No labored breathing   GI: Soft, nontender, non distended  PSYCH: Alert and oriented x 3, normal affect, normal mood      CAPILLARY BLOOD GLUCOSE  POCT Blood Glucose.: 118 mg/dL (05 Mar 2023 12:38)  POCT Blood Glucose.: 216 mg/dL (05 Mar 2023 08:45)  POCT Blood Glucose.: 226 mg/dL (05 Mar 2023 03:08)  POCT Blood Glucose.: 252 mg/dL (04 Mar 2023 21:39)  POCT Blood Glucose.: 164 mg/dL (04 Mar 2023 17:32)    A1C with Estimated Average Glucose (02.22.23 @ 05:13)    A1C with Estimated Average Glucose Result: 8.3    Estimated Average Glucose: 192      03-05    129<L>  |  94<L>  |  56<H>  ----------------------------<  220<H>  3.9   |  17<L>  |  2.25<H>    eGFR: 31<L>    Ca    9.2      03-05  Mg     2.10     03-04  Phos  3.5     03-04        Diet, Regular (03-04-23 @ 17:12) [Active]  Diet, NPO after Midnight:      NPO Start Date: 04-Mar-2023,   NPO Start Time: 23:59  Except Medications (03-04-23 @ 17:12) [Active]

## 2023-03-05 NOTE — CONSULT NOTE ADULT - SUBJECTIVE AND OBJECTIVE BOX
Patient is a 68y old  Male who presents with a chief complaint of Back pain (05 Mar 2023 11:53)      HPI:  69 yo M with DMII, CAD s/p CABG, CKD, chronic back pain, and HTN presents to the ED with worsening back pain. Patient is AAOX2 and somnolent, not able to provide any hx. Most of the information was supplemented by the son. As per son, pt is AAOx 3 and "mentally stable." He recently came from [facility] about 2 weeks ago and has " multiple medical complaints." But this morning he told his son that he is feeling weak and his back pain is getting worse. The pain is localized in the lower back region. It is burning is sensation and constant. Unclear if it radiates down the leg. It worsens with movement and walking. He can only walk "10 meters" with a cane. He is also incontinent of urine but denies any recent fever, chills, bowel incontinence, or lower extremities weakness/motor deficits. He reportedly had MRIs in the past but son does not know what it showed. He also has other chronic medical problems such as CKD, DMII and CAD and his son thinks pt needs management for them as well.   In the ED, his vitals were notable for hyperglycemia, elevated BUN/Scr, and hypomagnesemia. EKG showed bradycardia with T wave changes in the lateral leads.  (21 Feb 2023 18:36)      PAST MEDICAL & SURGICAL HISTORY:  Diabetes mellitus  Essential hypertension  CAD (coronary artery disease)  Chronic kidney disease, unspecified CKD stage  S/P CABG x 2      MEDICATIONS  (STANDING):  acetaminophen     Tablet .. 975 milliGRAM(s) Oral every 6 hours  aspirin  chewable 81 milliGRAM(s) Oral daily  atorvastatin 40 milliGRAM(s) Oral at bedtime  bisacodyl Suppository 10 milliGRAM(s) Rectal daily  calcitriol   Capsule 0.25 MICROGram(s) Oral daily  calcium acetate 667 milliGRAM(s) Oral with dinner  cyclobenzaprine 10 milliGRAM(s) Oral every 8 hours  dexAMETHasone     Tablet 4 milliGRAM(s) Oral every 6 hours  dextrose 50% Injectable 25 Gram(s) IV Push once  dextrose 50% Injectable 12.5 Gram(s) IV Push once  dextrose 50% Injectable 25 Gram(s) IV Push once  furosemide    Tablet 40 milliGRAM(s) Oral daily  heparin   Injectable 5000 Unit(s) SubCutaneous every 8 hours  insulin glargine Injectable (LANTUS) 27 Unit(s) SubCutaneous at bedtime  insulin lispro (ADMELOG) corrective regimen sliding scale   SubCutaneous three times a day before meals  insulin lispro (ADMELOG) corrective regimen sliding scale   SubCutaneous at bedtime  insulin lispro Injectable (ADMELOG) 12 Unit(s) SubCutaneous three times a day before meals  isosorbide    mononitrate Tablet (ISMO) 30 milliGRAM(s) Oral two times a day  melatonin 6 milliGRAM(s) Oral at bedtime  polyethylene glycol 3350 17 Gram(s) Oral daily  pregabalin 50 milliGRAM(s) Oral two times a day  psyllium Powder 1 Packet(s) Oral two times a day  sacubitril 24 mG/valsartan 26 mG 1 Tablet(s) Oral two times a day  senna 2 Tablet(s) Oral at bedtime  sodium chloride 2 Gram(s) Oral daily  tamsulosin 0.4 milliGRAM(s) Oral at bedtime    MEDICATIONS  (PRN):  dextrose Oral Gel 15 Gram(s) Oral once PRN Blood Glucose LESS THAN 70 milliGRAM(s)/deciliter  HYDROmorphone   Tablet 2 milliGRAM(s) Oral every 4 hours PRN Moderate Pain (4 - 6)  HYDROmorphone   Tablet 4 milliGRAM(s) Oral every 4 hours PRN Severe Pain (7 - 10)  HYDROmorphone  Injectable 0.5 milliGRAM(s) IV Push every 4 hours PRN Break through pain  nitroglycerin     SubLingual 0.4 milliGRAM(s) SubLingual every 5 minutes PRN Chest Pain      ICU Vital Signs Last 24 Hrs  T(C): 36.7 (05 Mar 2023 08:10), Max: 37.5 (04 Mar 2023 17:40)  T(F): 98 (05 Mar 2023 08:10), Max: 99.5 (04 Mar 2023 17:40)  HR: 91 (05 Mar 2023 08:10) (87 - 100)  BP: 129/57 (05 Mar 2023 08:10) (101/55 - 140/55)  BP(mean): --  ABP: --  ABP(mean): --  RR: 17 (05 Mar 2023 08:10) (17 - 18)  SpO2: 100% (05 Mar 2023 08:10) (94% - 100%)    O2 Parameters below as of 05 Mar 2023 08:10  Patient On (Oxygen Delivery Method): room air      Vital Signs Last 24 Hrs  T(C): 36.7 (05 Mar 2023 08:10), Max: 37.5 (04 Mar 2023 17:40)  T(F): 98 (05 Mar 2023 08:10), Max: 99.5 (04 Mar 2023 17:40)  HR: 91 (05 Mar 2023 08:10) (87 - 100)  BP: 129/57 (05 Mar 2023 08:10) (101/55 - 140/55)  BP(mean): --  RR: 17 (05 Mar 2023 08:10) (17 - 18)  SpO2: 100% (05 Mar 2023 08:10) (94% - 100%)    Parameters below as of 05 Mar 2023 08:10  Patient On (Oxygen Delivery Method): room air                              9.9    11.86 )-----------( 322      ( 05 Mar 2023 05:40 )             30.6                 COMMENTS/PLAN:  Patient seen at bedside. He is Spanish-speaking and minimally able to communicate using English though patient's niece is present and translating. Recently arrived from [facility] and now POD#5 L4-5 TLIF. Patient states he has low back pain from surgery that "sometimes" goes to either hip/leg, and "sometimes" goes to either knee and either calf. He describes the pain as a cramping, spasms, and squeezing. The pain is relieved is by IV Dilaudid and unsure if Oxycodone helps because it is usually given after the IV pushes. Patient is unsure if muscle relaxants have helped, however, patient has only received 2 doses on March 2 and March 4. Discussed pain medication options with patient and family, at this time patient prefers PRN opioid regimen as he does not like to take medication unless he is required. Patient expressing concerns about potential pain from being moved about too quickly for pending MRI. Discussed pain medication with patient and family and to ask for PO pain medication when pain is 4-5/10. Denies smoking, alcohol and illicit drug use.    Recommendations:  - Consider continuing current orders for Tylenol, Lyrica and IV Dilaudid.  - Consider discontinuing current orders for Gabapentin.  - Consider discontinuing current orders for Flexeril. Instead:  - Consider ordering Flexeril 10mg Q8H standing x 2 days, then PRN. Hold for sedation.  - Consider discontinuing current orders for Oxycodone. Instead:  - Consider ordering PO Dilaudid 2mg Q4H PRN moderate pain and 4mg Q4H PRN severe pain. Hold for sedation. Not to be given within 1 hour of any immediate acting opioid.  - Recommend continuous pulse oximetry.  - Recommend follow up with Chronic Pain doctor when discharged. If patient does not have a Chronic Pain doctor, may acquire one through patient's personal insurance carrier.  Discussed patient with Anesthesia pain attending who agrees with above recommendations. No further recommendations at this time. Pain service will follow-up tomorrow and reevaluate pain control.    [X] [place]  Reviewed and no medications found. Reference #[id number]

## 2023-03-06 ENCOUNTER — APPOINTMENT (OUTPATIENT)
Dept: NEUROSURGERY | Facility: HOSPITAL | Age: 68
End: 2023-03-06

## 2023-03-06 ENCOUNTER — TRANSCRIPTION ENCOUNTER (OUTPATIENT)
Age: 68
End: 2023-03-06

## 2023-03-06 LAB
ANION GAP SERPL CALC-SCNC: 14 MMOL/L — SIGNIFICANT CHANGE UP (ref 7–14)
ANION GAP SERPL CALC-SCNC: 16 MMOL/L — HIGH (ref 7–14)
APTT BLD: 25.7 SEC — LOW (ref 27–36.3)
APTT BLD: 25.9 SEC — LOW (ref 27–36.3)
BLD GP AB SCN SERPL QL: NEGATIVE — SIGNIFICANT CHANGE UP
BUN SERPL-MCNC: 64 MG/DL — HIGH (ref 7–23)
BUN SERPL-MCNC: 70 MG/DL — HIGH (ref 7–23)
CALCIUM SERPL-MCNC: 8.7 MG/DL — SIGNIFICANT CHANGE UP (ref 8.4–10.5)
CALCIUM SERPL-MCNC: 9 MG/DL — SIGNIFICANT CHANGE UP (ref 8.4–10.5)
CHLORIDE SERPL-SCNC: 100 MMOL/L — SIGNIFICANT CHANGE UP (ref 98–107)
CHLORIDE SERPL-SCNC: 97 MMOL/L — LOW (ref 98–107)
CO2 SERPL-SCNC: 15 MMOL/L — LOW (ref 22–31)
CO2 SERPL-SCNC: 17 MMOL/L — LOW (ref 22–31)
CREAT SERPL-MCNC: 2.07 MG/DL — HIGH (ref 0.5–1.3)
CREAT SERPL-MCNC: 2.4 MG/DL — HIGH (ref 0.5–1.3)
EGFR: 28 ML/MIN/1.73M2 — LOW
EGFR: 34 ML/MIN/1.73M2 — LOW
GAS PNL BLDA: SIGNIFICANT CHANGE UP
GLUCOSE BLDC GLUCOMTR-MCNC: 113 MG/DL — HIGH (ref 70–99)
GLUCOSE BLDC GLUCOMTR-MCNC: 158 MG/DL — HIGH (ref 70–99)
GLUCOSE BLDC GLUCOMTR-MCNC: 167 MG/DL — HIGH (ref 70–99)
GLUCOSE BLDC GLUCOMTR-MCNC: 195 MG/DL — HIGH (ref 70–99)
GLUCOSE BLDC GLUCOMTR-MCNC: 196 MG/DL — HIGH (ref 70–99)
GLUCOSE BLDC GLUCOMTR-MCNC: 208 MG/DL — HIGH (ref 70–99)
GLUCOSE BLDC GLUCOMTR-MCNC: 209 MG/DL — HIGH (ref 70–99)
GLUCOSE BLDC GLUCOMTR-MCNC: 309 MG/DL — HIGH (ref 70–99)
GLUCOSE BLDC GLUCOMTR-MCNC: 383 MG/DL — HIGH (ref 70–99)
GLUCOSE BLDC GLUCOMTR-MCNC: 386 MG/DL — HIGH (ref 70–99)
GLUCOSE BLDC GLUCOMTR-MCNC: 393 MG/DL — HIGH (ref 70–99)
GLUCOSE BLDC GLUCOMTR-MCNC: 88 MG/DL — SIGNIFICANT CHANGE UP (ref 70–99)
GLUCOSE SERPL-MCNC: 234 MG/DL — HIGH (ref 70–99)
GLUCOSE SERPL-MCNC: 405 MG/DL — HIGH (ref 70–99)
HCT VFR BLD CALC: 28.4 % — LOW (ref 39–50)
HCT VFR BLD CALC: 28.7 % — LOW (ref 39–50)
HGB BLD-MCNC: 9.3 G/DL — LOW (ref 13–17)
HGB BLD-MCNC: 9.5 G/DL — LOW (ref 13–17)
INR BLD: 1.02 RATIO — SIGNIFICANT CHANGE UP (ref 0.88–1.16)
INR BLD: 1.02 RATIO — SIGNIFICANT CHANGE UP (ref 0.88–1.16)
MAGNESIUM SERPL-MCNC: 2 MG/DL — SIGNIFICANT CHANGE UP (ref 1.6–2.6)
MCHC RBC-ENTMCNC: 30.1 PG — SIGNIFICANT CHANGE UP (ref 27–34)
MCHC RBC-ENTMCNC: 30.1 PG — SIGNIFICANT CHANGE UP (ref 27–34)
MCHC RBC-ENTMCNC: 32.7 GM/DL — SIGNIFICANT CHANGE UP (ref 32–36)
MCHC RBC-ENTMCNC: 33.1 GM/DL — SIGNIFICANT CHANGE UP (ref 32–36)
MCV RBC AUTO: 90.8 FL — SIGNIFICANT CHANGE UP (ref 80–100)
MCV RBC AUTO: 91.9 FL — SIGNIFICANT CHANGE UP (ref 80–100)
NRBC # BLD: 0 /100 WBCS — SIGNIFICANT CHANGE UP (ref 0–0)
NRBC # BLD: 0 /100 WBCS — SIGNIFICANT CHANGE UP (ref 0–0)
NRBC # FLD: 0 K/UL — SIGNIFICANT CHANGE UP (ref 0–0)
NRBC # FLD: 0 K/UL — SIGNIFICANT CHANGE UP (ref 0–0)
PHOSPHATE SERPL-MCNC: 4.6 MG/DL — HIGH (ref 2.5–4.5)
PLATELET # BLD AUTO: 376 K/UL — SIGNIFICANT CHANGE UP (ref 150–400)
PLATELET # BLD AUTO: 398 K/UL — SIGNIFICANT CHANGE UP (ref 150–400)
POTASSIUM SERPL-MCNC: 4.7 MMOL/L — SIGNIFICANT CHANGE UP (ref 3.5–5.3)
POTASSIUM SERPL-MCNC: 4.7 MMOL/L — SIGNIFICANT CHANGE UP (ref 3.5–5.3)
POTASSIUM SERPL-SCNC: 4.7 MMOL/L — SIGNIFICANT CHANGE UP (ref 3.5–5.3)
POTASSIUM SERPL-SCNC: 4.7 MMOL/L — SIGNIFICANT CHANGE UP (ref 3.5–5.3)
PROTHROM AB SERPL-ACNC: 11.8 SEC — SIGNIFICANT CHANGE UP (ref 10.5–13.4)
PROTHROM AB SERPL-ACNC: 11.8 SEC — SIGNIFICANT CHANGE UP (ref 10.5–13.4)
PTH RELATED PROT SERPL-MCNC: <2 PMOL/L — SIGNIFICANT CHANGE UP
RBC # BLD: 3.09 M/UL — LOW (ref 4.2–5.8)
RBC # BLD: 3.16 M/UL — LOW (ref 4.2–5.8)
RBC # FLD: 12.7 % — SIGNIFICANT CHANGE UP (ref 10.3–14.5)
RBC # FLD: 12.7 % — SIGNIFICANT CHANGE UP (ref 10.3–14.5)
RH IG SCN BLD-IMP: POSITIVE — SIGNIFICANT CHANGE UP
SODIUM SERPL-SCNC: 128 MMOL/L — LOW (ref 135–145)
SODIUM SERPL-SCNC: 131 MMOL/L — LOW (ref 135–145)
WBC # BLD: 16.27 K/UL — HIGH (ref 3.8–10.5)
WBC # BLD: 7.8 K/UL — SIGNIFICANT CHANGE UP (ref 3.8–10.5)
WBC # FLD AUTO: 16.27 K/UL — HIGH (ref 3.8–10.5)
WBC # FLD AUTO: 7.8 K/UL — SIGNIFICANT CHANGE UP (ref 3.8–10.5)

## 2023-03-06 PROCEDURE — 99253 IP/OBS CNSLTJ NEW/EST LOW 45: CPT | Mod: 25,24,GC

## 2023-03-06 PROCEDURE — 63048 LAM FACETEC &FORAMOT EA ADDL: CPT | Mod: 78

## 2023-03-06 PROCEDURE — 22614 ARTHRD PST TQ 1NTRSPC EA ADD: CPT | Mod: 78

## 2023-03-06 PROCEDURE — 99233 SBSQ HOSP IP/OBS HIGH 50: CPT

## 2023-03-06 PROCEDURE — 22612 ARTHRD PST TQ 1NTRSPC LUMBAR: CPT | Mod: 78

## 2023-03-06 PROCEDURE — 63047 LAM FACETEC & FORAMOT LUMBAR: CPT | Mod: 78

## 2023-03-06 PROCEDURE — 22842 INSERT SPINE FIXATION DEVICE: CPT | Mod: 78

## 2023-03-06 PROCEDURE — 72131 CT LUMBAR SPINE W/O DYE: CPT | Mod: 26

## 2023-03-06 PROCEDURE — 99232 SBSQ HOSP IP/OBS MODERATE 35: CPT

## 2023-03-06 PROCEDURE — 72100 X-RAY EXAM L-S SPINE 2/3 VWS: CPT | Mod: 26

## 2023-03-06 DEVICE — SURGIFOAM PAD 8CM X 12.5CM X 2MM (100C): Type: IMPLANTABLE DEVICE | Status: FUNCTIONAL

## 2023-03-06 DEVICE — FLOSEAL FAST PREP 10ML: Type: IMPLANTABLE DEVICE | Status: FUNCTIONAL

## 2023-03-06 DEVICE — AVITENE: Type: IMPLANTABLE DEVICE | Status: FUNCTIONAL

## 2023-03-06 DEVICE — SET SCREW SINGLE INNER STRL: Type: IMPLANTABLE DEVICE | Status: FUNCTIONAL

## 2023-03-06 DEVICE — SCREW VERSE CFX FENESTRATED 7X50MM: Type: IMPLANTABLE DEVICE | Status: FUNCTIONAL

## 2023-03-06 DEVICE — IMPLANTABLE DEVICE: Type: IMPLANTABLE DEVICE | Status: FUNCTIONAL

## 2023-03-06 DEVICE — GRAFT BONE INFUSE KIT LG: Type: IMPLANTABLE DEVICE | Status: FUNCTIONAL

## 2023-03-06 DEVICE — CMT BN SPINE CONFIDENCE 11ML: Type: IMPLANTABLE DEVICE | Status: FUNCTIONAL

## 2023-03-06 RX ORDER — INSULIN LISPRO 100/ML
14 VIAL (ML) SUBCUTANEOUS
Refills: 0 | Status: DISCONTINUED | OUTPATIENT
Start: 2023-03-06 | End: 2023-03-06

## 2023-03-06 RX ORDER — INSULIN GLARGINE 100 [IU]/ML
38 INJECTION, SOLUTION SUBCUTANEOUS AT BEDTIME
Refills: 0 | Status: DISCONTINUED | OUTPATIENT
Start: 2023-03-06 | End: 2023-03-06

## 2023-03-06 RX ORDER — CHLORHEXIDINE GLUCONATE 213 G/1000ML
1 SOLUTION TOPICAL DAILY
Refills: 0 | Status: DISCONTINUED | OUTPATIENT
Start: 2023-03-06 | End: 2023-03-07

## 2023-03-06 RX ORDER — ACETAMINOPHEN 500 MG
1000 TABLET ORAL ONCE
Refills: 0 | Status: COMPLETED | OUTPATIENT
Start: 2023-03-07 | End: 2023-03-07

## 2023-03-06 RX ORDER — ACETAMINOPHEN 500 MG
1000 TABLET ORAL ONCE
Refills: 0 | Status: COMPLETED | OUTPATIENT
Start: 2023-03-06 | End: 2023-03-06

## 2023-03-06 RX ORDER — HYDROMORPHONE HYDROCHLORIDE 2 MG/ML
0.5 INJECTION INTRAMUSCULAR; INTRAVENOUS; SUBCUTANEOUS EVERY 4 HOURS
Refills: 0 | Status: DISCONTINUED | OUTPATIENT
Start: 2023-03-06 | End: 2023-03-07

## 2023-03-06 RX ORDER — HYDROMORPHONE HYDROCHLORIDE 2 MG/ML
1 INJECTION INTRAMUSCULAR; INTRAVENOUS; SUBCUTANEOUS EVERY 4 HOURS
Refills: 0 | Status: DISCONTINUED | OUTPATIENT
Start: 2023-03-06 | End: 2023-03-07

## 2023-03-06 RX ORDER — CEFAZOLIN SODIUM 1 G
2000 VIAL (EA) INJECTION EVERY 8 HOURS
Refills: 0 | Status: COMPLETED | OUTPATIENT
Start: 2023-03-06 | End: 2023-03-07

## 2023-03-06 RX ORDER — INSULIN LISPRO 100/ML
VIAL (ML) SUBCUTANEOUS EVERY 6 HOURS
Refills: 0 | Status: DISCONTINUED | OUTPATIENT
Start: 2023-03-06 | End: 2023-03-06

## 2023-03-06 RX ORDER — SODIUM CHLORIDE 9 MG/ML
1000 INJECTION INTRAMUSCULAR; INTRAVENOUS; SUBCUTANEOUS
Refills: 0 | Status: DISCONTINUED | OUTPATIENT
Start: 2023-03-06 | End: 2023-03-07

## 2023-03-06 RX ORDER — INSULIN HUMAN 100 [IU]/ML
3 INJECTION, SOLUTION SUBCUTANEOUS
Qty: 100 | Refills: 0 | Status: DISCONTINUED | OUTPATIENT
Start: 2023-03-06 | End: 2023-03-07

## 2023-03-06 RX ORDER — INSULIN GLARGINE 100 [IU]/ML
40 INJECTION, SOLUTION SUBCUTANEOUS AT BEDTIME
Refills: 0 | Status: DISCONTINUED | OUTPATIENT
Start: 2023-03-06 | End: 2023-03-06

## 2023-03-06 RX ORDER — INSULIN HUMAN 100 [IU]/ML
4 INJECTION, SOLUTION SUBCUTANEOUS
Qty: 100 | Refills: 0 | Status: DISCONTINUED | OUTPATIENT
Start: 2023-03-06 | End: 2023-03-06

## 2023-03-06 RX ADMIN — Medication 50 MILLIGRAM(S): at 06:35

## 2023-03-06 RX ADMIN — Medication 1000 MILLIGRAM(S): at 21:00

## 2023-03-06 RX ADMIN — INSULIN HUMAN 3 UNIT(S)/HR: 100 INJECTION, SOLUTION SUBCUTANEOUS at 23:07

## 2023-03-06 RX ADMIN — Medication 40 MILLIGRAM(S): at 06:37

## 2023-03-06 RX ADMIN — SODIUM CHLORIDE 75 MILLILITER(S): 9 INJECTION INTRAMUSCULAR; INTRAVENOUS; SUBCUTANEOUS at 19:40

## 2023-03-06 RX ADMIN — HYDROMORPHONE HYDROCHLORIDE 0.5 MILLIGRAM(S): 2 INJECTION INTRAMUSCULAR; INTRAVENOUS; SUBCUTANEOUS at 21:30

## 2023-03-06 RX ADMIN — Medication 975 MILLIGRAM(S): at 00:26

## 2023-03-06 RX ADMIN — Medication 975 MILLIGRAM(S): at 06:34

## 2023-03-06 RX ADMIN — SACUBITRIL AND VALSARTAN 1 TABLET(S): 24; 26 TABLET, FILM COATED ORAL at 06:36

## 2023-03-06 RX ADMIN — HYDROMORPHONE HYDROCHLORIDE 0.5 MILLIGRAM(S): 2 INJECTION INTRAMUSCULAR; INTRAVENOUS; SUBCUTANEOUS at 21:12

## 2023-03-06 RX ADMIN — Medication 400 MILLIGRAM(S): at 20:30

## 2023-03-06 RX ADMIN — INSULIN HUMAN 4 UNIT(S)/HR: 100 INJECTION, SOLUTION SUBCUTANEOUS at 19:40

## 2023-03-06 RX ADMIN — Medication 1 PACKET(S): at 06:36

## 2023-03-06 RX ADMIN — Medication 100 MILLIGRAM(S): at 21:13

## 2023-03-06 RX ADMIN — Medication 10: at 09:22

## 2023-03-06 RX ADMIN — CYCLOBENZAPRINE HYDROCHLORIDE 10 MILLIGRAM(S): 10 TABLET, FILM COATED ORAL at 06:37

## 2023-03-06 RX ADMIN — Medication 4 MILLIGRAM(S): at 06:36

## 2023-03-06 NOTE — PROGRESS NOTE ADULT - ASSESSMENT
68M Tajik speaking, HTN, DM2 on insulin, CKD3, systolic HF (EF 45%), CAD s/p CABG 2005, RSA s/p L renal stent, and PAD s/p PTA to R common iliac and L common iliac s/p stent who presents to the ED with worsening back pain in context of known L4-L5 cord compression/cauda equina since 8/2022. now s/p procedure on requiring SICU stay for pressors not stable for floor. Medicine following for comanagement.

## 2023-03-06 NOTE — CONSULT NOTE ADULT - SUBJECTIVE AND OBJECTIVE BOX
SICU Consultation Note  =====================================================  HPI: 68y Male  68M DM2, CAD s/p CABG, CKD, chronic back pain, and HTN p/w worsening back pain, found to have spondylolisthesis of lumbar spine. Patient s/p L4-L5 TLIF on 2/28/23. Briefly admitted to SICU post-operatively for pressor requirements. Patient transferred to floor. Patient complaining of increased back pain. On 3/5 MRI of spine showed ventral epidural collection. Patient taken to OR on 3/6. Patient is now s/p .........  SICU consulted for insulin gtt management and q1hr neuro checks.      Surgery Information  OR time:      EBL:          IV Fluids:       Blood Products:   UOP:          PAST MEDICAL & SURGICAL HISTORY:  Diabetes mellitus      Essential hypertension      CAD (coronary artery disease)      Chronic kidney disease, unspecified CKD stage      S/P CABG x 2        Home Meds: Home Medications:  aspirin 81 mg oral delayed release tablet: 1 tab(s) orally once a day (22 Feb 2023 12:06)  atorvastatin 20 mg oral tablet: 1 tab(s) orally once a day (at bedtime) (22 Feb 2023 12:10)  bisacodyl 5 mg oral delayed release tablet: 1 tab(s) orally once a day (22 Feb 2023 12:06)  calcitriol 0.25 mcg oral capsule: 1 cap(s) orally once a day (22 Feb 2023 12:08)  calcium acetate 667 mg oral tablet: 1 tab(s) orally 3 times a day (22 Feb 2023 12:07)  dutasteride-tamsulosin 0.5 mg-0.4 mg oral capsule: 1 cap(s) orally once a day (22 Feb 2023 12:08)  febuxostat 40 mg oral tablet: 1 tab(s) orally once a day (22 Feb 2023 12:11)  Insulin Aspart FlexPen 100 units/mL injectable solution: 16 unit(s) injectable before lunch (22 Feb 2023 12:09)  Lasix 40 mg oral tablet: 1 tab(s) orally once a day (22 Feb 2023 12:11)  linagliptin-metformin 2.5 mg-500 mg oral tablet: 1 tab(s) orally 2 times a day (22 Feb 2023 12:05)  LORazepam 1 mg oral tablet: 1 tab(s) orally once a day (22 Feb 2023 12:11)  Nebivolol 2.5 mg oral tablet: 1 tab(s) orally once a day (22 Feb 2023 12:10)  nitroglycerin 2.5 mg oral capsule, extended release: 1 cap(s) orally 3 times a day (22 Feb 2023 12:07)  pregabalin 50 mg oral capsule: 1 cap(s) orally 2 times a day (22 Feb 2023 12:10)  Refresh ophthalmic solution: 1 drop(s) to each affected eye 4 times a day (22 Feb 2023 12:12)  sacubitril-valsartan 24 mg-26 mg oral tablet: 1 tab(s) orally 2 times a day (22 Feb 2023 12:12)  xylometazoline 0.1% nasal solution: nasal 2 times a day (22 Feb 2023 12:12)    Allergies: Allergies    No Known Allergies    Intolerances      Soc:   Advanced Directives: Presumed Full Code     ROS:    REVIEW OF SYSTEMS    [ ] A ten-point review of systems was otherwise negative except as noted.  [ ] Due to altered mental status/intubation, subjective information were not able to be obtained from the patient. History was obtained, to the extent possible, from review of the chart and collateral sources of information.      CURRENT MEDICATIONS:   --------------------------------------------------------------------------------------  Neurologic Medications  acetaminophen     Tablet .. 975 milliGRAM(s) Oral every 6 hours  cyclobenzaprine 10 milliGRAM(s) Oral every 8 hours  HYDROmorphone   Tablet 2 milliGRAM(s) Oral every 4 hours PRN Moderate Pain (4 - 6)  HYDROmorphone   Tablet 4 milliGRAM(s) Oral every 4 hours PRN Severe Pain (7 - 10)  HYDROmorphone  Injectable 0.5 milliGRAM(s) IV Push every 4 hours PRN Break through pain  melatonin 6 milliGRAM(s) Oral at bedtime  pregabalin 50 milliGRAM(s) Oral two times a day    Respiratory Medications    Cardiovascular Medications  isosorbide    mononitrate Tablet (ISMO) 30 milliGRAM(s) Oral two times a day  nitroglycerin     SubLingual 0.4 milliGRAM(s) SubLingual every 5 minutes PRN Chest Pain  sacubitril 24 mG/valsartan 26 mG 1 Tablet(s) Oral two times a day    Gastrointestinal Medications  bisacodyl Suppository 10 milliGRAM(s) Rectal daily  calcitriol   Capsule 0.25 MICROGram(s) Oral daily  calcium acetate 667 milliGRAM(s) Oral with dinner  polyethylene glycol 3350 17 Gram(s) Oral daily  psyllium Powder 1 Packet(s) Oral two times a day  senna 2 Tablet(s) Oral at bedtime  sodium chloride 0.9%. 1000 milliLiter(s) IV Continuous <Continuous>    Genitourinary Medications  tamsulosin 0.4 milliGRAM(s) Oral at bedtime    Hematologic/Oncologic Medications    Antimicrobial/Immunologic Medications    Endocrine/Metabolic Medications  atorvastatin 40 milliGRAM(s) Oral at bedtime  dexAMETHasone     Tablet 4 milliGRAM(s) Oral every 6 hours    Topical/Other Medications  chlorhexidine 2% Cloths 1 Application(s) Topical daily    --------------------------------------------------------------------------------------    VITAL SIGNS, INS/OUTS (last 24 hours):  --------------------------------------------------------------------------------------  ICU Vital Signs Last 24 Hrs  T(C): 36.7 (06 Mar 2023 11:41), Max: 36.9 (05 Mar 2023 16:19)  T(F): 98 (06 Mar 2023 11:41), Max: 98.5 (05 Mar 2023 16:19)  HR: 80 (06 Mar 2023 11:41) (80 - 129)  BP: 117/54 (06 Mar 2023 11:41) (88/52 - 131/75)  BP(mean): 61 (05 Mar 2023 20:15) (54 - 88)  ABP: --  ABP(mean): --  RR: 16 (06 Mar 2023 11:41) (14 - 20)  SpO2: 98% (06 Mar 2023 11:41) (94% - 100%)    O2 Parameters below as of 06 Mar 2023 10:48  Patient On (Oxygen Delivery Method): room air          I&O's Summary    05 Mar 2023 07:01  -  06 Mar 2023 07:00  --------------------------------------------------------  IN: 300 mL / OUT: 2375 mL / NET: -2075 mL      --------------------------------------------------------------------------------------    EXAM:  General/Neuro  RASS:   GCS:   Exam: Normal, NAD, alert, oriented x 3, no focal deficits. PERRLA  ***    Respiratory  Exam: Lungs clear to auscultation, Normal expansion/effort.  ***  [] Tracheostomy   [] Intubated  Mechanical Ventilation:     Cardiovascular  Exam: S1, S2.  Regular rate and rhythm.  Peripheral edema  ***  Cardiac Rhythm: Normal Sinus Rhythm  ECHO:     GI  Exam: Abdomen soft, Non-tender, Non-distended.  Gastrostomy / Jejunostomy tube in place.  Nasogastric tube in place.  Colostomy / Ileostomy.  ***  Wound:   ***  Current Diet:  NPO***      Tubes/Lines/Drains  ***  [x] Peripheral IV  [] Central Venous Line     	[] R	[] L	[] IJ	[] Fem	[] SC        Type:	    Date Placed:   [] Arterial Line		[] R	[] L	[] Fem	[] Rad	[] Ax	Date Placed:   [] PICC:         	[] Midline		[] Mediport           [] Urinary Catheter		Date Placed:     Extremities  Exam: Extremities warm, pink, well-perfused.        Derm:  Exam: Good skin turgor, no skin breakdown.      :   Exam: Millard catheter in place.     LABS  --------------------------------------------------------------------------------------  Labs:  CAPILLARY BLOOD GLUCOSE      POCT Blood Glucose.: 386 mg/dL (06 Mar 2023 10:02)  POCT Blood Glucose.: 383 mg/dL (06 Mar 2023 08:18)  POCT Blood Glucose.: 393 mg/dL (06 Mar 2023 08:16)  POCT Blood Glucose.: 224 mg/dL (05 Mar 2023 23:24)  POCT Blood Glucose.: 220 mg/dL (05 Mar 2023 22:01)  POCT Blood Glucose.: 193 mg/dL (05 Mar 2023 20:40)  POCT Blood Glucose.: 182 mg/dL (05 Mar 2023 19:46)                          9.3    7.80  )-----------( 376      ( 06 Mar 2023 07:41 )             28.4         03-06    128<L>  |  97<L>  |  70<H>  ----------------------------<  405<H>  4.7   |  15<L>  |  2.40<H>      Calcium, Total Serum: 9.0 mg/dL (03-06-23 @ 07:41)      LFTs:     Blood Gas Arterial, Lactate: 1.7 mmol/L (03-06-23 @ 15:50)  Blood Gas Arterial, Lactate: 1.8 mmol/L (03-06-23 @ 15:06)  Blood Gas Arterial, Lactate: 1.5 mmol/L (03-06-23 @ 14:06)  Blood Gas Arterial, Lactate: 1.2 mmol/L (03-06-23 @ 12:58)  Blood Gas Venous - Lactate: 1.5 mmol/L (03-04-23 @ 17:35)    ABG - ( 06 Mar 2023 15:50 )  pH: 7.26  /  pCO2: 42    /  pO2: 330   / HCO3: 19    / Base Excess: -7.8  /  SaO2: 98.0            ABG - ( 06 Mar 2023 15:06 )  pH: 7.30  /  pCO2: 38    /  pO2: 309   / HCO3: 19    / Base Excess: -7.1  /  SaO2: 97.4            ABG - ( 06 Mar 2023 14:06 )  pH: 7.27  /  pCO2: 42    /  pO2: 298   / HCO3: 19    / Base Excess: -7.2  /  SaO2: 97.9              Coags:     11.8   ----< 1.02    ( 06 Mar 2023 08:20 )     25.7          IMAGING RESULTS  < from: MR Lumbar Spine No Cont (03.05.23 @ 21:09) >  ACC: [id number] EXAM:  MR SPINE LUMBAR   ORDERED BY: BETHEL JOSE     PROCEDURE DATE:  03/05/2023          INTERPRETATION:  CLINICAL INFORMATION: Status post lumbar spine surgery.    MRI of the lumbar spine was performed sagittal T1-T2 and STIR sequence.   Axial T2 and coronal T2-weighted sequences were performed.    This postop exam is limited by lack of IV contrast.    This exam is also limited by patient's motion and positioning. This exam   is of poor diagnostic quality.    This exam is compared with prior CT scan of the lumbar spine performed on   March 1, 2023.    Postoperative changes compatible with L4-5 discectomy and posterior   spinal fusion.    Loss of the normal lumbar lordosis is identified which could be due to   positioning muscle spasm.    Grade 1 anterolisthesis involving L4 on L5.    Ventral epidural collection is seen involving the L4 and L5 levels   (11-15) this epidural collection could be due to postop changes though   the possibility of underlying epidural abscess in the correct clinical   setting cannot be entirely excluded. If clinical concern for underlying   infection continues contrast enhanced MRI lumbar spine is recommended.    T11-12: Bilateral hypertrophic facet joint changes. Moderate narrowing of   the right neural foramen is seen.    T12-L1: Bilateral hypertrophic facet joint changes. Moderate narrowing of   left neural foramen    L1-2: Disc bulge and bilateral hypertrophic facet joint changes. No   significant compromise of the spinal canal or either neural foramen    L2-3: Bilateral hypertrophic facet joint changes. Mild narrowing of the   spinal canal and both neural foramen    L3-4: Disc bulge and bilateral hypertrophic facet joint changes. Mild   narrowing of the spinal canal and both neural foramen.    L4-5: Axial imaging through this level is limited by artifact and motion.    L5-S1: Disc bulge and bilateral hypertrophic facet changes. Mild to   moderate spinal canal and both neural foramen    The conus ends at L1 and appears normal    IMPRESSION: Limited, poorly diagnostic exam as described above.    Postop changes are identified. Ventral epidural collection is identified   as described above. Please note this could be related postop changes   though the possibility of underlying epidural abscess cannot be entirely   exclude in the correct clinical setting. Contrast enhanced MR lumbar of   the spine can be done for further evaluation.    --- End of Report ---      < end of copied text >               SICU Consultation Note  =====================================================  HPI: 68y Male  68M DM2, CAD s/p CABG, CKD, chronic back pain, and HTN p/w worsening back pain, found to have spondylolisthesis of lumbar spine. Patient s/p L4-L5 TLIF on 2/28/23. Briefly admitted to SICU post-operatively for pressor requirements. Patient transferred to floor. Patient complaining of increased back pain. On 3/5 MRI of spine showed ventral epidural collection. Patient taken to OR on 3/6 for re-exploration of lumbar spine incision. L4 screws were found to have fractured the pedicles. Screws were removed and fusion extended to L3 with cannulated screws. L5 screws were upsized. Cement augmentation. .  SICU consulted for insulin gtt management and q1hr neuro checks.      Surgery Information  OR time:      EBL:          IV Fluids:       Blood Products:   UOP:          PAST MEDICAL & SURGICAL HISTORY:  Diabetes mellitus      Essential hypertension      CAD (coronary artery disease)      Chronic kidney disease, unspecified CKD stage      S/P CABG x 2        Home Meds: Home Medications:  aspirin 81 mg oral delayed release tablet: 1 tab(s) orally once a day (22 Feb 2023 12:06)  atorvastatin 20 mg oral tablet: 1 tab(s) orally once a day (at bedtime) (22 Feb 2023 12:10)  bisacodyl 5 mg oral delayed release tablet: 1 tab(s) orally once a day (22 Feb 2023 12:06)  calcitriol 0.25 mcg oral capsule: 1 cap(s) orally once a day (22 Feb 2023 12:08)  calcium acetate 667 mg oral tablet: 1 tab(s) orally 3 times a day (22 Feb 2023 12:07)  dutasteride-tamsulosin 0.5 mg-0.4 mg oral capsule: 1 cap(s) orally once a day (22 Feb 2023 12:08)  febuxostat 40 mg oral tablet: 1 tab(s) orally once a day (22 Feb 2023 12:11)  Insulin Aspart FlexPen 100 units/mL injectable solution: 16 unit(s) injectable before lunch (22 Feb 2023 12:09)  Lasix 40 mg oral tablet: 1 tab(s) orally once a day (22 Feb 2023 12:11)  linagliptin-metformin 2.5 mg-500 mg oral tablet: 1 tab(s) orally 2 times a day (22 Feb 2023 12:05)  LORazepam 1 mg oral tablet: 1 tab(s) orally once a day (22 Feb 2023 12:11)  Nebivolol 2.5 mg oral tablet: 1 tab(s) orally once a day (22 Feb 2023 12:10)  nitroglycerin 2.5 mg oral capsule, extended release: 1 cap(s) orally 3 times a day (22 Feb 2023 12:07)  pregabalin 50 mg oral capsule: 1 cap(s) orally 2 times a day (22 Feb 2023 12:10)  Refresh ophthalmic solution: 1 drop(s) to each affected eye 4 times a day (22 Feb 2023 12:12)  sacubitril-valsartan 24 mg-26 mg oral tablet: 1 tab(s) orally 2 times a day (22 Feb 2023 12:12)  xylometazoline 0.1% nasal solution: nasal 2 times a day (22 Feb 2023 12:12)    Allergies: Allergies    No Known Allergies    Intolerances      Soc:   Advanced Directives: Presumed Full Code     ROS:    REVIEW OF SYSTEMS    [ ] A ten-point review of systems was otherwise negative except as noted.  [ ] Due to altered mental status/intubation, subjective information were not able to be obtained from the patient. History was obtained, to the extent possible, from review of the chart and collateral sources of information.      CURRENT MEDICATIONS:   --------------------------------------------------------------------------------------  Neurologic Medications  acetaminophen     Tablet .. 975 milliGRAM(s) Oral every 6 hours  cyclobenzaprine 10 milliGRAM(s) Oral every 8 hours  HYDROmorphone   Tablet 2 milliGRAM(s) Oral every 4 hours PRN Moderate Pain (4 - 6)  HYDROmorphone   Tablet 4 milliGRAM(s) Oral every 4 hours PRN Severe Pain (7 - 10)  HYDROmorphone  Injectable 0.5 milliGRAM(s) IV Push every 4 hours PRN Break through pain  melatonin 6 milliGRAM(s) Oral at bedtime  pregabalin 50 milliGRAM(s) Oral two times a day    Respiratory Medications    Cardiovascular Medications  isosorbide    mononitrate Tablet (ISMO) 30 milliGRAM(s) Oral two times a day  nitroglycerin     SubLingual 0.4 milliGRAM(s) SubLingual every 5 minutes PRN Chest Pain  sacubitril 24 mG/valsartan 26 mG 1 Tablet(s) Oral two times a day    Gastrointestinal Medications  bisacodyl Suppository 10 milliGRAM(s) Rectal daily  calcitriol   Capsule 0.25 MICROGram(s) Oral daily  calcium acetate 667 milliGRAM(s) Oral with dinner  polyethylene glycol 3350 17 Gram(s) Oral daily  psyllium Powder 1 Packet(s) Oral two times a day  senna 2 Tablet(s) Oral at bedtime  sodium chloride 0.9%. 1000 milliLiter(s) IV Continuous <Continuous>    Genitourinary Medications  tamsulosin 0.4 milliGRAM(s) Oral at bedtime    Hematologic/Oncologic Medications    Antimicrobial/Immunologic Medications    Endocrine/Metabolic Medications  atorvastatin 40 milliGRAM(s) Oral at bedtime  dexAMETHasone     Tablet 4 milliGRAM(s) Oral every 6 hours    Topical/Other Medications  chlorhexidine 2% Cloths 1 Application(s) Topical daily    --------------------------------------------------------------------------------------    VITAL SIGNS, INS/OUTS (last 24 hours):  --------------------------------------------------------------------------------------  ICU Vital Signs Last 24 Hrs  T(C): 36.7 (06 Mar 2023 11:41), Max: 36.9 (05 Mar 2023 16:19)  T(F): 98 (06 Mar 2023 11:41), Max: 98.5 (05 Mar 2023 16:19)  HR: 80 (06 Mar 2023 11:41) (80 - 129)  BP: 117/54 (06 Mar 2023 11:41) (88/52 - 131/75)  BP(mean): 61 (05 Mar 2023 20:15) (54 - 88)  ABP: --  ABP(mean): --  RR: 16 (06 Mar 2023 11:41) (14 - 20)  SpO2: 98% (06 Mar 2023 11:41) (94% - 100%)    O2 Parameters below as of 06 Mar 2023 10:48  Patient On (Oxygen Delivery Method): room air          I&O's Summary    05 Mar 2023 07:01  -  06 Mar 2023 07:00  --------------------------------------------------------  IN: 300 mL / OUT: 2375 mL / NET: -2075 mL      --------------------------------------------------------------------------------------    EXAM:  General/Neuro  RASS:   GCS:   Exam: Normal, NAD, alert, oriented x 3, no focal deficits. PERRLA  ***    Respiratory  Exam: Lungs clear to auscultation, Normal expansion/effort.  ***  [] Tracheostomy   [] Intubated  Mechanical Ventilation:     Cardiovascular  Exam: S1, S2.  Regular rate and rhythm.  Peripheral edema  ***  Cardiac Rhythm: Normal Sinus Rhythm  ECHO:     GI  Exam: Abdomen soft, Non-tender, Non-distended.  Gastrostomy / Jejunostomy tube in place.  Nasogastric tube in place.  Colostomy / Ileostomy.  ***  Wound:   ***  Current Diet:  NPO***      Tubes/Lines/Drains  ***  [x] Peripheral IV  [] Central Venous Line     	[] R	[] L	[] IJ	[] Fem	[] SC        Type:	    Date Placed:   [] Arterial Line		[] R	[] L	[] Fem	[] Rad	[] Ax	Date Placed:   [] PICC:         	[] Midline		[] Mediport           [] Urinary Catheter		Date Placed:     Extremities  Exam: Extremities warm, pink, well-perfused.        Derm:  Exam: Good skin turgor, no skin breakdown.      :   Exam: Millard catheter in place.     LABS  --------------------------------------------------------------------------------------  Labs:  CAPILLARY BLOOD GLUCOSE      POCT Blood Glucose.: 386 mg/dL (06 Mar 2023 10:02)  POCT Blood Glucose.: 383 mg/dL (06 Mar 2023 08:18)  POCT Blood Glucose.: 393 mg/dL (06 Mar 2023 08:16)  POCT Blood Glucose.: 224 mg/dL (05 Mar 2023 23:24)  POCT Blood Glucose.: 220 mg/dL (05 Mar 2023 22:01)  POCT Blood Glucose.: 193 mg/dL (05 Mar 2023 20:40)  POCT Blood Glucose.: 182 mg/dL (05 Mar 2023 19:46)                          9.3    7.80  )-----------( 376      ( 06 Mar 2023 07:41 )             28.4         03-06    128<L>  |  97<L>  |  70<H>  ----------------------------<  405<H>  4.7   |  15<L>  |  2.40<H>      Calcium, Total Serum: 9.0 mg/dL (03-06-23 @ 07:41)      LFTs:     Blood Gas Arterial, Lactate: 1.7 mmol/L (03-06-23 @ 15:50)  Blood Gas Arterial, Lactate: 1.8 mmol/L (03-06-23 @ 15:06)  Blood Gas Arterial, Lactate: 1.5 mmol/L (03-06-23 @ 14:06)  Blood Gas Arterial, Lactate: 1.2 mmol/L (03-06-23 @ 12:58)  Blood Gas Venous - Lactate: 1.5 mmol/L (03-04-23 @ 17:35)    ABG - ( 06 Mar 2023 15:50 )  pH: 7.26  /  pCO2: 42    /  pO2: 330   / HCO3: 19    / Base Excess: -7.8  /  SaO2: 98.0            ABG - ( 06 Mar 2023 15:06 )  pH: 7.30  /  pCO2: 38    /  pO2: 309   / HCO3: 19    / Base Excess: -7.1  /  SaO2: 97.4            ABG - ( 06 Mar 2023 14:06 )  pH: 7.27  /  pCO2: 42    /  pO2: 298   / HCO3: 19    / Base Excess: -7.2  /  SaO2: 97.9              Coags:     11.8   ----< 1.02    ( 06 Mar 2023 08:20 )     25.7          IMAGING RESULTS  < from: MR Lumbar Spine No Cont (03.05.23 @ 21:09) >  ACC: [id number] EXAM:  MR SPINE LUMBAR   ORDERED BY: BETHEL JOSE     PROCEDURE DATE:  03/05/2023          INTERPRETATION:  CLINICAL INFORMATION: Status post lumbar spine surgery.    MRI of the lumbar spine was performed sagittal T1-T2 and STIR sequence.   Axial T2 and coronal T2-weighted sequences were performed.    This postop exam is limited by lack of IV contrast.    This exam is also limited by patient's motion and positioning. This exam   is of poor diagnostic quality.    This exam is compared with prior CT scan of the lumbar spine performed on   March 1, 2023.    Postoperative changes compatible with L4-5 discectomy and posterior   spinal fusion.    Loss of the normal lumbar lordosis is identified which could be due to   positioning muscle spasm.    Grade 1 anterolisthesis involving L4 on L5.    Ventral epidural collection is seen involving the L4 and L5 levels   (11-15) this epidural collection could be due to postop changes though   the possibility of underlying epidural abscess in the correct clinical   setting cannot be entirely excluded. If clinical concern for underlying   infection continues contrast enhanced MRI lumbar spine is recommended.    T11-12: Bilateral hypertrophic facet joint changes. Moderate narrowing of   the right neural foramen is seen.    T12-L1: Bilateral hypertrophic facet joint changes. Moderate narrowing of   left neural foramen    L1-2: Disc bulge and bilateral hypertrophic facet joint changes. No   significant compromise of the spinal canal or either neural foramen    L2-3: Bilateral hypertrophic facet joint changes. Mild narrowing of the   spinal canal and both neural foramen    L3-4: Disc bulge and bilateral hypertrophic facet joint changes. Mild   narrowing of the spinal canal and both neural foramen.    L4-5: Axial imaging through this level is limited by artifact and motion.    L5-S1: Disc bulge and bilateral hypertrophic facet changes. Mild to   moderate spinal canal and both neural foramen    The conus ends at L1 and appears normal    IMPRESSION: Limited, poorly diagnostic exam as described above.    Postop changes are identified. Ventral epidural collection is identified   as described above. Please note this could be related postop changes   though the possibility of underlying epidural abscess cannot be entirely   exclude in the correct clinical setting. Contrast enhanced MR lumbar of   the spine can be done for further evaluation.    --- End of Report ---      < end of copied text >

## 2023-03-06 NOTE — PROGRESS NOTE ADULT - PROBLEM SELECTOR PLAN 7
HbA1c of 8.3%, at home on insulin 14 units with lunch and 14-16 BID of mixed insulin + orals  - Fs uncontrolled. AM   -Increase lantus to 38u and premeal to 14TID  -change to moderate dose sliding scale

## 2023-03-06 NOTE — PROGRESS NOTE ADULT - SUBJECTIVE AND OBJECTIVE BOX
[facility] Department of Hospital Medicine  Dr. Jessika Perales  Available on MS Teams  Pager: [phone]    Patient is a 68y old  Male who presents with a chief complaint of Back pain (05 Mar 2023 02:33)    Subjective:  Pt seen and examined at bedside with family present. Spoke mostly to patient's son at bedside. Pt able to speak simple english. Patient reports pain in LLE. Also reports being very thirsty and wants to drink. He denies chest pain or SOB. During by visit, transport arrived to take patient to OR. FS remained uncontrolled.     Vital Signs Last 24 Hrs  T(C): 36.7 (06 Mar 2023 11:41), Max: 36.9 (05 Mar 2023 16:19)  T(F): 98 (06 Mar 2023 11:41), Max: 98.5 (05 Mar 2023 16:19)  HR: 80 (06 Mar 2023 11:41) (80 - 129)  BP: 117/54 (06 Mar 2023 11:41) (88/52 - 131/75)  BP(mean): 61 (05 Mar 2023 20:15) (54 - 88)  RR: 16 (06 Mar 2023 11:41) (14 - 20)  SpO2: 98% (06 Mar 2023 11:41) (94% - 100%)    Parameters below as of 06 Mar 2023 10:48  Patient On (Oxygen Delivery Method): room air      PHYSICAL EXAM:  Constitutional: WDWN resting comfortably in bed; NAD  Head: NC/AT  Eyes: PERRL, EOMI, anicteric sclera  ENT: no nasal discharge; MMM  Neck: supple; no JVD  Respiratory: CTA B/L; no W/R/R  Cardiac: +S1/S2; RRR; no M/R/G  Gastrointestinal: soft, NT/ND; no rebound or guarding; +BSx4  Extremities: WWP, no clubbing or cyanosis; no peripheral edema  Musculoskeletal: NROM x4; no joint swelling, tenderness or erythema  Vascular: 2+ radial, DP/PT pulses B/L  Dermatologic: skin warm, dry and intact; no rashes, wounds, or scars  Neurologic: AAOx3; CNII-XII grossly intact; no focal deficits  Psychiatric: affect and characteristics of appearance, verbalizations, behaviors are appropriate    MEDICATIONS  (STANDING):  acetaminophen     Tablet .. 975 milliGRAM(s) Oral every 6 hours  aspirin  chewable 81 milliGRAM(s) Oral daily  atorvastatin 40 milliGRAM(s) Oral at bedtime  bisacodyl Suppository 10 milliGRAM(s) Rectal daily  calcitriol   Capsule 0.25 MICROGram(s) Oral daily  calcium acetate 667 milliGRAM(s) Oral with dinner  dexAMETHasone     Tablet 4 milliGRAM(s) Oral every 6 hours  dextrose 50% Injectable 25 Gram(s) IV Push once  dextrose 50% Injectable 12.5 Gram(s) IV Push once  dextrose 50% Injectable 25 Gram(s) IV Push once  furosemide    Tablet 40 milliGRAM(s) Oral daily  gabapentin 300 milliGRAM(s) Oral three times a day  heparin   Injectable 5000 Unit(s) SubCutaneous every 8 hours  insulin glargine Injectable (LANTUS) 27 Unit(s) SubCutaneous at bedtime  insulin lispro (ADMELOG) corrective regimen sliding scale   SubCutaneous three times a day before meals  insulin lispro (ADMELOG) corrective regimen sliding scale   SubCutaneous at bedtime  insulin lispro Injectable (ADMELOG) 12 Unit(s) SubCutaneous three times a day before meals  isosorbide    mononitrate Tablet (ISMO) 30 milliGRAM(s) Oral two times a day  melatonin 6 milliGRAM(s) Oral at bedtime  polyethylene glycol 3350 17 Gram(s) Oral daily  pregabalin 50 milliGRAM(s) Oral two times a day  psyllium Powder 1 Packet(s) Oral two times a day  sacubitril 24 mG/valsartan 26 mG 1 Tablet(s) Oral two times a day  senna 2 Tablet(s) Oral at bedtime  sodium chloride 2 Gram(s) Oral daily  tamsulosin 0.4 milliGRAM(s) Oral at bedtime    MEDICATIONS  (PRN):  dextrose Oral Gel 15 Gram(s) Oral once PRN Blood Glucose LESS THAN 70 milliGRAM(s)/deciliter  HYDROmorphone  Injectable 0.5 milliGRAM(s) IV Push every 4 hours PRN Break through pain  nitroglycerin     SubLingual 0.4 milliGRAM(s) SubLingual every 5 minutes PRN Chest Pain  oxyCODONE    IR 10 milliGRAM(s) Oral every 4 hours PRN Severe Pain (7 - 10)  oxyCODONE    IR 5 milliGRAM(s) Oral every 4 hours PRN Moderate Pain (4 - 6)    LABS:                                   9.3    7.80  )-----------( 376      ( 06 Mar 2023 07:41 )             28.4   03-06    128<L>  |  97<L>  |  70<H>  ----------------------------<  405<H>  4.7   |  15<L>  |  2.40<H>    Ca    9.0      06 Mar 2023 07:41  Phos  3.5     03-04  Mg     2.10     03-04    CAPILLARY BLOOD GLUCOSE  POCT Blood Glucose.: 386 mg/dL (06 Mar 2023 10:02)  POCT Blood Glucose.: 383 mg/dL (06 Mar 2023 08:18)  POCT Blood Glucose.: 393 mg/dL (06 Mar 2023 08:16)  POCT Blood Glucose.: 224 mg/dL (05 Mar 2023 23:24)  POCT Blood Glucose.: 220 mg/dL (05 Mar 2023 22:01)  POCT Blood Glucose.: 193 mg/dL (05 Mar 2023 20:40)  POCT Blood Glucose.: 182 mg/dL (05 Mar 2023 19:46)  POCT Blood Glucose.: 118 mg/dL (05 Mar 2023 12:38)        RADIOLOGY & ADDITIONAL TESTS: Reviewed.

## 2023-03-06 NOTE — PROGRESS NOTE ADULT - SUBJECTIVE AND OBJECTIVE BOX
Patient is a 68y old  Male who presents with a chief complaint of Back pain (06 Mar 2023 05:37)      HPI:  67 yo M with DMII, CAD s/p CABG, CKD, chronic back pain, and HTN presents to the ED with worsening back pain. Patient is AAOX2 and somnolent, not able to provide any hx. Most of the information was supplemented by the son. As per son, pt is AAOx 3 and "mentally stable." He recently came from [facility] about 2 weeks ago and has " multiple medical complaints." But this morning he told his son that he is feeling weak and his back pain is getting worse. The pain is localized in the lower back region. It is burning is sensation and constant. Unclear if it radiates down the leg. It worsens with movement and walking. He can only walk "10 meters" with a cane. He is also incontinent of urine but denies any recent fever, chills, bowel incontinence, or lower extremities weakness/motor deficits. He reportedly had MRIs in the past but son does not know what it showed. He also has other chronic medical problems such as CKD, DMII and CAD and his son thinks pt needs management for them as well.   In the ED, his vitals were notable for hyperglycemia, elevated BUN/Scr, and hypomagnesemia. EKG showed bradycardia with T wave changes in the lateral leads.  (21 Feb 2023 18:36)    worsening pain yesterday, planned for possible surgical revision today per patient. Family at bedside.    REVIEW OF SYSTEMS  pain  constipation- no bm in 4 days    PAST MEDICAL & SURGICAL HISTORY  Diabetes mellitus    Essential hypertension    CAD (coronary artery disease)    Chronic kidney disease, unspecified CKD stage    S/P CABG x 2       CURRENT FUNCTIONAL STATUS  3/5   Bed Mobility  Bed Mobility Training Rehab Potential: good, to achieve stated therapy goals  Bed Mobility Training Symptoms Noted During/After Treatment: none  Bed Mobility Training Scooting: maximum assist (25% patient effort);  1 person assist;  verbal cues    Therapeutic Exercise  Therapeutic Exercise Rehab Effort: fair  Therapeutic Exercise Symptoms Noted During/After Treatment: increased pain  Therapeutic Exercise Detail: heel slides, quad sets, hip abductions, hip rotations performed with good tolerance until pt shouts in pain which is short lasting on left hip. pain is looks to be random in nature and not with an specific motion as it could happen with any motion. pt recovers quickly from the reported pain. family members giving massage right lower extremity through session in which pt without complaints. pt deferred left sidelying secondary to fear. encouraged to maintain neutral spine position, continued active ROM  and participate in OOB PT next session.         FAMILY HISTORY   FH: heart disease        RECENT LABS/IMAGING  CBC Full  -  ( 06 Mar 2023 07:41 )  WBC Count : 7.80 K/uL  RBC Count : 3.09 M/uL  Hemoglobin : 9.3 g/dL  Hematocrit : 28.4 %  Platelet Count - Automated : 376 K/uL  Mean Cell Volume : 91.9 fL  Mean Cell Hemoglobin : 30.1 pg  Mean Cell Hemoglobin Concentration : 32.7 gm/dL  Auto Neutrophil # : x  Auto Lymphocyte # : x  Auto Monocyte # : x  Auto Eosinophil # : x  Auto Basophil # : x  Auto Neutrophil % : x  Auto Lymphocyte % : x  Auto Monocyte % : x  Auto Eosinophil % : x  Auto Basophil % : x    03-06    128<L>  |  97<L>  |  70<H>  ----------------------------<  405<H>  4.7   |  15<L>  |  2.40<H>    Ca    9.0      06 Mar 2023 07:41  Phos  3.5     03-04  Mg     2.10     03-04          VITALS  T(C): 36.2 (03-06-23 @ 04:00), Max: 36.9 (03-05-23 @ 16:19)  HR: 129 (03-06-23 @ 06:00) (96 - 129)  BP: 91/75 (03-06-23 @ 06:00) (88/52 - 131/75)  RR: 18 (03-06-23 @ 04:00) (14 - 20)  SpO2: 100% (03-06-23 @ 04:00) (94% - 100%)  Wt(kg): --    ALLERGIES  No Known Allergies      MEDICATIONS   acetaminophen     Tablet .. 975 milliGRAM(s) Oral every 6 hours  atorvastatin 40 milliGRAM(s) Oral at bedtime  bisacodyl Suppository 10 milliGRAM(s) Rectal daily  calcitriol   Capsule 0.25 MICROGram(s) Oral daily  calcium acetate 667 milliGRAM(s) Oral with dinner  cyclobenzaprine 10 milliGRAM(s) Oral every 8 hours  dexAMETHasone     Tablet 4 milliGRAM(s) Oral every 6 hours  dextrose 50% Injectable 25 Gram(s) IV Push once  dextrose 50% Injectable 12.5 Gram(s) IV Push once  dextrose 50% Injectable 25 Gram(s) IV Push once  dextrose Oral Gel 15 Gram(s) Oral once PRN  HYDROmorphone   Tablet 2 milliGRAM(s) Oral every 4 hours PRN  HYDROmorphone   Tablet 4 milliGRAM(s) Oral every 4 hours PRN  HYDROmorphone  Injectable 0.5 milliGRAM(s) IV Push every 4 hours PRN  insulin glargine Injectable (LANTUS) 30 Unit(s) SubCutaneous at bedtime  insulin lispro (ADMELOG) corrective regimen sliding scale   SubCutaneous three times a day before meals  insulin lispro (ADMELOG) corrective regimen sliding scale   SubCutaneous at bedtime  insulin lispro Injectable (ADMELOG) 12 Unit(s) SubCutaneous three times a day before meals  isosorbide    mononitrate Tablet (ISMO) 30 milliGRAM(s) Oral two times a day  melatonin 6 milliGRAM(s) Oral at bedtime  nitroglycerin     SubLingual 0.4 milliGRAM(s) SubLingual every 5 minutes PRN  polyethylene glycol 3350 17 Gram(s) Oral daily  pregabalin 50 milliGRAM(s) Oral two times a day  psyllium Powder 1 Packet(s) Oral two times a day  sacubitril 24 mG/valsartan 26 mG 1 Tablet(s) Oral two times a day  senna 2 Tablet(s) Oral at bedtime  sodium chloride 0.9%. 1000 milliLiter(s) IV Continuous <Continuous>  tamsulosin 0.4 milliGRAM(s) Oral at bedtime      ----------------------------------------------------------------------------------------   PHYSICAL EXAM  Constitutional - in pain.  drain in place from surgical incision site   HEENT - NCAT, EOMI  Neck - Supple, No limited ROM  Chest - no respiratory distress  Cardiovascular -  tachycardia  Abdomen -  Soft, NTND  Extremities - No C/C/E, No calf tenderness   Neurologic Exam -                    Cognitive - Awake, Alert, AAO to self, place, date, year, situation     Communication - Fluent, No dysarthria     Cranial Nerves - CN 2-12 intact     Motor -  exam limited by pain                    LEFT    UE - ShAB 5/5, EF 5/5, EE 5/5, WE 5/5,  5/5                    RIGHT UE - ShAB 5/5, EF 5/5, EE 5/5, WE 5/5,  5/5                    LEFT    LE - HF 1+/5, KE 2/5, DF 2/5, PF 2/5                    RIGHT LE - HF 1+/5, KE 2/5, DF 2/5, PF 2/5     Sensory - impaired sensation b/l LE       Balance - WNL Static  Psychiatric - Mood stable, Affect WNL  ----------------------------------------------------------------------------------------  ASSESSMENT/PLAN  67 yo m s/p TLIF  Pain - dilaudid iv prn, dilaudid prn, bowel regimen  please monitor and treat constipation  diet: dash/tlc  DVT PPX - heparin  continue bedside PT and OT  Rehab -   recommend inpatient rehab, likely acute. currently limited by pain, planning for possible surgical revision

## 2023-03-06 NOTE — CONSULT NOTE ADULT - ASSESSMENT
ASSESSMENT:  68M DM2, CAD s/p CABG, CKD, chronic back pain, and HTN p/w worsening back pain, found to have spondylolisthesis of lumbar spine. Patient s/p L4-L5 TLIF on 2/28/23. Briefly admitted to SICU post-operatively for pressor requirements. Patient transferred to floor. Patient complaining of increased back pain. On 3/5 MRI of spine showed ventral epidural collection. Patient taken to OR on 3/6. Patient is now s/p .........  SICU consulted for insulin gtt management and q1hr neuro checks.        PLAN:  NEUROLOGIC: s/p TLIF L4-L5, s/p RTOR  - Pain control with ATC acetaminophen, oxycodone, dilaudid PRN   - q1hr neuro checks     RESPIRATORY:  - Maintain SaO2 > 92%    CARDIOVASCULAR:  - MAP >65  - monitor hemodynamics    GI/NUTRITION:  - Diet: NPO  - Bowel regimen with miralax and senna   - Protonix qd     /RENAL:   - IVF  - Millard in place, monitor UOP   - Replete electrolytes PRN     HEMATOLOGIC:  - Trend h/h  - Holding VTE ppx in setting of recent spinal surgery     INFECTIOUS DISEASE:  - Trend WBC, monitor for fevers  - Monitor off abx     ENDOCRINE:  - Monitor glucose  - insulin gtt, wean as tolerated   - Continue atorvastatin     LINES:  - PIV  - Millard    DISPO:  - SICU ASSESSMENT:  68M DM2, CAD s/p CABG, CKD, chronic back pain, and HTN p/w worsening back pain, found to have spondylolisthesis of lumbar spine. Patient s/p L4-L5 TLIF on 2/28/23. Briefly admitted to SICU post-operatively for pressor requirements. Patient transferred to floor. Patient complaining of increased back pain. On 3/5 MRI of spine showed ventral epidural collection. Patient taken to OR on 3/6 for re-exploration of lumbar spine incision. L4 screws were found to have fractured the pedicles. Screws were removed and fusion extended to L3 with cannulated screws. L5 screws were upsized. Cement augmentation.  SICU consulted for insulin gtt management and q1hr neuro checks.        PLAN:  NEUROLOGIC: s/p TLIF L4-L5, s/p RTOR  - Pain control with ATC acetaminophen, oxycodone, dilaudid PRN   - q1hr neuro checks     RESPIRATORY:  - Maintain SaO2 > 92%    CARDIOVASCULAR:  - MAP >65  - monitor hemodynamics    GI/NUTRITION:  - Diet: NPO  - Bowel regimen with miralax and senna   - Protonix qd     /RENAL:   - IVF  - Millard in place, monitor UOP   - Replete electrolytes PRN     HEMATOLOGIC:  - Trend h/h  - Holding VTE ppx in setting of recent spinal surgery     INFECTIOUS DISEASE:  - Trend WBC, monitor for fevers  - Monitor off abx     ENDOCRINE:  - Monitor glucose  - insulin gtt, wean as tolerated   - Continue atorvastatin     LINES:  - PIV  - Millard    DISPO:  - SICU

## 2023-03-06 NOTE — PROGRESS NOTE ADULT - PROBLEM SELECTOR PLAN 1
s/p OR on 2/28 with L4-L5 spinal fusion with neurosurgery, has drain in place  - drain output per [facility]  -MRI concerning for compressive fluid pocket at the surgical bed  -Neurosurgery planning intervention today.  -Overall given worsening renal function, hyponatremia, hyperglycemia, patient is at elevated risk for adverse events with anaesthesia. Risks and benefit of the procedure to be discussed by surgical team.   - PT/OT/PM&R following and recommending rehab, likely acute  - c/w pain control per primary team and pain mgmt

## 2023-03-06 NOTE — ASU PREOP CHECKLIST - 3.
Per Dr Bradshaw, OK to reorder citalopram & new sleep aid, Ambien. Both sent to pt's preferred pharmacy, [facility] on [address].   
spine hemovac to self suction

## 2023-03-06 NOTE — PROGRESS NOTE ADULT - SUBJECTIVE AND OBJECTIVE BOX
Follow up consult for Acute Pain Management     SUBJECTIVE:  The patient reports severe pain in his back. Patient's son at bedside translating. Patient states he did not ask for medications because he did not want to bother the staff. On checking EMR noticed patient did not receive any Dilaudid since yesterday 12pm.   OBJECTIVE:  Patient is laying in bed.    Pain Score:   (X) Refer to pain scores    Therapy:	[ ] IV PCA	[ ] Epidural   [ ] s/p Spinal Opioid	[ ] Peripheral nerve block  (x) PRN Oral/IV opioids and or Adjuvant non-opioid medications  	  Vital Signs Last 24 Hrs  T(C): 36.2 (06 Mar 2023 04:00), Max: 36.9 (05 Mar 2023 16:19)  T(F): 97.2 (06 Mar 2023 04:00), Max: 98.5 (05 Mar 2023 16:19)  HR: 129 (06 Mar 2023 06:00) (96 - 129)  BP: 91/75 (06 Mar 2023 06:00) (88/52 - 131/75)  BP(mean): 61 (05 Mar 2023 20:15) (54 - 88)  RR: 18 (06 Mar 2023 04:00) (14 - 20)  SpO2: 100% (06 Mar 2023 04:00) (94% - 100%)    Parameters below as of 06 Mar 2023 04:00  Patient On (Oxygen Delivery Method): room air        ( x) Alert & Oriented     ( ) No motor/sensory block     ( ) Nausea     ( ) Pruritis     ( ) Headache    ASSESSMENT/ PLAN    Therapy to  be:	[x ] Continue   [ ] Discontinued      Documentation and Verification of current medications:   [X] Done	[ ] Not done, not elligible    Comments: Discussed current pain regimen in detail with patient and son, with primary RN present at bedside. Reeducated patient on requesting ordered PRN opioids for pain when needed. Patient and son verbalized understanding of the plan. Continue current pain regimen. PRN Oral/IV opioids and/or Adjuvant non-opioid medication to be ordered at this point.  Pain service to sign off, no further recommendations for pain medications, at this time.  May call pain service if needed.    Progress Note written now but Patient was seen earlier.

## 2023-03-06 NOTE — CHART NOTE - NSCHARTNOTEFT_GEN_A_CORE
67 yo M with DM 2, CAD s/p CABG, CKD, chronic back pain, and HTN presents to the ED with worsening back pain.  He recently came from [facility] about 2 weeks ago and has " multiple medical complaints."  he told his son that he is feeling weak and his back pain is getting worse.   In the ED, his vitals were notable for hyperglycemia, elevated BUN/Scr, and hypomagnesemia. Endocrine called for DM 2, A1c 8.3    Unable to see patient at time of visit. OFF UNIT IN PACU  Hyperglycemia in setting of steroids persisting, also patient missed multiple doses of insulin yesterday, is NPO today  On Dexamethasone 4 mg q6h  Recommend to adjust Admelog correctional scale to q6h (MODERATE dose)   Increase Lantus to 40 units SQ qHS (from 30 units)   Continue Admelog correctional scale MODERATE dose before meals, continue low dose at bedtime  When diet is resumed, ok to resume Admelog 14 units TID and would adjust the Admelog moderate dose scale to before meals and bedtime (same scale for hyperglycemia recovery)  Check FS q6h while NPO, before meals and bedtime if eating  Hypoglycemia protocol  See prior endocrine progress notes for complete plan of care   Will follow     CAPILLARY BLOOD GLUCOSE    POCT Blood Glucose.: 386 mg/dL (06 Mar 2023 10:02)  POCT Blood Glucose.: 383 mg/dL (06 Mar 2023 08:18)  POCT Blood Glucose.: 393 mg/dL (06 Mar 2023 08:16)  POCT Blood Glucose.: 224 mg/dL (05 Mar 2023 23:24)  POCT Blood Glucose.: 220 mg/dL (05 Mar 2023 22:01)  POCT Blood Glucose.: 193 mg/dL (05 Mar 2023 20:40)  POCT Blood Glucose.: 182 mg/dL (05 Mar 2023 19:46)      03-06    128<L>  |  97<L>  |  70<H>  ----------------------------<  405<H>  4.7   |  15<L>  |  2.40<H>    Ca    9.0      06 Mar 2023 07:41  Phos  3.5     03-04  Mg     2.10     03-04        MEDICATIONS  (STANDING):  acetaminophen     Tablet .. 975 milliGRAM(s) Oral every 6 hours  atorvastatin 40 milliGRAM(s) Oral at bedtime  bisacodyl Suppository 10 milliGRAM(s) Rectal daily  calcitriol   Capsule 0.25 MICROGram(s) Oral daily  calcium acetate 667 milliGRAM(s) Oral with dinner  chlorhexidine 2% Cloths 1 Application(s) Topical daily  cyclobenzaprine 10 milliGRAM(s) Oral every 8 hours  dexAMETHasone     Tablet 4 milliGRAM(s) Oral every 6 hours  dextrose 50% Injectable 25 Gram(s) IV Push once  dextrose 50% Injectable 12.5 Gram(s) IV Push once  dextrose 50% Injectable 25 Gram(s) IV Push once  insulin glargine Injectable (LANTUS) 38 Unit(s) SubCutaneous at bedtime  insulin lispro (ADMELOG) corrective regimen sliding scale   SubCutaneous three times a day before meals  insulin lispro (ADMELOG) corrective regimen sliding scale   SubCutaneous at bedtime  insulin lispro Injectable (ADMELOG) 14 Unit(s) SubCutaneous three times a day before meals  isosorbide    mononitrate Tablet (ISMO) 30 milliGRAM(s) Oral two times a day  melatonin 6 milliGRAM(s) Oral at bedtime  polyethylene glycol 3350 17 Gram(s) Oral daily  pregabalin 50 milliGRAM(s) Oral two times a day  psyllium Powder 1 Packet(s) Oral two times a day  sacubitril 24 mG/valsartan 26 mG 1 Tablet(s) Oral two times a day  senna 2 Tablet(s) Oral at bedtime  sodium chloride 0.9%. 1000 milliLiter(s) (75 mL/Hr) IV Continuous <Continuous>  tamsulosin 0.4 milliGRAM(s) Oral at bedtime    Diet, NPO:   NPO for Procedure/Test     NPO Start Date: 06-Mar-2023,   NPO Start Time: 07:00  Except Medications (03-06-23 @ 07:59)  Diet, Regular:   Consistent Carbohydrate {Evening Snack} (CSTCHOSN) (03-05-23 @ 22:17)    A1C with Estimated Average Glucose Result: 8.3 % (02-22-23 @ 05:13)    Noemi Carlisle  Nurse Practitioner  Division of Endocrinology & Diabetes  In house pager #[phone]/long range pager #[phone]    If before 9AM or after 6PM, or on weekends/holidays, please call endocrine answering service for assistance ([phone]).  For nonurgent matters email [email or web address] for assistance. 67 yo M with DM 2, CAD s/p CABG, CKD, chronic back pain, and HTN presents to the ED with worsening back pain.  He recently came from [facility] about 2 weeks ago and has " multiple medical complaints."  he told his son that he is feeling weak and his back pain is getting worse.   In the ED, his vitals were notable for hyperglycemia, elevated BUN/Scr, and hypomagnesemia. Endocrine called for DM 2, A1c 8.3    Unable to see patient at time of visit. OFF UNIT IN PACU  Hyperglycemia in setting of steroids persisting, also patient missed multiple doses of insulin yesterday, is NPO today  On Dexamethasone 4 mg q6h  Recommend to adjust Admelog correctional scale to q6h (MODERATE dose)   Increase Lantus to 40 units SQ qHS (from 30 units)   When diet is resumed, ok to resume Admelog 14 units TID and would adjust the Admelog moderate dose scale to before meals and bedtime (same scale for hyperglycemia recovery)  Check FS q6h while NPO, before meals and bedtime if eating  Hypoglycemia protocol  See prior endocrine progress notes for complete plan of care   Will follow     CAPILLARY BLOOD GLUCOSE    POCT Blood Glucose.: 386 mg/dL (06 Mar 2023 10:02)  POCT Blood Glucose.: 383 mg/dL (06 Mar 2023 08:18)  POCT Blood Glucose.: 393 mg/dL (06 Mar 2023 08:16)  POCT Blood Glucose.: 224 mg/dL (05 Mar 2023 23:24)  POCT Blood Glucose.: 220 mg/dL (05 Mar 2023 22:01)  POCT Blood Glucose.: 193 mg/dL (05 Mar 2023 20:40)  POCT Blood Glucose.: 182 mg/dL (05 Mar 2023 19:46)      03-06    128<L>  |  97<L>  |  70<H>  ----------------------------<  405<H>  4.7   |  15<L>  |  2.40<H>    Ca    9.0      06 Mar 2023 07:41  Phos  3.5     03-04  Mg     2.10     03-04        MEDICATIONS  (STANDING):  acetaminophen     Tablet .. 975 milliGRAM(s) Oral every 6 hours  atorvastatin 40 milliGRAM(s) Oral at bedtime  bisacodyl Suppository 10 milliGRAM(s) Rectal daily  calcitriol   Capsule 0.25 MICROGram(s) Oral daily  calcium acetate 667 milliGRAM(s) Oral with dinner  chlorhexidine 2% Cloths 1 Application(s) Topical daily  cyclobenzaprine 10 milliGRAM(s) Oral every 8 hours  dexAMETHasone     Tablet 4 milliGRAM(s) Oral every 6 hours  dextrose 50% Injectable 25 Gram(s) IV Push once  dextrose 50% Injectable 12.5 Gram(s) IV Push once  dextrose 50% Injectable 25 Gram(s) IV Push once  insulin glargine Injectable (LANTUS) 38 Unit(s) SubCutaneous at bedtime  insulin lispro (ADMELOG) corrective regimen sliding scale   SubCutaneous three times a day before meals  insulin lispro (ADMELOG) corrective regimen sliding scale   SubCutaneous at bedtime  insulin lispro Injectable (ADMELOG) 14 Unit(s) SubCutaneous three times a day before meals  isosorbide    mononitrate Tablet (ISMO) 30 milliGRAM(s) Oral two times a day  melatonin 6 milliGRAM(s) Oral at bedtime  polyethylene glycol 3350 17 Gram(s) Oral daily  pregabalin 50 milliGRAM(s) Oral two times a day  psyllium Powder 1 Packet(s) Oral two times a day  sacubitril 24 mG/valsartan 26 mG 1 Tablet(s) Oral two times a day  senna 2 Tablet(s) Oral at bedtime  sodium chloride 0.9%. 1000 milliLiter(s) (75 mL/Hr) IV Continuous <Continuous>  tamsulosin 0.4 milliGRAM(s) Oral at bedtime    Diet, NPO:   NPO for Procedure/Test     NPO Start Date: 06-Mar-2023,   NPO Start Time: 07:00  Except Medications (03-06-23 @ 07:59)  Diet, Regular:   Consistent Carbohydrate {Evening Snack} (CSTCHOSN) (03-05-23 @ 22:17)    A1C with Estimated Average Glucose Result: 8.3 % (02-22-23 @ 05:13)    Noemi Carlisle  Nurse Practitioner  Division of Endocrinology & Diabetes  In house pager #[phone]/long range pager #[phone]    If before 9AM or after 6PM, or on weekends/holidays, please call endocrine answering service for assistance ([phone]).  For nonurgent matters email [email or web address] for assistance.

## 2023-03-06 NOTE — PROGRESS NOTE ADULT - ASSESSMENT
3/1; s/p L4-5 TLIF pod #1, HMV drain, requiring bola post op for bp support, albumin given x 1, sicu consulted, neuro exam stable  3/2: POD # 2 S/P L4-5 TLIF, 1 HMV in place. Off phenylepherine  3/3: POD # 3 S/P L4-5 TLIF. Having episodes of chest pain with elevated troponins, cardiology evaluating. Will transfer to telemetry floor, serial CE and EKG. 1 HMV in place  3/4: POD # 4 S/P L4-5 TLIF. No more chest pain. HMV in place. Seen by endocrine for DM management  3/5: POD # 5 S/P L4-5 TLIF. No more chest pain. HMV in place. Increased back pain with radiation to legs, MRI ordered with anesthesia  3/6: pod #6 s/p L4-5 TLIF, HMV in place, mri L/s w/ sed done no major compressive hematoma, enlarged neurogenic bladder, crowder placed

## 2023-03-06 NOTE — BRIEF OPERATIVE NOTE - OPERATION/FINDINGS
L4-5 TLIF
Re-exploration of lumbar spine incision, L4 screws found to have fractured the pedicles, removed and fusion extended to L3 with cannulated screws. L5 screws upsized. Cement augmentation. Monocryl closure.

## 2023-03-06 NOTE — PROGRESS NOTE ADULT - PROBLEM SELECTOR PLAN 2
EF 45% per outside records; TTE: EF 45% Mild to moderate segmental left ventricular systolic dysfunction.  Hypokinesis of the apex, distal septum, and distal anterior wall. Mod-Sev AS.  - c/w home Entresto (called Sabitar 50 Sacubitril  24 mg/Valsartan 26 mg), restarted   - Stop lasix as patient appears dehydrated and developing SANDRA  - holding BB per cards   - had CP trop 73-74, cards consulted, low concern for ACS at this time.   - no need for further trend troponin at this time unless patient with active chest pain; if so would obtain troponin x2 and STAT EKG to evaluate   - appears compensated  - cards eval appreciated

## 2023-03-06 NOTE — BRIEF OPERATIVE NOTE - NSICDXBRIEFPROCEDURE_GEN_ALL_CORE_FT
PROCEDURES:  Fusion, spine, lumbar, 1 level, using posterior interbody technique 28-Feb-2023 15:27:24  Devang Oconnor  
PROCEDURES:  Revision of posterolateral fusion of lumbar spine 06-Mar-2023 16:27:57  Devang Oconnor  
Detail Level: Zone
Detail Level: Simple

## 2023-03-06 NOTE — PROGRESS NOTE ADULT - SUBJECTIVE AND OBJECTIVE BOX
Patient seen prior to anesthesia induction and extensive conversation had with patient, wife and son.  Discussed with patient that given his slow decline, there is a concern for compressive fluid pocket in surgical bed.  Also discussed with patient and son that though his medical status is not optimized (hyperglycemic, hyponatremic, and others), the best chance for recovery of lower extremity pain and function is to do this procedure while closely monitoring his medical status throughout. In addition, close post operative management of these medical issues will be stressed.   An extensive discussion was had with them regarding the expanded risks of the procedure to include bleeding, infection, damage to nerves and surrounding structures, need for further procedures, stroke, coma, death and they agreed to proceed in light of those risks.

## 2023-03-06 NOTE — PROGRESS NOTE ADULT - SUBJECTIVE AND OBJECTIVE BOX
HPI:   69 yo M with DMII, CAD s/p CABG, CKD, chronic back pain, and HTN presents to the ED with worsening back pain. Patient is AAOX2 and somnolent, not able to provide any hx. Most of the information was supplemented by the son. As per son, pt is AAOx 3 and "mentally stable." [facility] and has " multiple medical complaints." But this morning he told his son that he is feeling weak and his back pain is getting worse. The pain is localized in the lower back region. It is burning is sensation and constant. Unclear if it radiates down the leg. It worsens with movement and walking. He can only walk "10 meters" with a cane. He is also incontinent of urine but denies any recent fever, chills, bowel incontinence, or lower extremities weakness/motor deficits. He reportedly had MRIs in the past but son does not know what it showed. He also has other chronic medical problems such as CKD, DMII and CAD and his son thinks pt needs management for them as well.   In the ED, his vitals were notable for hyperglycemia, elevated BUN/Scr, and hypomagnesemia. EKG showed bradycardia with T wave changes in the lateral leads.  (21 Feb 2023 18:36)    PAST MEDICAL & SURGICAL HISTORY:  Diabetes mellitus  Essential hypertension  CAD (coronary artery disease)  Chronic kidney disease, unspecified CKD stage  S/P CABG x 2    Vital Signs Last 24 Hrs  T(C): 36.2 (06 Mar 2023 04:00), Max: 36.9 (05 Mar 2023 16:19)  T(F): 97.2 (06 Mar 2023 04:00), Max: 98.5 (05 Mar 2023 16:19)  HR: 100 (06 Mar 2023 04:00) (91 - 105)  BP: 106/61 (06 Mar 2023 04:00) (88/52 - 131/75)  BP(mean): 61 (05 Mar 2023 20:15) (54 - 88)  ABP: --  ABP(mean): --  RR: 18 (06 Mar 2023 04:00) (14 - 20)  SpO2: 100% (06 Mar 2023 04:00) (94% - 100%)    O2 Parameters below as of 06 Mar 2023 04:00  Patient On (Oxygen Delivery Method): room air            AAO X 3  PERRLA, EOMI  Face symmetric  BENDER strength 5/5, movement limited by pain  SILT    HMV: 75cc    MEDICATIONS  (STANDING):  acetaminophen     Tablet .. 975 milliGRAM(s) Oral every 6 hours  aspirin  chewable 81 milliGRAM(s) Oral daily  atorvastatin 40 milliGRAM(s) Oral at bedtime  bisacodyl Suppository 10 milliGRAM(s) Rectal daily  calcitriol   Capsule 0.25 MICROGram(s) Oral daily  calcium acetate 667 milliGRAM(s) Oral with dinner  dexAMETHasone     Tablet 4 milliGRAM(s) Oral every 6 hours  dextrose 50% Injectable 25 Gram(s) IV Push once  dextrose 50% Injectable 12.5 Gram(s) IV Push once  dextrose 50% Injectable 25 Gram(s) IV Push once  furosemide    Tablet 40 milliGRAM(s) Oral daily  gabapentin 300 milliGRAM(s) Oral three times a day  heparin   Injectable 5000 Unit(s) SubCutaneous every 8 hours  insulin glargine Injectable (LANTUS) 27 Unit(s) SubCutaneous at bedtime  insulin lispro (ADMELOG) corrective regimen sliding scale   SubCutaneous three times a day before meals  insulin lispro (ADMELOG) corrective regimen sliding scale   SubCutaneous at bedtime  insulin lispro Injectable (ADMELOG) 12 Unit(s) SubCutaneous three times a day before meals  isosorbide    mononitrate Tablet (ISMO) 30 milliGRAM(s) Oral two times a day  melatonin 6 milliGRAM(s) Oral at bedtime  polyethylene glycol 3350 17 Gram(s) Oral daily  pregabalin 50 milliGRAM(s) Oral two times a day  psyllium Powder 1 Packet(s) Oral two times a day  sacubitril 24 mG/valsartan 26 mG 1 Tablet(s) Oral two times a day  senna 2 Tablet(s) Oral at bedtime  tamsulosin 0.4 milliGRAM(s) Oral at bedtime    MEDICATIONS  (PRN):  cyclobenzaprine 10 milliGRAM(s) Oral three times a day PRN Muscle Spasm  dextrose Oral Gel 15 Gram(s) Oral once PRN Blood Glucose LESS THAN 70 milliGRAM(s)/deciliter  HYDROmorphone  Injectable 0.5 milliGRAM(s) IV Push every 4 hours PRN Break through pain  nitroglycerin     SubLingual 0.4 milliGRAM(s) SubLingual every 5 minutes PRN Chest Pain  oxyCODONE    IR 10 milliGRAM(s) Oral every 4 hours PRN Severe Pain (7 - 10)  oxyCODONE    IR 5 milliGRAM(s) Oral every 4 hours PRN Moderate Pain (4 - 6)

## 2023-03-06 NOTE — PROGRESS NOTE ADULT - PROBLEM SELECTOR PLAN 8
All brand name English.  We do not have all of them.  Meds are: Ligenta-M 500 (Linagliptin 2.5 and metoformin 500mg), [place] card MR (trimetazidine hydrochloride 35 mg),  Ecosprin 75 (aspirin 75 mg), Duralax, Ketoalfa (amio acid Keto Analogues 600 mg), Nidocard-Retard (nitroglycerin 2.6 mg), Hypophos (calcium acetate 667 mg), Tamisol D (Tamsulosin 0.4 mg and Dutasteride 0.5 mg), Mydocalm (tolperisone 50 mg), Raditrol (calcitriol 0.25 microgram),  NovoRapid 16 units w/ lunch, Manuel nordisk insulin 16am/14pm, Atova 20 (atorvastatin 20 mg), Nebita 2.5 (nevivolol 2.5 mg), Tamisol (tamsulosin 0.4 mg), Pregaba 50 (pregabalin 50 mg), Febustat 40 (febuxostat 40 mg), Lozixum (lorazapam 1 mg), Lasix 40 mg, Sabitar 50 mg, Anazol 0.1%, Tearfresh

## 2023-03-07 PROBLEM — I10 ESSENTIAL (PRIMARY) HYPERTENSION: Chronic | Status: ACTIVE | Noted: 2023-02-21

## 2023-03-07 PROBLEM — N18.9 CHRONIC KIDNEY DISEASE, UNSPECIFIED: Chronic | Status: ACTIVE | Noted: 2023-02-21

## 2023-03-07 PROBLEM — I25.10 ATHEROSCLEROTIC HEART DISEASE OF NATIVE CORONARY ARTERY WITHOUT ANGINA PECTORIS: Chronic | Status: ACTIVE | Noted: 2023-02-21

## 2023-03-07 LAB
ANION GAP SERPL CALC-SCNC: 14 MMOL/L — SIGNIFICANT CHANGE UP (ref 7–14)
APTT BLD: 24.9 SEC — LOW (ref 27–36.3)
BUN SERPL-MCNC: 61 MG/DL — HIGH (ref 7–23)
CALCIUM SERPL-MCNC: 8.2 MG/DL — LOW (ref 8.4–10.5)
CHLORIDE SERPL-SCNC: 103 MMOL/L — SIGNIFICANT CHANGE UP (ref 98–107)
CO2 SERPL-SCNC: 16 MMOL/L — LOW (ref 22–31)
CREAT SERPL-MCNC: 1.93 MG/DL — HIGH (ref 0.5–1.3)
EGFR: 37 ML/MIN/1.73M2 — LOW
GLUCOSE BLDC GLUCOMTR-MCNC: 112 MG/DL — HIGH (ref 70–99)
GLUCOSE BLDC GLUCOMTR-MCNC: 115 MG/DL — HIGH (ref 70–99)
GLUCOSE BLDC GLUCOMTR-MCNC: 124 MG/DL — HIGH (ref 70–99)
GLUCOSE BLDC GLUCOMTR-MCNC: 126 MG/DL — HIGH (ref 70–99)
GLUCOSE BLDC GLUCOMTR-MCNC: 136 MG/DL — HIGH (ref 70–99)
GLUCOSE BLDC GLUCOMTR-MCNC: 148 MG/DL — HIGH (ref 70–99)
GLUCOSE BLDC GLUCOMTR-MCNC: 149 MG/DL — HIGH (ref 70–99)
GLUCOSE BLDC GLUCOMTR-MCNC: 150 MG/DL — HIGH (ref 70–99)
GLUCOSE BLDC GLUCOMTR-MCNC: 154 MG/DL — HIGH (ref 70–99)
GLUCOSE BLDC GLUCOMTR-MCNC: 156 MG/DL — HIGH (ref 70–99)
GLUCOSE SERPL-MCNC: 157 MG/DL — HIGH (ref 70–99)
HCT VFR BLD CALC: 25.9 % — LOW (ref 39–50)
HGB BLD-MCNC: 8.6 G/DL — LOW (ref 13–17)
INR BLD: 1.03 RATIO — SIGNIFICANT CHANGE UP (ref 0.88–1.16)
MAGNESIUM SERPL-MCNC: 2.2 MG/DL — SIGNIFICANT CHANGE UP (ref 1.6–2.6)
MCHC RBC-ENTMCNC: 30.2 PG — SIGNIFICANT CHANGE UP (ref 27–34)
MCHC RBC-ENTMCNC: 33.2 GM/DL — SIGNIFICANT CHANGE UP (ref 32–36)
MCV RBC AUTO: 90.9 FL — SIGNIFICANT CHANGE UP (ref 80–100)
NRBC # BLD: 0 /100 WBCS — SIGNIFICANT CHANGE UP (ref 0–0)
NRBC # FLD: 0 K/UL — SIGNIFICANT CHANGE UP (ref 0–0)
PHOSPHATE SERPL-MCNC: 4.1 MG/DL — SIGNIFICANT CHANGE UP (ref 2.5–4.5)
PLATELET # BLD AUTO: 384 K/UL — SIGNIFICANT CHANGE UP (ref 150–400)
POTASSIUM SERPL-MCNC: 4 MMOL/L — SIGNIFICANT CHANGE UP (ref 3.5–5.3)
POTASSIUM SERPL-SCNC: 4 MMOL/L — SIGNIFICANT CHANGE UP (ref 3.5–5.3)
PROTHROM AB SERPL-ACNC: 12 SEC — SIGNIFICANT CHANGE UP (ref 10.5–13.4)
RBC # BLD: 2.85 M/UL — LOW (ref 4.2–5.8)
RBC # FLD: 12.8 % — SIGNIFICANT CHANGE UP (ref 10.3–14.5)
SODIUM SERPL-SCNC: 133 MMOL/L — LOW (ref 135–145)
WBC # BLD: 14.29 K/UL — HIGH (ref 3.8–10.5)
WBC # FLD AUTO: 14.29 K/UL — HIGH (ref 3.8–10.5)

## 2023-03-07 PROCEDURE — 99233 SBSQ HOSP IP/OBS HIGH 50: CPT

## 2023-03-07 PROCEDURE — 74019 RADEX ABDOMEN 2 VIEWS: CPT | Mod: 26

## 2023-03-07 RX ORDER — OXYCODONE HYDROCHLORIDE 5 MG/1
10 TABLET ORAL EVERY 4 HOURS
Refills: 0 | Status: DISCONTINUED | OUTPATIENT
Start: 2023-03-07 | End: 2023-03-08

## 2023-03-07 RX ORDER — ALBUMIN HUMAN 25 %
500 VIAL (ML) INTRAVENOUS ONCE
Refills: 0 | Status: COMPLETED | OUTPATIENT
Start: 2023-03-07 | End: 2023-03-07

## 2023-03-07 RX ORDER — INSULIN LISPRO 100/ML
5 VIAL (ML) SUBCUTANEOUS
Refills: 0 | Status: DISCONTINUED | OUTPATIENT
Start: 2023-03-07 | End: 2023-03-09

## 2023-03-07 RX ORDER — INSULIN LISPRO 100/ML
VIAL (ML) SUBCUTANEOUS
Refills: 0 | Status: DISCONTINUED | OUTPATIENT
Start: 2023-03-07 | End: 2023-03-07

## 2023-03-07 RX ORDER — POLYETHYLENE GLYCOL 3350 17 G/17G
17 POWDER, FOR SOLUTION ORAL
Refills: 0 | Status: DISCONTINUED | OUTPATIENT
Start: 2023-03-07 | End: 2023-03-15

## 2023-03-07 RX ORDER — DEXTROSE 50 % IN WATER 50 %
25 SYRINGE (ML) INTRAVENOUS ONCE
Refills: 0 | Status: DISCONTINUED | OUTPATIENT
Start: 2023-03-07 | End: 2023-03-07

## 2023-03-07 RX ORDER — DEXTROSE 50 % IN WATER 50 %
25 SYRINGE (ML) INTRAVENOUS ONCE
Refills: 0 | Status: COMPLETED | OUTPATIENT
Start: 2023-03-07 | End: 2023-03-07

## 2023-03-07 RX ORDER — HEPARIN SODIUM 5000 [USP'U]/ML
5000 INJECTION INTRAVENOUS; SUBCUTANEOUS EVERY 8 HOURS
Refills: 0 | Status: DISCONTINUED | OUTPATIENT
Start: 2023-03-08 | End: 2023-03-09

## 2023-03-07 RX ORDER — SODIUM CHLORIDE 9 MG/ML
1000 INJECTION, SOLUTION INTRAVENOUS
Refills: 0 | Status: DISCONTINUED | OUTPATIENT
Start: 2023-03-07 | End: 2023-03-07

## 2023-03-07 RX ORDER — INSULIN LISPRO 100/ML
VIAL (ML) SUBCUTANEOUS
Refills: 0 | Status: DISCONTINUED | OUTPATIENT
Start: 2023-03-07 | End: 2023-03-09

## 2023-03-07 RX ORDER — DEXTROSE 50 % IN WATER 50 %
12.5 SYRINGE (ML) INTRAVENOUS ONCE
Refills: 0 | Status: DISCONTINUED | OUTPATIENT
Start: 2023-03-07 | End: 2023-03-07

## 2023-03-07 RX ORDER — DEXTROSE 50 % IN WATER 50 %
15 SYRINGE (ML) INTRAVENOUS ONCE
Refills: 0 | Status: DISCONTINUED | OUTPATIENT
Start: 2023-03-07 | End: 2023-03-07

## 2023-03-07 RX ORDER — INSULIN HUMAN 100 [IU]/ML
1 INJECTION, SOLUTION SUBCUTANEOUS
Qty: 100 | Refills: 0 | Status: DISCONTINUED | OUTPATIENT
Start: 2023-03-07 | End: 2023-03-07

## 2023-03-07 RX ORDER — ACETAMINOPHEN 500 MG
650 TABLET ORAL EVERY 6 HOURS
Refills: 0 | Status: DISCONTINUED | OUTPATIENT
Start: 2023-03-07 | End: 2023-03-15

## 2023-03-07 RX ORDER — GLUCAGON INJECTION, SOLUTION 0.5 MG/.1ML
1 INJECTION, SOLUTION SUBCUTANEOUS ONCE
Refills: 0 | Status: DISCONTINUED | OUTPATIENT
Start: 2023-03-07 | End: 2023-03-07

## 2023-03-07 RX ORDER — OXYCODONE HYDROCHLORIDE 5 MG/1
5 TABLET ORAL EVERY 4 HOURS
Refills: 0 | Status: DISCONTINUED | OUTPATIENT
Start: 2023-03-07 | End: 2023-03-09

## 2023-03-07 RX ORDER — INSULIN GLARGINE 100 [IU]/ML
25 INJECTION, SOLUTION SUBCUTANEOUS EVERY MORNING
Refills: 0 | Status: DISCONTINUED | OUTPATIENT
Start: 2023-03-08 | End: 2023-03-12

## 2023-03-07 RX ORDER — INSULIN GLARGINE 100 [IU]/ML
28 INJECTION, SOLUTION SUBCUTANEOUS ONCE
Refills: 0 | Status: COMPLETED | OUTPATIENT
Start: 2023-03-07 | End: 2023-03-07

## 2023-03-07 RX ORDER — INSULIN LISPRO 100/ML
5 VIAL (ML) SUBCUTANEOUS ONCE
Refills: 0 | Status: COMPLETED | OUTPATIENT
Start: 2023-03-07 | End: 2023-03-07

## 2023-03-07 RX ADMIN — Medication 50 MILLIGRAM(S): at 17:04

## 2023-03-07 RX ADMIN — INSULIN HUMAN 1 UNIT(S)/HR: 100 INJECTION, SOLUTION SUBCUTANEOUS at 02:15

## 2023-03-07 RX ADMIN — OXYCODONE HYDROCHLORIDE 5 MILLIGRAM(S): 5 TABLET ORAL at 21:53

## 2023-03-07 RX ADMIN — Medication 400 MILLIGRAM(S): at 01:54

## 2023-03-07 RX ADMIN — HYDROMORPHONE HYDROCHLORIDE 1 MILLIGRAM(S): 2 INJECTION INTRAMUSCULAR; INTRAVENOUS; SUBCUTANEOUS at 18:13

## 2023-03-07 RX ADMIN — Medication 100 MILLIGRAM(S): at 05:49

## 2023-03-07 RX ADMIN — Medication 250 MILLILITER(S): at 01:53

## 2023-03-07 RX ADMIN — ATORVASTATIN CALCIUM 40 MILLIGRAM(S): 80 TABLET, FILM COATED ORAL at 21:24

## 2023-03-07 RX ADMIN — SENNA PLUS 2 TABLET(S): 8.6 TABLET ORAL at 21:24

## 2023-03-07 RX ADMIN — HYDROMORPHONE HYDROCHLORIDE 1 MILLIGRAM(S): 2 INJECTION INTRAMUSCULAR; INTRAVENOUS; SUBCUTANEOUS at 08:52

## 2023-03-07 RX ADMIN — HYDROMORPHONE HYDROCHLORIDE 1 MILLIGRAM(S): 2 INJECTION INTRAMUSCULAR; INTRAVENOUS; SUBCUTANEOUS at 17:58

## 2023-03-07 RX ADMIN — Medication 667 MILLIGRAM(S): at 17:04

## 2023-03-07 RX ADMIN — HYDROMORPHONE HYDROCHLORIDE 1 MILLIGRAM(S): 2 INJECTION INTRAMUSCULAR; INTRAVENOUS; SUBCUTANEOUS at 08:37

## 2023-03-07 RX ADMIN — OXYCODONE HYDROCHLORIDE 5 MILLIGRAM(S): 5 TABLET ORAL at 21:23

## 2023-03-07 RX ADMIN — POLYETHYLENE GLYCOL 3350 17 GRAM(S): 17 POWDER, FOR SOLUTION ORAL at 11:35

## 2023-03-07 RX ADMIN — HYDROMORPHONE HYDROCHLORIDE 1 MILLIGRAM(S): 2 INJECTION INTRAMUSCULAR; INTRAVENOUS; SUBCUTANEOUS at 14:14

## 2023-03-07 RX ADMIN — INSULIN HUMAN 1.5 UNIT(S)/HR: 100 INJECTION, SOLUTION SUBCUTANEOUS at 00:45

## 2023-03-07 RX ADMIN — SACUBITRIL AND VALSARTAN 1 TABLET(S): 24; 26 TABLET, FILM COATED ORAL at 17:05

## 2023-03-07 RX ADMIN — INSULIN GLARGINE 28 UNIT(S): 100 INJECTION, SOLUTION SUBCUTANEOUS at 03:07

## 2023-03-07 RX ADMIN — HYDROMORPHONE HYDROCHLORIDE 1 MILLIGRAM(S): 2 INJECTION INTRAMUSCULAR; INTRAVENOUS; SUBCUTANEOUS at 13:44

## 2023-03-07 RX ADMIN — ISOSORBIDE MONONITRATE 30 MILLIGRAM(S): 60 TABLET, EXTENDED RELEASE ORAL at 17:05

## 2023-03-07 RX ADMIN — TAMSULOSIN HYDROCHLORIDE 0.4 MILLIGRAM(S): 0.4 CAPSULE ORAL at 21:24

## 2023-03-07 RX ADMIN — Medication 1 PACKET(S): at 17:04

## 2023-03-07 RX ADMIN — Medication 6 MILLIGRAM(S): at 21:24

## 2023-03-07 RX ADMIN — HYDROMORPHONE HYDROCHLORIDE 1 MILLIGRAM(S): 2 INJECTION INTRAMUSCULAR; INTRAVENOUS; SUBCUTANEOUS at 05:00

## 2023-03-07 RX ADMIN — Medication 5 UNIT(S): at 12:41

## 2023-03-07 RX ADMIN — HYDROMORPHONE HYDROCHLORIDE 1 MILLIGRAM(S): 2 INJECTION INTRAMUSCULAR; INTRAVENOUS; SUBCUTANEOUS at 04:37

## 2023-03-07 RX ADMIN — CHLORHEXIDINE GLUCONATE 1 APPLICATION(S): 213 SOLUTION TOPICAL at 11:37

## 2023-03-07 RX ADMIN — CALCITRIOL 0.25 MICROGRAM(S): 0.5 CAPSULE ORAL at 11:37

## 2023-03-07 RX ADMIN — Medication 25 MILLILITER(S): at 00:14

## 2023-03-07 RX ADMIN — Medication 10 MILLIGRAM(S): at 11:35

## 2023-03-07 RX ADMIN — Medication 5 UNIT(S): at 16:55

## 2023-03-07 NOTE — PROGRESS NOTE ADULT - PROBLEM SELECTOR PLAN 1
q1h neuro checks  Pain control  Monitor drain output  c/w crowder    d/w attending q1h neuro checks  Pain control  Monitor drain output  c/w crowder  brace pending   d/w attending

## 2023-03-07 NOTE — PROGRESS NOTE ADULT - ASSESSMENT
PLAN:  NEUROLOGIC: s/p TLIF L4-L5, s/p RTOR  - Pain control with ATC acetaminophen, oxycodone, dilaudid PRN   - q1hr neuro checks     RESPIRATORY:  - Maintain SaO2 > 92%    CARDIOVASCULAR:  - MAP >65  - monitor hemodynamics  -holding at home ASA for now    GI/NUTRITION:  - Diet: NPO  - Bowel regimen with miralax and senna   - Protonix qd     /RENAL:   - IVF  - Millard in place, monitor UOP   - Replete electrolytes PRN     HEMATOLOGIC:  - Trend h/h  - Holding VTE ppx in setting of recent spinal surgery     INFECTIOUS DISEASE:  - Trend WBC, monitor for fevers  - On Ancef x 2 doses    ENDOCRINE:  - Monitor glucose  - insulin gtt, wean as tolerated   - Continue atorvastatin     LINES:  - PIV  - Millard    DISPO:  - SICU 68M DM2, CAD s/p CABG, CKD, chronic back pain, and HTN p/w worsening back pain, found to have spondylolisthesis of lumbar spine. Patient s/p L4-L5 TLIF on 2/28/23. Briefly admitted to SICU post-operatively for pressor requirements. Patient transferred to floor. Patient complaining of increased back pain. On 3/5 MRI of spine showed ventral epidural collection. Patient taken to OR on 3/6 for re-exploration of lumbar spine incision. L4 screws were found to have fractured the pedicles. Screws were removed and fusion extended to L3 with cannulated screws and L5 screws were upsized. SICU consulted for insulin gtt management and q1hr neuro checks. Patient now eligible for floors.     PLAN:  NEUROLOGIC: s/p TLIF L4-L5, s/p RTOR  - Pain control with ATC acetaminophen, oxycodone, dilaudid PRN   - q4hr neuro checks     RESPIRATORY:  - Maintain SaO2 > 92%    CARDIOVASCULAR:  - MAP >65  - monitor hemodynamics  - holding at home ASA for now per neurosx    GI/NUTRITION:  - Diet: Regular diet.   - Bowel regimen with miralax and senna   - Protonix qd     /RENAL:   - IVL  - Millard in place, monitor UOP   - Replete electrolytes PRN     HEMATOLOGIC:  - Trend h/h  - resume chemical DVT ppx today pending neurosx    INFECTIOUS DISEASE:  - Trend WBC, monitor for fevers  - On Ancef x 2 doses    ENDOCRINE:  - Monitor glucose  - off insulin gtt, now on lantus and pre-meals with ISS  - Continue atorvastatin     LINES:  - PIV  - Millard    DISPO: list for floor

## 2023-03-07 NOTE — CHART NOTE - NSCHARTNOTEFT_GEN_A_CORE
Per chart----68M DM2, CAD s/p CABG, CKD, chronic back pain, and HTN p/w worsening back pain, found to have spondylolisthesis of lumbar spine. Patient s/p L4-L5 TLIF on 2/28/23. Briefly admitted to SICU post-operatively for pressor requirements. Patient transferred to floor. Patient complaining of increased back pain. On 3/5 MRI of spine showed ventral epidural collection. Patient taken to OR on 3/6 for re-exploration of lumbar spine incision. L4 screws were found to have fractured the pedicles. Screws were removed and fusion extended to L3 with cannulated screws. L5 screws were upsized. Cement augmentation. .  SICU consulted for insulin gtt management and q1hr neuro checks.    Nutrition follow up critical care length of stay. Pt speaks Daniel, used  services, Tessie, ID # [id number].  Pt was NPO x2 days, now passed bedside swallow assess per Nursing and diet advanced to Regular texture. Pt denies any further nausea, vomiting since surgery. No BM for 4 days and not passing gas per Pt. Nursing present during RD visit and reports continues on bowel regimen, suppository daily. Pt without dentures at present, and denies chewing difficulty. Requesting Halal meals and prefers to receive fish for lunch today--noted preference.   Noted glucose levels improving and is followed by Endo.   Pt encouraged adequate po intakes.   Observed with visible moderate bi temporal muscle wasting and remains with </50% of estimated energy requirement for >/5 days during course of hospital stay. Pt now meets criteria for moderate malnutrition.     DIET : Diet, Regular:   Consistent Carbohydrate {Evening Snack} (CSTCHOSN) (03-07-23 @ 10:19)    WEIGHT: Weight (kg): (3/7) 72.8 (3/6) 76 (3/3) 77.2 (3/2) 75.4     PERTINENT MEDICATIONS: MEDICATIONS  (STANDING):  atorvastatin 40 milliGRAM(s) Oral at bedtime  bisacodyl Suppository 10 milliGRAM(s) Rectal daily  calcitriol   Capsule 0.25 MICROGram(s) Oral daily  calcium acetate 667 milliGRAM(s) Oral with dinner  chlorhexidine 2% Cloths 1 Application(s) Topical daily  dextrose 5%. 1000 milliLiter(s) (50 mL/Hr) IV Continuous <Continuous>  dextrose 5%. 1000 milliLiter(s) (100 mL/Hr) IV Continuous <Continuous>  dextrose 50% Injectable 25 Gram(s) IV Push once  dextrose 50% Injectable 12.5 Gram(s) IV Push once  dextrose 50% Injectable 25 Gram(s) IV Push once  glucagon  Injectable 1 milliGRAM(s) IntraMuscular once  insulin lispro (ADMELOG) corrective regimen sliding scale   SubCutaneous three times a day before meals  isosorbide    mononitrate Tablet (ISMO) 30 milliGRAM(s) Oral two times a day  melatonin 6 milliGRAM(s) Oral at bedtime  polyethylene glycol 3350 17 Gram(s) Oral daily  pregabalin 50 milliGRAM(s) Oral two times a day  psyllium Powder 1 Packet(s) Oral two times a day  sacubitril 24 mG/valsartan 26 mG 1 Tablet(s) Oral two times a day  senna 2 Tablet(s) Oral at bedtime  sodium chloride 0.9%. 1000 milliLiter(s) (75 mL/Hr) IV Continuous <Continuous>  tamsulosin 0.4 milliGRAM(s) Oral at bedtime    LABS:  03-07 Na133 mmol/L<L> Glu 157 mg/dL<H> K+ 4.0 mmol/L Cr  1.93 mg/dL<H> BUN 61 mg/dL<H>  CAPILLARY BLOOD GLUCOSE  POCT Blood Glucose.: 149 mg/dL (07 Mar 2023 06:24)  POCT Blood Glucose.: 115 mg/dL (07 Mar 2023 04:10)  POCT Blood Glucose.: 112 mg/dL (07 Mar 2023 03:06)  POCT Blood Glucose.: 124 mg/dL (07 Mar 2023 02:07)  POCT Blood Glucose.: 148 mg/dL (07 Mar 2023 01:07)  POCT Blood Glucose.: 156 mg/dL (07 Mar 2023 00:42)  POCT Blood Glucose.: 88 mg/dL (06 Mar 2023 23:58)  POCT Blood Glucose.: 113 mg/dL (06 Mar 2023 22:58)  POCT Blood Glucose.: 167 mg/dL (06 Mar 2023 21:56)  POCT Blood Glucose.: 158 mg/dL (06 Mar 2023 21:08)  POCT Blood Glucose.: 196 mg/dL (06 Mar 2023 19:59)  POCT Blood Glucose.: 209 mg/dL (06 Mar 2023 19:06)  POCT Blood Glucose.: 195 mg/dL (06 Mar 2023 18:08)  POCT Blood Glucose.: 208 mg/dL (06 Mar 2023 17:19)  POCT Blood Glucose.: 309 mg/dL (06 Mar 2023 11:06)     SKIN: No pressure injury, has r back Hemovac drain.    EDEMA: No edema      Nutrient Needs: IBW: 67 kg  [X] reassessed  1,675-2,010 kcal (25-30 kcal/kg)  74-87 (1.1-1.3 gm/kg)       New Nutrition Diagnosis:   Pt meets criteria for moderate malnutrition in the setting of acute illness.   Moderate muscle wasting, </50% of estimated energy requirement for >/5 days.                                       ~~~~~RECOMMEND~~~~~  1.  Change diet to Low Na/CSTCHOSN  2.  Monitor PO intake, diet tolerance, skin integrity and pertinent labs.    3.  Please obtain current weight.  4.  Aggressive bowel regimen.  5.  Recommend daily multivitamin supplement.     ALISSON Daniels RD, CDN, CDCES

## 2023-03-07 NOTE — PROGRESS NOTE ADULT - SUBJECTIVE AND OBJECTIVE BOX
HPI:   69 yo M with DMII, CAD s/p CABG, CKD, chronic back pain, and HTN presents to the ED with worsening back pain. Patient is AAOX2 and somnolent, not able to provide any hx. Most of the information was supplemented by the son. As per son, pt is AAOx 3 and "mentally stable." He recently came from [facility] about 2 weeks ago and has " multiple medical complaints." But this morning he told his son that he is feeling weak and his back pain is getting worse. The pain is localized in the lower back region. It is burning is sensation and constant. Unclear if it radiates down the leg. It worsens with movement and walking. He can only walk "10 meters" with a cane. He is also incontinent of urine but denies any recent fever, chills, bowel incontinence, or lower extremities weakness/motor deficits. He reportedly had MRIs in the past but son does not know what it showed. He also has other chronic medical problems such as CKD, DMII and CAD and his son thinks pt needs management for them as well.   In the ED, his vitals were notable for hyperglycemia, elevated BUN/Scr, and hypomagnesemia. EKG showed bradycardia with T wave changes in the lateral leads.  (21 Feb 2023 18:36)    PAST MEDICAL & SURGICAL HISTORY:  Diabetes mellitus  Essential hypertension  CAD (coronary artery disease)  Chronic kidney disease, unspecified CKD stage  S/P CABG x 2      ICU Vital Signs Last 24 Hrs  T(C): 36.2 (07 Mar 2023 00:00), Max: 36.7 (06 Mar 2023 10:48)  T(F): 97.1 (07 Mar 2023 00:00), Max: 98 (06 Mar 2023 10:48)  HR: 64 (07 Mar 2023 04:00) (61 - 129)  BP: 125/98 (06 Mar 2023 21:00) (91/75 - 138/64)  BP(mean): 108 (06 Mar 2023 21:00) (78 - 108)  ABP: 115/57 (07 Mar 2023 04:00) (111/43 - 161/60)  ABP(mean): 77 (07 Mar 2023 04:00) (65 - 90)  RR: 19 (07 Mar 2023 04:00) (11 - 22)  SpO2: 100% (07 Mar 2023 04:00) (97% - 100%)    O2 Parameters below as of 07 Mar 2023 04:00  Patient On (Oxygen Delivery Method): nasal cannula w/ humidification  O2 Flow (L/min): 2      AAO X 3  PERRLA, EOMI  Face symmetric  b/l UE 5/5  LE proximally 5/5 distally R. DF 3/5 PF 4/5  L. PF 4/5 DF 0/5  SILT    2 JOVITA 45,50   HMV 5    MEDICATIONS  (STANDING):  atorvastatin 40 milliGRAM(s) Oral at bedtime  bisacodyl Suppository 10 milliGRAM(s) Rectal daily  calcitriol   Capsule 0.25 MICROGram(s) Oral daily  calcium acetate 667 milliGRAM(s) Oral with dinner  ceFAZolin   IVPB 2000 milliGRAM(s) IV Intermittent every 8 hours  chlorhexidine 2% Cloths 1 Application(s) Topical daily  cyclobenzaprine 10 milliGRAM(s) Oral every 8 hours  dextrose 5%. 1000 milliLiter(s) (100 mL/Hr) IV Continuous <Continuous>  dextrose 5%. 1000 milliLiter(s) (50 mL/Hr) IV Continuous <Continuous>  dextrose 50% Injectable 25 Gram(s) IV Push once  dextrose 50% Injectable 12.5 Gram(s) IV Push once  dextrose 50% Injectable 25 Gram(s) IV Push once  glucagon  Injectable 1 milliGRAM(s) IntraMuscular once  insulin lispro (ADMELOG) corrective regimen sliding scale   SubCutaneous three times a day before meals  isosorbide    mononitrate Tablet (ISMO) 30 milliGRAM(s) Oral two times a day  melatonin 6 milliGRAM(s) Oral at bedtime  polyethylene glycol 3350 17 Gram(s) Oral daily  pregabalin 50 milliGRAM(s) Oral two times a day  psyllium Powder 1 Packet(s) Oral two times a day  sacubitril 24 mG/valsartan 26 mG 1 Tablet(s) Oral two times a day  senna 2 Tablet(s) Oral at bedtime  sodium chloride 0.9%. 1000 milliLiter(s) (75 mL/Hr) IV Continuous <Continuous>  tamsulosin 0.4 milliGRAM(s) Oral at bedtime    MEDICATIONS  (PRN):  dextrose Oral Gel 15 Gram(s) Oral once PRN Blood Glucose LESS THAN 70 milliGRAM(s)/deciliter  HYDROmorphone  Injectable 0.5 milliGRAM(s) IV Push every 4 hours PRN Break through pain  HYDROmorphone  Injectable 0.5 milliGRAM(s) IV Push every 4 hours PRN Moderate Pain (4 - 6)  HYDROmorphone  Injectable 1 milliGRAM(s) IV Push every 4 hours PRN Severe Pain (7 - 10)  nitroglycerin     SubLingual 0.4 milliGRAM(s) SubLingual every 5 minutes PRN Chest Pain

## 2023-03-07 NOTE — PROVIDER CONTACT NOTE (OTHER) - ASSESSMENT
patient asympotmatic, other vitals stable patient put on telementry patient asympotmatic, other vitals stable patient put on telemetry monitoring

## 2023-03-07 NOTE — DIETITIAN NUTRITION RISK NOTIFICATION - TREATMENT: THE FOLLOWING DIET HAS BEEN RECOMMENDED
Diet, Regular:   Consistent Carbohydrate {Evening Snack} (CSTCHOSN) (03-07-23 @ 10:19) [Active]

## 2023-03-07 NOTE — PROVIDER CONTACT NOTE (OTHER) - ACTION/TREATMENT ORDERED:
Provider to bedside Provider to bedside, used  to assess. patient verbalized his blood pressure is usually low.

## 2023-03-07 NOTE — PROGRESS NOTE ADULT - ATTENDING COMMENTS
I agree with the history, physical, and plan, which I have reviewed and edited where appropriate.  I agree with notes/assessment of health care providers on my service.  I have personally examined the patient.  I was physically present for the key portions of the evaluation and management (E/M) service provided.  I reviewed data and laboratory tests/x-rays and all pertinent electronic images.  The patient is a critical care patient with life threatening hemodynamic and metabolic instability in SICU.  Risk benefit analyses discussed.    The patient is in SICU with diagnosis mentioned in the note.    The plan is specified below.    68M DM2, CAD s/p CABG, CKD, chronic back pain, and HTN p/w worsening back pain, found to have spondylolisthesis of lumbar spine. Patient is now s/p L4-L5 TLIF, requiring phenylephrine in OR, with continued need for blood pressure support in PACU. SICU consulted for blood pressure management in postoperative period.     PLAN:    NEUROLOGIC: s/p TLIF L4-L5  - Pain control with ATC acetaminophen, oxycodone, dilaudid PRN   - pregabalin  - melatonin   - standing XRs pending for AM   - Q2h neurochecks    RESPIRATORY:  - on RA     CARDIOVASCULAR:  - phenylephrine gtt for MAP > 65   - Holding entresto     GI/NUTRITION:  - DASH diet   - Bowel regimen with miralax and senna   - Protonix qd     /RENAL: s/p albumin 250cc in PACU; UOP 75cc/hr  - Continue LR at 75cc/hr  - Millard in place, monitor UOP   - Continue calcitriol, calcium   - Continue flomax   - Holding lasix     HEMATOLOGIC:  - Holding VTE ppx in setting of recent spinal surgery, resume 24h post op      INFECTIOUS DISEASE:  - Monitor off abx     ENDOCRINE:  - Monitor glucose  - ISS, admelog 7 tid, lantus 22 qhs; endocrinology team following   - Continue atorvastatin
Upon further discussion, will defer further ischemic testing as it will not change perioperative management. The patient is considered to be at elevated risk for surgery, however without intervention the pt reports his quality of life would be unacceptable and he is severely functionally limited.    Aortic stenosis is mod-severe and would avoid hypotension/hypovolemia. Otherwise will further assess when pt able to ambulate to assess symptoms and further diagnostic/therapeutic options as outpatient.    The patient is currently without evidence of acute ischemia, uncontrolled arrhythmia, or decompensated heart failure. There is currently no cardiac contraindication to the planned procedure.
I agree with the detailed interval history, physical, and plan, which I have reviewed and edited where appropriate'; also agree with notes/assessment with my team on service.  I have personally examined the patient.  I was physically present for the key portions of the evaluation and management (E/M) service provided.  I reviewed all the pertinent data.  The patient is a critical care patient with life threatening hemodynamic and metabolic instability in SICU.  The SICU team has a constant risk benefit analyzes discussion and coordinating care with the primary team and all consultants.   The patient is in SICU with the chief complaint and diagnosis mentioned in the note.   The plan will be specified in the note.  68M s/p L4-L5 TLIF, sp re-exploration of lumbar spine incision. L4 screws were found to have fractured the pedicles. Screws were removed and fusion extended to L3 with cannulated screws and L5 screws were upsized in SICU for insulin gtt management and neuro checks.   EXAM  Neuro:  Exam: b/l foot drop, strength 5/5 LE. upper extremities motor and sensation intact. JPx2 SS. hemovac in place   Respiratory  Exam: Lungs clear   Cardiovascular  Exam: Regular rate   GI  Exam: Abdomen soft,    PLAN:  NEUROLOGIC:   - acetaminophen, oxycodone, dilaudid PRN   - q4hr neuro checks     RESPIRATORY:  - Maintain SaO2 > 92%    CARDIOVASCULAR:  - MAP >65    GI/NUTRITION:  - Diet: Regular diet.   -Protonix qd     /RENAL:   - IVL    HEMATOLOGIC:  -resume chemical DVT ppx today pending neurosx    INFECTIOUS DISEASE:  - Trend WBC,    ENDOCRINE:  - Monitor glucose  DISPO; dc floor
Patient overnight no issues, weaned off phenylephrine. Patient hemodynamically stable, neurologically intact, on room air. Plan to d/c crowder, on vte ppx, floor eligible.  I have personally interviewed when possible and examined the patient, reviewed data and laboratory tests/x-rays and all pertinent electronic images.  I was physically present for the key portions of the evaluation and management (E/M) service provided.   The SICU team has a constant risk benefit analyzes discussion with the primary team, all consultants, House Staff and PA's on all decisions.  These diagnoses are unrelated to the surgical procedure noted above.  I meet with family if needed to get further history, discuss the case and make care decisions for this patient who might not be able to participate.  Time involved in performance of separately billable procedures was not counted toward my critical care time. There is no overlap.  I spent 30 minutes ( 0800Hrs-0915Hrs in AM/ 1600Hrs-1715Hrs in PM, or other time indicated) of critical care time for the diagnoses, assessment, plan and interventions.  This time excludes time spent on separate procedures and teaching.

## 2023-03-07 NOTE — PROGRESS NOTE ADULT - ASSESSMENT
3/1; s/p L4-5 TLIF pod #1, HMV drain, requiring bola post op for bp support, albumin given x 1, sicu consulted, neuro exam stable  3/2: POD # 2 S/P L4-5 TLIF, 1 HMV in place. Off phenylepherine  3/3: POD # 3 S/P L4-5 TLIF. Having episodes of chest pain with elevated troponins, cardiology evaluating. Will transfer to telemetry floor, serial CE and EKG. 1 HMV in place  3/4: POD # 4 S/P L4-5 TLIF. No more chest pain. HMV in place. Seen by endocrine for DM management  3/5: POD # 5 S/P L4-5 TLIF. No more chest pain. HMV in place. Increased back pain with radiation to legs, MRI ordered with anesthesia  3/6: pod #6 s/p L4-5 TLIF, HMV in place, mri L/s w/ sed done no major compressive hematoma, enlarged neurogenic bladder, crowder placed  3/7: s/p RTOR for L. foot drop  cement augmentation for L4 pedicle fx 2/2 to screw pod #1, post op exam stable , HMV, 2JPs's crowder, CT L/s done 3/1; s/p L4-5 TLIF pod #1, HMV drain, requiring bola post op for bp support, albumin given x 1, sicu consulted, neuro exam stable  3/2: POD # 2 S/P L4-5 TLIF, 1 HMV in place. Off phenylepherine  3/3: POD # 3 S/P L4-5 TLIF. Having episodes of chest pain with elevated troponins, cardiology evaluating. Will transfer to telemetry floor, serial CE and EKG. 1 HMV in place  3/4: POD # 4 S/P L4-5 TLIF. No more chest pain. HMV in place. Seen by endocrine for DM management  3/5: POD # 5 S/P L4-5 TLIF. No more chest pain. HMV in place. Increased back pain with radiation to legs, MRI ordered with anesthesia  3/6: pod #6 s/p L4-5 TLIF, HMV in place, mri L/s w/ sed done no major compressive hematoma, enlarged neurogenic bladder, crowder placed  3/7: s/p RTOR for L. foot drop  cement augmentation for L4 pedicle fx 2/2 to screw pod #1, post op exam stable , HMV, 2JPs's crowder, CT L/s done, needs brace

## 2023-03-07 NOTE — PROGRESS NOTE ADULT - SUBJECTIVE AND OBJECTIVE BOX
SICU Daily Progress Note  =====================================================  HPI:   68M DM2, CAD s/p CABG, CKD, chronic back pain, and HTN p/w worsening back pain, found to have spondylolisthesis of lumbar spine. Patient s/p L4-L5 TLIF on 2/28/23. Briefly admitted to SICU post-operatively for pressor requirements. Patient transferred to floor. Patient complaining of increased back pain. On 3/5 MRI of spine showed ventral epidural collection. Patient taken to OR on 3/6 for re-exploration of lumbar spine incision. L4 screws were found to have fractured the pedicles. Screws were removed and fusion extended to L3 with cannulated screws. L5 screws were upsized. Cement augmentation. .  SICU consulted for insulin gtt management and q1hr neuro checks.    24 HR Events:  24hr events:  - s/p CT L spine  -Pending repeat CT  -Amp of D50 given at glu was 88  -currently holding insulin gtt and will restart at 1.5    MEDICATIONS:   --------------------------------------------------------------------------------------  Neurologic Medications  acetaminophen   IVPB .. 1000 milliGRAM(s) IV Intermittent once  cyclobenzaprine 10 milliGRAM(s) Oral every 8 hours  HYDROmorphone  Injectable 0.5 milliGRAM(s) IV Push every 4 hours PRN Break through pain  HYDROmorphone  Injectable 0.5 milliGRAM(s) IV Push every 4 hours PRN Moderate Pain (4 - 6)  HYDROmorphone  Injectable 1 milliGRAM(s) IV Push every 4 hours PRN Severe Pain (7 - 10)  melatonin 6 milliGRAM(s) Oral at bedtime  pregabalin 50 milliGRAM(s) Oral two times a day    Respiratory Medications    Cardiovascular Medications  isosorbide    mononitrate Tablet (ISMO) 30 milliGRAM(s) Oral two times a day  nitroglycerin     SubLingual 0.4 milliGRAM(s) SubLingual every 5 minutes PRN Chest Pain  sacubitril 24 mG/valsartan 26 mG 1 Tablet(s) Oral two times a day    Gastrointestinal Medications  bisacodyl Suppository 10 milliGRAM(s) Rectal daily  calcitriol   Capsule 0.25 MICROGram(s) Oral daily  calcium acetate 667 milliGRAM(s) Oral with dinner  polyethylene glycol 3350 17 Gram(s) Oral daily  psyllium Powder 1 Packet(s) Oral two times a day  senna 2 Tablet(s) Oral at bedtime  sodium chloride 0.9%. 1000 milliLiter(s) IV Continuous <Continuous>    Genitourinary Medications  tamsulosin 0.4 milliGRAM(s) Oral at bedtime    Hematologic/Oncologic Medications    Antimicrobial/Immunologic Medications  ceFAZolin   IVPB 2000 milliGRAM(s) IV Intermittent every 8 hours    Endocrine/Metabolic Medications  atorvastatin 40 milliGRAM(s) Oral at bedtime  insulin regular Infusion 3 Unit(s)/Hr IV Continuous <Continuous>    Topical/Other Medications  chlorhexidine 2% Cloths 1 Application(s) Topical daily    --------------------------------------------------------------------------------------    VITAL SIGNS, INS/OUTS (last 24 hours):  --------------------------------------------------------------------------------------  ICU Vital Signs Last 24 Hrs  T(C): 36 (06 Mar 2023 20:00), Max: 36.7 (06 Mar 2023 10:48)  T(F): 96.8 (06 Mar 2023 20:00), Max: 98 (06 Mar 2023 10:48)  HR: 71 (06 Mar 2023 23:00) (70 - 129)  BP: 125/98 (06 Mar 2023 21:00) (91/75 - 138/64)  BP(mean): 108 (06 Mar 2023 21:00) (78 - 108)  ABP: 126/50 (06 Mar 2023 23:00) (111/43 - 161/60)  ABP(mean): 73 (06 Mar 2023 23:00) (65 - 90)  RR: 13 (06 Mar 2023 23:00) (12 - 22)  SpO2: 100% (06 Mar 2023 23:00) (97% - 100%)    O2 Parameters below as of 06 Mar 2023 23:00    O2 Flow (L/min): 2        03-05-23 @ 07:01  -  03-06-23 @ 07:00  --------------------------------------------------------  IN: 300 mL / OUT: 2375 mL / NET: -2075 mL    03-06-23 @ 07:01  -  03-07-23 @ 00:09  --------------------------------------------------------  IN: 700 mL / OUT: 990 mL / NET: -290 mL      --------------------------------------------------------------------------------------    EXAM  Exam: Normal, NAD, alert, oriented x 3, no focal deficits. PERRLA  ***    Respiratory  Exam: Lungs clear to auscultation, Normal expansion/effort.  ***    Cardiovascular  Exam: S1, S2.  Regular rate and rhythm.  Peripheral edema  ***  Cardiac Rhythm: Normal Sinus Rhythm  ECHO:     GI  Exam: Abdomen soft, Non-tender, Non-distended.  Gastrostomy / Jejunostomy tube in place.     Neuro: b/l foot drop, strength 5/5 LE.      METABOLIC/FLUIDS/ELECTROLYTES  calcitriol   Capsule 0.25 MICROGram(s) Oral daily  calcium acetate 667 milliGRAM(s) Oral with dinner  sodium chloride 0.9%. 1000 milliLiter(s) IV Continuous <Continuous>                        9.5    16.27 )-----------( 398      ( 06 Mar 2023 17:45 )             28.7       03-06    131<L>  |  100  |  64<H>  ----------------------------<  234<H>  4.7   |  17<L>  |  2.07<H>    Ca    8.7      06 Mar 2023 17:45  Phos  4.6     03-06  Mg     2.00     03-06            ABG - ( 06 Mar 2023 15:50 )  pH, Arterial: 7.26  pH, Blood: x     /  pCO2: 42    /  pO2: 330   / HCO3: 19    / Base Excess: -7.8  /  SaO2: 98.0                PT/INR - ( 06 Mar 2023 17:45 )   PT: 11.8 sec;   INR: 1.02 ratio         PTT - ( 06 Mar 2023 17:45 )  PTT:25.9 sec          HEMATOLOGIC  [x] VTE Prophylaxis:       LABS  --------------------------------------------------------------------------------------    T(C): 36 (03-06-23 @ 20:00), Max: 36.7 (03-06-23 @ 10:48)  HR: 71 (03-06-23 @ 23:00) (70 - 129)  BP: 125/98 (03-06-23 @ 21:00) (91/75 - 138/64)  RR: 13 (03-06-23 @ 23:00) (12 - 22)  SpO2: 100% (03-06-23 @ 23:00) (97% - 100%)    --------------------------------------------------------------------------------------   SICU Daily Progress Note  =====================================================  24 HR Events:  - s/p CT L spine, no acute changes  - off insulin gtt, lantus and admelog added  - Regular diet  - q4hr checks     PAST MEDICAL & SURGICAL HISTORY:  Diabetes mellitus  Essential hypertension  CAD (coronary artery disease)  Chronic kidney disease, unspecified CKD stage  S/P CABG x 2    MEDICATIONS:   --------------------------------------------------------------------------------------  Neurologic Medications  acetaminophen   IVPB .. 1000 milliGRAM(s) IV Intermittent once  cyclobenzaprine 10 milliGRAM(s) Oral every 8 hours  HYDROmorphone  Injectable 0.5 milliGRAM(s) IV Push every 4 hours PRN Break through pain  HYDROmorphone  Injectable 0.5 milliGRAM(s) IV Push every 4 hours PRN Moderate Pain (4 - 6)  HYDROmorphone  Injectable 1 milliGRAM(s) IV Push every 4 hours PRN Severe Pain (7 - 10)  melatonin 6 milliGRAM(s) Oral at bedtime  pregabalin 50 milliGRAM(s) Oral two times a day    Respiratory Medications    Cardiovascular Medications  isosorbide    mononitrate Tablet (ISMO) 30 milliGRAM(s) Oral two times a day  nitroglycerin     SubLingual 0.4 milliGRAM(s) SubLingual every 5 minutes PRN Chest Pain  sacubitril 24 mG/valsartan 26 mG 1 Tablet(s) Oral two times a day    Gastrointestinal Medications  bisacodyl Suppository 10 milliGRAM(s) Rectal daily  calcitriol   Capsule 0.25 MICROGram(s) Oral daily  calcium acetate 667 milliGRAM(s) Oral with dinner  polyethylene glycol 3350 17 Gram(s) Oral daily  psyllium Powder 1 Packet(s) Oral two times a day  senna 2 Tablet(s) Oral at bedtime  sodium chloride 0.9%. 1000 milliLiter(s) IV Continuous <Continuous>    Genitourinary Medications  tamsulosin 0.4 milliGRAM(s) Oral at bedtime    Hematologic/Oncologic Medications    Antimicrobial/Immunologic Medications  ceFAZolin   IVPB 2000 milliGRAM(s) IV Intermittent every 8 hours    Endocrine/Metabolic Medications  atorvastatin 40 milliGRAM(s) Oral at bedtime  insulin regular Infusion 3 Unit(s)/Hr IV Continuous <Continuous>    Topical/Other Medications  chlorhexidine 2% Cloths 1 Application(s) Topical daily    --------------------------------------------------------------------------------------    VITAL SIGNS, INS/OUTS (last 24 hours):  ICU Vital Signs Last 24 Hrs  T(C): 36.3 (07 Mar 2023 12:00), Max: 36.3 (07 Mar 2023 08:00)  T(F): 97.4 (07 Mar 2023 12:00), Max: 97.4 (07 Mar 2023 12:00)  HR: 68 (07 Mar 2023 12:00) (61 - 86)  BP: 129/74 (07 Mar 2023 12:00) (113/62 - 138/64)  BP(mean): 83 (07 Mar 2023 12:00) (78 - 108)  ABP: 167/55 (07 Mar 2023 11:00) (108/36 - 167/55)  ABP(mean): 92 (07 Mar 2023 11:00) (59 - 95)  RR: 12 (07 Mar 2023 12:00) (11 - 22)  SpO2: 100% (07 Mar 2023 12:00) (97% - 100%)    O2 Parameters below as of 07 Mar 2023 12:00  Patient On (Oxygen Delivery Method): nasal cannula  O2 Flow (L/min): 2        I&O's Detail    06 Mar 2023 07:01  -  07 Mar 2023 07:00  --------------------------------------------------------  IN:    Insulin: 6 mL    Insulin: 16 mL    Insulin: 3 mL    Insulin: 3 mL    Insulin: 1 mL    IV PiggyBack: 750 mL    sodium chloride 0.9%: 1125 mL  Total IN: 1904 mL    OUT:    Accordian (mL): 15 mL    Bulb (mL): 60 mL    Bulb (mL): 70 mL    Indwelling Catheter - Urethral (mL): 1635 mL  Total OUT: 1780 mL    Total NET: 124 mL      07 Mar 2023 07:01  -  07 Mar 2023 13:05  --------------------------------------------------------  IN:    sodium chloride 0.9%: 375 mL  Total IN: 375 mL    OUT:    Indwelling Catheter - Urethral (mL): 350 mL  Total OUT: 350 mL    Total NET: 25 mL        --------------------------------------------------------------------------------------    EXAM  Neuro:  Exam: Normal, NAD, alert, oriented x 3,  b/l foot drop, strength 5/5 LE. upper extremities motor and sensation intact. JPx2 SS. hemovac in place     Respiratory  Exam: Lungs clear to auscultation, Normal expansion/effort.  No signs of respiratory distress    Cardiovascular  Exam: S1, S2.  Regular rate and rhythm.  Peripheral edema    Cardiac Rhythm: Normal Sinus Rhythm    GI  Exam: Abdomen soft, Non-tender, Non-distended.            LABS                          8.6    14.29 )-----------( 384      ( 07 Mar 2023 01:00 )             25.9     03-07    133<L>  |  103  |  61<H>  ----------------------------<  157<H>  4.0   |  16<L>  |  1.93<H>    Ca    8.2<L>      07 Mar 2023 01:00  Phos  4.1     03-07  Mg     2.20     03-07        --------------------------------------------------------------------------------------

## 2023-03-08 DIAGNOSIS — K91.89 OTHER POSTPROCEDURAL COMPLICATIONS AND DISORDERS OF DIGESTIVE SYSTEM: ICD-10-CM

## 2023-03-08 PROBLEM — Z00.00 ENCOUNTER FOR PREVENTIVE HEALTH EXAMINATION: Status: ACTIVE | Noted: 2023-03-08

## 2023-03-08 LAB
ANION GAP SERPL CALC-SCNC: 11 MMOL/L — SIGNIFICANT CHANGE UP (ref 7–14)
BUN SERPL-MCNC: 59 MG/DL — HIGH (ref 7–23)
CALCIUM SERPL-MCNC: 8.3 MG/DL — LOW (ref 8.4–10.5)
CHLORIDE SERPL-SCNC: 106 MMOL/L — SIGNIFICANT CHANGE UP (ref 98–107)
CO2 SERPL-SCNC: 17 MMOL/L — LOW (ref 22–31)
CREAT SERPL-MCNC: 1.8 MG/DL — HIGH (ref 0.5–1.3)
EGFR: 40 ML/MIN/1.73M2 — LOW
GLUCOSE BLDC GLUCOMTR-MCNC: 106 MG/DL — HIGH (ref 70–99)
GLUCOSE BLDC GLUCOMTR-MCNC: 126 MG/DL — HIGH (ref 70–99)
GLUCOSE BLDC GLUCOMTR-MCNC: 207 MG/DL — HIGH (ref 70–99)
GLUCOSE BLDC GLUCOMTR-MCNC: 84 MG/DL — SIGNIFICANT CHANGE UP (ref 70–99)
GLUCOSE SERPL-MCNC: 111 MG/DL — HIGH (ref 70–99)
HCT VFR BLD CALC: 27.8 % — LOW (ref 39–50)
HGB BLD-MCNC: 8.8 G/DL — LOW (ref 13–17)
MAGNESIUM SERPL-MCNC: 2.3 MG/DL — SIGNIFICANT CHANGE UP (ref 1.6–2.6)
MCHC RBC-ENTMCNC: 29.3 PG — SIGNIFICANT CHANGE UP (ref 27–34)
MCHC RBC-ENTMCNC: 31.7 GM/DL — LOW (ref 32–36)
MCV RBC AUTO: 92.7 FL — SIGNIFICANT CHANGE UP (ref 80–100)
NRBC # BLD: 0 /100 WBCS — SIGNIFICANT CHANGE UP (ref 0–0)
NRBC # FLD: 0 K/UL — SIGNIFICANT CHANGE UP (ref 0–0)
PHOSPHATE SERPL-MCNC: 2.3 MG/DL — LOW (ref 2.5–4.5)
PLATELET # BLD AUTO: 397 K/UL — SIGNIFICANT CHANGE UP (ref 150–400)
POTASSIUM SERPL-MCNC: 4 MMOL/L — SIGNIFICANT CHANGE UP (ref 3.5–5.3)
POTASSIUM SERPL-SCNC: 4 MMOL/L — SIGNIFICANT CHANGE UP (ref 3.5–5.3)
RBC # BLD: 3 M/UL — LOW (ref 4.2–5.8)
RBC # FLD: 13.2 % — SIGNIFICANT CHANGE UP (ref 10.3–14.5)
SODIUM SERPL-SCNC: 134 MMOL/L — LOW (ref 135–145)
WBC # BLD: 11.69 K/UL — HIGH (ref 3.8–10.5)
WBC # FLD AUTO: 11.69 K/UL — HIGH (ref 3.8–10.5)

## 2023-03-08 PROCEDURE — 99232 SBSQ HOSP IP/OBS MODERATE 35: CPT

## 2023-03-08 RX ADMIN — OXYCODONE HYDROCHLORIDE 5 MILLIGRAM(S): 5 TABLET ORAL at 06:09

## 2023-03-08 RX ADMIN — POLYETHYLENE GLYCOL 3350 17 GRAM(S): 17 POWDER, FOR SOLUTION ORAL at 17:48

## 2023-03-08 RX ADMIN — OXYCODONE HYDROCHLORIDE 5 MILLIGRAM(S): 5 TABLET ORAL at 06:39

## 2023-03-08 RX ADMIN — TAMSULOSIN HYDROCHLORIDE 0.4 MILLIGRAM(S): 0.4 CAPSULE ORAL at 21:22

## 2023-03-08 RX ADMIN — Medication 5 UNIT(S): at 09:11

## 2023-03-08 RX ADMIN — CALCITRIOL 0.25 MICROGRAM(S): 0.5 CAPSULE ORAL at 11:31

## 2023-03-08 RX ADMIN — HEPARIN SODIUM 5000 UNIT(S): 5000 INJECTION INTRAVENOUS; SUBCUTANEOUS at 05:59

## 2023-03-08 RX ADMIN — OXYCODONE HYDROCHLORIDE 5 MILLIGRAM(S): 5 TABLET ORAL at 11:41

## 2023-03-08 RX ADMIN — Medication 1 PACKET(S): at 17:48

## 2023-03-08 RX ADMIN — INSULIN GLARGINE 25 UNIT(S): 100 INJECTION, SOLUTION SUBCUTANEOUS at 09:07

## 2023-03-08 RX ADMIN — OXYCODONE HYDROCHLORIDE 5 MILLIGRAM(S): 5 TABLET ORAL at 12:30

## 2023-03-08 RX ADMIN — SACUBITRIL AND VALSARTAN 1 TABLET(S): 24; 26 TABLET, FILM COATED ORAL at 05:56

## 2023-03-08 RX ADMIN — SACUBITRIL AND VALSARTAN 1 TABLET(S): 24; 26 TABLET, FILM COATED ORAL at 17:48

## 2023-03-08 RX ADMIN — OXYCODONE HYDROCHLORIDE 10 MILLIGRAM(S): 5 TABLET ORAL at 17:58

## 2023-03-08 RX ADMIN — Medication 650 MILLIGRAM(S): at 21:21

## 2023-03-08 RX ADMIN — Medication 5 UNIT(S): at 13:12

## 2023-03-08 RX ADMIN — Medication 4: at 13:12

## 2023-03-08 RX ADMIN — Medication 1 PACKET(S): at 05:57

## 2023-03-08 RX ADMIN — Medication 6 MILLIGRAM(S): at 21:23

## 2023-03-08 RX ADMIN — Medication 667 MILLIGRAM(S): at 17:48

## 2023-03-08 RX ADMIN — Medication 10 MILLIGRAM(S): at 11:31

## 2023-03-08 RX ADMIN — SENNA PLUS 2 TABLET(S): 8.6 TABLET ORAL at 21:22

## 2023-03-08 RX ADMIN — ATORVASTATIN CALCIUM 40 MILLIGRAM(S): 80 TABLET, FILM COATED ORAL at 21:22

## 2023-03-08 RX ADMIN — Medication 50 MILLIGRAM(S): at 17:58

## 2023-03-08 RX ADMIN — ISOSORBIDE MONONITRATE 30 MILLIGRAM(S): 60 TABLET, EXTENDED RELEASE ORAL at 05:57

## 2023-03-08 RX ADMIN — ISOSORBIDE MONONITRATE 30 MILLIGRAM(S): 60 TABLET, EXTENDED RELEASE ORAL at 17:49

## 2023-03-08 RX ADMIN — POLYETHYLENE GLYCOL 3350 17 GRAM(S): 17 POWDER, FOR SOLUTION ORAL at 05:59

## 2023-03-08 RX ADMIN — HEPARIN SODIUM 5000 UNIT(S): 5000 INJECTION INTRAVENOUS; SUBCUTANEOUS at 13:13

## 2023-03-08 RX ADMIN — Medication 650 MILLIGRAM(S): at 21:51

## 2023-03-08 RX ADMIN — OXYCODONE HYDROCHLORIDE 10 MILLIGRAM(S): 5 TABLET ORAL at 18:41

## 2023-03-08 RX ADMIN — Medication 50 MILLIGRAM(S): at 05:55

## 2023-03-08 RX ADMIN — HEPARIN SODIUM 5000 UNIT(S): 5000 INJECTION INTRAVENOUS; SUBCUTANEOUS at 21:23

## 2023-03-08 NOTE — CONSULT NOTE ADULT - ATTENDING COMMENTS
given poor exercise tolerance check nuclear stress test for additional risk stratification.  given possible inpatient surgery and intended neuraxial procedure hold aspirin as needed,
Chief complaint:  back pain    68M DM2, CAD s/p CABG, CKD, chronic back pain, and HTN p/w worsening back pain, found to have spondylolisthesis of lumbar spine. Patient s/p L4-L5 TLIF on 2/28/23. Briefly admitted to SICU post-operatively for pressor requirements. Patient transferred to floor. Patient complaining of increased back pain. On 3/5 MRI of spine showed ventral epidural collection. Patient taken to OR on 3/6 for re-exploration of lumbar spine incision. L4 screws were found to have fractured the pedicles. Screws were removed and fusion extended to L3 with cannulated screws. L5 screws were upsized. Cement augmentation.    The active issues are:  1. hyperglycemia  2. acute postsurgical pain    Will initiate insulin infusion for FSG goal<180 until can safely transition to basal insulin regimen once tolerating regular oral diet.    The Acute Care Surgery (B Team) Practice:    urgent issues - spectra [phone]  nonurgent issues - [phone]  patient appointments or afterhours - [phone]
Patient s/p L4-L5 tlif requiring q1 hr neurochecks and strict bp control in perioperative setting prevent spinal cord ischemia  N mentating at baseline, multimodal pain therapy  resp on room air  cv perfusing adequately requiring pressors phenylephrine to maintain map above 65  gi advance diet as tolerated  gu/renal adequate uop  heme vte ppx  id no changes  endo no changes    The patient is a critical care patient with life threatening hemodynamic and metabolic instability in SICU.  I have personally interviewed when possible and examined the patient, reviewed data and laboratory tests/x-rays and all pertinent electronic images.  I was physically present for the key portions of the evaluation and management (E/M) service provided.   The SICU team has a constant risk benefit analyzes discussion with the primary team, all consultants, House Staff and PA's on all decisions.  These diagnoses are unrelated to the surgical procedure noted above.  I meet with family if needed to get further history, discuss the case and make care decisions for this patient who might not be able to participate.  Time involved in performance of separately billable procedures was not counted toward my critical care time. There is no overlap.  I spent 55-75 minutes ( 0800Hrs-0915Hrs in AM/ 1600Hrs-1715Hrs in PM, or other time indicated) of critical care time for the diagnoses, assessment, plan and interventions.  This time excludes time spent on separate procedures and teaching.
Serbian  #[id number]    68 M with L4-5 disc bulge with severe canal stenosis and cauda equina compression now s/p L4-5 fusion on 2/28, with subsequent MRI L-spine (3/5) showing L4-5 ventral epidural collection. Went back to OR 3/6 with finding of L4 pedicle screws causing fracture - fusion was extended to L3, and cement augmentation was done.  Pt has had worsening weakness in distal BLE.    Exam 3/9:  Awake, answers questions and follows commands, at times closing eyes during conversation  Fluent speech  Motor: 5/5 BUE proximally and distally. R knee flex 3, L knee flex 4-4+. B/l knee ext 5. B/l feet flaccid with 0/5 strength b/l inversion/eversion/DF/PF  Reflexes: 1-2+ R biceps. L biceps possibly position-limited, unable to elicit. 1+ BR b/l. 0 or trace R patellar; 0 L patellar. Absent b/l ankle reflexes. Plantars mute b/l.    3/6 CT L-spine: post-op changes L3-S1. b/l iliac artery stent. L5 vertebral body compression deformity. L4 pedicle screws removed. L3-5 posterior fusion. L3 and L5 vertebral body cement augmentation.    Assessment:  Cauda equina compression s/p L4-5 fusion that was then extended to L3 with cement augmentation of L3 and L5 vertebral bodies  Worsening BLE weakness: knee flexion weakness and flaccid feet    Recommendations:  As above    Aliza Nava MD

## 2023-03-08 NOTE — PROGRESS NOTE ADULT - SUBJECTIVE AND OBJECTIVE BOX
Patient is a 68y old  Male who presents with a chief complaint of Back pain (08 Mar 2023 02:04)      Interval history: s/p revision of posterolateral fusion of lumbar spine.  patient reports pain, weakness worse RLE    REVIEW OF SYSTEMS  weakness  pain    PAST MEDICAL & SURGICAL HISTORY  Diabetes mellitus    Essential hypertension    CAD (coronary artery disease)    Chronic kidney disease, unspecified CKD stage    S/P CABG x 2         CURRENT FUNCTIONAL STATUS  mobility held pending TLSO    FAMILY HISTORY   FH: heart disease        RECENT LABS/IMAGING  CBC Full  -  ( 08 Mar 2023 06:21 )  WBC Count : 11.69 K/uL  RBC Count : 3.00 M/uL  Hemoglobin : 8.8 g/dL  Hematocrit : 27.8 %  Platelet Count - Automated : 397 K/uL  Mean Cell Volume : 92.7 fL  Mean Cell Hemoglobin : 29.3 pg  Mean Cell Hemoglobin Concentration : 31.7 gm/dL  Auto Neutrophil # : x  Auto Lymphocyte # : x  Auto Monocyte # : x  Auto Eosinophil # : x  Auto Basophil # : x  Auto Neutrophil % : x  Auto Lymphocyte % : x  Auto Monocyte % : x  Auto Eosinophil % : x  Auto Basophil % : x    03-08    134<L>  |  106  |  59<H>  ----------------------------<  111<H>  4.0   |  17<L>  |  1.80<H>    Ca    8.3<L>      08 Mar 2023 06:21  Phos  2.3     03-08  Mg     2.30     03-08          VITALS  T(C): 36.6 (03-08-23 @ 08:49), Max: 37 (03-07-23 @ 19:47)  HR: 89 (03-08-23 @ 08:49) (68 - 91)  BP: 108/55 (03-08-23 @ 08:49) (93/51 - 162/61)  RR: 18 (03-08-23 @ 08:49) (12 - 18)  SpO2: 99% (03-08-23 @ 08:49) (96% - 100%)  Wt(kg): --    ALLERGIES  No Known Allergies      MEDICATIONS   acetaminophen     Tablet .. 650 milliGRAM(s) Oral every 6 hours PRN  atorvastatin 40 milliGRAM(s) Oral at bedtime  bisacodyl Suppository 10 milliGRAM(s) Rectal daily  calcitriol   Capsule 0.25 MICROGram(s) Oral daily  calcium acetate 667 milliGRAM(s) Oral with dinner  heparin   Injectable 5000 Unit(s) SubCutaneous every 8 hours  insulin glargine Injectable (LANTUS) 25 Unit(s) SubCutaneous every morning  insulin lispro (ADMELOG) corrective regimen sliding scale   SubCutaneous three times a day before meals  insulin lispro Injectable (ADMELOG) 5 Unit(s) SubCutaneous three times a day before meals  isosorbide    mononitrate Tablet (ISMO) 30 milliGRAM(s) Oral two times a day  melatonin 6 milliGRAM(s) Oral at bedtime  nitroglycerin     SubLingual 0.4 milliGRAM(s) SubLingual every 5 minutes PRN  oxyCODONE    IR 5 milliGRAM(s) Oral every 4 hours PRN  oxyCODONE    IR 10 milliGRAM(s) Oral every 4 hours PRN  polyethylene glycol 3350 17 Gram(s) Oral two times a day  pregabalin 50 milliGRAM(s) Oral two times a day  psyllium Powder 1 Packet(s) Oral two times a day  sacubitril 24 mG/valsartan 26 mG 1 Tablet(s) Oral two times a day  senna 2 Tablet(s) Oral at bedtime  tamsulosin 0.4 milliGRAM(s) Oral at bedtime      ----------------------------------------------------------------------------------------  PHYSICAL EXAM   ----------------------------------------------------------------------------------------  ASSESSMENT/PLAN    Pain -  DVT PPX -   Rehab -   Patient is a 68y old  Male who presents with a chief complaint of Back pain (08 Mar 2023 02:04)      Interval history: s/p revision of posterolateral fusion of lumbar spine.  L4 found to have fractured pedicles, L4 screws removed and fusion extended to L3, L5 screws upsized, cement augmentation.   patient reports pain, weakness worse RLE    REVIEW OF SYSTEMS  weakness  pain  constipation    PAST MEDICAL & SURGICAL HISTORY  Diabetes mellitus    Essential hypertension    CAD (coronary artery disease)    Chronic kidney disease, unspecified CKD stage    S/P CABG x 2         CURRENT FUNCTIONAL STATUS  mobility held pending TLSO    FAMILY HISTORY   FH: heart disease        RECENT LABS/IMAGING  CBC Full  -  ( 08 Mar 2023 06:21 )  WBC Count : 11.69 K/uL  RBC Count : 3.00 M/uL  Hemoglobin : 8.8 g/dL  Hematocrit : 27.8 %  Platelet Count - Automated : 397 K/uL  Mean Cell Volume : 92.7 fL  Mean Cell Hemoglobin : 29.3 pg  Mean Cell Hemoglobin Concentration : 31.7 gm/dL  Auto Neutrophil # : x  Auto Lymphocyte # : x  Auto Monocyte # : x  Auto Eosinophil # : x  Auto Basophil # : x  Auto Neutrophil % : x  Auto Lymphocyte % : x  Auto Monocyte % : x  Auto Eosinophil % : x  Auto Basophil % : x    03-08    134<L>  |  106  |  59<H>  ----------------------------<  111<H>  4.0   |  17<L>  |  1.80<H>    Ca    8.3<L>      08 Mar 2023 06:21  Phos  2.3     03-08  Mg     2.30     03-08          VITALS  T(C): 36.6 (03-08-23 @ 08:49), Max: 37 (03-07-23 @ 19:47)  HR: 89 (03-08-23 @ 08:49) (68 - 91)  BP: 108/55 (03-08-23 @ 08:49) (93/51 - 162/61)  RR: 18 (03-08-23 @ 08:49) (12 - 18)  SpO2: 99% (03-08-23 @ 08:49) (96% - 100%)  Wt(kg): --    ALLERGIES  No Known Allergies      MEDICATIONS   acetaminophen     Tablet .. 650 milliGRAM(s) Oral every 6 hours PRN  atorvastatin 40 milliGRAM(s) Oral at bedtime  bisacodyl Suppository 10 milliGRAM(s) Rectal daily  calcitriol   Capsule 0.25 MICROGram(s) Oral daily  calcium acetate 667 milliGRAM(s) Oral with dinner  heparin   Injectable 5000 Unit(s) SubCutaneous every 8 hours  insulin glargine Injectable (LANTUS) 25 Unit(s) SubCutaneous every morning  insulin lispro (ADMELOG) corrective regimen sliding scale   SubCutaneous three times a day before meals  insulin lispro Injectable (ADMELOG) 5 Unit(s) SubCutaneous three times a day before meals  isosorbide    mononitrate Tablet (ISMO) 30 milliGRAM(s) Oral two times a day  melatonin 6 milliGRAM(s) Oral at bedtime  nitroglycerin     SubLingual 0.4 milliGRAM(s) SubLingual every 5 minutes PRN  oxyCODONE    IR 5 milliGRAM(s) Oral every 4 hours PRN  oxyCODONE    IR 10 milliGRAM(s) Oral every 4 hours PRN  polyethylene glycol 3350 17 Gram(s) Oral two times a day  pregabalin 50 milliGRAM(s) Oral two times a day  psyllium Powder 1 Packet(s) Oral two times a day  sacubitril 24 mG/valsartan 26 mG 1 Tablet(s) Oral two times a day  senna 2 Tablet(s) Oral at bedtime  tamsulosin 0.4 milliGRAM(s) Oral at bedtime      ----------------------------------------------------------------------------------------  PHYSICAL EXAM    PHYSICAL EXAM  Constitutional - NAD  HEENT - NCAT, EOMI  Neck - Supple, No limited ROM  Chest - no respiratory distress  Cardiovascular - s1 s2  Abdomen -  Soft, NTND  Extremities - No C/C/E, No calf tenderness   Neurologic Exam -                    Cognitive - Awake, Alert, AAO to self, place, date, year, situation     Communication - Fluent, No dysarthria     Cranial Nerves - CN 2-12 intact     Motor -                      LEFT    UE - ShAB 5/5, EF 5/5, EE 5/5, WE 5/5,  5/5                    RIGHT UE - ShAB 5/5, EF 5/5, EE 5/5, WE 5/5,  5/5                    LEFT    LE - HF 2/5, KE 2/5, DF 1/5, PF 1/5                    RIGHT LE - HF 2/5, KE 2/5, DF 0/5, PF 0/5     Sensory - impaired sensation b/l LE  R worse than L     Balance - WNL Static  Psychiatric - Mood stable, Affect WNL  ----------------------------------------------------------------------------------------  ASSESSMENT/PLAN  67 yo m s/p TLIF  Pain - oxy ir prn, bowel regimen  please monitor and treat constipation  diet: dash/tlc  DVT PPX - heparin  continue bedside PT and OT - waiting for TLSO  Rehab -   recommend inpatient rehab when medically cleared.  acute vs sangeeta pending progress and medical course.  currently limited by pain, s/p surgical revision 3/6

## 2023-03-08 NOTE — PROGRESS NOTE ADULT - ASSESSMENT
68M Turkish speaking, HTN, DM2 on insulin, CKD3, systolic HF (EF 45%), CAD s/p CABG 2005, RSA s/p L renal stent, and PAD s/p PTA to R common iliac and L common iliac s/p stent who presents to the ED with worsening back pain in context of known L4-L5 cord compression/cauda equina since 8/2022. now s/p procedure on requiring SICU stay for pressors not stable for floor. Medicine following for comanagement.

## 2023-03-08 NOTE — PROGRESS NOTE ADULT - ASSESSMENT
67 yo M with DMII, CAD s/p CABG, CKD, chronic back pain, and HTN presents to the ED with worsening back pain. Patient is AAOX2 and somnolent, not able to provide any hx. Most of the information was supplemented by the son. As per son, pt is AAOx 3 and "mentally stable." He recently came from [facility] about 2 weeks ago and has " multiple medical complaints." But this morning he told his son that he is feeling weak and his back pain is getting worse. The pain is localized in the lower back region. Pain is worse when standing. Denies radiculopathy. Intermittent urinary and bowel incontinence for 1 year. Was admitted to a hospital in [facility] in Aug 2022 for pain, MRI was completed that showed a L4-5 spondylolisthesis. Patient was offered surgery but told he has too may medical issues and discharged him.   Patient here here visiting his son when back pain exacerbated and came to ER for evaluation with his MRI on paper films     MRI reviewed, L4-5 disk dessication and cauda equina compression , Right grade II and Left Grade I spondylolisthesis     3/1; s/p L4-5 TLIF pod #1, HMV drain, requiring bola post op for bp support, albumin given x 1, sicu consulted, neuro exam stable  3/2: POD # 2 S/P L4-5 TLIF, 1 HMV in place. Off phenylepherine. CT lumbar spine, screws ok  3/3: POD # 3 S/P L4-5 TLIF. Having episodes of chest pain with elevated troponins, cardiology evaluating. Will transfer to telemetry floor, serial CE and EKG. 1 HMV in place  3/4: POD # 4 S/P L4-5 TLIF. No more chest pain. HMV in place. Seen by endocrine for DM management  3/5: POD # 5 S/P L4-5 TLIF. No more chest pain. HMV in place. Increased back pain with radiation to legs, MRI ordered obtained by evening with anesthesia, possible ventral epidural collection. Imaging reviewed with Dr. Avalos  3/6: pod #6 s/p L4-5 TLIF, HMV in place, mri L/s w/ sed done no major compressive hematoma, enlarged neurogenic bladder, crowder placed  3/7: PAtient noted to have new Left food drop, s/p emergent RTOR for Lef. foot drop  cement augmentation for L4 pedicle fx 2/2 to screw pod #1, post op exam stable , HMV, 2JPs's vel, CT L/s done- screws ok, needs brace

## 2023-03-08 NOTE — CONSULT NOTE ADULT - ASSESSMENT
Assessment:  Collateral provided by chart review. Patient also able to provide history via Uzbek  (ID: [id number]). 68 year old male PMH HTN, DM2 on insulin, CKD3, systolic HF (EF 45%), CAD s/p CABG 2005, RSA s/p L renal stent, and PAD s/p PTA to R common iliac and L common iliac s/p stent who initially presented to the ED with worsening low back pain in context of known L4-L5 cord compression/cauda equina since 8/2022. Neurology consulted for 'b/l foot drop.' In [facility] patient had MRI L-spine showing diffuse posterior L4-5 disc bulge causing spinal canal stenosis and cauda equina compression, performed 8/2022. At that time, patient was unable to have surgery performed 2/2 'multiple medical issues.'Prior to ED arrival, patient was able ambulate with a cane. Prior to ED arrival, patient was endorsing intermittent urinary/bowel incontinence for duration of 1 year. MRI L-spine here showed 'Grade 1 anterior listhesis L4 on L5 in combination with disc bulge/protrusion causes severe central canal stenosis at the L4-L5 level.' Then taken to the OR 2/28 for L4-L5 fusion and decompression (TLIF). After surgery, patient initially endorsed pain in both LE's, with R being greater than L, which eventually worsened. The pain then progressed to weakness throughout the RLE, with the LLE being okay in terms of strength. Then the weakness progressed to both legs, with the R being worse than the L.  Patient was taken for a repeat MRI L-spine, showing ventral epidural collection at L4-L5, possibly representing abscess vs post-op changes. Patient was again taken to OR 3/6 for MR findings and L foot drop, for which cement augmentation was done for L4 pedicle fx 2/2 to screw. After this 2nd surgery, patient stating pain in RLE compared to LLE is persistent. Also he states he is unable to move RLE at all, and has slight movement in the LLE. Also states he has decreased overall sensation throughout RLE compared to the LLE. Exam as above.       Impression: B/l LE weakness/sensory changes, most prominent at the feet during CF/PF of uncertain etiology.     Plan:  -Recs pending  -PT/OT    -Management & disposition to be discussed with neuro attending, Dr. Nava Assessment:  Collateral provided by chart review. Patient also able to provide history via Greek  (ID: [id number]). 68 year old male PMH HTN, DM2 on insulin, CKD3, systolic HF (EF 45%), CAD s/p CABG 2005, RSA s/p L renal stent, and PAD s/p PTA to R common iliac and L common iliac s/p stent who initially presented to the ED with worsening low back pain in context of known L4-L5 cord compression/cauda equina since 8/2022. Neurology consulted for 'b/l foot drop.' In [facility] patient had MRI L-spine showing diffuse posterior L4-5 disc bulge causing spinal canal stenosis and cauda equina compression, performed 8/2022. At that time, patient was unable to have surgery performed 2/2 'multiple medical issues.'Prior to ED arrival, patient was able ambulate with a cane. Prior to ED arrival, patient was endorsing intermittent urinary/bowel incontinence for duration of 1 year. MRI L-spine here showed 'Grade 1 anterior listhesis L4 on L5 in combination with disc bulge/protrusion causes severe central canal stenosis at the L4-L5 level.' Then taken to the OR 2/28 for L4-L5 fusion and decompression (TLIF). After surgery, patient initially endorsed pain in both LE's, with R being greater than L, which eventually worsened. The pain then progressed to weakness throughout the RLE, with the LLE being okay in terms of strength. Then the weakness progressed to both legs, with the R being worse than the L.  Patient was taken for a repeat MRI L-spine, showing ventral epidural collection at L4-L5, possibly representing abscess vs post-op changes. Patient was again taken to OR 3/6 for MR findings and L foot drop, for which cement augmentation was done for L4 pedicle fx 2/2 to screw. After this 2nd surgery, patient stating pain in RLE compared to LLE is persistent. Also he states he is unable to move RLE at all, and has slight movement in the LLE. Also states he has decreased overall sensation throughout RLE compared to the LLE. Exam as above.       Impression: B/l LE weakness/sensory changes, most prominent at the feet during DF/PF of uncertain etiology.     Plan:  -Recs pending  -PT/OT    -Management & disposition to be discussed with neuro attending, Dr. Nava Assessment:  Collateral provided by chart review. Patient also able to provide history via French  (ID: [id number]). 68 year old male PMH HTN, DM2 on insulin, CKD3, systolic HF (EF 45%), CAD s/p CABG 2005, RSA s/p L renal stent, and PAD s/p PTA to R common iliac and L common iliac s/p stent who initially presented to the ED with worsening low back pain in context of known L4-L5 cord compression/cauda equina since 8/2022. Neurology consulted for 'b/l foot drop.' In [facility] patient had MRI L-spine showing diffuse posterior L4-5 disc bulge causing spinal canal stenosis and cauda equina compression, performed 8/2022. At that time, patient was unable to have surgery performed 2/2 'multiple medical issues.'Prior to ED arrival, patient was able ambulate with a cane. Prior to ED arrival, patient was endorsing intermittent urinary/bowel incontinence for duration of 1 year. MRI L-spine here showed 'Grade 1 anterior listhesis L4 on L5 in combination with disc bulge/protrusion causes severe central canal stenosis at the L4-L5 level.' Then taken to the OR 2/28 for L4-L5 fusion and decompression (TLIF). After surgery, patient initially endorsed pain in both LE's, with R being greater than L, which eventually worsened. The pain then progressed to weakness throughout the RLE, with the LLE being okay in terms of strength. Then the weakness progressed to both legs, with the R being worse than the L.  Patient was taken for a repeat MRI L-spine, showing ventral epidural collection at L4-L5, possibly representing abscess vs post-op changes. Patient was again taken to OR 3/6 for MR findings and L foot drop, for which cement augmentation was done for L4 pedicle fx 2/2 to screw. After this 2nd surgery, patient stating pain in RLE compared to LLE is persistent. Also he states he is unable to move RLE at all, and has slight movement in the LLE. Also states he has decreased overall sensation throughout RLE compared to the LLE. Exam as above.       Impression: B/l LE weakness/sensory changes, most prominent at the feet during DF/PF of uncertain etiology. Appears to be worsened post 2nd surgery.     Plan:  -Repeat MRI L-spine w/o. Can do w/ contrast if concern for abscess.   -PT/OT  -Further recs pending MR imaging    -Management & disposition to be discussed with neuro attending, Dr. Nava Assessment:  Collateral provided by chart review. Patient also able to provide history via Slovak  (ID: [id number]). 68 year old male PMH HTN, DM2 on insulin, CKD3, systolic HF (EF 45%), CAD s/p CABG 2005, RSA s/p L renal stent, and PAD s/p PTA to R common iliac and L common iliac s/p stent who initially presented to the ED with worsening low back pain in context of known L4-L5 cord compression/cauda equina since 8/2022. Neurology consulted for 'b/l foot drop.' In [facility] patient had MRI L-spine showing diffuse posterior L4-5 disc bulge causing spinal canal stenosis and cauda equina compression, performed 8/2022. At that time, patient was unable to have surgery performed 2/2 'multiple medical issues.'Prior to ED arrival, patient was able ambulate with a cane. Prior to ED arrival, patient was endorsing intermittent urinary/bowel incontinence for duration of 1 year. MRI L-spine here showed 'Grade 1 anterior listhesis L4 on L5 in combination with disc bulge/protrusion causes severe central canal stenosis at the L4-L5 level.' Then taken to the OR 2/28 for L4-L5 fusion and decompression (TLIF). After surgery, patient initially endorsed pain in both LE's, with R being greater than L, which eventually worsened. The pain then progressed to weakness throughout the RLE, with the LLE being okay in terms of strength. Then the weakness progressed to both legs, with the R being worse than the L.  Patient was taken for a repeat MRI L-spine, showing ventral epidural collection at L4-L5, possibly representing abscess vs post-op changes. Patient was again taken to OR 3/6 for MR findings and L foot drop, for which cement augmentation was done for L4 pedicle fx 2/2 to screw. After this 2nd surgery, patient stating pain in RLE compared to LLE is persistent. Also he states he is unable to move RLE at all, and has slight movement in the LLE. Also states he has decreased overall sensation throughout RLE compared to the LLE. Exam as above.       Impression: B/l LE weakness/sensory changes, most prominent at the distal LE's during DF/PF of uncertain etiology. Appears to be worsened post 2nd surgery.     Plan:  -Repeat MRI L-spine w/o. Can do w/ contrast if concern for abscess.   -PT/OT  -Further recs pending MR imaging    -Management & disposition to be discussed with neuro attending, Dr. Nava Assessment:  Collateral provided by chart review. Patient also able to provide history via Pashto  (ID: [id number]). 68 year old male PMH HTN, DM2 on insulin, CKD3, systolic HF (EF 45%), CAD s/p CABG 2005, RSA s/p L renal stent, and PAD s/p PTA to R common iliac and L common iliac s/p stent who initially presented to the ED with worsening low back pain in context of known L4-L5 cord compression/cauda equina since 8/2022. Neurology consulted for 'b/l foot drop.' In [facility] patient had MRI L-spine showing diffuse posterior L4-5 disc bulge causing spinal canal stenosis and cauda equina compression, performed 8/2022. At that time, patient was unable to have surgery performed 2/2 'multiple medical issues.'Prior to ED arrival, patient was able ambulate with a cane. Prior to ED arrival, patient was endorsing intermittent urinary/bowel incontinence for duration of 1 year. MRI L-spine here showed 'Grade 1 anterior listhesis L4 on L5 in combination with disc bulge/protrusion causes severe central canal stenosis at the L4-L5 level.' Then taken to the OR 2/28 for L4-L5 fusion and decompression (TLIF). After surgery, patient initially endorsed pain in both LE's, with R being greater than L, which eventually worsened. The pain then progressed to weakness throughout the RLE, with the LLE being okay in terms of strength. Then the weakness progressed to both legs, with the R being worse than the L.  Patient was taken for a repeat MRI L-spine, showing ventral epidural collection at L4-L5, possibly representing abscess vs post-op changes. Patient was again taken to OR 3/6 for MR findings and L foot drop, for which cement augmentation was done for L4 pedicle fx 2/2 to screw. After this 2nd surgery, patient stating pain in RLE compared to LLE is persistent. Also he states he is unable to move RLE at all, and has slight movement in the LLE. Also states he has decreased overall sensation throughout RLE compared to the LLE. Exam as above.       Impression: B/l LE weakness/sensory changes, most prominent distally, due to cauda equina compression. Appears to be worsened post 2nd surgery.     Plan:  -Repeat MRI L-spine w/o. Can do w/ contrast if concern for abscess.   -PT/OT, SW  -Rest of management per neurosurgery  -Follow up in neurology clinic after discharge: [phone]    -Management & disposition to be discussed with neuro attending, Dr. Nava

## 2023-03-08 NOTE — PROGRESS NOTE ADULT - ASSESSMENT
69 yo M with DMII, CAD s/p CABG, CKD, chronic back pain, and HTN presents to the ED with worsening back pain.  He recently came from [facility] about 2 weeks ago and has " multiple medical complaints."  he told his son that he is feeling weak and his back pain is getting worse.   In the ED, his vitals were notable for hyperglycemia, elevated BUN/Scr, and hypomagnesemia. Endocrine called for DM 2, A1c 8.3    1. DM 2  A1c 8.3%  Home Regimen: Ligenta-M 500 (Linagliptin 2.5 and metformin 500mg) 1 tablet once daily, NovoRapid (insulin aspart) 14 units w/ lunch, NovoMix 30 insulin 14 units before breakfast, 14-16 units before dinner    While inpatient:  BG target 100-180 mg/dl  Patient was on Dexamethasone over the weekend, now stopped, last dose 3/6  Continue Lantus 25 units every morning (0900)  Continue Admelog 5 units SQ TID before meals (Hold if NPO/skips meal)   Today  at lunch, if persisting above 200 prandially will increase pre-meal insulin   Chek FS before meals and at bedtime  Hypoglycemia protocol    Carb Consistent Diet   Nutrition consult   Provider to RN for diabetes/insulin pen teaching, AB pharmacist following (see pharmacy intervention notes)     Discharge Plan:  Medicaid pending - currently uninsured   Likely stop prior outpatient regimen and continue with mixed insulin (70/30) only  Consider Novolog 70/30: doses TBD close to discharge: before breakfast and before dinner via pen (see pharmacy liaison note: patient can qualify for coupon), so could use insulin PENS if that is easier for patient  Rapid acting insulin should NOT be ordered in conjunction with mixed insulin for risk of hypoglycemia   Ensure he has glucometer, glucose test strips, lancets, alcohol swabs and insulin PEN needles vs syringes   Followup with [facility], [address] [place] [address], [phone], [phone]    2. HTN  Goal BP in DM <130/80  On Entresto   outpt mc/cr ratio  Titration as per primary team     3. HLD  LDL 75 above goal; LDL <70   Continue Atorvastatin 40mg daily    Noemi Carlisle  Nurse Practitioner  Division of Endocrinology & Diabetes  In house pager #[phone]/long range pager #[phone]    If before 9AM or after 6PM, or on weekends/holidays, please call endocrine answering service for assistance ([phone]).  For nonurgent matters email [email or web address] for assistance.

## 2023-03-08 NOTE — CONSULT NOTE ADULT - CONSULT REQUESTED DATE/TIME
06-Mar-2023 16:16
03-Mar-2023
05-Mar-2023 12:10
08-Mar-2023 10:51
22-Feb-2023
28-Feb-2023 20:31
02-Mar-2023 12:53
23-Feb-2023 13:26
23-Feb-2023 17:12

## 2023-03-08 NOTE — CONSULT NOTE ADULT - SUBJECTIVE AND OBJECTIVE BOX
HPI: Collateral provided by chart review. Patient also able to provide history via Estonian  (ID: [id number]). 68 year old male PMH HTN, DM2 on insulin, CKD3, systolic HF (EF 45%), CAD s/p CABG 2005, RSA s/p L renal stent, and PAD s/p PTA to R common iliac and L common iliac s/p stent who initially presented to the ED with worsening low back pain in context of known L4-L5 cord compression/cauda equina since 8/2022. Neurology consulted for 'b/l foot drop.' Came from [facility] to visit soon 2 weeks prior to ED arrival. In [facility] patient had MRI L-spine showing diffuse posterior L4-5 disc bulge causing spinal canal stenosis and cauda equina compression, performed 8/2022. At that time, patient was unable to have surgery performed 2/2 'multiple medical issues.' During this time and prior to ED arrival, patient was able ambulate with a cane. Also prior to ED arrival, patient was endorsing intermittent urinary/bowel incontinence for duration of 1 year. Patient was admitted to [facility] and MRI L-spine performed while inpatient showed 'Grade 1 anterior listhesis L4 on L5 in combination with disc bulge/protrusion causes severe central canal stenosis at the L4-L5 level.' Pending cardiology and medicine clearance, patient was taken to the OR 2/28 for L4-L5 fusion and decompression (TLIF). No complication reported during surgery. After surgery, patient initially endorsed pain in both LE's, with R being greater than L, which eventually worsened. The pain then progressed to weakness throughout the RLE, with the LLE being okay in terms of strength. Then the weakness progressed to both legs, with the R being worse than the L.  Patient was taken for a repeat MRI L-spine, showing ventral epidural collection at L4-L5, possibly representing abscess vs post-op changes. Patient was again taken to OR 3/6 for MR findings and L foot drop, for which cement augmentation was done for L4 pedicle fx 2/2 to screw. After this 2nd surgery, patient stating pain in RLE compared to LLE is persistent. Also he states he is unable to move RLE at all, and has slight movement in the LLE. Also states he has decreased overall sensation throughout RLE compared to the LLE.         REVIEW OF SYSTEMS  Per HPI    PAST MEDICAL & SURGICAL HISTORY:  Diabetes mellitus      Essential hypertension    CAD (coronary artery disease)      Chronic kidney disease, unspecified CKD stage      S/P CABG x 2        FAMILY HISTORY:  FH: heart disease      SOCIAL HISTORY:   T/E/D:   Occupation:   Lives with:     MEDICATIONS (HOME):  Home Medications:  aspirin 81 mg oral delayed release tablet: 1 tab(s) orally once a day (22 Feb 2023 12:06)  atorvastatin 20 mg oral tablet: 1 tab(s) orally once a day (at bedtime) (22 Feb 2023 12:10)  bisacodyl 5 mg oral delayed release tablet: 1 tab(s) orally once a day (22 Feb 2023 12:06)  calcitriol 0.25 mcg oral capsule: 1 cap(s) orally once a day (22 Feb 2023 12:08)  calcium acetate 667 mg oral tablet: 1 tab(s) orally 3 times a day (22 Feb 2023 12:07)  dutasteride-tamsulosin 0.5 mg-0.4 mg oral capsule: 1 cap(s) orally once a day (22 Feb 2023 12:08)  febuxostat 40 mg oral tablet: 1 tab(s) orally once a day (22 Feb 2023 12:11)  Insulin Aspart FlexPen 100 units/mL injectable solution: 16 unit(s) injectable before lunch (22 Feb 2023 12:09)  Lasix 40 mg oral tablet: 1 tab(s) orally once a day (22 Feb 2023 12:11)  linagliptin-metformin 2.5 mg-500 mg oral tablet: 1 tab(s) orally 2 times a day (22 Feb 2023 12:05)  LORazepam 1 mg oral tablet: 1 tab(s) orally once a day (22 Feb 2023 12:11)  Nebivolol 2.5 mg oral tablet: 1 tab(s) orally once a day (22 Feb 2023 12:10)  nitroglycerin 2.5 mg oral capsule, extended release: 1 cap(s) orally 3 times a day (22 Feb 2023 12:07)  pregabalin 50 mg oral capsule: 1 cap(s) orally 2 times a day (22 Feb 2023 12:10)  Refresh ophthalmic solution: 1 drop(s) to each affected eye 4 times a day (22 Feb 2023 12:12)  sacubitril-valsartan 24 mg-26 mg oral tablet: 1 tab(s) orally 2 times a day (22 Feb 2023 12:12)  xylometazoline 0.1% nasal solution: nasal 2 times a day (22 Feb 2023 12:12)    MEDICATIONS  (STANDING):  atorvastatin 40 milliGRAM(s) Oral at bedtime  bisacodyl Suppository 10 milliGRAM(s) Rectal daily  calcitriol   Capsule 0.25 MICROGram(s) Oral daily  calcium acetate 667 milliGRAM(s) Oral with dinner  heparin   Injectable 5000 Unit(s) SubCutaneous every 8 hours  insulin glargine Injectable (LANTUS) 25 Unit(s) SubCutaneous every morning  insulin lispro (ADMELOG) corrective regimen sliding scale   SubCutaneous three times a day before meals  insulin lispro Injectable (ADMELOG) 5 Unit(s) SubCutaneous three times a day before meals  isosorbide    mononitrate Tablet (ISMO) 30 milliGRAM(s) Oral two times a day  melatonin 6 milliGRAM(s) Oral at bedtime  polyethylene glycol 3350 17 Gram(s) Oral two times a day  pregabalin 50 milliGRAM(s) Oral two times a day  psyllium Powder 1 Packet(s) Oral two times a day  sacubitril 24 mG/valsartan 26 mG 1 Tablet(s) Oral two times a day  senna 2 Tablet(s) Oral at bedtime  tamsulosin 0.4 milliGRAM(s) Oral at bedtime    MEDICATIONS  (PRN):  acetaminophen     Tablet .. 650 milliGRAM(s) Oral every 6 hours PRN Mild Pain (1 - 3)  nitroglycerin     SubLingual 0.4 milliGRAM(s) SubLingual every 5 minutes PRN Chest Pain  oxyCODONE    IR 5 milliGRAM(s) Oral every 4 hours PRN Moderate Pain (4 - 6)  oxyCODONE    IR 10 milliGRAM(s) Oral every 4 hours PRN Severe Pain (7 - 10)    ALLERGIES/INTOLERANCES:  Allergies  No Known Allergies    Intolerances    VITALS & EXAMINATION:  Vital Signs Last 24 Hrs  T(C): 36.6 (08 Mar 2023 08:49), Max: 37 (07 Mar 2023 19:47)  T(F): 97.8 (08 Mar 2023 08:49), Max: 98.6 (07 Mar 2023 19:47)  HR: 89 (08 Mar 2023 08:49) (68 - 91)  BP: 108/55 (08 Mar 2023 08:49) (93/51 - 162/61)  BP(mean): 90 (07 Mar 2023 17:00) (76 - 90)  RR: 18 (08 Mar 2023 08:49) (12 - 18)  SpO2: 99% (08 Mar 2023 08:49) (96% - 100%)    Parameters below as of 08 Mar 2023 08:49  Patient On (Oxygen Delivery Method): room air        General:  Constitutional: Male, appears stated age, in no apparent distress including pain  Head: Normocephalic & atraumatic.    Neurological (>12):  MS: Awake, alert, oriented to person, place, situation, time. Normal affect. Follows all commands.    Language: Speech is clear, fluent     CNs: PERRLA (R = 3mm, L = 3mm). EOMI. No facial asymmetry b/l. Gag reflex deferred. Head turning & shoulder shrug intact b/l. Tongue midline, normal movements, no atrophy.    Motor: Normal muscle bulk & tone. No noticeable tremor or seizure. No pronator drift.              Deltoid	Biceps	Triceps	   R	5	5	5	5		  L	5	5	5	5	    	H-Flex	H-ABd	H-ADd	K-Ext	D-Flex	P-Flex  R	4+	4	4	4	0	0	   L	4+	4	4	4+	2	1		     Sensation: Intact to LT b/l (L>R). Decreased to PP throughout worse in RLE compared to LLE. Proprioception intact L foot, not inact R foot.     Cortical: Extinction on DSS (neglect): none    Reflexes:              Biceps(C5)       BR(C6)     Triceps(C7)               Patellar(L4)    Achilles(S1)    Plantar Resp  R	1	          1             1		        0		    0		Mute  L	1	          1	             1		        0		    0		Mute     Coordination: No dysmetria to FTN    Gait: Deferred 2/2 fall risk    LABORATORY:  CBC                       8.8    11.69 )-----------( 397      ( 08 Mar 2023 06:21 )             27.8     Chem 03-08    134<L>  |  106  |  59<H>  ----------------------------<  111<H>  4.0   |  17<L>  |  1.80<H>    Ca    8.3<L>      08 Mar 2023 06:21  Phos  2.3     03-08  Mg     2.30     03-08      LFTs   Coagulopathy PT/INR - ( 07 Mar 2023 01:00 )   PT: 12.0 sec;   INR: 1.03 ratio         PTT - ( 07 Mar 2023 01:00 )  PTT:24.9 sec  Lipid Panel 02-22 Chol 136 LDL -- HDL 29<L> Trig 162<H>  A1c   Cardiac enzymes     U/A   CSF  Immunological  Other    STUDIES & IMAGING:  Studies (EKG, EEG, EMG, etc):     Radiology (XR, CT, MR, U/S, TTE/AMINATA):    CT L-spine w/o: New postop changes as above.    MRI L-spine 3/5:  Limited, poorly diagnostic exam as described above.  Postop changes are identified. Ventral epidural collection is identified   as described above. Please note this could be related postop changes   though the possibility of underlying epidural abscess cannot be entirely   exclude in the correct clinical setting. Contrast enhanced MR lumbar of   the spine can be done for further evaluation.    MRI L-spine: Grade 1 anterior listhesis L4 on L5 in combination with disc   bulge/protrusion causes severe central canal stenosis at the L4-L5 level.

## 2023-03-08 NOTE — PROGRESS NOTE ADULT - SUBJECTIVE AND OBJECTIVE BOX
[facility] Department of Hospital Medicine  Dr. Jessika Perales  Available on MS Teams  Pager: [phone]    Patient is a 68y old  Male who presents with a chief complaint of Back pain (05 Mar 2023 02:33)    Subjective:  Pt seen and examined at bedside. Patient reports constipation. Requesting medication. Denies chest pain or SOB.     Vital Signs Last 24 Hrs  T(C): 36.7 (08 Mar 2023 12:54), Max: 37 (07 Mar 2023 19:47)  T(F): 98.1 (08 Mar 2023 12:54), Max: 98.6 (07 Mar 2023 19:47)  HR: 106 (08 Mar 2023 12:54) (70 - 106)  BP: 134/61 (08 Mar 2023 12:54) (93/51 - 162/61)  BP(mean): 90 (07 Mar 2023 17:00) (76 - 90)  RR: 18 (08 Mar 2023 12:54) (16 - 18)  SpO2: 100% (08 Mar 2023 12:54) (96% - 100%)    Parameters below as of 08 Mar 2023 12:54  Patient On (Oxygen Delivery Method): room air      PHYSICAL EXAM:  Constitutional: WDWN resting comfortably in bed; NAD  Head: NC/AT  Eyes: PERRL, EOMI, anicteric sclera  ENT: no nasal discharge; MMM  Neck: supple; no JVD  Respiratory: CTA B/L; no W/R/R  Cardiac: +S1/S2; RRR; no M/R/G  Gastrointestinal: soft, NT/ND; no rebound or guarding; +BSx4  Extremities: WWP, no clubbing or cyanosis; no peripheral edema  Musculoskeletal: NROM x4; no joint swelling, tenderness or erythema  Vascular: 2+ radial, DP/PT pulses B/L  Dermatologic: skin warm, dry and intact; no rashes, wounds, or scars  Neurologic: AAOx3; CNII-XII grossly intact; no focal deficits  Psychiatric: affect and characteristics of appearance, verbalizations, behaviors are appropriate    MEDICATIONS  (STANDING):  acetaminophen     Tablet .. 975 milliGRAM(s) Oral every 6 hours  aspirin  chewable 81 milliGRAM(s) Oral daily  atorvastatin 40 milliGRAM(s) Oral at bedtime  bisacodyl Suppository 10 milliGRAM(s) Rectal daily  calcitriol   Capsule 0.25 MICROGram(s) Oral daily  calcium acetate 667 milliGRAM(s) Oral with dinner  dexAMETHasone     Tablet 4 milliGRAM(s) Oral every 6 hours  dextrose 50% Injectable 25 Gram(s) IV Push once  dextrose 50% Injectable 12.5 Gram(s) IV Push once  dextrose 50% Injectable 25 Gram(s) IV Push once  furosemide    Tablet 40 milliGRAM(s) Oral daily  gabapentin 300 milliGRAM(s) Oral three times a day  heparin   Injectable 5000 Unit(s) SubCutaneous every 8 hours  insulin glargine Injectable (LANTUS) 27 Unit(s) SubCutaneous at bedtime  insulin lispro (ADMELOG) corrective regimen sliding scale   SubCutaneous three times a day before meals  insulin lispro (ADMELOG) corrective regimen sliding scale   SubCutaneous at bedtime  insulin lispro Injectable (ADMELOG) 12 Unit(s) SubCutaneous three times a day before meals  isosorbide    mononitrate Tablet (ISMO) 30 milliGRAM(s) Oral two times a day  melatonin 6 milliGRAM(s) Oral at bedtime  polyethylene glycol 3350 17 Gram(s) Oral daily  pregabalin 50 milliGRAM(s) Oral two times a day  psyllium Powder 1 Packet(s) Oral two times a day  sacubitril 24 mG/valsartan 26 mG 1 Tablet(s) Oral two times a day  senna 2 Tablet(s) Oral at bedtime  sodium chloride 2 Gram(s) Oral daily  tamsulosin 0.4 milliGRAM(s) Oral at bedtime    MEDICATIONS  (PRN):  dextrose Oral Gel 15 Gram(s) Oral once PRN Blood Glucose LESS THAN 70 milliGRAM(s)/deciliter  HYDROmorphone  Injectable 0.5 milliGRAM(s) IV Push every 4 hours PRN Break through pain  nitroglycerin     SubLingual 0.4 milliGRAM(s) SubLingual every 5 minutes PRN Chest Pain  oxyCODONE    IR 10 milliGRAM(s) Oral every 4 hours PRN Severe Pain (7 - 10)  oxyCODONE    IR 5 milliGRAM(s) Oral every 4 hours PRN Moderate Pain (4 - 6)    LABS:                                   8.8    11.69 )-----------( 397      ( 08 Mar 2023 06:21 )             27.8   03-08    134<L>  |  106  |  59<H>  ----------------------------<  111<H>  4.0   |  17<L>  |  1.80<H>    Ca    8.3<L>      08 Mar 2023 06:21  Phos  2.3     03-08  Mg     2.30     03-08               CAPILLARY BLOOD GLUCOSE    POCT Blood Glucose.: 207 mg/dL (08 Mar 2023 12:23)    RADIOLOGY & ADDITIONAL TESTS:   < from: Xray Abdomen 2 Views (03.07.23 @ 21:49) >    IMPRESSION: Postoperative ileus.    < end of copied text >

## 2023-03-08 NOTE — PROGRESS NOTE ADULT - PROBLEM SELECTOR PLAN 6
- per chart review ranging from 1.8-2.3  - stop lasix and monitor   -if continues to rise, would stop entresto as well.   - trend daily  - avoid nephrotoxic agents and renally dose medications

## 2023-03-08 NOTE — PROGRESS NOTE ADULT - PROBLEM SELECTOR PLAN 9
All brand name Bulgarian.  We do not have all of them.  Meds are: Ligenta-M 500 (Linagliptin 2.5 and metoformin 500mg), [place] card MR (trimetazidine hydrochloride 35 mg),  Ecosprin 75 (aspirin 75 mg), Duralax, Ketoalfa (amio acid Keto Analogues 600 mg), Nidocard-Retard (nitroglycerin 2.6 mg), Hypophos (calcium acetate 667 mg), Tamisol D (Tamsulosin 0.4 mg and Dutasteride 0.5 mg), Mydocalm (tolperisone 50 mg), Raditrol (calcitriol 0.25 microgram),  NovoRapid 16 units w/ lunch, Manuel nordisk insulin 16am/14pm, Atova 20 (atorvastatin 20 mg), Nebita 2.5 (nevivolol 2.5 mg), Tamisol (tamsulosin 0.4 mg), Pregaba 50 (pregabalin 50 mg), Febustat 40 (febuxostat 40 mg), Lozixum (lorazapam 1 mg), Lasix 40 mg, Sabitar 50 mg, Anazol 0.1%, Tearfresh

## 2023-03-08 NOTE — PROGRESS NOTE ADULT - PROBLEM SELECTOR PLAN 1
- C/w Millard  - Surg hospital followup, monitor vitals signs  - Aggressive bowel regimen. Suppository daily. If no BM by today, will give fleet enema  - Abd Xray  - PT/OT  - TLSO brace when OOB  - Will restart DVT ppx today  - Baby ASA in 24 hours  - C/w Telemetry monitoring

## 2023-03-08 NOTE — PROGRESS NOTE ADULT - PROBLEM SELECTOR PLAN 1
s/p OR on 2/28 with L4-L5 spinal fusion with neurosurgery, has drain in place  - drain output per Nsgyx  -MRI concerning for compressive fluid pocket at the surgical bed s/p OR on 3/6  - PT/OT/PM&R following and recommending rehab  - c/w pain control per primary team and pain mgmt

## 2023-03-08 NOTE — PROGRESS NOTE ADULT - PROBLEM SELECTOR PLAN 8
HbA1c of 8.3%, at home on insulin 14 units with lunch and 14-16 BID of mixed insulin + orals  - Fs uncontrolled. AM   -c/w lantus to 25u and premeal to 5TID  -change to moderate dose sliding scale  -endo following

## 2023-03-08 NOTE — PROGRESS NOTE ADULT - SUBJECTIVE AND OBJECTIVE BOX
Chief Complaint: DM 2    History: Patient seen at bedside. RN was present as well, and  to [facility] on [employer] video device #[id number]  Patient reports he ate breakfast, no nausea/vomiting. Endorses constipation     MEDICATIONS  (STANDING):  atorvastatin 40 milliGRAM(s) Oral at bedtime  bisacodyl Suppository 10 milliGRAM(s) Rectal daily  calcitriol   Capsule 0.25 MICROGram(s) Oral daily  calcium acetate 667 milliGRAM(s) Oral with dinner  heparin   Injectable 5000 Unit(s) SubCutaneous every 8 hours  insulin glargine Injectable (LANTUS) 25 Unit(s) SubCutaneous every morning  insulin lispro (ADMELOG) corrective regimen sliding scale   SubCutaneous three times a day before meals  insulin lispro Injectable (ADMELOG) 5 Unit(s) SubCutaneous three times a day before meals  isosorbide    mononitrate Tablet (ISMO) 30 milliGRAM(s) Oral two times a day  melatonin 6 milliGRAM(s) Oral at bedtime  polyethylene glycol 3350 17 Gram(s) Oral two times a day  pregabalin 50 milliGRAM(s) Oral two times a day  psyllium Powder 1 Packet(s) Oral two times a day  sacubitril 24 mG/valsartan 26 mG 1 Tablet(s) Oral two times a day  senna 2 Tablet(s) Oral at bedtime  tamsulosin 0.4 milliGRAM(s) Oral at bedtime    MEDICATIONS  (PRN):  acetaminophen     Tablet .. 650 milliGRAM(s) Oral every 6 hours PRN Mild Pain (1 - 3)  nitroglycerin     SubLingual 0.4 milliGRAM(s) SubLingual every 5 minutes PRN Chest Pain  oxyCODONE    IR 5 milliGRAM(s) Oral every 4 hours PRN Moderate Pain (4 - 6)  oxyCODONE    IR 10 milliGRAM(s) Oral every 4 hours PRN Severe Pain (7 - 10)    No Known Allergies    Review of Systems:  Cardiovascular: No chest pain  Respiratory: No SOB  GI: +constipation, No nausea, vomiting  Endocrine: no hypoglycemia     PHYSICAL EXAM:  VITALS: T(C): 36.7 (03-08-23 @ 12:54)  T(F): 98.1 (03-08-23 @ 12:54), Max: 98.6 (03-07-23 @ 19:47)  HR: 98 (03-08-23 @ 13:00) (70 - 106)  BP: 134/61 (03-08-23 @ 12:54) (93/51 - 162/61)  RR:  (16 - 18)  SpO2:  (96% - 100%)  Wt(kg): --  GENERAL: NAD  EYES: No proptosis, no lid lag, anicteric  HEENT:  Atraumatic, Normocephalic, moist mucous membranes  RESPIRATORY: unlabored respirations    CAPILLARY BLOOD GLUCOSE    POCT Blood Glucose.: 207 mg/dL (08 Mar 2023 12:23)  POCT Blood Glucose.: 126 mg/dL (08 Mar 2023 08:39)  POCT Blood Glucose.: 150 mg/dL (07 Mar 2023 22:14)  POCT Blood Glucose.: 126 mg/dL (07 Mar 2023 16:53)      03-08    134<L>  |  106  |  59<H>  ----------------------------<  111<H>  4.0   |  17<L>  |  1.80<H>    eGFR: 40<L>    Ca    8.3<L>      03-08  Mg     2.30     03-08  Phos  2.3     03-08      A1C with Estimated Average Glucose Result: 8.3 % (02-22-23 @ 05:13)    Diet, Regular:   Consistent Carbohydrate Evening Snack (CSTCHOSN) (03-07-23 @ 10:19) [Active]

## 2023-03-08 NOTE — PROGRESS NOTE ADULT - SUBJECTIVE AND OBJECTIVE BOX
OVERNIGHT EVENTS:  Raymond  [id number]  Feels shooting pain is better than before surgery, still "burning" in left good and noted foot weakness  Complaining of constipation  + Flatus      ICU Vital Signs Last 24 Hrs  T(C): 36.7 (08 Mar 2023 00:22), Max: 37 (07 Mar 2023 19:47)  T(F): 98.1 (08 Mar 2023 00:22), Max: 98.6 (07 Mar 2023 19:47)  HR: 85 (08 Mar 2023 00:22) (62 - 91)  BP: 114/46 (08 Mar 2023 00:22) (93/51 - 162/61)  BP(mean): 90 (07 Mar 2023 17:00) (76 - 90)  ABP: 167/55 (07 Mar 2023 11:00) (115/57 - 167/55)  ABP(mean): 92 (07 Mar 2023 11:00) (74 - 95)  RR: 17 (08 Mar 2023 00:22) (11 - 21)  SpO2: 99% (08 Mar 2023 00:22) (96% - 100%)    O2 Parameters below as of 08 Mar 2023 00:22  Patient On (Oxygen Delivery Method): room air              AAO X 3  PERRLA, EOMI  Face symmetric  b/l UE 5/5  LE proximally 5/5 distally R. DF 2/5 PF 4/5  LEFT. PF 4/5 DF 0/5      HMV:10cc L30cc R38cc    MEDICATIONS  (STANDING):  atorvastatin 40 milliGRAM(s) Oral at bedtime  bisacodyl Suppository 10 milliGRAM(s) Rectal daily  calcitriol   Capsule 0.25 MICROGram(s) Oral daily  calcium acetate 667 milliGRAM(s) Oral with dinner  heparin   Injectable 5000 Unit(s) SubCutaneous every 8 hours  insulin glargine Injectable (LANTUS) 25 Unit(s) SubCutaneous every morning  insulin lispro (ADMELOG) corrective regimen sliding scale   SubCutaneous three times a day before meals  insulin lispro Injectable (ADMELOG) 5 Unit(s) SubCutaneous three times a day before meals  isosorbide    mononitrate Tablet (ISMO) 30 milliGRAM(s) Oral two times a day  melatonin 6 milliGRAM(s) Oral at bedtime  polyethylene glycol 3350 17 Gram(s) Oral two times a day  pregabalin 50 milliGRAM(s) Oral two times a day  psyllium Powder 1 Packet(s) Oral two times a day  sacubitril 24 mG/valsartan 26 mG 1 Tablet(s) Oral two times a day  senna 2 Tablet(s) Oral at bedtime  tamsulosin 0.4 milliGRAM(s) Oral at bedtime    MEDICATIONS  (PRN):  acetaminophen     Tablet .. 650 milliGRAM(s) Oral every 6 hours PRN Mild Pain (1 - 3)  nitroglycerin     SubLingual 0.4 milliGRAM(s) SubLingual every 5 minutes PRN Chest Pain  oxyCODONE    IR 10 milliGRAM(s) Oral every 4 hours PRN Severe Pain (7 - 10)  oxyCODONE    IR 5 milliGRAM(s) Oral every 4 hours PRN Moderate Pain (4 - 6)        < from: MR Lumbar Spine No Cont (03.05.23 @ 21:09) >  IMPRESSION: Limited, poorly diagnostic exam as described above.    Postop changes are identified. Ventral epidural collection is identified   as described above. Please note this could be related postop changes   though the possibility of underlying epidural abscess cannot be entirely   exclude in the correct clinical setting. Contrast enhanced MR lumbar of   the spine can be done for further evaluation.    < end of copied text >   OVERNIGHT EVENTS:  [place]  [id number]  Feels shooting pain is better than before surgery, still "burning" in left good and noted foot weakness  Complaining of constipation  + Flatus      ICU Vital Signs Last 24 Hrs  T(C): 36.7 (08 Mar 2023 00:22), Max: 37 (07 Mar 2023 19:47)  T(F): 98.1 (08 Mar 2023 00:22), Max: 98.6 (07 Mar 2023 19:47)  HR: 85 (08 Mar 2023 00:22) (62 - 91)  BP: 114/46 (08 Mar 2023 00:22) (93/51 - 162/61)  BP(mean): 90 (07 Mar 2023 17:00) (76 - 90)  ABP: 167/55 (07 Mar 2023 11:00) (115/57 - 167/55)  ABP(mean): 92 (07 Mar 2023 11:00) (74 - 95)  RR: 17 (08 Mar 2023 00:22) (11 - 21)  SpO2: 99% (08 Mar 2023 00:22) (96% - 100%)    O2 Parameters below as of 08 Mar 2023 00:22  Patient On (Oxygen Delivery Method): room air              AAO X 3  PERRLA, EOMI  Face symmetric  b/l UE 5/5  LE proximally 5/5 distally R. DF 2/5 PF 4/5  LEFT. PF 4/5 DF 0/5  Abdomen soft, non distended  + flatus      HMV:10cc L30cc R38cc    MEDICATIONS  (STANDING):  atorvastatin 40 milliGRAM(s) Oral at bedtime  bisacodyl Suppository 10 milliGRAM(s) Rectal daily  calcitriol   Capsule 0.25 MICROGram(s) Oral daily  calcium acetate 667 milliGRAM(s) Oral with dinner  heparin   Injectable 5000 Unit(s) SubCutaneous every 8 hours  insulin glargine Injectable (LANTUS) 25 Unit(s) SubCutaneous every morning  insulin lispro (ADMELOG) corrective regimen sliding scale   SubCutaneous three times a day before meals  insulin lispro Injectable (ADMELOG) 5 Unit(s) SubCutaneous three times a day before meals  isosorbide    mononitrate Tablet (ISMO) 30 milliGRAM(s) Oral two times a day  melatonin 6 milliGRAM(s) Oral at bedtime  polyethylene glycol 3350 17 Gram(s) Oral two times a day  pregabalin 50 milliGRAM(s) Oral two times a day  psyllium Powder 1 Packet(s) Oral two times a day  sacubitril 24 mG/valsartan 26 mG 1 Tablet(s) Oral two times a day  senna 2 Tablet(s) Oral at bedtime  tamsulosin 0.4 milliGRAM(s) Oral at bedtime    MEDICATIONS  (PRN):  acetaminophen     Tablet .. 650 milliGRAM(s) Oral every 6 hours PRN Mild Pain (1 - 3)  nitroglycerin     SubLingual 0.4 milliGRAM(s) SubLingual every 5 minutes PRN Chest Pain  oxyCODONE    IR 10 milliGRAM(s) Oral every 4 hours PRN Severe Pain (7 - 10)  oxyCODONE    IR 5 milliGRAM(s) Oral every 4 hours PRN Moderate Pain (4 - 6)        < from: MR Lumbar Spine No Cont (03.05.23 @ 21:09) >  IMPRESSION: Limited, poorly diagnostic exam as described above.    Postop changes are identified. Ventral epidural collection is identified   as described above. Please note this could be related postop changes   though the possibility of underlying epidural abscess cannot be entirely   exclude in the correct clinical setting. Contrast enhanced MR lumbar of   the spine can be done for further evaluation.    < end of copied text >

## 2023-03-09 LAB
ANION GAP SERPL CALC-SCNC: 13 MMOL/L — SIGNIFICANT CHANGE UP (ref 7–14)
BUN SERPL-MCNC: 52 MG/DL — HIGH (ref 7–23)
CALCIUM SERPL-MCNC: 8.9 MG/DL — SIGNIFICANT CHANGE UP (ref 8.4–10.5)
CHLORIDE SERPL-SCNC: 101 MMOL/L — SIGNIFICANT CHANGE UP (ref 98–107)
CO2 SERPL-SCNC: 16 MMOL/L — LOW (ref 22–31)
CREAT SERPL-MCNC: 1.54 MG/DL — HIGH (ref 0.5–1.3)
EGFR: 48 ML/MIN/1.73M2 — LOW
GLUCOSE BLDC GLUCOMTR-MCNC: 151 MG/DL — HIGH (ref 70–99)
GLUCOSE BLDC GLUCOMTR-MCNC: 267 MG/DL — HIGH (ref 70–99)
GLUCOSE BLDC GLUCOMTR-MCNC: 340 MG/DL — HIGH (ref 70–99)
GLUCOSE BLDC GLUCOMTR-MCNC: 85 MG/DL — SIGNIFICANT CHANGE UP (ref 70–99)
GLUCOSE SERPL-MCNC: 206 MG/DL — HIGH (ref 70–99)
HCT VFR BLD CALC: 28.7 % — LOW (ref 39–50)
HGB BLD-MCNC: 9.3 G/DL — LOW (ref 13–17)
MAGNESIUM SERPL-MCNC: 2.3 MG/DL — SIGNIFICANT CHANGE UP (ref 1.6–2.6)
MCHC RBC-ENTMCNC: 29.6 PG — SIGNIFICANT CHANGE UP (ref 27–34)
MCHC RBC-ENTMCNC: 32.4 GM/DL — SIGNIFICANT CHANGE UP (ref 32–36)
MCV RBC AUTO: 91.4 FL — SIGNIFICANT CHANGE UP (ref 80–100)
NRBC # BLD: 0 /100 WBCS — SIGNIFICANT CHANGE UP (ref 0–0)
NRBC # FLD: 0 K/UL — SIGNIFICANT CHANGE UP (ref 0–0)
PHOSPHATE SERPL-MCNC: 2.4 MG/DL — LOW (ref 2.5–4.5)
PLATELET # BLD AUTO: 460 K/UL — HIGH (ref 150–400)
POTASSIUM SERPL-MCNC: 4.1 MMOL/L — SIGNIFICANT CHANGE UP (ref 3.5–5.3)
POTASSIUM SERPL-SCNC: 4.1 MMOL/L — SIGNIFICANT CHANGE UP (ref 3.5–5.3)
RBC # BLD: 3.14 M/UL — LOW (ref 4.2–5.8)
RBC # FLD: 13.3 % — SIGNIFICANT CHANGE UP (ref 10.3–14.5)
SODIUM SERPL-SCNC: 130 MMOL/L — LOW (ref 135–145)
WBC # BLD: 14.83 K/UL — HIGH (ref 3.8–10.5)
WBC # FLD AUTO: 14.83 K/UL — HIGH (ref 3.8–10.5)

## 2023-03-09 PROCEDURE — 99232 SBSQ HOSP IP/OBS MODERATE 35: CPT

## 2023-03-09 PROCEDURE — 99222 1ST HOSP IP/OBS MODERATE 55: CPT | Mod: GC

## 2023-03-09 RX ORDER — INSULIN LISPRO 100/ML
7 VIAL (ML) SUBCUTANEOUS
Refills: 0 | Status: DISCONTINUED | OUTPATIENT
Start: 2023-03-09 | End: 2023-03-10

## 2023-03-09 RX ORDER — INSULIN LISPRO 100/ML
VIAL (ML) SUBCUTANEOUS
Refills: 0 | Status: DISCONTINUED | OUTPATIENT
Start: 2023-03-09 | End: 2023-03-13

## 2023-03-09 RX ORDER — CALCIUM CARBONATE 500(1250)
1 TABLET ORAL ONCE
Refills: 0 | Status: COMPLETED | OUTPATIENT
Start: 2023-03-09 | End: 2023-03-09

## 2023-03-09 RX ORDER — ONDANSETRON 8 MG/1
4 TABLET, FILM COATED ORAL EVERY 6 HOURS
Refills: 0 | Status: DISCONTINUED | OUTPATIENT
Start: 2023-03-09 | End: 2023-03-15

## 2023-03-09 RX ORDER — PANTOPRAZOLE SODIUM 20 MG/1
40 TABLET, DELAYED RELEASE ORAL
Refills: 0 | Status: DISCONTINUED | OUTPATIENT
Start: 2023-03-09 | End: 2023-03-15

## 2023-03-09 RX ORDER — INSULIN LISPRO 100/ML
2 VIAL (ML) SUBCUTANEOUS AT BEDTIME
Refills: 0 | Status: DISCONTINUED | OUTPATIENT
Start: 2023-03-09 | End: 2023-03-12

## 2023-03-09 RX ORDER — INSULIN LISPRO 100/ML
VIAL (ML) SUBCUTANEOUS AT BEDTIME
Refills: 0 | Status: DISCONTINUED | OUTPATIENT
Start: 2023-03-09 | End: 2023-03-13

## 2023-03-09 RX ADMIN — CALCITRIOL 0.25 MICROGRAM(S): 0.5 CAPSULE ORAL at 13:47

## 2023-03-09 RX ADMIN — OXYCODONE HYDROCHLORIDE 5 MILLIGRAM(S): 5 TABLET ORAL at 08:26

## 2023-03-09 RX ADMIN — Medication 50 MILLIGRAM(S): at 17:21

## 2023-03-09 RX ADMIN — SACUBITRIL AND VALSARTAN 1 TABLET(S): 24; 26 TABLET, FILM COATED ORAL at 17:21

## 2023-03-09 RX ADMIN — POLYETHYLENE GLYCOL 3350 17 GRAM(S): 17 POWDER, FOR SOLUTION ORAL at 05:49

## 2023-03-09 RX ADMIN — Medication 8: at 13:10

## 2023-03-09 RX ADMIN — Medication 1: at 17:31

## 2023-03-09 RX ADMIN — ISOSORBIDE MONONITRATE 30 MILLIGRAM(S): 60 TABLET, EXTENDED RELEASE ORAL at 17:21

## 2023-03-09 RX ADMIN — Medication 1 ENEMA: at 05:55

## 2023-03-09 RX ADMIN — Medication 5 UNIT(S): at 09:13

## 2023-03-09 RX ADMIN — Medication 50 MILLIGRAM(S): at 05:48

## 2023-03-09 RX ADMIN — ATORVASTATIN CALCIUM 40 MILLIGRAM(S): 80 TABLET, FILM COATED ORAL at 21:15

## 2023-03-09 RX ADMIN — Medication 6 MILLIGRAM(S): at 21:15

## 2023-03-09 RX ADMIN — INSULIN GLARGINE 25 UNIT(S): 100 INJECTION, SOLUTION SUBCUTANEOUS at 09:19

## 2023-03-09 RX ADMIN — Medication 1 PACKET(S): at 17:21

## 2023-03-09 RX ADMIN — ISOSORBIDE MONONITRATE 30 MILLIGRAM(S): 60 TABLET, EXTENDED RELEASE ORAL at 05:48

## 2023-03-09 RX ADMIN — Medication 1 PACKET(S): at 05:49

## 2023-03-09 RX ADMIN — SACUBITRIL AND VALSARTAN 1 TABLET(S): 24; 26 TABLET, FILM COATED ORAL at 05:48

## 2023-03-09 RX ADMIN — OXYCODONE HYDROCHLORIDE 5 MILLIGRAM(S): 5 TABLET ORAL at 08:56

## 2023-03-09 RX ADMIN — OXYCODONE HYDROCHLORIDE 5 MILLIGRAM(S): 5 TABLET ORAL at 21:46

## 2023-03-09 RX ADMIN — Medication 5 UNIT(S): at 13:14

## 2023-03-09 RX ADMIN — Medication 1 TABLET(S): at 09:14

## 2023-03-09 RX ADMIN — Medication 667 MILLIGRAM(S): at 17:21

## 2023-03-09 RX ADMIN — SENNA PLUS 2 TABLET(S): 8.6 TABLET ORAL at 21:16

## 2023-03-09 RX ADMIN — Medication 6: at 09:13

## 2023-03-09 RX ADMIN — HEPARIN SODIUM 5000 UNIT(S): 5000 INJECTION INTRAVENOUS; SUBCUTANEOUS at 05:52

## 2023-03-09 RX ADMIN — Medication 7 UNIT(S): at 17:30

## 2023-03-09 RX ADMIN — TAMSULOSIN HYDROCHLORIDE 0.4 MILLIGRAM(S): 0.4 CAPSULE ORAL at 21:15

## 2023-03-09 RX ADMIN — OXYCODONE HYDROCHLORIDE 5 MILLIGRAM(S): 5 TABLET ORAL at 21:16

## 2023-03-09 NOTE — PROGRESS NOTE ADULT - PROBLEM SELECTOR PLAN 2
-on miralax BID  -Senna   -metamucil  -dulcolax   -monitor bowel function - had multiple BMs today per patient and RN

## 2023-03-09 NOTE — PROGRESS NOTE ADULT - SUBJECTIVE AND OBJECTIVE BOX
No acute issues overnight  Still having pain radiating to legs, improved  Vital Signs Last 24 Hrs  T(C): 36.5 (09 Mar 2023 00:29), Max: 36.9 (08 Mar 2023 19:54)  T(F): 97.7 (09 Mar 2023 00:29), Max: 98.4 (08 Mar 2023 19:54)  HR: 101 (09 Mar 2023 00:29) (77 - 120)  BP: 138/50 (09 Mar 2023 00:29) (108/55 - 138/50)  BP(mean): --  RR: 18 (09 Mar 2023 00:29) (18 - 18)  SpO2: 100% (09 Mar 2023 00:29) (98% - 100%)    Parameters below as of 09 Mar 2023 00:29  Patient On (Oxygen Delivery Method): room air    AAO X 3  PERRLA, EOMI  Face symmetric  bilateral UE 5/5  LE proximally 5/5 distally Right DF 2/5 PF 4/5  Left PF 4/5 DF 0/5    HMV: 0cc  Right JOVITA: 20cc  Left JOVITA: 15cc    MEDICATIONS  (STANDING):  atorvastatin 40 milliGRAM(s) Oral at bedtime  bisacodyl Suppository 10 milliGRAM(s) Rectal daily  calcitriol   Capsule 0.25 MICROGram(s) Oral daily  calcium acetate 667 milliGRAM(s) Oral with dinner  heparin   Injectable 5000 Unit(s) SubCutaneous every 8 hours  insulin glargine Injectable (LANTUS) 25 Unit(s) SubCutaneous every morning  insulin lispro (ADMELOG) corrective regimen sliding scale   SubCutaneous three times a day before meals  insulin lispro Injectable (ADMELOG) 5 Unit(s) SubCutaneous three times a day before meals  isosorbide    mononitrate Tablet (ISMO) 30 milliGRAM(s) Oral two times a day  melatonin 6 milliGRAM(s) Oral at bedtime  polyethylene glycol 3350 17 Gram(s) Oral two times a day  pregabalin 50 milliGRAM(s) Oral two times a day  psyllium Powder 1 Packet(s) Oral two times a day  sacubitril 24 mG/valsartan 26 mG 1 Tablet(s) Oral two times a day  senna 2 Tablet(s) Oral at bedtime  tamsulosin 0.4 milliGRAM(s) Oral at bedtime    MEDICATIONS  (PRN):  acetaminophen     Tablet .. 650 milliGRAM(s) Oral every 6 hours PRN Mild Pain (1 - 3)  nitroglycerin     SubLingual 0.4 milliGRAM(s) SubLingual every 5 minutes PRN Chest Pain  oxyCODONE    IR 5 milliGRAM(s) Oral every 4 hours PRN Moderate Pain (4 - 6)  oxyCODONE    IR 10 milliGRAM(s) Oral every 4 hours PRN Severe Pain (7 - 10)  saline laxative (FLEET) Rectal Enema 1 Enema Rectal daily PRN no BM                          8.8    11.69 )-----------( 397      ( 08 Mar 2023 06:21 )             27.8     03-08    134<L>  |  106  |  59<H>  ----------------------------<  111<H>  4.0   |  17<L>  |  1.80<H>    Ca    8.3<L>      08 Mar 2023 06:21  Phos  2.3     03-08  Mg     2.30     03-08

## 2023-03-09 NOTE — PROGRESS NOTE ADULT - PROBLEM SELECTOR PLAN 1
s/p OR on 2/28 with L4-L5 spinal fusion with neurosurgery  -MRI concerning for compressive fluid pocket at the surgical bed s/p OR on 3/6  -drain management per neurosurgery  -course complicated by b/l foot drop. Seen by neurology. Plan for repeat MRI  - PT/OT/PM&R following and recommending rehab  - c/w pain control per primary team and pain mgmt

## 2023-03-09 NOTE — PROGRESS NOTE ADULT - ASSESSMENT
69 yo M with DMII, CAD s/p CABG, CKD, chronic back pain, and HTN presents to the ED with worsening back pain. Patient is AAOX2 and somnolent, not able to provide any hx. Most of the information was supplemented by the son. As per son, pt is AAOx 3 and "mentally stable." He recently came from [facility] about 2 weeks ago and has " multiple medical complaints." But this morning he told his son that he is feeling weak and his back pain is getting worse. The pain is localized in the lower back region. Pain is worse when standing. Denies radiculopathy. Intermittent urinary and bowel incontinence for 1 year. Was admitted to a hospital in [facility] in Aug 2022 for pain, MRI was completed that showed a L4-5 spondylolisthesis. Patient was offered surgery but told he has too may medical issues and discharged him.   Patient here here visiting his son when back pain exacerbated and came to ER for evaluation with his MRI on paper films     MRI reviewed, L4-5 disk dessication and cauda equina compression , Right grade II and Left Grade I spondylolisthesis     3/1; s/p L4-5 TLIF pod #1, HMV drain, requiring bola post op for bp support, albumin given x 1, sicu consulted, neuro exam stable  3/2: POD # 2 S/P L4-5 TLIF, 1 HMV in place. Off phenylepherine. CT lumbar spine, screws ok  3/3: POD # 3 S/P L4-5 TLIF. Having episodes of chest pain with elevated troponins, cardiology evaluating. Will transfer to telemetry floor, serial CE and EKG. 1 HMV in place  3/4: POD # 4 S/P L4-5 TLIF. No more chest pain. HMV in place. Seen by endocrine for DM management  3/5: POD # 5 S/P L4-5 TLIF. No more chest pain. HMV in place. Increased back pain with radiation to legs, MRI ordered obtained by evening with anesthesia, possible ventral epidural collection. Imaging reviewed with Dr. Avalos  3/6: pod #6 s/p L4-5 TLIF, HMV in place, mri L/s w/ sed done no major compressive hematoma, enlarged neurogenic bladder, crowder placed  3/7: PAtient noted to have new Left food drop, s/p emergent RTOR for Lef. foot drop  cement augmentation for L4 pedicle fx 2/2 to screw pod #1, post op exam stable , HMV, 2JPs's vel, CT L/s done- screws ok, needs brace  3/9: Stable exam POD # 2 S/P cement augmentation for L4 pedicle fx 2/2 to screw. HMV X 1, JOVITA X 2.

## 2023-03-09 NOTE — PROGRESS NOTE ADULT - ASSESSMENT
67 yo M with DMII, CAD s/p CABG, CKD, chronic back pain, and HTN presents to the ED with worsening back pain.  He recently came from [facility] about 2 weeks ago and has " multiple medical complaints."  he told his son that he is feeling weak and his back pain is getting worse.   In the ED, his vitals were notable for hyperglycemia, elevated BUN/Scr, and hypomagnesemia. Endocrine called for DM 2, A1c 8.3    1. DM 2  A1c 8.3%  Home Regimen: Ligenta-M 500 (Linagliptin 2.5 and metformin 500mg) 1 tablet once daily, NovoRapid (insulin aspart) 14 units w/ lunch, NovoMix 30 insulin 14 units before breakfast, 14-16 units before dinner    While inpatient:  BG target 100-180 mg/dl  Patient was on Dexamethasone over the weekend, now stopped, last dose 3/6  Continue Lantus 25 units every morning (0900)  Increase Admelog to 7 units SQ TID before meals (Hold if NPO/skips meal)   Continue Admelog moderate dose correctional scale before meals, add Admelog LOW dose scale at bedtime  Chek FS before meals and at bedtime  Hypoglycemia protocol    Carb Consistent Diet   Nutrition consult   Provider to RN for diabetes/insulin pen teaching, AB pharmacist following (see pharmacy intervention notes)     Discharge Plan:  Medicaid pending - currently uninsured   Likely stop prior outpatient regimen and continue with mixed insulin (70/30) only  Consider Novolog 70/30: doses TBD close to discharge: before breakfast and before dinner via pen (see pharmacy liaison note: patient can qualify for coupon), so could use insulin PENS if that is easier for patient  Rapid acting insulin should NOT be ordered in conjunction with mixed insulin for risk of hypoglycemia   Ensure he has glucometer, glucose test strips, lancets, alcohol swabs and insulin PEN needles vs syringes   Followup with [facility], [address] [place] [address], [phone], [phone]    2. HTN  Goal BP in DM <130/80  On Entresto   Outpatient mc/cr ratio  Titration as per primary team     3. HLD  LDL 75 above goal; LDL <70   Continue Atorvastatin 40mg daily    Noemi Carlisle  Nurse Practitioner  Division of Endocrinology & Diabetes  In house pager #[phone]/long range pager #[phone]    If before 9AM or after 6PM, or on weekends/holidays, please call endocrine answering service for assistance ([phone]).  For nonurgent matters email [email or web address] for assistance.

## 2023-03-09 NOTE — PROGRESS NOTE ADULT - PROBLEM SELECTOR PLAN 8
HbA1c of 8.3%, at home on insulin 14 units with lunch and 14-16 BID of mixed insulin + orals  - Fs 106  -c/w lantus to 25u in AM and premeal to 5TID  -change to moderate dose sliding scale  -endo following

## 2023-03-09 NOTE — PROGRESS NOTE ADULT - ASSESSMENT
68M Greek speaking, HTN, DM2 on insulin, CKD3, systolic HF (EF 45%), CAD s/p CABG 2005, RSA s/p L renal stent, and PAD s/p PTA to R common iliac and L common iliac s/p stent who presents to the ED with worsening back pain in context of known L4-L5 cord compression/cauda equina since 8/2022. now s/p procedure on requiring SICU stay for pressors not stable for floor. Medicine following for comanagement.

## 2023-03-09 NOTE — PROGRESS NOTE ADULT - PROBLEM SELECTOR PLAN 3
EF 45% per outside records; TTE: EF 45% Mild to moderate segmental left ventricular systolic dysfunction.  Hypokinesis of the apex, distal septum, and distal anterior wall. Mod-Sev AS.  - c/w home Entresto (called Sabitar 50 Sacubitril  24 mg/Valsartan 26 mg), restarted   - lasix on hold for now. Reassess daily to start lasix.   - holding BB per cards   - had CP trop 73-74, cards consulted, low concern for ACS at this time.   - no need for further trend troponin at this time unless patient with active chest pain; if so would obtain troponin x2 and STAT EKG to evaluate   - appears compensated  - cards eval appreciated

## 2023-03-09 NOTE — PROGRESS NOTE ADULT - SUBJECTIVE AND OBJECTIVE BOX
[facility] Department of Hospital Medicine  Dr. Jessika Perales  Available on MS Teams  Pager: [phone]    Patient is a 68y old  Male who presents with a chief complaint of Back pain (05 Mar 2023 02:33)    Subjective: Pt seen and examined at bedside. Sitting in chair.  used to obtain history. Reports he had multiple BM today. Denies current chest pain or SOB. Says he likes to sit in the chair and requesting to be moved daily to chair.     Vital Signs Last 24 Hrs  T(C): 36.8 (09 Mar 2023 08:28), Max: 36.9 (08 Mar 2023 19:54)  T(F): 98.2 (09 Mar 2023 08:28), Max: 98.4 (08 Mar 2023 19:54)  HR: 52 (09 Mar 2023 08:28) (52 - 120)  BP: 105/58 (09 Mar 2023 08:28) (105/58 - 148/58)  BP(mean): --  RR: 18 (09 Mar 2023 08:28) (18 - 19)  SpO2: 98% (09 Mar 2023 08:28) (97% - 100%)    Parameters below as of 09 Mar 2023 08:28  Patient On (Oxygen Delivery Method): room air    PHYSICAL EXAM:  Constitutional: WDWN resting comfortably in bed; NAD  Head: NC/AT  Eyes: PERRL, EOMI, anicteric sclera  ENT: no nasal discharge; MMM  Neck: supple; no JVD  Respiratory: CTA B/L; no W/R/R  Cardiac: +S1/S2; RRR; no M/R/G  Gastrointestinal: soft, NT/ND; no rebound or guarding; +BSx4  Extremities: WWP, no clubbing or cyanosis; no peripheral edema  Musculoskeletal: NROM x4; no joint swelling, tenderness or erythema  Vascular: 2+ radial, DP/PT pulses B/L  Dermatologic: skin warm, dry and intact; no rashes, wounds, or scars  Neurologic: AAOx3; CNII-XII grossly intact; no focal deficits  Psychiatric: affect and characteristics of appearance, verbalizations, behaviors are appropriate    MEDICATIONS  (STANDING):  acetaminophen     Tablet .. 975 milliGRAM(s) Oral every 6 hours  aspirin  chewable 81 milliGRAM(s) Oral daily  atorvastatin 40 milliGRAM(s) Oral at bedtime  bisacodyl Suppository 10 milliGRAM(s) Rectal daily  calcitriol   Capsule 0.25 MICROGram(s) Oral daily  calcium acetate 667 milliGRAM(s) Oral with dinner  dexAMETHasone     Tablet 4 milliGRAM(s) Oral every 6 hours  dextrose 50% Injectable 25 Gram(s) IV Push once  dextrose 50% Injectable 12.5 Gram(s) IV Push once  dextrose 50% Injectable 25 Gram(s) IV Push once  furosemide    Tablet 40 milliGRAM(s) Oral daily  gabapentin 300 milliGRAM(s) Oral three times a day  heparin   Injectable 5000 Unit(s) SubCutaneous every 8 hours  insulin glargine Injectable (LANTUS) 27 Unit(s) SubCutaneous at bedtime  insulin lispro (ADMELOG) corrective regimen sliding scale   SubCutaneous three times a day before meals  insulin lispro (ADMELOG) corrective regimen sliding scale   SubCutaneous at bedtime  insulin lispro Injectable (ADMELOG) 12 Unit(s) SubCutaneous three times a day before meals  isosorbide    mononitrate Tablet (ISMO) 30 milliGRAM(s) Oral two times a day  melatonin 6 milliGRAM(s) Oral at bedtime  polyethylene glycol 3350 17 Gram(s) Oral daily  pregabalin 50 milliGRAM(s) Oral two times a day  psyllium Powder 1 Packet(s) Oral two times a day  sacubitril 24 mG/valsartan 26 mG 1 Tablet(s) Oral two times a day  senna 2 Tablet(s) Oral at bedtime  sodium chloride 2 Gram(s) Oral daily  tamsulosin 0.4 milliGRAM(s) Oral at bedtime    MEDICATIONS  (PRN):  dextrose Oral Gel 15 Gram(s) Oral once PRN Blood Glucose LESS THAN 70 milliGRAM(s)/deciliter  HYDROmorphone  Injectable 0.5 milliGRAM(s) IV Push every 4 hours PRN Break through pain  nitroglycerin     SubLingual 0.4 milliGRAM(s) SubLingual every 5 minutes PRN Chest Pain  oxyCODONE    IR 10 milliGRAM(s) Oral every 4 hours PRN Severe Pain (7 - 10)  oxyCODONE    IR 5 milliGRAM(s) Oral every 4 hours PRN Moderate Pain (4 - 6)    LABS:                                   9.3    14.83 )-----------( 460      ( 09 Mar 2023 07:00 )             28.7   03-09    130<L>  |  101  |  52<H>  ----------------------------<  206<H>  4.1   |  16<L>  |  1.54<H>    Ca    8.9      09 Mar 2023 07:00  Phos  2.4     03-09  Mg     2.30     03-09      CAPILLARY BLOOD GLUCOSE  POCT Blood Glucose.: 267 mg/dL (09 Mar 2023 08:44)  POCT Blood Glucose.: 106 mg/dL (08 Mar 2023 21:58)  POCT Blood Glucose.: 84 mg/dL (08 Mar 2023 17:55)  POCT Blood Glucose.: 207 mg/dL (08 Mar 2023 12:23)      RADIOLOGY & ADDITIONAL TESTS:   < from: Xray Abdomen 2 Views (03.07.23 @ 21:49) >    IMPRESSION: Postoperative ileus.    < end of copied text >

## 2023-03-09 NOTE — PROGRESS NOTE ADULT - PROBLEM SELECTOR PLAN 9
All brand name [place].  We do not have all of them.  Meds are: Ligenta-M 500 (Linagliptin 2.5 and metoformin 500mg), [place] card MR (trimetazidine hydrochloride 35 mg),  Ecosprin 75 (aspirin 75 mg), Duralax, Ketoalfa (amio acid Keto Analogues 600 mg), Nidocard-Retard (nitroglycerin 2.6 mg), Hypophos (calcium acetate 667 mg), Tamisol D (Tamsulosin 0.4 mg and Dutasteride 0.5 mg), Mydocalm (tolperisone 50 mg), Raditrol (calcitriol 0.25 microgram),  NovoRapid 16 units w/ lunch, Manuel nordisk insulin 16am/14pm, Atova 20 (atorvastatin 20 mg), Nebita 2.5 (nevivolol 2.5 mg), Tamisol (tamsulosin 0.4 mg), Pregaba 50 (pregabalin 50 mg), Febustat 40 (febuxostat 40 mg), Lozixum (lorazapam 1 mg), Lasix 40 mg, Sabitar 50 mg, Anazol 0.1%, Tearfresh

## 2023-03-09 NOTE — PROGRESS NOTE ADULT - SUBJECTIVE AND OBJECTIVE BOX
Chief Complaint: DM 2 with hyperglycemia     History: Patient seen at bedside, declined  services. Reports he ate breakfast, endorsing constipation. Patient states he drank one apple juice prior to lunch FS - resulted 340 mg/dl     MEDICATIONS  (STANDING):  atorvastatin 40 milliGRAM(s) Oral at bedtime  bisacodyl Suppository 10 milliGRAM(s) Rectal daily  calcitriol   Capsule 0.25 MICROGram(s) Oral daily  calcium acetate 667 milliGRAM(s) Oral with dinner  insulin glargine Injectable (LANTUS) 25 Unit(s) SubCutaneous every morning  insulin lispro (ADMELOG) corrective regimen sliding scale   SubCutaneous three times a day before meals  insulin lispro Injectable (ADMELOG) 5 Unit(s) SubCutaneous three times a day before meals  isosorbide    mononitrate Tablet (ISMO) 30 milliGRAM(s) Oral two times a day  melatonin 6 milliGRAM(s) Oral at bedtime  pantoprazole    Tablet 40 milliGRAM(s) Oral before breakfast  polyethylene glycol 3350 17 Gram(s) Oral two times a day  pregabalin 50 milliGRAM(s) Oral two times a day  psyllium Powder 1 Packet(s) Oral two times a day  sacubitril 24 mG/valsartan 26 mG 1 Tablet(s) Oral two times a day  senna 2 Tablet(s) Oral at bedtime  tamsulosin 0.4 milliGRAM(s) Oral at bedtime    MEDICATIONS  (PRN):  acetaminophen     Tablet .. 650 milliGRAM(s) Oral every 6 hours PRN Mild Pain (1 - 3)  nitroglycerin     SubLingual 0.4 milliGRAM(s) SubLingual every 5 minutes PRN Chest Pain  ondansetron Injectable 4 milliGRAM(s) IV Push every 6 hours PRN Nausea and/or Vomiting  oxyCODONE    IR 5 milliGRAM(s) Oral every 4 hours PRN Moderate Pain (4 - 6)  oxyCODONE    IR 10 milliGRAM(s) Oral every 4 hours PRN Severe Pain (7 - 10)  saline laxative (FLEET) Rectal Enema 1 Enema Rectal daily PRN no BM    No Known Allergies    Review of Systems:  Cardiovascular: No chest pain  Respiratory: No SOB  GI: No nausea, vomiting  Endocrine: no hypoglycemia     PHYSICAL EXAM:  VITALS: T(C): 36.7 (03-09-23 @ 12:15)  T(F): 98.1 (03-09-23 @ 12:15), Max: 98.4 (03-08-23 @ 19:54)  HR: 89 (03-09-23 @ 12:15) (52 - 120)  BP: 113/58 (03-09-23 @ 12:15) (105/58 - 148/58)  RR:  (18 - 19)  SpO2:  (97% - 100%)  Wt(kg): --  GENERAL: NAD  EYES: No proptosis, no lid lag, anicteric  HEENT:  Atraumatic, Normocephalic, moist mucous membranes  RESPIRATORY: unlabored respirations     CAPILLARY BLOOD GLUCOSE    POCT Blood Glucose.: 340 mg/dL (09 Mar 2023 12:10)  POCT Blood Glucose.: 267 mg/dL (09 Mar 2023 08:44)  POCT Blood Glucose.: 106 mg/dL (08 Mar 2023 21:58)  POCT Blood Glucose.: 84 mg/dL (08 Mar 2023 17:55)      03-09    130<L>  |  101  |  52<H>  ----------------------------<  206<H>  4.1   |  16<L>  |  1.54<H>    eGFR: 48<L>    Ca    8.9      03-09  Mg     2.30     03-09  Phos  2.4     03-09        A1C with Estimated Average Glucose Result: 8.3 % (02-22-23 @ 05:13)    Diet, Regular:   Consistent Carbohydrate Evening Snack (CSTCHOSN) (03-07-23 @ 10:19) [Active]

## 2023-03-10 LAB
ANION GAP SERPL CALC-SCNC: 12 MMOL/L — SIGNIFICANT CHANGE UP (ref 7–14)
BUN SERPL-MCNC: 41 MG/DL — HIGH (ref 7–23)
CALCIUM SERPL-MCNC: 8.7 MG/DL — SIGNIFICANT CHANGE UP (ref 8.4–10.5)
CHLORIDE SERPL-SCNC: 103 MMOL/L — SIGNIFICANT CHANGE UP (ref 98–107)
CO2 SERPL-SCNC: 17 MMOL/L — LOW (ref 22–31)
CREAT SERPL-MCNC: 1.22 MG/DL — SIGNIFICANT CHANGE UP (ref 0.5–1.3)
EGFR: 64 ML/MIN/1.73M2 — SIGNIFICANT CHANGE UP
GLUCOSE BLDC GLUCOMTR-MCNC: 113 MG/DL — HIGH (ref 70–99)
GLUCOSE BLDC GLUCOMTR-MCNC: 186 MG/DL — HIGH (ref 70–99)
GLUCOSE BLDC GLUCOMTR-MCNC: 209 MG/DL — HIGH (ref 70–99)
GLUCOSE BLDC GLUCOMTR-MCNC: 282 MG/DL — HIGH (ref 70–99)
GLUCOSE SERPL-MCNC: 182 MG/DL — HIGH (ref 70–99)
HCT VFR BLD CALC: 28.5 % — LOW (ref 39–50)
HGB BLD-MCNC: 9 G/DL — LOW (ref 13–17)
MAGNESIUM SERPL-MCNC: 2 MG/DL — SIGNIFICANT CHANGE UP (ref 1.6–2.6)
MCHC RBC-ENTMCNC: 29.5 PG — SIGNIFICANT CHANGE UP (ref 27–34)
MCHC RBC-ENTMCNC: 31.6 GM/DL — LOW (ref 32–36)
MCV RBC AUTO: 93.4 FL — SIGNIFICANT CHANGE UP (ref 80–100)
NRBC # BLD: 0 /100 WBCS — SIGNIFICANT CHANGE UP (ref 0–0)
NRBC # FLD: 0 K/UL — SIGNIFICANT CHANGE UP (ref 0–0)
PHOSPHATE SERPL-MCNC: 2.3 MG/DL — LOW (ref 2.5–4.5)
PLATELET # BLD AUTO: 475 K/UL — HIGH (ref 150–400)
POTASSIUM SERPL-MCNC: 4.3 MMOL/L — SIGNIFICANT CHANGE UP (ref 3.5–5.3)
POTASSIUM SERPL-SCNC: 4.3 MMOL/L — SIGNIFICANT CHANGE UP (ref 3.5–5.3)
RBC # BLD: 3.05 M/UL — LOW (ref 4.2–5.8)
RBC # FLD: 13.2 % — SIGNIFICANT CHANGE UP (ref 10.3–14.5)
SODIUM SERPL-SCNC: 132 MMOL/L — LOW (ref 135–145)
WBC # BLD: 14.91 K/UL — HIGH (ref 3.8–10.5)
WBC # FLD AUTO: 14.91 K/UL — HIGH (ref 3.8–10.5)

## 2023-03-10 PROCEDURE — 99232 SBSQ HOSP IP/OBS MODERATE 35: CPT

## 2023-03-10 RX ORDER — INSULIN LISPRO 100/ML
7 VIAL (ML) SUBCUTANEOUS
Refills: 0 | Status: DISCONTINUED | OUTPATIENT
Start: 2023-03-10 | End: 2023-03-15

## 2023-03-10 RX ORDER — FUROSEMIDE 40 MG
40 TABLET ORAL DAILY
Refills: 0 | Status: DISCONTINUED | OUTPATIENT
Start: 2023-03-10 | End: 2023-03-12

## 2023-03-10 RX ORDER — CALCIUM CARBONATE 500(1250)
1 TABLET ORAL EVERY 4 HOURS
Refills: 0 | Status: DISCONTINUED | OUTPATIENT
Start: 2023-03-10 | End: 2023-03-15

## 2023-03-10 RX ORDER — HEPARIN SODIUM 5000 [USP'U]/ML
5000 INJECTION INTRAVENOUS; SUBCUTANEOUS EVERY 8 HOURS
Refills: 0 | Status: DISCONTINUED | OUTPATIENT
Start: 2023-03-10 | End: 2023-03-15

## 2023-03-10 RX ORDER — INSULIN LISPRO 100/ML
9 VIAL (ML) SUBCUTANEOUS
Refills: 0 | Status: DISCONTINUED | OUTPATIENT
Start: 2023-03-11 | End: 2023-03-12

## 2023-03-10 RX ORDER — HYDROXYZINE HCL 10 MG
25 TABLET ORAL AT BEDTIME
Refills: 0 | Status: DISCONTINUED | OUTPATIENT
Start: 2023-03-10 | End: 2023-03-15

## 2023-03-10 RX ORDER — INSULIN LISPRO 100/ML
7 VIAL (ML) SUBCUTANEOUS
Refills: 0 | Status: DISCONTINUED | OUTPATIENT
Start: 2023-03-11 | End: 2023-03-15

## 2023-03-10 RX ORDER — SIMETHICONE 80 MG/1
80 TABLET, CHEWABLE ORAL ONCE
Refills: 0 | Status: COMPLETED | OUTPATIENT
Start: 2023-03-10 | End: 2023-03-10

## 2023-03-10 RX ADMIN — Medication 7 UNIT(S): at 13:10

## 2023-03-10 RX ADMIN — ISOSORBIDE MONONITRATE 30 MILLIGRAM(S): 60 TABLET, EXTENDED RELEASE ORAL at 05:36

## 2023-03-10 RX ADMIN — Medication 1 TABLET(S): at 22:55

## 2023-03-10 RX ADMIN — POLYETHYLENE GLYCOL 3350 17 GRAM(S): 17 POWDER, FOR SOLUTION ORAL at 05:36

## 2023-03-10 RX ADMIN — SENNA PLUS 2 TABLET(S): 8.6 TABLET ORAL at 22:57

## 2023-03-10 RX ADMIN — SACUBITRIL AND VALSARTAN 1 TABLET(S): 24; 26 TABLET, FILM COATED ORAL at 18:26

## 2023-03-10 RX ADMIN — Medication 3: at 13:10

## 2023-03-10 RX ADMIN — SACUBITRIL AND VALSARTAN 1 TABLET(S): 24; 26 TABLET, FILM COATED ORAL at 05:36

## 2023-03-10 RX ADMIN — Medication 1 TABLET(S): at 13:10

## 2023-03-10 RX ADMIN — Medication 1 TABLET(S): at 18:29

## 2023-03-10 RX ADMIN — ATORVASTATIN CALCIUM 40 MILLIGRAM(S): 80 TABLET, FILM COATED ORAL at 22:56

## 2023-03-10 RX ADMIN — INSULIN GLARGINE 25 UNIT(S): 100 INJECTION, SOLUTION SUBCUTANEOUS at 08:58

## 2023-03-10 RX ADMIN — Medication 667 MILLIGRAM(S): at 18:26

## 2023-03-10 RX ADMIN — Medication 1 ENEMA: at 20:49

## 2023-03-10 RX ADMIN — POLYETHYLENE GLYCOL 3350 17 GRAM(S): 17 POWDER, FOR SOLUTION ORAL at 18:26

## 2023-03-10 RX ADMIN — HEPARIN SODIUM 5000 UNIT(S): 5000 INJECTION INTRAVENOUS; SUBCUTANEOUS at 22:57

## 2023-03-10 RX ADMIN — Medication 10 MILLIGRAM(S): at 13:11

## 2023-03-10 RX ADMIN — Medication 7 UNIT(S): at 18:25

## 2023-03-10 RX ADMIN — TAMSULOSIN HYDROCHLORIDE 0.4 MILLIGRAM(S): 0.4 CAPSULE ORAL at 22:55

## 2023-03-10 RX ADMIN — Medication 1 TABLET(S): at 01:45

## 2023-03-10 RX ADMIN — Medication 50 MILLIGRAM(S): at 05:35

## 2023-03-10 RX ADMIN — Medication 7 UNIT(S): at 08:56

## 2023-03-10 RX ADMIN — Medication 1 PACKET(S): at 18:26

## 2023-03-10 RX ADMIN — SIMETHICONE 80 MILLIGRAM(S): 80 TABLET, CHEWABLE ORAL at 11:04

## 2023-03-10 RX ADMIN — Medication 2: at 08:57

## 2023-03-10 RX ADMIN — PANTOPRAZOLE SODIUM 40 MILLIGRAM(S): 20 TABLET, DELAYED RELEASE ORAL at 05:36

## 2023-03-10 RX ADMIN — CALCITRIOL 0.25 MICROGRAM(S): 0.5 CAPSULE ORAL at 13:12

## 2023-03-10 RX ADMIN — Medication 1 PACKET(S): at 05:36

## 2023-03-10 RX ADMIN — ONDANSETRON 4 MILLIGRAM(S): 8 TABLET, FILM COATED ORAL at 20:50

## 2023-03-10 RX ADMIN — ISOSORBIDE MONONITRATE 30 MILLIGRAM(S): 60 TABLET, EXTENDED RELEASE ORAL at 18:27

## 2023-03-10 RX ADMIN — Medication 50 MILLIGRAM(S): at 18:29

## 2023-03-10 RX ADMIN — Medication 1: at 18:25

## 2023-03-10 NOTE — PROGRESS NOTE ADULT - ASSESSMENT
68M Thai speaking, HTN, DM2 on insulin, CKD3, systolic HF (EF 45%), CAD s/p CABG 2005, RSA s/p L renal stent, and PAD s/p PTA to R common iliac and L common iliac s/p stent who presents to the ED with worsening back pain in context of known L4-L5 cord compression/cauda equina since 8/2022. now s/p l4-L5 spinal fusion by neurosurgery; Medicine following for comanagement.

## 2023-03-10 NOTE — PROGRESS NOTE ADULT - PROBLEM SELECTOR PLAN 2
-on miralax BID  -Senna   -metamucil  -dulcolax   -Enema PRN  -monitor bowel function - had multiple BMs yesterday, none today  -monitor stool counts. Abd soft. No N/V.

## 2023-03-10 NOTE — PROGRESS NOTE ADULT - PROBLEM SELECTOR PLAN 3
EF 45% per outside records; TTE: EF 45% Mild to moderate segmental left ventricular systolic dysfunction.  Hypokinesis of the apex, distal septum, and distal anterior wall. Mod-Sev AS.  - c/w home Entresto (called Sabitar 50 Sacubitril  24 mg/Valsartan 26 mg), restarted   -restart lasix today 40daily. Monitor volume status daily.   - holding BB per cards   - had CP trop 73-74, cards consulted, low concern for ACS at this time.   - no need for further trend troponin at this time unless patient with active chest pain; if so would obtain troponin x2 and STAT EKG to evaluate   - appears compensated  - cards eval appreciated

## 2023-03-10 NOTE — PROGRESS NOTE ADULT - PROBLEM SELECTOR PLAN 1
-s/p OR on 2/28 with L4-L5 spinal fusion with neurosurgery  -MRI concerning for compressive fluid pocket at the surgical bed s/p OR on 3/6  -drain management per neurosurgery  -course complicated by b/l foot drop. Seen by neurology. Plan for repeat MRI  - PT/OT/PM&R following and recommending rehab  - c/w pain control per primary team and pain mgmt

## 2023-03-10 NOTE — PROGRESS NOTE ADULT - PROBLEM SELECTOR PLAN 6
- per chart review ranging from 1.8-2.3  - Currently 1.22. Restart lasix 40daily. Monitor renal function.   - if continues to rise, would stop entresto as well.   - trend daily  - avoid nephrotoxic agents and renally dose medications

## 2023-03-10 NOTE — PROGRESS NOTE ADULT - ASSESSMENT
67 yo M with DMII, CAD s/p CABG, CKD, chronic back pain, and HTN presents to the ED with worsening back pain. Patient is AAOX2 and somnolent, not able to provide any hx. Most of the information was supplemented by the son. As per son, pt is AAOx 3 and "mentally stable." He recently came from [facility] about 2 weeks ago and has " multiple medical complaints." But this morning he told his son that he is feeling weak and his back pain is getting worse. The pain is localized in the lower back region. Pain is worse when standing. Denies radiculopathy. Intermittent urinary and bowel incontinence for 1 year. Was admitted to a hospital in [facility] in Aug 2022 for pain, MRI was completed that showed a L4-5 spondylolisthesis. Patient was offered surgery but told he has too may medical issues and discharged him.   Patient here here visiting his son when back pain exacerbated and came to ER for evaluation with his MRI on paper films     MRI reviewed, L4-5 disk dessication and cauda equina compression , Right grade II and Left Grade I spondylolisthesis     3/1; s/p L4-5 TLIF pod #1, HMV drain, requiring bola post op for bp support, albumin given x 1, sicu consulted, neuro exam stable  3/2: POD # 2 S/P L4-5 TLIF, 1 HMV in place. Off phenylepherine. CT lumbar spine, screws ok  3/3: POD # 3 S/P L4-5 TLIF. Having episodes of chest pain with elevated troponins, cardiology evaluating. Will transfer to telemetry floor, serial CE and EKG. 1 HMV in place  3/4: POD # 4 S/P L4-5 TLIF. No more chest pain. HMV in place. Seen by endocrine for DM management  3/5: POD # 5 S/P L4-5 TLIF. No more chest pain. HMV in place. Increased back pain with radiation to legs, MRI ordered obtained by evening with anesthesia, possible ventral epidural collection. Imaging reviewed with Dr. Avalos  3/6: pod #6 s/p L4-5 TLIF, HMV in place, mri L/s w/ sed done no major compressive hematoma, enlarged neurogenic bladder, crowdre placed  3/7: PAtient noted to have new Left food drop, s/p emergent RTOR for Lef. foot drop  cement augmentation for L4 pedicle fx 2/2 to screw pod #1, post op exam stable , HMV, 2JPs's vel, CT L/s done- screws ok, needs brace  3/9-10: Stable exam POD # 3 S/P cement augmentation for L4 pedicle fx 2/2 to screw. HMV X 1, JOVITA X 2. MRI L spine ordered

## 2023-03-10 NOTE — PROGRESS NOTE ADULT - SUBJECTIVE AND OBJECTIVE BOX
[facility] Department of Hospital Medicine  Dr. Jessika Perales  Available on MS Teams  Pager: [phone]    Patient is a 68y old  Male who presents with a chief complaint of Back pain (05 Mar 2023 02:33)    Subjective: Pt seen and examined at bedside.  used to obtain history. Reports gas pain. Says he needs suppository to be able to pass gas and have BM. Had BMs yesterday morning. Also reports acid reflux today. No SOB or LE edema.     Vital Signs Last 24 Hrs  T(C): 37.2 (10 Mar 2023 07:50), Max: 37.2 (10 Mar 2023 00:40)  T(F): 99 (10 Mar 2023 07:50), Max: 99 (10 Mar 2023 07:50)  HR: 76 (10 Mar 2023 07:50) (76 - 98)  BP: 130/76 (10 Mar 2023 07:50) (113/58 - 170/77)  BP(mean): --  RR: 19 (10 Mar 2023 07:50) (18 - 19)  SpO2: 98% (10 Mar 2023 07:50) (98% - 100%)    Parameters below as of 10 Mar 2023 07:50  Patient On (Oxygen Delivery Method): room air    PHYSICAL EXAM:  Constitutional: WDWN resting comfortably in bed; NAD  Head: NC/AT  Eyes: PERRL, EOMI, anicteric sclera  ENT: no nasal discharge; MMM  Neck: supple; no JVD  Respiratory: CTA B/L; no W/R/R  Cardiac: +S1/S2; RRR; no M/R/G  Gastrointestinal: soft, NT/ND; no rebound or guarding; +BSx4  Extremities: WWP, no clubbing or cyanosis; no peripheral edema  Musculoskeletal: NROM x4; no joint swelling, tenderness or erythema  Vascular: 2+ radial, DP/PT pulses B/L  Dermatologic: skin warm, dry and intact; no rashes, wounds, or scars  Neurologic: AAOx3; CNII-XII grossly intact; no focal deficits  Psychiatric: affect and characteristics of appearance, verbalizations, behaviors are appropriate    MEDICATIONS  (STANDING):  acetaminophen     Tablet .. 975 milliGRAM(s) Oral every 6 hours  aspirin  chewable 81 milliGRAM(s) Oral daily  atorvastatin 40 milliGRAM(s) Oral at bedtime  bisacodyl Suppository 10 milliGRAM(s) Rectal daily  calcitriol   Capsule 0.25 MICROGram(s) Oral daily  calcium acetate 667 milliGRAM(s) Oral with dinner  dexAMETHasone     Tablet 4 milliGRAM(s) Oral every 6 hours  dextrose 50% Injectable 25 Gram(s) IV Push once  dextrose 50% Injectable 12.5 Gram(s) IV Push once  dextrose 50% Injectable 25 Gram(s) IV Push once  furosemide    Tablet 40 milliGRAM(s) Oral daily  gabapentin 300 milliGRAM(s) Oral three times a day  heparin   Injectable 5000 Unit(s) SubCutaneous every 8 hours  insulin glargine Injectable (LANTUS) 27 Unit(s) SubCutaneous at bedtime  insulin lispro (ADMELOG) corrective regimen sliding scale   SubCutaneous three times a day before meals  insulin lispro (ADMELOG) corrective regimen sliding scale   SubCutaneous at bedtime  insulin lispro Injectable (ADMELOG) 12 Unit(s) SubCutaneous three times a day before meals  isosorbide    mononitrate Tablet (ISMO) 30 milliGRAM(s) Oral two times a day  melatonin 6 milliGRAM(s) Oral at bedtime  polyethylene glycol 3350 17 Gram(s) Oral daily  pregabalin 50 milliGRAM(s) Oral two times a day  psyllium Powder 1 Packet(s) Oral two times a day  sacubitril 24 mG/valsartan 26 mG 1 Tablet(s) Oral two times a day  senna 2 Tablet(s) Oral at bedtime  sodium chloride 2 Gram(s) Oral daily  tamsulosin 0.4 milliGRAM(s) Oral at bedtime    MEDICATIONS  (PRN):  dextrose Oral Gel 15 Gram(s) Oral once PRN Blood Glucose LESS THAN 70 milliGRAM(s)/deciliter  HYDROmorphone  Injectable 0.5 milliGRAM(s) IV Push every 4 hours PRN Break through pain  nitroglycerin     SubLingual 0.4 milliGRAM(s) SubLingual every 5 minutes PRN Chest Pain  oxyCODONE    IR 10 milliGRAM(s) Oral every 4 hours PRN Severe Pain (7 - 10)  oxyCODONE    IR 5 milliGRAM(s) Oral every 4 hours PRN Moderate Pain (4 - 6)    LABS:                                   9.0    14.91 )-----------( 475      ( 10 Mar 2023 06:10 )             28.5   03-10    132<L>  |  103  |  41<H>  ----------------------------<  182<H>  4.3   |  17<L>  |  1.22    Ca    8.7      10 Mar 2023 06:10  Phos  2.3     03-10  Mg     2.00     03-10      CAPILLARY BLOOD GLUCOSE  POCT Blood Glucose.: 209 mg/dL (10 Mar 2023 08:10)  POCT Blood Glucose.: 85 mg/dL (09 Mar 2023 22:06)  POCT Blood Glucose.: 151 mg/dL (09 Mar 2023 17:29)  POCT Blood Glucose.: 340 mg/dL (09 Mar 2023 12:10)      RADIOLOGY & ADDITIONAL TESTS:   < from: Xray Abdomen 2 Views (03.07.23 @ 21:49) >    IMPRESSION: Postoperative ileus.    < end of copied text >

## 2023-03-10 NOTE — PROGRESS NOTE ADULT - ASSESSMENT
67 yo M with DMII, CAD s/p CABG, CKD, chronic back pain, and HTN presents to the ED with worsening back pain.  He recently came from [facility] about 2 weeks ago and has " multiple medical complaints."  he told his son that he is feeling weak and his back pain is getting worse.   In the ED, his vitals were notable for hyperglycemia, elevated BUN/Scr, and hypomagnesemia. Endocrine called for DM 2, A1c 8.3    1. DM 2  A1c 8.3%  Home Regimen: Ligenta-M 500 (Linagliptin 2.5 and metformin 500mg) 1 tablet once daily, NovoRapid (insulin aspart) 14 units w/ lunch, NovoMix 30 insulin 14 units before breakfast, 14-16 units before dinner    While inpatient:  BG target 100-180 mg/dl  Patient was on Dexamethasone over the weekend, now stopped, last dose 3/6  Continue Lantus 25 units every morning (0900)  Increase Admelog to 9 units SQ TID before breakfast (Hold if NPO/skips meal)   Continue Ademlog 7 units SQ before lunch and dinner (Hold if NPO/skips meal)   Continue Admelog low dose correctional scale before meals, continue Admelog LOW dose scale at bedtime  Chek FS before meals and at bedtime  Hypoglycemia protocol    Carb Consistent Diet   Nutrition consult completed   Provider to RN for diabetes/insulin pen teaching, AB pharmacist following (see pharmacy intervention notes)     Discharge Plan:  Medicaid pending - currently uninsured   Likely stop prior outpatient regimen and continue with mixed insulin (70/30) only  Consider Novolog 70/30: doses TBD close to discharge: before breakfast and before dinner via pen (see pharmacy liaison note: patient can qualify for coupon), so could use insulin PENS if that is easier for patient  Rapid acting insulin should NOT be ordered in conjunction with mixed insulin for risk of hypoglycemia   Ensure he has glucometer, glucose test strips, lancets, alcohol swabs and insulin PEN needles vs syringes   Followup with [facility], [phone] [place] [address], [phone], [phone]    2. HTN  Goal BP in DM <130/80  On Entresto   Outpatient mc/cr ratio  Titration as per primary team     3. HLD  LDL 75 above goal; LDL <70   Continue Atorvastatin 40mg daily    HAI King-BC  Nurse Practitioner   Division of Endocrinology  Pager: SILVIO pager [phone]    If out of hospital/unavailable when paged, please note: patient will be cared for by another provider on the endocrine service.  Please call the endocrine answering service for assistance to reach covering provider ([phone]). For non-urgent matters, please email [email or web address] for assistance.      69 yo M with DMII, CAD s/p CABG, CKD, chronic back pain, and HTN presents to the ED with worsening back pain.  He recently came from [facility] about 2 weeks ago and has " multiple medical complaints."  he told his son that he is feeling weak and his back pain is getting worse.   In the ED, his vitals were notable for hyperglycemia, elevated BUN/Scr, and hypomagnesemia. Endocrine called for DM 2, A1c 8.3    1. DM 2  A1c 8.3%  Home Regimen: Ligenta-M 500 (Linagliptin 2.5 and metformin 500mg) 1 tablet once daily, NovoRapid (insulin aspart) 14 units w/ lunch, NovoMix 30 insulin 14 units before breakfast, 14-16 units before dinner    While inpatient:  BG target 100-180 mg/dl  Patient was on Dexamethasone over the weekend, now stopped, last dose 3/6  Continue Lantus 25 units every morning (0900)  Increase Admelog to 9 units SQ TID before breakfast (Hold if NPO/skips meal)   Continue Ademlog 7 units SQ before lunch and dinner (Hold if NPO/skips meal)   Can continue bedtime snack Admelog 2 units, HOLD if no bedtime snack   Continue Admelog low dose correctional scale before meals, continue Admelog LOW dose scale at bedtime  Chek FS before meals and at bedtime  Hypoglycemia protocol    Carb Consistent Diet   Nutrition consult completed   Provider to RN for diabetes/insulin pen teaching, AB pharmacist following (see pharmacy intervention notes)     Discharge Plan:  Medicaid pending - currently uninsured   Likely stop prior outpatient regimen and continue with mixed insulin (70/30) only  Consider Novolog 70/30: doses TBD close to discharge: before breakfast and before dinner via pen (see pharmacy liaison note: patient can qualify for coupon), so could use insulin PENS if that is easier for patient  Rapid acting insulin should NOT be ordered in conjunction with mixed insulin for risk of hypoglycemia   Ensure he has glucometer, glucose test strips, lancets, alcohol swabs and insulin PEN needles vs syringes   Followup with [facility], [phone] [place] [address], [phone], [phone]    2. HTN  Goal BP in DM <130/80  On Entresto   Outpatient mc/cr ratio  Titration as per primary team     3. HLD  LDL 75 above goal; LDL <70   Continue Atorvastatin 40mg daily    HAI King-BC  Nurse Practitioner   Division of Endocrinology  Pager: SILVIO pager [phone]    If out of hospital/unavailable when paged, please note: patient will be cared for by another provider on the endocrine service.  Please call the endocrine answering service for assistance to reach covering provider ([phone]). For non-urgent matters, please email [email or web address] for assistance.

## 2023-03-10 NOTE — PROVIDER CONTACT NOTE (OTHER) - BACKGROUND
69 yo M with DMII, CAD s/p CABG, CKD, chronic back pain, and HTN presents to the ED with worsening back pain.

## 2023-03-10 NOTE — PROGRESS NOTE ADULT - PROBLEM SELECTOR PLAN 8
HbA1c of 8.3%, at home on insulin 14 units with lunch and 14-16 BID of mixed insulin + orals  - Fs 106  -c/w lantus to 25u in AM and premeal to 7TID  -change to moderate dose sliding scale  -endo following

## 2023-03-10 NOTE — PROGRESS NOTE ADULT - PROBLEM SELECTOR PLAN 9
All brand name [place]ish.  We do not have all of them.  Meds are: Ligenta-M 500 (Linagliptin 2.5 and metoformin 500mg), [place] card MR (trimetazidine hydrochloride 35 mg),  Ecosprin 75 (aspirin 75 mg), Duralax, Ketoalfa (amio acid Keto Analogues 600 mg), Nidocard-Retard (nitroglycerin 2.6 mg), Hypophos (calcium acetate 667 mg), Tamisol D (Tamsulosin 0.4 mg and Dutasteride 0.5 mg), Mydocalm (tolperisone 50 mg), Raditrol (calcitriol 0.25 microgram),  NovoRapid 16 units w/ lunch, Manuel nordisk insulin 16am/14pm, Atova 20 (atorvastatin 20 mg), Nebita 2.5 (nevivolol 2.5 mg), Tamisol (tamsulosin 0.4 mg), Pregaba 50 (pregabalin 50 mg), Febustat 40 (febuxostat 40 mg), Lozixum (lorazapam 1 mg), Lasix 40 mg, Sabitar 50 mg, Anazol 0.1%, Tearfresh

## 2023-03-10 NOTE — PROGRESS NOTE ADULT - SUBJECTIVE AND OBJECTIVE BOX
No issues overnight  Patient reports having several BM's  Vital Signs Last 24 Hrs  T(C): 37.2 (10 Mar 2023 00:40), Max: 37.2 (10 Mar 2023 00:40)  T(F): 98.9 (10 Mar 2023 00:40), Max: 98.9 (10 Mar 2023 00:40)  HR: 94 (10 Mar 2023 00:40) (52 - 98)  BP: 140/76 (10 Mar 2023 00:40) (105/58 - 170/77)  BP(mean): --  RR: 18 (10 Mar 2023 00:40) (18 - 19)  SpO2: 99% (10 Mar 2023 00:40) (97% - 100%)    Parameters below as of 10 Mar 2023 00:40  Patient On (Oxygen Delivery Method): room air    AAO X 3  PERRLA, EOMI  Face symmetric  bilateral UE 5/5  LE proximally 5/5 distally Right DF 2/5 PF 4/5  Left PF 4/5 DF 0/5    HMV: 0cc  Right JOVITA: 25cc  Left JOVITA: 15cc    MEDICATIONS  (STANDING):  atorvastatin 40 milliGRAM(s) Oral at bedtime  bisacodyl Suppository 10 milliGRAM(s) Rectal daily  calcitriol   Capsule 0.25 MICROGram(s) Oral daily  calcium acetate 667 milliGRAM(s) Oral with dinner  insulin glargine Injectable (LANTUS) 25 Unit(s) SubCutaneous every morning  insulin lispro (ADMELOG) corrective regimen sliding scale   SubCutaneous three times a day before meals  insulin lispro (ADMELOG) corrective regimen sliding scale   SubCutaneous at bedtime  insulin lispro Injectable (ADMELOG) 7 Unit(s) SubCutaneous three times a day before meals  insulin lispro Injectable (ADMELOG) 2 Unit(s) SubCutaneous at bedtime  isosorbide    mononitrate Tablet (ISMO) 30 milliGRAM(s) Oral two times a day  melatonin 6 milliGRAM(s) Oral at bedtime  pantoprazole    Tablet 40 milliGRAM(s) Oral before breakfast  polyethylene glycol 3350 17 Gram(s) Oral two times a day  pregabalin 50 milliGRAM(s) Oral two times a day  psyllium Powder 1 Packet(s) Oral two times a day  sacubitril 24 mG/valsartan 26 mG 1 Tablet(s) Oral two times a day  senna 2 Tablet(s) Oral at bedtime  tamsulosin 0.4 milliGRAM(s) Oral at bedtime    MEDICATIONS  (PRN):  acetaminophen     Tablet .. 650 milliGRAM(s) Oral every 6 hours PRN Mild Pain (1 - 3)  calcium carbonate    500 mG (Tums) Chewable 1 Tablet(s) Chew every 4 hours PRN Heartburn  nitroglycerin     SubLingual 0.4 milliGRAM(s) SubLingual every 5 minutes PRN Chest Pain  ondansetron Injectable 4 milliGRAM(s) IV Push every 6 hours PRN Nausea and/or Vomiting  oxyCODONE    IR 5 milliGRAM(s) Oral every 4 hours PRN Moderate Pain (4 - 6)  oxyCODONE    IR 10 milliGRAM(s) Oral every 4 hours PRN Severe Pain (7 - 10)  saline laxative (FLEET) Rectal Enema 1 Enema Rectal daily PRN no BM                          9.3    14.83 )-----------( 460      ( 09 Mar 2023 07:00 )             28.7     03-09    130<L>  |  101  |  52<H>  ----------------------------<  206<H>  4.1   |  16<L>  |  1.54<H>    Ca    8.9      09 Mar 2023 07:00  Phos  2.4     03-09  Mg     2.30     03-09

## 2023-03-10 NOTE — PROGRESS NOTE ADULT - SUBJECTIVE AND OBJECTIVE BOX
Chief Complaint: DM 2 with hyperglycemia     History: Patient seen at bedside, declined  services. Reports he ate breakfast, endorsing constipation.     MEDICATIONS  (STANDING):  atorvastatin 40 milliGRAM(s) Oral at bedtime  bisacodyl Suppository 10 milliGRAM(s) Rectal daily  calcitriol   Capsule 0.25 MICROGram(s) Oral daily  calcium acetate 667 milliGRAM(s) Oral with dinner  furosemide    Tablet 40 milliGRAM(s) Oral daily  insulin glargine Injectable (LANTUS) 25 Unit(s) SubCutaneous every morning  insulin lispro (ADMELOG) corrective regimen sliding scale   SubCutaneous three times a day before meals  insulin lispro (ADMELOG) corrective regimen sliding scale   SubCutaneous at bedtime  insulin lispro Injectable (ADMELOG) 7 Unit(s) SubCutaneous three times a day before meals  insulin lispro Injectable (ADMELOG) 2 Unit(s) SubCutaneous at bedtime  isosorbide    mononitrate Tablet (ISMO) 30 milliGRAM(s) Oral two times a day  melatonin 6 milliGRAM(s) Oral at bedtime  pantoprazole    Tablet 40 milliGRAM(s) Oral before breakfast  polyethylene glycol 3350 17 Gram(s) Oral two times a day  pregabalin 50 milliGRAM(s) Oral two times a day  psyllium Powder 1 Packet(s) Oral two times a day  sacubitril 24 mG/valsartan 26 mG 1 Tablet(s) Oral two times a day  senna 2 Tablet(s) Oral at bedtime  tamsulosin 0.4 milliGRAM(s) Oral at bedtime      No Known Allergies    Review of Systems:  Cardiovascular: No chest pain  Respiratory: No SOB  GI: No nausea, vomiting  Endocrine: no hypoglycemia     PHYSICAL EXAM:  Vital Signs Last 24 Hrs  T(C): 36.9 (10 Mar 2023 16:00), Max: 37.2 (10 Mar 2023 00:40)  T(F): 98.5 (10 Mar 2023 16:00), Max: 99 (10 Mar 2023 07:50)  HR: 94 (10 Mar 2023 16:00) (76 - 108)  BP: 146/69 (10 Mar 2023 16:00) (98/55 - 151/65)  BP(mean): --  RR: 18 (10 Mar 2023 16:00) (18 - 19)  SpO2: 100% (10 Mar 2023 16:00) (98% - 100%)    Parameters below as of 10 Mar 2023 16:00  Patient On (Oxygen Delivery Method): room air      GENERAL: NAD  EYES: No proptosis, no lid lag, anicteric  HEENT:  Atraumatic, Normocephalic, moist mucous membranes  RESPIRATORY: unlabored respirations     CAPILLARY BLOOD GLUCOSE      POCT Blood Glucose.: 282 mg/dL (10 Mar 2023 12:21)  POCT Blood Glucose.: 209 mg/dL (10 Mar 2023 08:10)  POCT Blood Glucose.: 85 mg/dL (09 Mar 2023 22:06)  POCT Blood Glucose.: 151 mg/dL (09 Mar 2023 17:29)        03-10    132<L>  |  103  |  41<H>  ----------------------------<  182<H>  4.3   |  17<L>  |  1.22    Ca    8.7      10 Mar 2023 06:10  Phos  2.3     03-10  Mg     2.00     03-10        A1C with Estimated Average Glucose Result: 8.3 % (02-22-23 @ 05:13)    Diet, Regular:   Consistent Carbohydrate Evening Snack (CSTCHOSN) (03-07-23 @ 10:19) [Active]

## 2023-03-10 NOTE — PROVIDER CONTACT NOTE (OTHER) - BACKGROUND
67 yo M with DMII, CAD s/p CABG, CKD, chronic back pain, and HTN presents to the ED with worsening back pain.

## 2023-03-11 LAB
ANION GAP SERPL CALC-SCNC: 11 MMOL/L — SIGNIFICANT CHANGE UP (ref 7–14)
BUN SERPL-MCNC: 34 MG/DL — HIGH (ref 7–23)
CALCIUM SERPL-MCNC: 8.6 MG/DL — SIGNIFICANT CHANGE UP (ref 8.4–10.5)
CHLORIDE SERPL-SCNC: 99 MMOL/L — SIGNIFICANT CHANGE UP (ref 98–107)
CO2 SERPL-SCNC: 19 MMOL/L — LOW (ref 22–31)
CREAT SERPL-MCNC: 1.23 MG/DL — SIGNIFICANT CHANGE UP (ref 0.5–1.3)
EGFR: 63 ML/MIN/1.73M2 — SIGNIFICANT CHANGE UP
GLUCOSE BLDC GLUCOMTR-MCNC: 169 MG/DL — HIGH (ref 70–99)
GLUCOSE BLDC GLUCOMTR-MCNC: 173 MG/DL — HIGH (ref 70–99)
GLUCOSE BLDC GLUCOMTR-MCNC: 194 MG/DL — HIGH (ref 70–99)
GLUCOSE BLDC GLUCOMTR-MCNC: 239 MG/DL — HIGH (ref 70–99)
GLUCOSE SERPL-MCNC: 167 MG/DL — HIGH (ref 70–99)
HCT VFR BLD CALC: 27.8 % — LOW (ref 39–50)
HGB BLD-MCNC: 8.9 G/DL — LOW (ref 13–17)
MAGNESIUM SERPL-MCNC: 2 MG/DL — SIGNIFICANT CHANGE UP (ref 1.6–2.6)
MCHC RBC-ENTMCNC: 30 PG — SIGNIFICANT CHANGE UP (ref 27–34)
MCHC RBC-ENTMCNC: 32 GM/DL — SIGNIFICANT CHANGE UP (ref 32–36)
MCV RBC AUTO: 93.6 FL — SIGNIFICANT CHANGE UP (ref 80–100)
NRBC # BLD: 0 /100 WBCS — SIGNIFICANT CHANGE UP (ref 0–0)
NRBC # FLD: 0 K/UL — SIGNIFICANT CHANGE UP (ref 0–0)
PHOSPHATE SERPL-MCNC: 2.4 MG/DL — LOW (ref 2.5–4.5)
PLATELET # BLD AUTO: 508 K/UL — HIGH (ref 150–400)
POTASSIUM SERPL-MCNC: 4.4 MMOL/L — SIGNIFICANT CHANGE UP (ref 3.5–5.3)
POTASSIUM SERPL-SCNC: 4.4 MMOL/L — SIGNIFICANT CHANGE UP (ref 3.5–5.3)
RBC # BLD: 2.97 M/UL — LOW (ref 4.2–5.8)
RBC # FLD: 13.1 % — SIGNIFICANT CHANGE UP (ref 10.3–14.5)
SODIUM SERPL-SCNC: 129 MMOL/L — LOW (ref 135–145)
WBC # BLD: 14.82 K/UL — HIGH (ref 3.8–10.5)
WBC # FLD AUTO: 14.82 K/UL — HIGH (ref 3.8–10.5)

## 2023-03-11 PROCEDURE — 74018 RADEX ABDOMEN 1 VIEW: CPT | Mod: 26

## 2023-03-11 PROCEDURE — 99232 SBSQ HOSP IP/OBS MODERATE 35: CPT

## 2023-03-11 RX ADMIN — HEPARIN SODIUM 5000 UNIT(S): 5000 INJECTION INTRAVENOUS; SUBCUTANEOUS at 05:33

## 2023-03-11 RX ADMIN — PANTOPRAZOLE SODIUM 40 MILLIGRAM(S): 20 TABLET, DELAYED RELEASE ORAL at 05:27

## 2023-03-11 RX ADMIN — Medication 1: at 08:25

## 2023-03-11 RX ADMIN — CALCITRIOL 0.25 MICROGRAM(S): 0.5 CAPSULE ORAL at 13:06

## 2023-03-11 RX ADMIN — Medication 10 MILLIGRAM(S): at 13:06

## 2023-03-11 RX ADMIN — Medication 50 MILLIGRAM(S): at 05:35

## 2023-03-11 RX ADMIN — Medication 50 MILLIGRAM(S): at 18:29

## 2023-03-11 RX ADMIN — SACUBITRIL AND VALSARTAN 1 TABLET(S): 24; 26 TABLET, FILM COATED ORAL at 18:28

## 2023-03-11 RX ADMIN — Medication 9 UNIT(S): at 08:25

## 2023-03-11 RX ADMIN — HEPARIN SODIUM 5000 UNIT(S): 5000 INJECTION INTRAVENOUS; SUBCUTANEOUS at 22:15

## 2023-03-11 RX ADMIN — ATORVASTATIN CALCIUM 40 MILLIGRAM(S): 80 TABLET, FILM COATED ORAL at 22:14

## 2023-03-11 RX ADMIN — POLYETHYLENE GLYCOL 3350 17 GRAM(S): 17 POWDER, FOR SOLUTION ORAL at 05:26

## 2023-03-11 RX ADMIN — Medication 667 MILLIGRAM(S): at 18:27

## 2023-03-11 RX ADMIN — Medication 7 UNIT(S): at 18:26

## 2023-03-11 RX ADMIN — Medication 6 MILLIGRAM(S): at 22:13

## 2023-03-11 RX ADMIN — Medication 7 UNIT(S): at 13:05

## 2023-03-11 RX ADMIN — HEPARIN SODIUM 5000 UNIT(S): 5000 INJECTION INTRAVENOUS; SUBCUTANEOUS at 13:05

## 2023-03-11 RX ADMIN — ISOSORBIDE MONONITRATE 30 MILLIGRAM(S): 60 TABLET, EXTENDED RELEASE ORAL at 18:27

## 2023-03-11 RX ADMIN — Medication 1 PACKET(S): at 05:27

## 2023-03-11 RX ADMIN — POLYETHYLENE GLYCOL 3350 17 GRAM(S): 17 POWDER, FOR SOLUTION ORAL at 18:27

## 2023-03-11 RX ADMIN — Medication 40 MILLIGRAM(S): at 05:27

## 2023-03-11 RX ADMIN — Medication 1 PACKET(S): at 18:27

## 2023-03-11 RX ADMIN — SACUBITRIL AND VALSARTAN 1 TABLET(S): 24; 26 TABLET, FILM COATED ORAL at 05:26

## 2023-03-11 RX ADMIN — ISOSORBIDE MONONITRATE 30 MILLIGRAM(S): 60 TABLET, EXTENDED RELEASE ORAL at 05:26

## 2023-03-11 RX ADMIN — INSULIN GLARGINE 25 UNIT(S): 100 INJECTION, SOLUTION SUBCUTANEOUS at 08:24

## 2023-03-11 RX ADMIN — Medication 2: at 13:05

## 2023-03-11 RX ADMIN — SENNA PLUS 2 TABLET(S): 8.6 TABLET ORAL at 22:14

## 2023-03-11 RX ADMIN — Medication 1: at 18:26

## 2023-03-11 RX ADMIN — TAMSULOSIN HYDROCHLORIDE 0.4 MILLIGRAM(S): 0.4 CAPSULE ORAL at 22:13

## 2023-03-11 NOTE — PROGRESS NOTE ADULT - ASSESSMENT
68M Albanian speaking, HTN, DM2 on insulin, CKD3, systolic HF (EF 45%), CAD s/p CABG 2005, RSA s/p L renal stent, and PAD s/p PTA to R common iliac and L common iliac s/p stent who presents to the ED with worsening back pain in context of known L4-L5 cord compression/cauda equina since 8/2022. now s/p l4-L5 spinal fusion by neurosurgery; Medicine following for comanagement.

## 2023-03-11 NOTE — PROVIDER CONTACT NOTE (OTHER) - REASON
Hypotension
BP change
Pt complains of new onset of chest pain, denies SOB
critical value of Troponin level of 72
patient fingerstick 306 on admission to unit
pt vomited
insulin admelog

## 2023-03-11 NOTE — PROGRESS NOTE ADULT - PROBLEM SELECTOR PLAN 9
All brand name [place]ic.  We do not have all of them.  Meds are: Ligenta-M 500 (Linagliptin 2.5 and metoformin 500mg), [place] card MR (trimetazidine hydrochloride 35 mg),  Ecosprin 75 (aspirin 75 mg), Duralax, Ketoalfa (amio acid Keto Analogues 600 mg), Nidocard-Retard (nitroglycerin 2.6 mg), Hypophos (calcium acetate 667 mg), Tamisol D (Tamsulosin 0.4 mg and Dutasteride 0.5 mg), Mydocalm (tolperisone 50 mg), Raditrol (calcitriol 0.25 microgram),  NovoRapid 16 units w/ lunch, Manuel nordisk insulin 16am/14pm, Atova 20 (atorvastatin 20 mg), Nebita 2.5 (nevivolol 2.5 mg), Tamisol (tamsulosin 0.4 mg), Pregaba 50 (pregabalin 50 mg), Febustat 40 (febuxostat 40 mg), Lozixum (lorazapam 1 mg), Lasix 40 mg, Sabitar 50 mg, Anazol 0.1%, Tearfresh

## 2023-03-11 NOTE — PROGRESS NOTE ADULT - SUBJECTIVE AND OBJECTIVE BOX
Patient with decreased appetite, vomited X 1  Left JOVITA drain removed  Vital Signs Last 24 Hrs  T(C): 36.8 (10 Mar 2023 20:00), Max: 37.2 (10 Mar 2023 07:50)  T(F): 98.3 (10 Mar 2023 20:00), Max: 99 (10 Mar 2023 07:50)  HR: 92 (11 Mar 2023 00:26) (76 - 108)  BP: 104/45 (11 Mar 2023 00:26) (98/55 - 151/65)  BP(mean): --  RR: 18 (11 Mar 2023 00:26) (18 - 19)  SpO2: 100% (11 Mar 2023 00:26) (98% - 100%)    Parameters below as of 11 Mar 2023 00:26  Patient On (Oxygen Delivery Method): room air    AAO X 3  PERRLA, EOMI  Face symmetric  bilateral UE 5/5  LE proximally 5/5 distally Right DF 2/5 PF 4/5  Left PF 4/5 DF 0/5    HMV: 5cc  Right JOVITA: 20cc   MEDICATIONS  (STANDING):  atorvastatin 40 milliGRAM(s) Oral at bedtime  bisacodyl Suppository 10 milliGRAM(s) Rectal daily  calcitriol   Capsule 0.25 MICROGram(s) Oral daily  calcium acetate 667 milliGRAM(s) Oral with dinner  furosemide    Tablet 40 milliGRAM(s) Oral daily  heparin   Injectable 5000 Unit(s) SubCutaneous every 8 hours  insulin glargine Injectable (LANTUS) 25 Unit(s) SubCutaneous every morning  insulin lispro (ADMELOG) corrective regimen sliding scale   SubCutaneous three times a day before meals  insulin lispro (ADMELOG) corrective regimen sliding scale   SubCutaneous at bedtime  insulin lispro Injectable (ADMELOG) 2 Unit(s) SubCutaneous at bedtime  insulin lispro Injectable (ADMELOG) 9 Unit(s) SubCutaneous before breakfast  insulin lispro Injectable (ADMELOG) 7 Unit(s) SubCutaneous before lunch  insulin lispro Injectable (ADMELOG) 7 Unit(s) SubCutaneous before dinner  isosorbide    mononitrate Tablet (ISMO) 30 milliGRAM(s) Oral two times a day  melatonin 6 milliGRAM(s) Oral at bedtime  pantoprazole    Tablet 40 milliGRAM(s) Oral before breakfast  polyethylene glycol 3350 17 Gram(s) Oral two times a day  pregabalin 50 milliGRAM(s) Oral two times a day  psyllium Powder 1 Packet(s) Oral two times a day  sacubitril 24 mG/valsartan 26 mG 1 Tablet(s) Oral two times a day  senna 2 Tablet(s) Oral at bedtime  tamsulosin 0.4 milliGRAM(s) Oral at bedtime    MEDICATIONS  (PRN):  acetaminophen     Tablet .. 650 milliGRAM(s) Oral every 6 hours PRN Mild Pain (1 - 3)  calcium carbonate    500 mG (Tums) Chewable 1 Tablet(s) Chew every 4 hours PRN Heartburn  hydrOXYzine hydrochloride 25 milliGRAM(s) Oral at bedtime PRN sleep  nitroglycerin     SubLingual 0.4 milliGRAM(s) SubLingual every 5 minutes PRN Chest Pain  ondansetron Injectable 4 milliGRAM(s) IV Push every 6 hours PRN Nausea and/or Vomiting  oxyCODONE    IR 5 milliGRAM(s) Oral every 4 hours PRN Moderate Pain (4 - 6)  oxyCODONE    IR 10 milliGRAM(s) Oral every 4 hours PRN Severe Pain (7 - 10)  saline laxative (FLEET) Rectal Enema 1 Enema Rectal daily PRN no BM                          9.0    14.91 )-----------( 475      ( 10 Mar 2023 06:10 )             28.5     03-10    132<L>  |  103  |  41<H>  ----------------------------<  182<H>  4.3   |  17<L>  |  1.22    Ca    8.7      10 Mar 2023 06:10  Phos  2.3     03-10  Mg     2.00     03-10

## 2023-03-11 NOTE — PROVIDER CONTACT NOTE (OTHER) - DATE AND TIME:
03-Mar-2023 00:02
10-Mar-2023 19:57
07-Mar-2023 19:48
11-Mar-2023 20:30
03-Mar-2023 04:10
10-Mar-2023 22:50
03-Mar-2023 01:40

## 2023-03-11 NOTE — PROVIDER CONTACT NOTE (OTHER) - RECOMMENDATIONS
consider insulin coverage.
Pain medication given. telemetry monitoring.
Reassess BP
hold insulin
EKG, Provider to bedside

## 2023-03-11 NOTE — PROGRESS NOTE ADULT - SUBJECTIVE AND OBJECTIVE BOX
[facility]  Division of Hospital Medicine  Michelle Cho MD  Pager: [phone]      Patient is a 68y old  Male who presents with a chief complaint of Back pain (11 Mar 2023 02:58)      SUBJECTIVE / OVERNIGHT EVENTS: Resting comfortably. Feeling okay. Started on lasix yesterday   ADDITIONAL REVIEW OF SYSTEMS:    MEDICATIONS  (STANDING):  atorvastatin 40 milliGRAM(s) Oral at bedtime  bisacodyl Suppository 10 milliGRAM(s) Rectal daily  calcitriol   Capsule 0.25 MICROGram(s) Oral daily  calcium acetate 667 milliGRAM(s) Oral with dinner  furosemide    Tablet 40 milliGRAM(s) Oral daily  heparin   Injectable 5000 Unit(s) SubCutaneous every 8 hours  insulin glargine Injectable (LANTUS) 25 Unit(s) SubCutaneous every morning  insulin lispro (ADMELOG) corrective regimen sliding scale   SubCutaneous three times a day before meals  insulin lispro (ADMELOG) corrective regimen sliding scale   SubCutaneous at bedtime  insulin lispro Injectable (ADMELOG) 9 Unit(s) SubCutaneous before breakfast  insulin lispro Injectable (ADMELOG) 7 Unit(s) SubCutaneous before lunch  insulin lispro Injectable (ADMELOG) 7 Unit(s) SubCutaneous before dinner  insulin lispro Injectable (ADMELOG) 2 Unit(s) SubCutaneous at bedtime  isosorbide    mononitrate Tablet (ISMO) 30 milliGRAM(s) Oral two times a day  melatonin 6 milliGRAM(s) Oral at bedtime  pantoprazole    Tablet 40 milliGRAM(s) Oral before breakfast  polyethylene glycol 3350 17 Gram(s) Oral two times a day  pregabalin 50 milliGRAM(s) Oral two times a day  psyllium Powder 1 Packet(s) Oral two times a day  sacubitril 24 mG/valsartan 26 mG 1 Tablet(s) Oral two times a day  senna 2 Tablet(s) Oral at bedtime  tamsulosin 0.4 milliGRAM(s) Oral at bedtime    MEDICATIONS  (PRN):  acetaminophen     Tablet .. 650 milliGRAM(s) Oral every 6 hours PRN Mild Pain (1 - 3)  calcium carbonate    500 mG (Tums) Chewable 1 Tablet(s) Chew every 4 hours PRN Heartburn  hydrOXYzine hydrochloride 25 milliGRAM(s) Oral at bedtime PRN sleep  nitroglycerin     SubLingual 0.4 milliGRAM(s) SubLingual every 5 minutes PRN Chest Pain  ondansetron Injectable 4 milliGRAM(s) IV Push every 6 hours PRN Nausea and/or Vomiting  oxyCODONE    IR 5 milliGRAM(s) Oral every 4 hours PRN Moderate Pain (4 - 6)  oxyCODONE    IR 10 milliGRAM(s) Oral every 4 hours PRN Severe Pain (7 - 10)  saline laxative (FLEET) Rectal Enema 1 Enema Rectal daily PRN no BM      CAPILLARY BLOOD GLUCOSE      POCT Blood Glucose.: 239 mg/dL (11 Mar 2023 12:16)  POCT Blood Glucose.: 194 mg/dL (11 Mar 2023 08:13)  POCT Blood Glucose.: 113 mg/dL (10 Mar 2023 21:56)  POCT Blood Glucose.: 186 mg/dL (10 Mar 2023 18:05)    I&O's Summary    10 Mar 2023 07:01  -  11 Mar 2023 07:00  --------------------------------------------------------  IN: 1190 mL / OUT: 2077.5 mL / NET: -887.5 mL    11 Mar 2023 06:01  -  11 Mar 2023 12:30  --------------------------------------------------------  IN: 360 mL / OUT: 355 mL / NET: 5 mL        PHYSICAL EXAM:  Vital Signs Last 24 Hrs  T(C): 36.8 (11 Mar 2023 12:00), Max: 36.9 (10 Mar 2023 16:00)  T(F): 98.2 (11 Mar 2023 12:00), Max: 98.5 (10 Mar 2023 16:00)  HR: 82 (11 Mar 2023 12:00) (82 - 94)  BP: 123/72 (11 Mar 2023 12:00) (104/45 - 146/69)  BP(mean): --  RR: 18 (11 Mar 2023 12:00) (16 - 19)  SpO2: 96% (11 Mar 2023 12:00) (96% - 100%)    Parameters below as of 11 Mar 2023 12:00  Patient On (Oxygen Delivery Method): room air    Constitutional: WDWN resting comfortably in bed; NAD  Head: NC/AT  Eyes: PERRL, EOMI, anicteric sclera  ENT: no nasal discharge; MMM  Neck: supple; no JVD  Respiratory: CTA B/L; no W/R/R  Cardiac: +S1/S2; RRR; no M/R/G  Gastrointestinal: soft, NT/ND; no rebound or guarding; +BSx4  Extremities: WWP, no clubbing or cyanosis; no peripheral edema  Musculoskeletal: NROM x4; no joint swelling, tenderness or erythema  Vascular: 2+ radial, DP/PT pulses B/L  Dermatologic: skin warm, dry and intact; no rashes, wounds, or scars  Neurologic: AAOx3; CNII-XII grossly intact; no focal deficits  Psychiatric: affect and characteristics of appearance, verbalizations, behaviors are appropriateCONSTITUTIONAL: NAD, well-developed, well-groomed  EYES: PERRLA; conjunctiva and sclera clear  ENMT: Moist oral mucosa, no pharyngeal injection or exudates; normal dentition  NECK: Supple, no palpable masses; no thyromegaly  RESPIRATORY: Normal respiratory effort; lungs are clear to auscultation bilaterally  CARDIOVASCULAR: Regular rate and rhythm, normal S1 and S2, no murmur/rub/gallop; No lower extremity edema; Peripheral pulses are 2+ bilaterally  ABDOMEN: Nontender to palpation, normoactive bowel sounds, no rebound/guarding; No hepatosplenomegaly  MUSCULOSKELETAL:  Normal gait; no clubbing or cyanosis of digits; no joint swelling or tenderness to palpation  PSYCH: A+O to person, place, and time; affect appropriate  NEUROLOGY: CN 2-12 are intact and symmetric; no gross sensory deficits   SKIN: No rashes; no palpable lesions    LABS:                        8.9    14.82 )-----------( 508      ( 11 Mar 2023 06:12 )             27.8     03-11    129<L>  |  99  |  34<H>  ----------------------------<  167<H>  4.4   |  19<L>  |  1.23    Ca    8.6      11 Mar 2023 06:12  Phos  2.4     03-11  Mg     2.00     03-11                  RADIOLOGY & ADDITIONAL TESTS:  Results Reviewed:   Imaging Personally Reviewed:  Electrocardiogram Personally Reviewed:    COORDINATION OF CARE:  Care Discussed with Consultants/Other Providers [Y/N]:  Prior or Outpatient Records Reviewed [Y/N]:

## 2023-03-11 NOTE — PROVIDER CONTACT NOTE (OTHER) - ASSESSMENT
Pt in bed just woke up from sleeping, pt stated he didn't eat much of his dinner, pt not symptomatic no c/o dizziness or lightheadedness

## 2023-03-11 NOTE — PROGRESS NOTE ADULT - ASSESSMENT
67 yo M with DMII, CAD s/p CABG, CKD, chronic back pain, and HTN presents to the ED with worsening back pain. Patient is AAOX2 and somnolent, not able to provide any hx. Most of the information was supplemented by the son. As per son, pt is AAOx 3 and "mentally stable." He recently came from [facility] about 2 weeks ago and has " multiple medical complaints." But this morning he told his son that he is feeling weak and his back pain is getting worse. The pain is localized in the lower back region. Pain is worse when standing. Denies radiculopathy. Intermittent urinary and bowel incontinence for 1 year. Was admitted to a hospital in [facility] in Aug 2022 for pain, MRI was completed that showed a L4-5 spondylolisthesis. Patient was offered surgery but told he has too may medical issues and discharged him.   Patient here here visiting his son when back pain exacerbated and came to ER for evaluation with his MRI on paper films     MRI reviewed, L4-5 disk dessication and cauda equina compression , Right grade II and Left Grade I spondylolisthesis     3/1; s/p L4-5 TLIF pod #1, HMV drain, requiring bola post op for bp support, albumin given x 1, sicu consulted, neuro exam stable  3/2: POD # 2 S/P L4-5 TLIF, 1 HMV in place. Off phenylepherine. CT lumbar spine, screws ok  3/3: POD # 3 S/P L4-5 TLIF. Having episodes of chest pain with elevated troponins, cardiology evaluating. Will transfer to telemetry floor, serial CE and EKG. 1 HMV in place  3/4: POD # 4 S/P L4-5 TLIF. No more chest pain. HMV in place. Seen by endocrine for DM management  3/5: POD # 5 S/P L4-5 TLIF. No more chest pain. HMV in place. Increased back pain with radiation to legs, MRI ordered obtained by evening with anesthesia, possible ventral epidural collection. Imaging reviewed with Dr. Avalos  3/6: pod #6 s/p L4-5 TLIF, HMV in place, mri L/s w/ sed done no major compressive hematoma, enlarged neurogenic bladder, crowder placed  3/7: PAtient noted to have new Left food drop, s/p emergent RTOR for Lef. foot drop  cement augmentation for L4 pedicle fx 2/2 to screw pod #1, post op exam stable , HMV, 2JPs's vel, CT L/s done- screws ok, needs brace  3/9-11: Stable exam POD # 4 S/P cement augmentation for L4 pedicle fx 2/2 to screw. HMV X 1, JOVITA X 1. MRI L spine ordered. Episode of vomiting on 3/10, AXR ordered

## 2023-03-11 NOTE — PROVIDER CONTACT NOTE (OTHER) - SITUATION
Pts BP 98/50
Pt complains of new onset of chest pain, denies SOB
Pt vomited, denied nauseas
patient arrived to 8T, vitals taken as routine.
2 unit insulin hold for endo notes. give with snack and pt is not tolerating food now
critical value of Troponin level of 72
patient glucose 306 on admission to unit

## 2023-03-12 LAB
ANION GAP SERPL CALC-SCNC: 12 MMOL/L — SIGNIFICANT CHANGE UP (ref 7–14)
BUN SERPL-MCNC: 40 MG/DL — HIGH (ref 7–23)
CALCIUM SERPL-MCNC: 8.6 MG/DL — SIGNIFICANT CHANGE UP (ref 8.4–10.5)
CHLORIDE SERPL-SCNC: 100 MMOL/L — SIGNIFICANT CHANGE UP (ref 98–107)
CO2 SERPL-SCNC: 19 MMOL/L — LOW (ref 22–31)
CREAT ?TM UR-MCNC: 62 MG/DL — SIGNIFICANT CHANGE UP
CREAT SERPL-MCNC: 1.54 MG/DL — HIGH (ref 0.5–1.3)
EGFR: 48 ML/MIN/1.73M2 — LOW
GLUCOSE BLDC GLUCOMTR-MCNC: 163 MG/DL — HIGH (ref 70–99)
GLUCOSE BLDC GLUCOMTR-MCNC: 203 MG/DL — HIGH (ref 70–99)
GLUCOSE BLDC GLUCOMTR-MCNC: 214 MG/DL — HIGH (ref 70–99)
GLUCOSE BLDC GLUCOMTR-MCNC: 81 MG/DL — SIGNIFICANT CHANGE UP (ref 70–99)
GLUCOSE SERPL-MCNC: 124 MG/DL — HIGH (ref 70–99)
HCT VFR BLD CALC: 24.7 % — LOW (ref 39–50)
HGB BLD-MCNC: 7.9 G/DL — LOW (ref 13–17)
MAGNESIUM SERPL-MCNC: 2.1 MG/DL — SIGNIFICANT CHANGE UP (ref 1.6–2.6)
MCHC RBC-ENTMCNC: 29.8 PG — SIGNIFICANT CHANGE UP (ref 27–34)
MCHC RBC-ENTMCNC: 32 GM/DL — SIGNIFICANT CHANGE UP (ref 32–36)
MCV RBC AUTO: 93.2 FL — SIGNIFICANT CHANGE UP (ref 80–100)
NRBC # BLD: 0 /100 WBCS — SIGNIFICANT CHANGE UP (ref 0–0)
NRBC # FLD: 0 K/UL — SIGNIFICANT CHANGE UP (ref 0–0)
PHOSPHATE SERPL-MCNC: 2.8 MG/DL — SIGNIFICANT CHANGE UP (ref 2.5–4.5)
PLATELET # BLD AUTO: 474 K/UL — HIGH (ref 150–400)
POTASSIUM SERPL-MCNC: 4.6 MMOL/L — SIGNIFICANT CHANGE UP (ref 3.5–5.3)
POTASSIUM SERPL-SCNC: 4.6 MMOL/L — SIGNIFICANT CHANGE UP (ref 3.5–5.3)
RBC # BLD: 2.65 M/UL — LOW (ref 4.2–5.8)
RBC # FLD: 13.3 % — SIGNIFICANT CHANGE UP (ref 10.3–14.5)
SODIUM SERPL-SCNC: 131 MMOL/L — LOW (ref 135–145)
UUN UR-MCNC: 743 MG/DL — SIGNIFICANT CHANGE UP
WBC # BLD: 12.04 K/UL — HIGH (ref 3.8–10.5)
WBC # FLD AUTO: 12.04 K/UL — HIGH (ref 3.8–10.5)

## 2023-03-12 PROCEDURE — 99232 SBSQ HOSP IP/OBS MODERATE 35: CPT

## 2023-03-12 RX ORDER — INSULIN GLARGINE 100 [IU]/ML
20 INJECTION, SOLUTION SUBCUTANEOUS ONCE
Refills: 0 | Status: COMPLETED | OUTPATIENT
Start: 2023-03-13 | End: 2023-03-13

## 2023-03-12 RX ORDER — INSULIN LISPRO 100/ML
10 VIAL (ML) SUBCUTANEOUS
Refills: 0 | Status: DISCONTINUED | OUTPATIENT
Start: 2023-03-13 | End: 2023-03-15

## 2023-03-12 RX ORDER — INSULIN GLARGINE 100 [IU]/ML
20 INJECTION, SOLUTION SUBCUTANEOUS EVERY MORNING
Refills: 0 | Status: DISCONTINUED | OUTPATIENT
Start: 2023-03-12 | End: 2023-03-12

## 2023-03-12 RX ADMIN — Medication 10 MILLIGRAM(S): at 13:11

## 2023-03-12 RX ADMIN — TAMSULOSIN HYDROCHLORIDE 0.4 MILLIGRAM(S): 0.4 CAPSULE ORAL at 22:09

## 2023-03-12 RX ADMIN — Medication 9 UNIT(S): at 09:03

## 2023-03-12 RX ADMIN — INSULIN GLARGINE 25 UNIT(S): 100 INJECTION, SOLUTION SUBCUTANEOUS at 09:02

## 2023-03-12 RX ADMIN — ISOSORBIDE MONONITRATE 30 MILLIGRAM(S): 60 TABLET, EXTENDED RELEASE ORAL at 05:15

## 2023-03-12 RX ADMIN — Medication 7 UNIT(S): at 13:10

## 2023-03-12 RX ADMIN — Medication 1 PACKET(S): at 18:21

## 2023-03-12 RX ADMIN — HEPARIN SODIUM 5000 UNIT(S): 5000 INJECTION INTRAVENOUS; SUBCUTANEOUS at 13:11

## 2023-03-12 RX ADMIN — Medication 50 MILLIGRAM(S): at 18:22

## 2023-03-12 RX ADMIN — POLYETHYLENE GLYCOL 3350 17 GRAM(S): 17 POWDER, FOR SOLUTION ORAL at 05:16

## 2023-03-12 RX ADMIN — CALCITRIOL 0.25 MICROGRAM(S): 0.5 CAPSULE ORAL at 13:10

## 2023-03-12 RX ADMIN — Medication 1 PACKET(S): at 05:14

## 2023-03-12 RX ADMIN — Medication 2: at 18:20

## 2023-03-12 RX ADMIN — SENNA PLUS 2 TABLET(S): 8.6 TABLET ORAL at 22:10

## 2023-03-12 RX ADMIN — Medication 6 MILLIGRAM(S): at 22:10

## 2023-03-12 RX ADMIN — SACUBITRIL AND VALSARTAN 1 TABLET(S): 24; 26 TABLET, FILM COATED ORAL at 05:15

## 2023-03-12 RX ADMIN — Medication 40 MILLIGRAM(S): at 05:17

## 2023-03-12 RX ADMIN — HEPARIN SODIUM 5000 UNIT(S): 5000 INJECTION INTRAVENOUS; SUBCUTANEOUS at 05:17

## 2023-03-12 RX ADMIN — PANTOPRAZOLE SODIUM 40 MILLIGRAM(S): 20 TABLET, DELAYED RELEASE ORAL at 05:18

## 2023-03-12 RX ADMIN — HEPARIN SODIUM 5000 UNIT(S): 5000 INJECTION INTRAVENOUS; SUBCUTANEOUS at 22:09

## 2023-03-12 RX ADMIN — Medication 50 MILLIGRAM(S): at 05:22

## 2023-03-12 RX ADMIN — ATORVASTATIN CALCIUM 40 MILLIGRAM(S): 80 TABLET, FILM COATED ORAL at 22:09

## 2023-03-12 RX ADMIN — Medication 650 MILLIGRAM(S): at 23:54

## 2023-03-12 RX ADMIN — Medication 667 MILLIGRAM(S): at 18:21

## 2023-03-12 RX ADMIN — Medication 2: at 13:10

## 2023-03-12 RX ADMIN — Medication 1: at 09:03

## 2023-03-12 RX ADMIN — Medication 7 UNIT(S): at 18:20

## 2023-03-12 RX ADMIN — POLYETHYLENE GLYCOL 3350 17 GRAM(S): 17 POWDER, FOR SOLUTION ORAL at 18:21

## 2023-03-12 NOTE — PROGRESS NOTE ADULT - SUBJECTIVE AND OBJECTIVE BOX
Chief Complaint: DM 2 with hyperglycemia     History: Patient seen at bedside, declined  services. Reports he is tolerating meals. Glucose overall improved. Patient will be NPO after MN tonight     MEDICATIONS  (STANDING):  atorvastatin 40 milliGRAM(s) Oral at bedtime  bisacodyl Suppository 10 milliGRAM(s) Rectal daily  calcitriol   Capsule 0.25 MICROGram(s) Oral daily  calcium acetate 667 milliGRAM(s) Oral with dinner  heparin   Injectable 5000 Unit(s) SubCutaneous every 8 hours  insulin glargine Injectable (LANTUS) 25 Unit(s) SubCutaneous every morning  insulin lispro (ADMELOG) corrective regimen sliding scale   SubCutaneous three times a day before meals  insulin lispro (ADMELOG) corrective regimen sliding scale   SubCutaneous at bedtime  insulin lispro Injectable (ADMELOG) 9 Unit(s) SubCutaneous before breakfast  insulin lispro Injectable (ADMELOG) 7 Unit(s) SubCutaneous before lunch  insulin lispro Injectable (ADMELOG) 7 Unit(s) SubCutaneous before dinner  insulin lispro Injectable (ADMELOG) 2 Unit(s) SubCutaneous at bedtime  isosorbide    mononitrate Tablet (ISMO) 30 milliGRAM(s) Oral two times a day  melatonin 6 milliGRAM(s) Oral at bedtime  pantoprazole    Tablet 40 milliGRAM(s) Oral before breakfast  polyethylene glycol 3350 17 Gram(s) Oral two times a day  pregabalin 50 milliGRAM(s) Oral two times a day  psyllium Powder 1 Packet(s) Oral two times a day  senna 2 Tablet(s) Oral at bedtime  tamsulosin 0.4 milliGRAM(s) Oral at bedtime        No Known Allergies    Review of Systems:  Cardiovascular: No chest pain  Respiratory: No SOB  GI: No nausea, vomiting  Endocrine: no hypoglycemia     PHYSICAL EXAM:  Vital Signs Last 24 Hrs  T(C): 36.4 (12 Mar 2023 08:40), Max: 37.1 (11 Mar 2023 21:00)  T(F): 97.6 (12 Mar 2023 08:40), Max: 98.8 (11 Mar 2023 21:00)  HR: 66 (12 Mar 2023 08:40) (64 - 88)  BP: 117/50 (12 Mar 2023 08:40) (98/50 - 143/56)  BP(mean): --  RR: 20 (12 Mar 2023 08:40) (17 - 20)  SpO2: 100% (12 Mar 2023 08:40) (97% - 100%)    Parameters below as of 12 Mar 2023 08:40  Patient On (Oxygen Delivery Method): room air    GENERAL: NAD  EYES: No proptosis, no lid lag, anicteric  HEENT:  Atraumatic, Normocephalic, moist mucous membranes  RESPIRATORY: unlabored respirations     CAPILLARY BLOOD GLUCOSE      POCT Blood Glucose.: 214 mg/dL (12 Mar 2023 12:27)  POCT Blood Glucose.: 163 mg/dL (12 Mar 2023 08:09)  POCT Blood Glucose.: 173 mg/dL (11 Mar 2023 22:11)  POCT Blood Glucose.: 169 mg/dL (11 Mar 2023 18:20)        03-12    131<L>  |  100  |  40<H>  ----------------------------<  124<H>  4.6   |  19<L>  |  1.54<H>    Ca    8.6      12 Mar 2023 06:31  Phos  2.8     03-12  Mg     2.10     03-12        A1C with Estimated Average Glucose Result: 8.3 % (02-22-23 @ 05:13)    Diet, NPO after Midnight:      NPO Start Date: 12-Mar-2023,   NPO Start Time: 23:59  Except Medications (03-12-23 @ 08:56) [Active]  Diet, Regular:   Consistent Carbohydrate Evening Snack (CSTCHOSN) (03-07-23 @ 10:19) [Active]

## 2023-03-12 NOTE — PROGRESS NOTE ADULT - ASSESSMENT
69 yo M with DMII, CAD s/p CABG, CKD, chronic back pain, and HTN presents to the ED with worsening back pain. Patient is AAOX2 and somnolent, not able to provide any hx. Most of the information was supplemented by the son. As per son, pt is AAOx 3 and "mentally stable." He recently came from [facility] about 2 weeks ago and has " multiple medical complaints." But this morning he told his son that he is feeling weak and his back pain is getting worse. The pain is localized in the lower back region. Pain is worse when standing. Denies radiculopathy. Intermittent urinary and bowel incontinence for 1 year. Was admitted to a hospital in [facility] in Aug 2022 for pain, MRI was completed that showed a L4-5 spondylolisthesis. Patient was offered surgery but told he has too may medical issues and discharged him.   Patient here here visiting his son when back pain exacerbated and came to ER for evaluation with his MRI on paper films     MRI reviewed, L4-5 disk dessication and cauda equina compression , Right grade II and Left Grade I spondylolisthesis     3/1; s/p L4-5 TLIF pod #1, HMV drain, requiring bola post op for bp support, albumin given x 1, sicu consulted, neuro exam stable  3/2: POD # 2 S/P L4-5 TLIF, 1 HMV in place. Off phenylepherine. CT lumbar spine, screws ok  3/3: POD # 3 S/P L4-5 TLIF. Having episodes of chest pain with elevated troponins, cardiology evaluating. Will transfer to telemetry floor, serial CE and EKG. 1 HMV in place  3/4: POD # 4 S/P L4-5 TLIF. No more chest pain. HMV in place. Seen by endocrine for DM management  3/5: POD # 5 S/P L4-5 TLIF. No more chest pain. HMV in place. Increased back pain with radiation to legs, MRI ordered obtained by evening with anesthesia, possible ventral epidural collection. Imaging reviewed with Dr. Avalos  3/6: pod #6 s/p L4-5 TLIF, HMV in place, mri L/s w/ sed done no major compressive hematoma, enlarged neurogenic bladder, crowder placed  3/7: PAtient noted to have new Left food drop, s/p emergent RTOR for Lef. foot drop  cement augmentation for L4 pedicle fx 2/2 to screw pod #1, post op exam stable , HMV, 2JPs's vel, CT L/s done- screws ok, needs brace  3/9-11: Stable exam POD # 4 S/P cement augmentation for L4 pedicle fx 2/2 to screw. HMV X 1, JOVITA X 1. MRI L spine ordered. Episode of vomiting on 3/10, AXR ordered  3/12: Superficial HMV removed, Exam stable, MR pending, pain improved

## 2023-03-12 NOTE — PROGRESS NOTE ADULT - SUBJECTIVE AND OBJECTIVE BOX
HPI:  67 yo M with DMII, CAD s/p CABG, CKD, chronic back pain, and HTN presents to the ED with worsening back pain. Patient is AAOX2 and somnolent, not able to provide any hx. Most of the information was supplemented by the son. As per son, pt is AAOx 3 and "mentally stable." He recently came from [facility] about 2 weeks ago and has " multiple medical complaints." But this morning he told his son that he is feeling weak and his back pain is getting worse. The pain is localized in the lower back region. It is burning is sensation and constant. Unclear if it radiates down the leg. It worsens with movement and walking. He can only walk "10 meters" with a cane. He is also incontinent of urine but denies any recent fever, chills, bowel incontinence, or lower extremities weakness/motor deficits. He reportedly had MRIs in the past but son does not know what it showed. He also has other chronic medical problems such as CKD, DMII and CAD and his son thinks pt needs management for them as well.   In the ED, his vitals were notable for hyperglycemia, elevated BUN/Scr, and hypomagnesemia. EKG showed bradycardia with T wave changes in the lateral leads.  (21 Feb 2023 18:36)    OVERNIGHT EVENTS:  Vital Signs Last 24 Hrs  T(C): 37.1 (12 Mar 2023 00:00), Max: 37.1 (11 Mar 2023 21:00)  T(F): 98.8 (12 Mar 2023 00:00), Max: 98.8 (11 Mar 2023 21:00)  HR: 74 (12 Mar 2023 00:00) (74 - 92)  BP: 105/47 (12 Mar 2023 00:00) (98/50 - 143/56)  BP(mean): --  RR: 17 (12 Mar 2023 00:00) (16 - 19)  SpO2: 97% (12 Mar 2023 00:00) (96% - 100%)    Parameters below as of 12 Mar 2023 00:00  Patient On (Oxygen Delivery Method): room air        I&O's Detail    10 Mar 2023 07:01  -  11 Mar 2023 07:00  --------------------------------------------------------  IN:    Oral Fluid: 1190 mL  Total IN: 1190 mL    OUT:    Accordian (mL): 7.5 mL    Bulb (mL): 5 mL    Bulb (mL): 65 mL    Indwelling Catheter - Urethral (mL): 2000 mL    IV PiggyBack: 0 mL  Total OUT: 2077.5 mL    Total NET: -887.5 mL      11 Mar 2023 06:01  -  12 Mar 2023 00:23  --------------------------------------------------------  IN:    Oral Fluid: 1180 mL  Total IN: 1180 mL    OUT:    Accordian (mL): 3 mL    Bulb (mL): 15 mL    Indwelling Catheter - Urethral (mL): 1000 mL    IV PiggyBack: 0 mL    Voided (mL): 0 mL  Total OUT: 1018 mL    Total NET: 162 mL        I&O's Summary    10 Mar 2023 07:01  -  11 Mar 2023 07:00  --------------------------------------------------------  IN: 1190 mL / OUT: 2077.5 mL / NET: -887.5 mL    11 Mar 2023 06:01  -  12 Mar 2023 00:23  --------------------------------------------------------  IN: 1180 mL / OUT: 1018 mL / NET: 162 mL        PHYSICAL EXAM:  Neurological:    Motor exam:         [] Upper extremity              Delt        Bicep      Tricep     HG                                               R         5/5        5/5         5/5          5/5                                               L          5/5        5/5         5/5          5/5         [] Lower extremity             HF         KF          KE         PF          DF                                               R        5/5        5/5        5/5       0/5        0/5                                               L         5/5        5/5       5/5        1/5        0/5                                                    LABS:                        8.9    14.82 )-----------( 508      ( 11 Mar 2023 06:12 )             27.8     03-11    129<L>  |  99  |  34<H>  ----------------------------<  167<H>  4.4   |  19<L>  |  1.23    Ca    8.6      11 Mar 2023 06:12  Phos  2.4     03-11  Mg     2.00     03-11              CAPILLARY BLOOD GLUCOSE      POCT Blood Glucose.: 173 mg/dL (11 Mar 2023 22:11)  POCT Blood Glucose.: 169 mg/dL (11 Mar 2023 18:20)  POCT Blood Glucose.: 239 mg/dL (11 Mar 2023 12:16)  POCT Blood Glucose.: 194 mg/dL (11 Mar 2023 08:13)      Drug Levels: [] N/A    CSF Analysis: [] N/A      Allergies    No Known Allergies    Intolerances      MEDICATIONS:  Antibiotics:    Neuro:  acetaminophen     Tablet .. 650 milliGRAM(s) Oral every 6 hours PRN  hydrOXYzine hydrochloride 25 milliGRAM(s) Oral at bedtime PRN  melatonin 6 milliGRAM(s) Oral at bedtime  ondansetron Injectable 4 milliGRAM(s) IV Push every 6 hours PRN  oxyCODONE    IR 5 milliGRAM(s) Oral every 4 hours PRN  oxyCODONE    IR 10 milliGRAM(s) Oral every 4 hours PRN  pregabalin 50 milliGRAM(s) Oral two times a day    Anticoagulation:  heparin   Injectable 5000 Unit(s) SubCutaneous every 8 hours    OTHER:  atorvastatin 40 milliGRAM(s) Oral at bedtime  bisacodyl Suppository 10 milliGRAM(s) Rectal daily  calcium carbonate    500 mG (Tums) Chewable 1 Tablet(s) Chew every 4 hours PRN  furosemide    Tablet 40 milliGRAM(s) Oral daily  insulin glargine Injectable (LANTUS) 25 Unit(s) SubCutaneous every morning  insulin lispro (ADMELOG) corrective regimen sliding scale   SubCutaneous three times a day before meals  insulin lispro (ADMELOG) corrective regimen sliding scale   SubCutaneous at bedtime  insulin lispro Injectable (ADMELOG) 2 Unit(s) SubCutaneous at bedtime  insulin lispro Injectable (ADMELOG) 9 Unit(s) SubCutaneous before breakfast  insulin lispro Injectable (ADMELOG) 7 Unit(s) SubCutaneous before lunch  insulin lispro Injectable (ADMELOG) 7 Unit(s) SubCutaneous before dinner  isosorbide    mononitrate Tablet (ISMO) 30 milliGRAM(s) Oral two times a day  nitroglycerin     SubLingual 0.4 milliGRAM(s) SubLingual every 5 minutes PRN  pantoprazole    Tablet 40 milliGRAM(s) Oral before breakfast  polyethylene glycol 3350 17 Gram(s) Oral two times a day  psyllium Powder 1 Packet(s) Oral two times a day  sacubitril 24 mG/valsartan 26 mG 1 Tablet(s) Oral two times a day  saline laxative (FLEET) Rectal Enema 1 Enema Rectal daily PRN  senna 2 Tablet(s) Oral at bedtime  tamsulosin 0.4 milliGRAM(s) Oral at bedtime    IVF:  calcitriol   Capsule 0.25 MICROGram(s) Oral daily  calcium acetate 667 milliGRAM(s) Oral with dinner    CULTURES:    RADIOLOGY & ADDITIONAL TESTS:

## 2023-03-12 NOTE — PROGRESS NOTE ADULT - ASSESSMENT
67 yo M with DMII, CAD s/p CABG, CKD, chronic back pain, and HTN presents to the ED with worsening back pain.  He recently came from [facility] about 2 weeks ago and has " multiple medical complaints."  he told his son that he is feeling weak and his back pain is getting worse.   In the ED, his vitals were notable for hyperglycemia, elevated BUN/Scr, and hypomagnesemia. Endocrine called for DM 2, A1c 8.3    1. DM 2  A1c 8.3%  Home Regimen: Ligenta-M 500 (Linagliptin 2.5 and metformin 500mg) 1 tablet once daily, NovoRapid (insulin aspart) 14 units w/ lunch, NovoMix 30 insulin 14 units before breakfast, 14-16 units before dinner    While inpatient:  BG target 100-180 mg/dl  Patient was on Dexamethasone, last dose 3/6  Continue Lantus 25 units every morning (0900) however, patient ordered to be NPO after MN tonight and gets Lantus in AM   Would suggest Lantus 20 units tomorrow morning instead of 25 units for NPO status   Increase Admelog to 10 units SQ TID before breakfast (Hold if NPO/skips meal) - next dose due tomorrow AM but patient will be NPO, will likely receive this newly increased dose on Tuesday morning   Continue Ademlog 7 units SQ before lunch and dinner (Hold if NPO/skips meal)   Can continue bedtime snack Admelog 2 units, HOLD if no bedtime snack   Continue Admelog low dose correctional scale before meals, continue Admelog LOW dose scale at bedtime  Chek FS before meals and at bedtime  Hypoglycemia protocol    Carb Consistent Diet   Nutrition consult completed   Provider to RN for diabetes/insulin pen teaching, AB pharmacist following (see pharmacy intervention notes)     Discharge Plan:  Medicaid pending - currently uninsured   Likely stop prior outpatient regimen and continue with mixed insulin (70/30) only  Consider Novolog 70/30: doses TBD close to discharge: before breakfast and before dinner via pen (see pharmacy liaison note: patient can qualify for coupon), so could use insulin PENS if that is easier for patient  Rapid acting insulin should NOT be ordered in conjunction with mixed insulin for risk of hypoglycemia   Ensure he has glucometer, glucose test strips, lancets, alcohol swabs and insulin PEN needles vs syringes   Followup with [facility], [address] [place] [address], [phone], [phone]    2. HTN  Goal BP in DM <130/80  Outpatient mc/cr ratio  Titration as per primary team     3. HLD  LDL 75 above goal; LDL <70   Continue Atorvastatin 40mg daily    HAI King-BC  Nurse Practitioner   Division of Endocrinology  Pager: SILVIO pager [phone]    If out of hospital/unavailable when paged, please note: patient will be cared for by another provider on the endocrine service.  Please call the endocrine answering service for assistance to reach covering provider ([phone]). For non-urgent matters, please email [email or web address] for assistance.      67 yo M with DMII, CAD s/p CABG, CKD, chronic back pain, and HTN presents to the ED with worsening back pain.  He recently came from [facility] about 2 weeks ago and has " multiple medical complaints."  he told his son that he is feeling weak and his back pain is getting worse.   In the ED, his vitals were notable for hyperglycemia, elevated BUN/Scr, and hypomagnesemia. Endocrine called for DM 2, A1c 8.3    1. DM 2  A1c 8.3%  Home Regimen: Ligenta-M 500 (Linagliptin 2.5 and metformin 500mg) 1 tablet once daily, NovoRapid (insulin aspart) 14 units w/ lunch, NovoMix 30 insulin 14 units before breakfast, 14-16 units before dinner    While inpatient:  BG target 100-180 mg/dl  Patient was on Dexamethasone, last dose 3/6  Continue Lantus 25 units every morning (0900) however, patient ordered to be NPO after MN tonight and gets Lantus in AM   Would suggest Lantus 20 units tomorrow morning instead of 25 units for NPO status   Increase Admelog to 10 units SQ TID before breakfast (Hold if NPO/skips meal) - next dose due tomorrow AM but patient will be NPO, will likely receive this newly increased dose on Tuesday morning   Continue Ademlog 7 units SQ before lunch and dinner (Hold if NPO/skips meal)   Will discontinue bedtime snack Admelog 2 units, patient has not received this dose recently   Continue Admelog low dose correctional scale before meals, continue Admelog LOW dose scale at bedtime  Chek FS before meals and at bedtime  Hypoglycemia protocol    Carb Consistent Diet   Nutrition consult completed   Provider to RN for diabetes/insulin pen teaching, AB pharmacist following (see pharmacy intervention notes)     Discharge Plan:  Medicaid pending - currently uninsured   Likely stop prior outpatient regimen and continue with mixed insulin (70/30) only  Consider Novolog 70/30: doses TBD close to discharge: before breakfast and before dinner via pen (see pharmacy liaison note: patient can qualify for coupon), so could use insulin PENS if that is easier for patient  Rapid acting insulin should NOT be ordered in conjunction with mixed insulin for risk of hypoglycemia   Ensure he has glucometer, glucose test strips, lancets, alcohol swabs and insulin PEN needles vs syringes   Followup with [facility], [address] [place] [address], , [phone]    2. HTN  Goal BP in DM <130/80  Outpatient mc/cr ratio  Titration as per primary team     3. HLD  LDL 75 above goal; LDL <70   Continue Atorvastatin 40mg daily    HAI King-BC  Nurse Practitioner   Division of Endocrinology  Pager: SILVIO pager [phone]    If out of hospital/unavailable when paged, please note: patient will be cared for by another provider on the endocrine service.  Please call the endocrine answering service for assistance to reach covering provider ([phone]). For non-urgent matters, please email [email or web address] for assistance.      69 yo M with DMII, CAD s/p CABG, CKD, chronic back pain, and HTN presents to the ED with worsening back pain.  He recently came from [facility] about 2 weeks ago and has " multiple medical complaints."  he told his son that he is feeling weak and his back pain is getting worse.   In the ED, his vitals were notable for hyperglycemia, elevated BUN/Scr, and hypomagnesemia. Endocrine called for DM 2, A1c 8.3    1. DM 2  A1c 8.3%  Home Regimen: Ligenta-M 500 (Linagliptin 2.5 and metformin 500mg) 1 tablet once daily, NovoRapid (insulin aspart) 14 units w/ lunch, NovoMix 30 insulin 14 units before breakfast, 14-16 units before dinner    While inpatient:  BG target 100-180 mg/dl  Patient was on Dexamethasone, last dose 3/6  Continue Lantus 25 units every morning (0900) however, patient ordered to be NPO after MN tonight and gets Lantus in AM   Would suggest Lantus 20 units tomorrow morning instead of 25 units for NPO status   Increase Admelog to 10 units SQ TID before breakfast (Hold if NPO/skips meal) - next dose due tomorrow AM but patient will be NPO, will likely receive this newly increased dose on Tuesday morning   Continue Ademlog 7 units SQ before lunch and dinner (Hold if NPO/skips meal)   Will discontinue bedtime snack Admelog 2 units, patient has not received this dose recently   Continue Admelog low dose correctional scale before meals, continue Admelog LOW dose scale at bedtime  Chek FS before meals and at bedtime  Hypoglycemia protocol    Carb Consistent Diet   Nutrition consult completed   Provider to RN for diabetes/insulin pen teaching, AB pharmacist following (see pharmacy intervention notes)     Discharge Plan:  Medicaid pending - currently uninsured   Likely stop prior outpatient regimen and continue with mixed insulin (70/30) only  Consider Novolog 70/30: doses TBD close to discharge: before breakfast and before dinner via pen (see pharmacy liaison note: patient can qualify for coupon), so could use insulin PENS if that is easier for patient  Rapid acting insulin should NOT be ordered in conjunction with mixed insulin for risk of hypoglycemia   Ensure he has glucometer, glucose test strips, lancets, alcohol swabs and insulin PEN needles vs syringes   Followup with [facility], [address] [place] [address], [address] [place] [address]     2. HTN  Goal BP in DM <130/80  Outpatient mc/cr ratio  Titration as per primary team     3. HLD  LDL 75 above goal; LDL <70   Continue Atorvastatin 40mg daily    Discussed above with primary team, SUKHDEEP Moraes ANP-BC  Nurse Practitioner   Division of Endocrinology  Pager: SILVIO pager [phone]    If out of hospital/unavailable when paged, please note: patient will be cared for by another provider on the endocrine service.  Please call the endocrine answering service for assistance to reach covering provider ([phone]). For non-urgent matters, please email [email or web address] for assistance.

## 2023-03-12 NOTE — CHART NOTE - NSCHARTNOTEFT_GEN_A_CORE
Patient with elevated creatine. Will hold lasix and entresto and continue to monitor. Ordered urine studies. medicine to follow along

## 2023-03-13 LAB
ANION GAP SERPL CALC-SCNC: 10 MMOL/L — SIGNIFICANT CHANGE UP (ref 7–14)
BUN SERPL-MCNC: 31 MG/DL — HIGH (ref 7–23)
CALCIUM SERPL-MCNC: 8.9 MG/DL — SIGNIFICANT CHANGE UP (ref 8.4–10.5)
CHLORIDE SERPL-SCNC: 99 MMOL/L — SIGNIFICANT CHANGE UP (ref 98–107)
CO2 SERPL-SCNC: 19 MMOL/L — LOW (ref 22–31)
CREAT SERPL-MCNC: 1.42 MG/DL — HIGH (ref 0.5–1.3)
EGFR: 53 ML/MIN/1.73M2 — LOW
GLUCOSE BLDC GLUCOMTR-MCNC: 148 MG/DL — HIGH (ref 70–99)
GLUCOSE BLDC GLUCOMTR-MCNC: 155 MG/DL — HIGH (ref 70–99)
GLUCOSE BLDC GLUCOMTR-MCNC: 165 MG/DL — HIGH (ref 70–99)
GLUCOSE BLDC GLUCOMTR-MCNC: 166 MG/DL — HIGH (ref 70–99)
GLUCOSE BLDC GLUCOMTR-MCNC: 220 MG/DL — HIGH (ref 70–99)
GLUCOSE SERPL-MCNC: 143 MG/DL — HIGH (ref 70–99)
HCT VFR BLD CALC: 28.4 % — LOW (ref 39–50)
HGB BLD-MCNC: 9.3 G/DL — LOW (ref 13–17)
MAGNESIUM SERPL-MCNC: 2.1 MG/DL — SIGNIFICANT CHANGE UP (ref 1.6–2.6)
MCHC RBC-ENTMCNC: 30.2 PG — SIGNIFICANT CHANGE UP (ref 27–34)
MCHC RBC-ENTMCNC: 32.7 GM/DL — SIGNIFICANT CHANGE UP (ref 32–36)
MCV RBC AUTO: 92.2 FL — SIGNIFICANT CHANGE UP (ref 80–100)
NRBC # BLD: 0 /100 WBCS — SIGNIFICANT CHANGE UP (ref 0–0)
NRBC # FLD: 0 K/UL — SIGNIFICANT CHANGE UP (ref 0–0)
PHOSPHATE SERPL-MCNC: 2.8 MG/DL — SIGNIFICANT CHANGE UP (ref 2.5–4.5)
PLATELET # BLD AUTO: 484 K/UL — HIGH (ref 150–400)
POTASSIUM SERPL-MCNC: 4.8 MMOL/L — SIGNIFICANT CHANGE UP (ref 3.5–5.3)
POTASSIUM SERPL-SCNC: 4.8 MMOL/L — SIGNIFICANT CHANGE UP (ref 3.5–5.3)
RBC # BLD: 3.08 M/UL — LOW (ref 4.2–5.8)
RBC # FLD: 13.3 % — SIGNIFICANT CHANGE UP (ref 10.3–14.5)
SODIUM SERPL-SCNC: 128 MMOL/L — LOW (ref 135–145)
WBC # BLD: 10.81 K/UL — HIGH (ref 3.8–10.5)
WBC # FLD AUTO: 10.81 K/UL — HIGH (ref 3.8–10.5)

## 2023-03-13 PROCEDURE — 99232 SBSQ HOSP IP/OBS MODERATE 35: CPT

## 2023-03-13 PROCEDURE — 72148 MRI LUMBAR SPINE W/O DYE: CPT | Mod: 26

## 2023-03-13 RX ORDER — INSULIN LISPRO 100/ML
VIAL (ML) SUBCUTANEOUS
Refills: 0 | Status: DISCONTINUED | OUTPATIENT
Start: 2023-03-13 | End: 2023-03-15

## 2023-03-13 RX ORDER — INSULIN LISPRO 100/ML
VIAL (ML) SUBCUTANEOUS EVERY 6 HOURS
Refills: 0 | Status: DISCONTINUED | OUTPATIENT
Start: 2023-03-13 | End: 2023-03-13

## 2023-03-13 RX ORDER — LACTULOSE 10 G/15ML
10 SOLUTION ORAL EVERY 6 HOURS
Refills: 0 | Status: COMPLETED | OUTPATIENT
Start: 2023-03-13 | End: 2023-03-14

## 2023-03-13 RX ORDER — INSULIN GLARGINE 100 [IU]/ML
25 INJECTION, SOLUTION SUBCUTANEOUS
Refills: 0 | Status: DISCONTINUED | OUTPATIENT
Start: 2023-03-14 | End: 2023-03-15

## 2023-03-13 RX ADMIN — Medication 1: at 14:51

## 2023-03-13 RX ADMIN — SENNA PLUS 2 TABLET(S): 8.6 TABLET ORAL at 20:59

## 2023-03-13 RX ADMIN — Medication 7 UNIT(S): at 14:50

## 2023-03-13 RX ADMIN — Medication 7 UNIT(S): at 20:57

## 2023-03-13 RX ADMIN — Medication 650 MILLIGRAM(S): at 00:24

## 2023-03-13 RX ADMIN — Medication 1: at 06:27

## 2023-03-13 RX ADMIN — PANTOPRAZOLE SODIUM 40 MILLIGRAM(S): 20 TABLET, DELAYED RELEASE ORAL at 05:30

## 2023-03-13 RX ADMIN — HEPARIN SODIUM 5000 UNIT(S): 5000 INJECTION INTRAVENOUS; SUBCUTANEOUS at 14:46

## 2023-03-13 RX ADMIN — INSULIN GLARGINE 20 UNIT(S): 100 INJECTION, SOLUTION SUBCUTANEOUS at 11:46

## 2023-03-13 RX ADMIN — Medication 50 MILLIGRAM(S): at 18:32

## 2023-03-13 RX ADMIN — HEPARIN SODIUM 5000 UNIT(S): 5000 INJECTION INTRAVENOUS; SUBCUTANEOUS at 05:30

## 2023-03-13 RX ADMIN — ISOSORBIDE MONONITRATE 30 MILLIGRAM(S): 60 TABLET, EXTENDED RELEASE ORAL at 18:32

## 2023-03-13 RX ADMIN — Medication 6 MILLIGRAM(S): at 22:02

## 2023-03-13 RX ADMIN — Medication 30 MILLILITER(S): at 14:49

## 2023-03-13 RX ADMIN — TAMSULOSIN HYDROCHLORIDE 0.4 MILLIGRAM(S): 0.4 CAPSULE ORAL at 21:00

## 2023-03-13 RX ADMIN — CALCITRIOL 0.25 MICROGRAM(S): 0.5 CAPSULE ORAL at 13:53

## 2023-03-13 RX ADMIN — Medication 667 MILLIGRAM(S): at 21:00

## 2023-03-13 RX ADMIN — Medication 50 MILLIGRAM(S): at 05:30

## 2023-03-13 RX ADMIN — HEPARIN SODIUM 5000 UNIT(S): 5000 INJECTION INTRAVENOUS; SUBCUTANEOUS at 20:59

## 2023-03-13 RX ADMIN — ATORVASTATIN CALCIUM 40 MILLIGRAM(S): 80 TABLET, FILM COATED ORAL at 21:00

## 2023-03-13 RX ADMIN — Medication 2: at 20:10

## 2023-03-13 RX ADMIN — Medication 10 MILLIGRAM(S): at 13:53

## 2023-03-13 NOTE — PROGRESS NOTE ADULT - PROBLEM SELECTOR PLAN 9
All brand name Chinese.  We do not have all of them.  Meds are: Ligenta-M 500 (Linagliptin 2.5 and metoformin 500mg), [place] card MR (trimetazidine hydrochloride 35 mg),  Ecosprin 75 (aspirin 75 mg), Duralax, Ketoalfa (amio acid Keto Analogues 600 mg), Nidocard-Retard (nitroglycerin 2.6 mg), Hypophos (calcium acetate 667 mg), Tamisol D (Tamsulosin 0.4 mg and Dutasteride 0.5 mg), Mydocalm (tolperisone 50 mg), Raditrol (calcitriol 0.25 microgram),  NovoRapid 16 units w/ lunch, Manuel nordisk insulin 16am/14pm, Atova 20 (atorvastatin 20 mg), Nebita 2.5 (nevivolol 2.5 mg), Tamisol (tamsulosin 0.4 mg), Pregaba 50 (pregabalin 50 mg), Febustat 40 (febuxostat 40 mg), Lozixum (lorazapam 1 mg), Lasix 40 mg, Sabitar 50 mg, Anazol 0.1%, Tearfresh

## 2023-03-13 NOTE — PROGRESS NOTE ADULT - PROBLEM SELECTOR PLAN 6
- per chart review ranging from 1.8-2.3  - Currently 1.42. Monitor renal function.   - Given rise, will hold lasix and Entresto   - trend daily  - avoid nephrotoxic agents and renally dose medications

## 2023-03-13 NOTE — PROGRESS NOTE ADULT - PROBLEM SELECTOR PLAN 8
HbA1c of 8.3%, at home on insulin 14 units with lunch and 14-16 BID of mixed insulin + orals  - Fs 106  -c/w lantus to 25u in AM and premeal to 7TID  -change to moderate dose sliding scale  -endo following Wheelchair

## 2023-03-13 NOTE — PROGRESS NOTE ADULT - SUBJECTIVE AND OBJECTIVE BOX
HPI:  69 yo M with DMII, CAD s/p CABG, CKD, chronic back pain, and HTN presents to the ED with worsening back pain. Patient is AAOX2 and somnolent, not able to provide any hx. Most of the information was supplemented by the son. As per son, pt is AAOx 3 and "mentally stable." He recently came from [facility] about 2 weeks ago and has " multiple medical complaints." But this morning he told his son that he is feeling weak and his back pain is getting worse. The pain is localized in the lower back region. It is burning is sensation and constant. Unclear if it radiates down the leg. It worsens with movement and walking. He can only walk "10 meters" with a cane. He is also incontinent of urine but denies any recent fever, chills, bowel incontinence, or lower extremities weakness/motor deficits. He reportedly had MRIs in the past but son does not know what it showed. He also has other chronic medical problems such as CKD, DMII and CAD and his son thinks pt needs management for them as well.   In the ED, his vitals were notable for hyperglycemia, elevated BUN/Scr, and hypomagnesemia. EKG showed bradycardia with T wave changes in the lateral leads.  (21 Feb 2023 18:36)    No issues overnight  NPO for MRI with sedation today  Vital Signs Last 24 Hrs  T(C): 36.4 (13 Mar 2023 00:10), Max: 36.9 (12 Mar 2023 03:35)  T(F): 97.5 (13 Mar 2023 00:10), Max: 98.5 (12 Mar 2023 19:42)  HR: 70 (13 Mar 2023 00:10) (64 - 80)  BP: 129/44 (13 Mar 2023 00:10) (107/39 - 139/45)  BP(mean): --  RR: 18 (13 Mar 2023 00:10) (17 - 20)  SpO2: 99% (13 Mar 2023 00:10) (99% - 100%)    Parameters below as of 13 Mar 2023 00:10  Patient On (Oxygen Delivery Method): room air    AAO X 3  PERRLA, EOMI  Face symmetric  bilateral UE 5/5  LE proximally 5/5 distally Right DF 0/5 PF 1/5  Left PF 4/5 DF 0/5    Right JOVITA: 5cc    MEDICATIONS  (STANDING):  atorvastatin 40 milliGRAM(s) Oral at bedtime  bisacodyl Suppository 10 milliGRAM(s) Rectal daily  calcitriol   Capsule 0.25 MICROGram(s) Oral daily  calcium acetate 667 milliGRAM(s) Oral with dinner  heparin   Injectable 5000 Unit(s) SubCutaneous every 8 hours  insulin glargine Injectable (LANTUS) 20 Unit(s) SubCutaneous once  insulin lispro (ADMELOG) corrective regimen sliding scale   SubCutaneous every 6 hours  insulin lispro Injectable (ADMELOG) 7 Unit(s) SubCutaneous before lunch  insulin lispro Injectable (ADMELOG) 7 Unit(s) SubCutaneous before dinner  insulin lispro Injectable (ADMELOG) 10 Unit(s) SubCutaneous before breakfast  isosorbide    mononitrate Tablet (ISMO) 30 milliGRAM(s) Oral two times a day  melatonin 6 milliGRAM(s) Oral at bedtime  pantoprazole    Tablet 40 milliGRAM(s) Oral before breakfast  polyethylene glycol 3350 17 Gram(s) Oral two times a day  pregabalin 50 milliGRAM(s) Oral two times a day  psyllium Powder 1 Packet(s) Oral two times a day  senna 2 Tablet(s) Oral at bedtime  tamsulosin 0.4 milliGRAM(s) Oral at bedtime    MEDICATIONS  (PRN):  acetaminophen     Tablet .. 650 milliGRAM(s) Oral every 6 hours PRN Mild Pain (1 - 3)  calcium carbonate    500 mG (Tums) Chewable 1 Tablet(s) Chew every 4 hours PRN Heartburn  hydrOXYzine hydrochloride 25 milliGRAM(s) Oral at bedtime PRN sleep  nitroglycerin     SubLingual 0.4 milliGRAM(s) SubLingual every 5 minutes PRN Chest Pain  ondansetron Injectable 4 milliGRAM(s) IV Push every 6 hours PRN Nausea and/or Vomiting  oxyCODONE    IR 5 milliGRAM(s) Oral every 4 hours PRN Moderate Pain (4 - 6)  oxyCODONE    IR 10 milliGRAM(s) Oral every 4 hours PRN Severe Pain (7 - 10)  saline laxative (FLEET) Rectal Enema 1 Enema Rectal daily PRN no BM                          7.9    12.04 )-----------( 474      ( 12 Mar 2023 06:31 )             24.7     03-12    131<L>  |  100  |  40<H>  ----------------------------<  124<H>  4.6   |  19<L>  |  1.54<H>    Ca    8.6      12 Mar 2023 06:31  Phos  2.8     03-12  Mg     2.10     03-12    < from: Xray Abdomen 1 View PORTABLE -Routine (Xray Abdomen 1 View PORTABLE -Routine .) (03.11.23 @ 08:48) >  COMPARISON: 3/7/23 plain abdominal films.    CLINICAL INDICATION: S/P lumbar fusion. Vomiting    TECHNIQUE: 2 flat abdominal films.    FINDINGS:  Redemonstration of orthopedic hardware involving the lower lumbar   vertebrae.  Interval decrease in gaseous distention of small and large bowel.  Decrease in colonic fecal burden.  No intra-abdominal free air.  Redemonstration of surgical drains.    IMPRESSION:  Interval improvement/decrease in postoperative ileus.    < end of copied text >

## 2023-03-13 NOTE — PROGRESS NOTE ADULT - SUBJECTIVE AND OBJECTIVE BOX
[facility]  Division of Hospital Medicine  Michelle Cho MD  Pager: [phone]      Patient is a 68y old  Male who presents with a chief complaint of Back pain (13 Mar 2023 10:41)      SUBJECTIVE / OVERNIGHT EVENTS: Patient seen and examined at bedside using Tajik . Reports constipation. And feeling of acid reflux. Discussed holding entresto and lasix. Reports is eating. However, prefers Tajik food.   ADDITIONAL REVIEW OF SYSTEMS:    MEDICATIONS  (STANDING):  atorvastatin 40 milliGRAM(s) Oral at bedtime  bisacodyl Suppository 10 milliGRAM(s) Rectal daily  calcitriol   Capsule 0.25 MICROGram(s) Oral daily  calcium acetate 667 milliGRAM(s) Oral with dinner  heparin   Injectable 5000 Unit(s) SubCutaneous every 8 hours  insulin lispro (ADMELOG) corrective regimen sliding scale   SubCutaneous every 6 hours  insulin lispro Injectable (ADMELOG) 7 Unit(s) SubCutaneous before lunch  insulin lispro Injectable (ADMELOG) 7 Unit(s) SubCutaneous before dinner  insulin lispro Injectable (ADMELOG) 10 Unit(s) SubCutaneous before breakfast  isosorbide    mononitrate Tablet (ISMO) 30 milliGRAM(s) Oral two times a day  lactulose Syrup 10 Gram(s) Oral every 6 hours  melatonin 6 milliGRAM(s) Oral at bedtime  pantoprazole    Tablet 40 milliGRAM(s) Oral before breakfast  polyethylene glycol 3350 17 Gram(s) Oral two times a day  pregabalin 50 milliGRAM(s) Oral two times a day  psyllium Powder 1 Packet(s) Oral two times a day  senna 2 Tablet(s) Oral at bedtime  tamsulosin 0.4 milliGRAM(s) Oral at bedtime    MEDICATIONS  (PRN):  acetaminophen     Tablet .. 650 milliGRAM(s) Oral every 6 hours PRN Mild Pain (1 - 3)  calcium carbonate    500 mG (Tums) Chewable 1 Tablet(s) Chew every 4 hours PRN Heartburn  hydrOXYzine hydrochloride 25 milliGRAM(s) Oral at bedtime PRN sleep  nitroglycerin     SubLingual 0.4 milliGRAM(s) SubLingual every 5 minutes PRN Chest Pain  ondansetron Injectable 4 milliGRAM(s) IV Push every 6 hours PRN Nausea and/or Vomiting  oxyCODONE    IR 5 milliGRAM(s) Oral every 4 hours PRN Moderate Pain (4 - 6)  oxyCODONE    IR 10 milliGRAM(s) Oral every 4 hours PRN Severe Pain (7 - 10)  saline laxative (FLEET) Rectal Enema 1 Enema Rectal daily PRN no BM      CAPILLARY BLOOD GLUCOSE      POCT Blood Glucose.: 148 mg/dL (13 Mar 2023 11:46)  POCT Blood Glucose.: 155 mg/dL (13 Mar 2023 06:23)  POCT Blood Glucose.: 81 mg/dL (12 Mar 2023 21:45)  POCT Blood Glucose.: 203 mg/dL (12 Mar 2023 17:36)    I&O's Summary    12 Mar 2023 07:01  -  13 Mar 2023 07:00  --------------------------------------------------------  IN: 1520 mL / OUT: 2255 mL / NET: -735 mL    13 Mar 2023 07:01  -  13 Mar 2023 13:31  --------------------------------------------------------  IN: 0 mL / OUT: 700 mL / NET: -700 mL        PHYSICAL EXAM:  Vital Signs Last 24 Hrs  T(C): 36.7 (13 Mar 2023 09:00), Max: 36.9 (12 Mar 2023 19:42)  T(F): 98.1 (13 Mar 2023 09:00), Max: 98.5 (12 Mar 2023 19:42)  HR: 66 (13 Mar 2023 09:00) (64 - 70)  BP: 139/55 (13 Mar 2023 09:00) (128/41 - 146/48)  BP(mean): --  RR: 23 (13 Mar 2023 09:00) (18 - 23)  SpO2: 100% (13 Mar 2023 09:00) (99% - 100%)    Parameters below as of 13 Mar 2023 09:00  Patient On (Oxygen Delivery Method): room air      CONSTITUTIONAL: Elderly man in  NAD, well-developed, well-groomed  RESPIRATORY: Normal respiratory effort; lungs are clear to auscultation bilaterally  CARDIOVASCULAR: Regular rate and rhythm, normal S1 and S2, no murmur/rub/gallop; No lower extremity edema; Peripheral pulses are 2+ bilaterally  ABDOMEN: Nontender to palpation, normoactive bowel sounds, no rebound/guarding; No hepatosplenomegaly  PSYCH: A+O to person, place, and time; affect appropriate  SKIN: No rashes; no palpable lesions    LABS:                        9.3    10.81 )-----------( 484      ( 13 Mar 2023 06:30 )             28.4     03-13    128<L>  |  99  |  31<H>  ----------------------------<  143<H>  4.8   |  19<L>  |  1.42<H>    Ca    8.9      13 Mar 2023 06:30  Phos  2.8     03-13  Mg     2.10     03-13                  RADIOLOGY & ADDITIONAL TESTS:  Results Reviewed:   Imaging Personally Reviewed:  Electrocardiogram Personally Reviewed:    COORDINATION OF CARE:  Care Discussed with Consultants/Other Providers [Y/N]:  Prior or Outpatient Records Reviewed [Y/N]:

## 2023-03-13 NOTE — PROGRESS NOTE ADULT - SUBJECTIVE AND OBJECTIVE BOX
Patient is a 68y old  Male who presents with a chief complaint of Back pain (13 Mar 2023 01:25)      HPI:  67 yo M with DMII, CAD s/p CABG, CKD, chronic back pain, and HTN presents to the ED with worsening back pain. Patient is AAOX2 and somnolent, not able to provide any hx. Most of the information was supplemented by the son. As per son, pt is AAOx 3 and "mentally stable." He recently came from [facility] about 2 weeks ago and has " multiple medical complaints." But this morning he told his son that he is feeling weak and his back pain is getting worse. The pain is localized in the lower back region. It is burning is sensation and constant. Unclear if it radiates down the leg. It worsens with movement and walking. He can only walk "10 meters" with a cane. He is also incontinent of urine but denies any recent fever, chills, bowel incontinence, or lower extremities weakness/motor deficits. He reportedly had MRIs in the past but son does not know what it showed. He also has other chronic medical problems such as CKD, DMII and CAD and his son thinks pt needs management for them as well.   In the ED, his vitals were notable for hyperglycemia, elevated BUN/Scr, and hypomagnesemia. EKG showed bradycardia with T wave changes in the lateral leads.  (21 Feb 2023 18:36)      REVIEW OF SYSTEMS    PAST MEDICAL & SURGICAL HISTORY  Diabetes mellitus    Essential hypertension    CAD (coronary artery disease)    Chronic kidney disease, unspecified CKD stage    S/P CABG x 2    CURRENT FUNCTIONAL STATUS  3/12  Bed Mobility  Bed Mobility Training Symptoms Noted During/After Treatment: none  Bed Mobility Training Rolling/Turning: moderate assist (50% patient effort);  1 person assist;  verbal cues  Bed Mobility Training Sit-to-Supine: moderate assist (50% patient effort);  1 person assist;  verbal cues  Bed Mobility Training Limitations: decreased strength;  decreased flexibility;  impaired coordination;  impaired balance;  decreased ability to use legs for bridging/pushing;  impaired ability to control trunk for mobility    Bed-Chair Transfer Training  Bed-to-Chair Transfer Training Rehab Potential: good, to achieve stated therapy goals  Bed-to-Chair Transfer Training Symptoms Noted During/After Treatment: none  Transfer Training Chair-to-Bed Transfer: rolling walker;  weight-bearing as tolerated   1 person assist;  verbal cues;  moderate assist (50% patient effort)  Bed-to-Chair Transfer Training Transfer Safety Analysis: decreased step length;  decreased weight-shifting ability;  decreased balance;  decreased sequencing ability;  stepping too close to front of assistive device;  decreased strength;  decreased flexibility;  impaired balance;  impaired coordination;  impaired postural control;  impaired motor control;  rolling walker    Therapeutic Exercise  Therapeutic Exercise Detail: long arc quads, ankle pumps, hip adductions, marches within precautions performed and encouraged as home exercise program without pain.       FAMILY HISTORY   FH: heart disease      RECENT LABS/IMAGING  CBC Full  -  ( 13 Mar 2023 06:30 )  WBC Count : 10.81 K/uL  RBC Count : 3.08 M/uL  Hemoglobin : 9.3 g/dL  Hematocrit : 28.4 %  Platelet Count - Automated : 484 K/uL  Mean Cell Volume : 92.2 fL  Mean Cell Hemoglobin : 30.2 pg  Mean Cell Hemoglobin Concentration : 32.7 gm/dL  Auto Neutrophil # : x  Auto Lymphocyte # : x  Auto Monocyte # : x  Auto Eosinophil # : x  Auto Basophil # : x  Auto Neutrophil % : x  Auto Lymphocyte % : x  Auto Monocyte % : x  Auto Eosinophil % : x  Auto Basophil % : x    03-13    128<L>  |  99  |  31<H>  ----------------------------<  143<H>  4.8   |  19<L>  |  1.42<H>    Ca    8.9      13 Mar 2023 06:30  Phos  2.8     03-13  Mg     2.10     03-13          VITALS  T(C): 36.7 (03-13-23 @ 09:00), Max: 36.9 (03-12-23 @ 19:42)  HR: 66 (03-13-23 @ 09:00) (64 - 77)  BP: 139/55 (03-13-23 @ 09:00) (128/41 - 146/48)  RR: 23 (03-13-23 @ 09:00) (18 - 23)  SpO2: 100% (03-13-23 @ 09:00) (99% - 100%)  Wt(kg): --    ALLERGIES  No Known Allergies      MEDICATIONS   acetaminophen     Tablet .. 650 milliGRAM(s) Oral every 6 hours PRN  atorvastatin 40 milliGRAM(s) Oral at bedtime  bisacodyl Suppository 10 milliGRAM(s) Rectal daily  calcitriol   Capsule 0.25 MICROGram(s) Oral daily  calcium acetate 667 milliGRAM(s) Oral with dinner  calcium carbonate    500 mG (Tums) Chewable 1 Tablet(s) Chew every 4 hours PRN  heparin   Injectable 5000 Unit(s) SubCutaneous every 8 hours  hydrOXYzine hydrochloride 25 milliGRAM(s) Oral at bedtime PRN  insulin glargine Injectable (LANTUS) 20 Unit(s) SubCutaneous once  insulin lispro (ADMELOG) corrective regimen sliding scale   SubCutaneous every 6 hours  insulin lispro Injectable (ADMELOG) 7 Unit(s) SubCutaneous before lunch  insulin lispro Injectable (ADMELOG) 7 Unit(s) SubCutaneous before dinner  insulin lispro Injectable (ADMELOG) 10 Unit(s) SubCutaneous before breakfast  isosorbide    mononitrate Tablet (ISMO) 30 milliGRAM(s) Oral two times a day  melatonin 6 milliGRAM(s) Oral at bedtime  nitroglycerin     SubLingual 0.4 milliGRAM(s) SubLingual every 5 minutes PRN  ondansetron Injectable 4 milliGRAM(s) IV Push every 6 hours PRN  oxyCODONE    IR 5 milliGRAM(s) Oral every 4 hours PRN  oxyCODONE    IR 10 milliGRAM(s) Oral every 4 hours PRN  pantoprazole    Tablet 40 milliGRAM(s) Oral before breakfast  polyethylene glycol 3350 17 Gram(s) Oral two times a day  pregabalin 50 milliGRAM(s) Oral two times a day  psyllium Powder 1 Packet(s) Oral two times a day  saline laxative (FLEET) Rectal Enema 1 Enema Rectal daily PRN  senna 2 Tablet(s) Oral at bedtime  tamsulosin 0.4 milliGRAM(s) Oral at bedtime      ----------------------------------------------------------------------------------------  PHYSICAL EXAM  Constitutional - NAD  HEENT - NCAT, EOMI  Neck - Supple, No limited ROM  Chest - no respiratory distress  Cardiovascular - s1 s2  Abdomen -  Soft, NTND  Extremities - No C/C/E, No calf tenderness   Neurologic Exam -                    Cognitive - Awake, Alert, AAO to self, place, date, year, situation     Communication - Fluent, No dysarthria     Cranial Nerves - CN 2-12 intact     Motor -                      LEFT    UE - ShAB 5/5, EF 5/5, EE 5/5, WE 5/5,  5/5                    RIGHT UE - ShAB 5/5, EF 5/5, EE 5/5, WE 5/5,  5/5                    LEFT    LE - HF 2/5, KE 2/5, DF 1/5, PF 1/5                    RIGHT LE - HF 2/5, KE 2/5, DF 0/5, PF 0/5     Sensory - impaired sensation b/l LE  R worse than L     Balance - WNL Static  Psychiatric - Mood stable, Affect WNL  ----------------------------------------------------------------------------------------  ASSESSMENT/PLAN  67 yo m s/p TLIF  Pain - oxy ir prn, bowel regimen  please monitor and treat constipation  diet: dash/tlc  DVT PPX - heparin  continue bedside PT and OT - waiting for TLSO  Rehab -   recommend inpatient rehab when medically cleared.  acute vs sangeeta pending progress and medical course.  currently limited by pain, s/p surgical revision 3/6         Patient is a 68y old  Male who presents with a chief complaint of Back pain (13 Mar 2023 01:25)      HPI:  67 yo M with DMII, CAD s/p CABG, CKD, chronic back pain, and HTN presents to the ED with worsening back pain. Patient is AAOX2 and somnolent, not able to provide any hx. Most of the information was supplemented by the son. As per son, pt is AAOx 3 and "mentally stable." He recently came from [facility] about 2 weeks ago and has " multiple medical complaints." But this morning he told his son that he is feeling weak and his back pain is getting worse. The pain is localized in the lower back region. It is burning is sensation and constant. Unclear if it radiates down the leg. It worsens with movement and walking. He can only walk "10 meters" with a cane. He is also incontinent of urine but denies any recent fever, chills, bowel incontinence, or lower extremities weakness/motor deficits. He reportedly had MRIs in the past but son does not know what it showed. He also has other chronic medical problems such as CKD, DMII and CAD and his son thinks pt needs management for them as well.   In the ED, his vitals were notable for hyperglycemia, elevated BUN/Scr, and hypomagnesemia. EKG showed bradycardia with T wave changes in the lateral leads.  (21 Feb 2023 18:36)    planned for MRI with sedation today    REVIEW OF SYSTEMS  weakness    PAST MEDICAL & SURGICAL HISTORY  Diabetes mellitus    Essential hypertension    CAD (coronary artery disease)    Chronic kidney disease, unspecified CKD stage    S/P CABG x 2    CURRENT FUNCTIONAL STATUS  3/12  Bed Mobility  Bed Mobility Training Symptoms Noted During/After Treatment: none  Bed Mobility Training Rolling/Turning: moderate assist (50% patient effort);  1 person assist;  verbal cues  Bed Mobility Training Sit-to-Supine: moderate assist (50% patient effort);  1 person assist;  verbal cues  Bed Mobility Training Limitations: decreased strength;  decreased flexibility;  impaired coordination;  impaired balance;  decreased ability to use legs for bridging/pushing;  impaired ability to control trunk for mobility    Bed-Chair Transfer Training  Bed-to-Chair Transfer Training Rehab Potential: good, to achieve stated therapy goals  Bed-to-Chair Transfer Training Symptoms Noted During/After Treatment: none  Transfer Training Chair-to-Bed Transfer: rolling walker;  weight-bearing as tolerated   1 person assist;  verbal cues;  moderate assist (50% patient effort)  Bed-to-Chair Transfer Training Transfer Safety Analysis: decreased step length;  decreased weight-shifting ability;  decreased balance;  decreased sequencing ability;  stepping too close to front of assistive device;  decreased strength;  decreased flexibility;  impaired balance;  impaired coordination;  impaired postural control;  impaired motor control;  rolling walker    Therapeutic Exercise  Therapeutic Exercise Detail: long arc quads, ankle pumps, hip adductions, marches within precautions performed and encouraged as home exercise program without pain.       FAMILY HISTORY   FH: heart disease      RECENT LABS/IMAGING  CBC Full  -  ( 13 Mar 2023 06:30 )  WBC Count : 10.81 K/uL  RBC Count : 3.08 M/uL  Hemoglobin : 9.3 g/dL  Hematocrit : 28.4 %  Platelet Count - Automated : 484 K/uL  Mean Cell Volume : 92.2 fL  Mean Cell Hemoglobin : 30.2 pg  Mean Cell Hemoglobin Concentration : 32.7 gm/dL  Auto Neutrophil # : x  Auto Lymphocyte # : x  Auto Monocyte # : x  Auto Eosinophil # : x  Auto Basophil # : x  Auto Neutrophil % : x  Auto Lymphocyte % : x  Auto Monocyte % : x  Auto Eosinophil % : x  Auto Basophil % : x    03-13    128<L>  |  99  |  31<H>  ----------------------------<  143<H>  4.8   |  19<L>  |  1.42<H>    Ca    8.9      13 Mar 2023 06:30  Phos  2.8     03-13  Mg     2.10     03-13          VITALS  T(C): 36.7 (03-13-23 @ 09:00), Max: 36.9 (03-12-23 @ 19:42)  HR: 66 (03-13-23 @ 09:00) (64 - 77)  BP: 139/55 (03-13-23 @ 09:00) (128/41 - 146/48)  RR: 23 (03-13-23 @ 09:00) (18 - 23)  SpO2: 100% (03-13-23 @ 09:00) (99% - 100%)  Wt(kg): --    ALLERGIES  No Known Allergies      MEDICATIONS   acetaminophen     Tablet .. 650 milliGRAM(s) Oral every 6 hours PRN  atorvastatin 40 milliGRAM(s) Oral at bedtime  bisacodyl Suppository 10 milliGRAM(s) Rectal daily  calcitriol   Capsule 0.25 MICROGram(s) Oral daily  calcium acetate 667 milliGRAM(s) Oral with dinner  calcium carbonate    500 mG (Tums) Chewable 1 Tablet(s) Chew every 4 hours PRN  heparin   Injectable 5000 Unit(s) SubCutaneous every 8 hours  hydrOXYzine hydrochloride 25 milliGRAM(s) Oral at bedtime PRN  insulin glargine Injectable (LANTUS) 20 Unit(s) SubCutaneous once  insulin lispro (ADMELOG) corrective regimen sliding scale   SubCutaneous every 6 hours  insulin lispro Injectable (ADMELOG) 7 Unit(s) SubCutaneous before lunch  insulin lispro Injectable (ADMELOG) 7 Unit(s) SubCutaneous before dinner  insulin lispro Injectable (ADMELOG) 10 Unit(s) SubCutaneous before breakfast  isosorbide    mononitrate Tablet (ISMO) 30 milliGRAM(s) Oral two times a day  melatonin 6 milliGRAM(s) Oral at bedtime  nitroglycerin     SubLingual 0.4 milliGRAM(s) SubLingual every 5 minutes PRN  ondansetron Injectable 4 milliGRAM(s) IV Push every 6 hours PRN  oxyCODONE    IR 5 milliGRAM(s) Oral every 4 hours PRN  oxyCODONE    IR 10 milliGRAM(s) Oral every 4 hours PRN  pantoprazole    Tablet 40 milliGRAM(s) Oral before breakfast  polyethylene glycol 3350 17 Gram(s) Oral two times a day  pregabalin 50 milliGRAM(s) Oral two times a day  psyllium Powder 1 Packet(s) Oral two times a day  saline laxative (FLEET) Rectal Enema 1 Enema Rectal daily PRN  senna 2 Tablet(s) Oral at bedtime  tamsulosin 0.4 milliGRAM(s) Oral at bedtime      ----------------------------------------------------------------------------------------  PHYSICAL EXAM  Constitutional - NAD, vel  HEENT - NCAT, EOMI   Chest - no respiratory distress  Cardiovascular - s1 s2  Abdomen -  Soft, NTND  Extremities - No C/C/E, No calf tenderness   Neurologic Exam -                    Cognitive - Awake, Alert, AAO to self, place, date, year, situation     Communication - Fluent, No dysarthria     Cranial Nerves - CN 2-12 intact     Motor -                      LEFT    UE - ShAB 5/5, EF 5/5, EE 5/5, WE 5/5,  5/5                    RIGHT UE - ShAB 5/5, EF 5/5, EE 5/5, WE 5/5,  5/5                    LEFT    LE - HF 3/5, KE 3/5, DF 0/5, PF 0/5                    RIGHT LE - HF 3/5, KE 3/5, DF 0/5, PF 0/5     Sensory - impaired sensation b/l LE  R worse than L     Balance - WNL Static  Psychiatric - Mood stable, Affect WNL  ----------------------------------------------------------------------------------------  ASSESSMENT/PLAN  67 yo m s/p TLIF  Pain - oxy ir prn, bowel regimen  waiting for MRI with sedation, npo   turn and position q 2 to prevent skin breakdown  DVT PPX - heparin  continue bedside PT and OT   Rehab -   recommend inpatient rehab when medically cleared.  acute vs sangeeta pending progress and medical course.   goal is for acute rehab when medically cleared. will continue to monitor

## 2023-03-13 NOTE — PROGRESS NOTE ADULT - ASSESSMENT
69 yo M with DMII, CAD s/p CABG, CKD, chronic back pain, and HTN presents to the ED with worsening back pain. Patient is AAOX2 and somnolent, not able to provide any hx. Most of the information was supplemented by the son. As per son, pt is AAOx 3 and "mentally stable." He recently came from [facility] about 2 weeks ago and has " multiple medical complaints." But this morning he told his son that he is feeling weak and his back pain is getting worse. The pain is localized in the lower back region. Pain is worse when standing. Denies radiculopathy. Intermittent urinary and bowel incontinence for 1 year. Was admitted to a hospital in [facility] in Aug 2022 for pain, MRI was completed that showed a L4-5 spondylolisthesis. Patient was offered surgery but told he has too may medical issues and discharged him.   Patient here here visiting his son when back pain exacerbated and came to ER for evaluation with his MRI on paper films     MRI reviewed, L4-5 disk dessication and cauda equina compression , Right grade II and Left Grade I spondylolisthesis     3/1; s/p L4-5 TLIF pod #1, HMV drain, requiring bola post op for bp support, albumin given x 1, sicu consulted, neuro exam stable  3/2: POD # 2 S/P L4-5 TLIF, 1 HMV in place. Off phenylepherine. CT lumbar spine, screws ok  3/3: POD # 3 S/P L4-5 TLIF. Having episodes of chest pain with elevated troponins, cardiology evaluating. Will transfer to telemetry floor, serial CE and EKG. 1 HMV in place  3/4: POD # 4 S/P L4-5 TLIF. No more chest pain. HMV in place. Seen by endocrine for DM management  3/5: POD # 5 S/P L4-5 TLIF. No more chest pain. HMV in place. Increased back pain with radiation to legs, MRI ordered obtained by evening with anesthesia, possible ventral epidural collection. Imaging reviewed with Dr. Avalos  3/6: pod #6 s/p L4-5 TLIF, HMV in place, mri L/s w/ sed done no major compressive hematoma, enlarged neurogenic bladder, crowder placed  3/7: PAtient noted to have new Left food drop, s/p emergent RTOR for Lef. foot drop  cement augmentation for L4 pedicle fx 2/2 to screw pod #1, post op exam stable , HMV, 2JPs's vel, CT L/s done- screws ok, needs brace  3/9-11: Stable exam POD # 4 S/P cement augmentation for L4 pedicle fx 2/2 to screw. HMV X 1, JOVITA X 1. MRI L spine ordered. Episode of vomiting on 3/10, AXR ordered  3/12: Superficial HMV removed, Exam stable, MR pending, pain improved  1/13: Stable exam. 1 JOVITA in place. NPO for sedated MRI today

## 2023-03-13 NOTE — PROGRESS NOTE ADULT - PROBLEM SELECTOR PLAN 3
EF 45% per outside records; TTE: EF 45% Mild to moderate segmental left ventricular systolic dysfunction.  Hypokinesis of the apex, distal septum, and distal anterior wall. Mod-Sev AS.  - c/w home Entresto (called Sabitar 50 Sacubitril  24 mg/Valsartan 26 mg), restarted - holding i/s/o increased kidney function  -restart lasix today 40daily on 3/10 Creatine uptrending will hold for now 3/12 Monitor volume status daily.   - holding BB per cards   - had CP trop 73-74, cards consulted, low concern for ACS at this time.   - no need for further trend troponin at this time unless patient with active chest pain; if so would obtain troponin x2 and STAT EKG to evaluate   - appears compensated  - cards eval appreciated

## 2023-03-13 NOTE — PROGRESS NOTE ADULT - ASSESSMENT
68M Setswana speaking, HTN, DM2 on insulin, CKD3, systolic HF (EF 45%), CAD s/p CABG 2005, RSA s/p L renal stent, and PAD s/p PTA to R common iliac and L common iliac s/p stent who presents to the ED with worsening back pain in context of known L4-L5 cord compression/cauda equina since 8/2022. now s/p l4-L5 spinal fusion by neurosurgery; Medicine following for comanagement.

## 2023-03-14 PROBLEM — E11.9 TYPE 2 DIABETES MELLITUS WITHOUT COMPLICATIONS: Chronic | Status: ACTIVE | Noted: 2023-02-21

## 2023-03-14 LAB
ANION GAP SERPL CALC-SCNC: 14 MMOL/L — SIGNIFICANT CHANGE UP (ref 7–14)
BUN SERPL-MCNC: 33 MG/DL — HIGH (ref 7–23)
CALCIUM SERPL-MCNC: 9.4 MG/DL — SIGNIFICANT CHANGE UP (ref 8.4–10.5)
CHLORIDE SERPL-SCNC: 99 MMOL/L — SIGNIFICANT CHANGE UP (ref 98–107)
CO2 SERPL-SCNC: 19 MMOL/L — LOW (ref 22–31)
CREAT SERPL-MCNC: 1.49 MG/DL — HIGH (ref 0.5–1.3)
EGFR: 50 ML/MIN/1.73M2 — LOW
GLUCOSE BLDC GLUCOMTR-MCNC: 110 MG/DL — HIGH (ref 70–99)
GLUCOSE BLDC GLUCOMTR-MCNC: 119 MG/DL — HIGH (ref 70–99)
GLUCOSE BLDC GLUCOMTR-MCNC: 178 MG/DL — HIGH (ref 70–99)
GLUCOSE BLDC GLUCOMTR-MCNC: 253 MG/DL — HIGH (ref 70–99)
GLUCOSE SERPL-MCNC: 130 MG/DL — HIGH (ref 70–99)
HCT VFR BLD CALC: 28.2 % — LOW (ref 39–50)
HGB BLD-MCNC: 9.3 G/DL — LOW (ref 13–17)
MAGNESIUM SERPL-MCNC: 2.3 MG/DL — SIGNIFICANT CHANGE UP (ref 1.6–2.6)
MCHC RBC-ENTMCNC: 30.3 PG — SIGNIFICANT CHANGE UP (ref 27–34)
MCHC RBC-ENTMCNC: 33 GM/DL — SIGNIFICANT CHANGE UP (ref 32–36)
MCV RBC AUTO: 91.9 FL — SIGNIFICANT CHANGE UP (ref 80–100)
NRBC # BLD: 0 /100 WBCS — SIGNIFICANT CHANGE UP (ref 0–0)
NRBC # FLD: 0 K/UL — SIGNIFICANT CHANGE UP (ref 0–0)
PHOSPHATE SERPL-MCNC: 3.2 MG/DL — SIGNIFICANT CHANGE UP (ref 2.5–4.5)
PLATELET # BLD AUTO: 506 K/UL — HIGH (ref 150–400)
POTASSIUM SERPL-MCNC: 4.9 MMOL/L — SIGNIFICANT CHANGE UP (ref 3.5–5.3)
POTASSIUM SERPL-SCNC: 4.9 MMOL/L — SIGNIFICANT CHANGE UP (ref 3.5–5.3)
RBC # BLD: 3.07 M/UL — LOW (ref 4.2–5.8)
RBC # FLD: 13.4 % — SIGNIFICANT CHANGE UP (ref 10.3–14.5)
SODIUM SERPL-SCNC: 132 MMOL/L — LOW (ref 135–145)
WBC # BLD: 10.4 K/UL — SIGNIFICANT CHANGE UP (ref 3.8–10.5)
WBC # FLD AUTO: 10.4 K/UL — SIGNIFICANT CHANGE UP (ref 3.8–10.5)

## 2023-03-14 PROCEDURE — 99232 SBSQ HOSP IP/OBS MODERATE 35: CPT

## 2023-03-14 PROCEDURE — 99231 SBSQ HOSP IP/OBS SF/LOW 25: CPT

## 2023-03-14 RX ORDER — ASPIRIN/CALCIUM CARB/MAGNESIUM 324 MG
81 TABLET ORAL DAILY
Refills: 0 | Status: DISCONTINUED | OUTPATIENT
Start: 2023-03-14 | End: 2023-03-15

## 2023-03-14 RX ORDER — SODIUM BICARBONATE 1 MEQ/ML
325 SYRINGE (ML) INTRAVENOUS
Refills: 0 | Status: DISCONTINUED | OUTPATIENT
Start: 2023-03-14 | End: 2023-03-15

## 2023-03-14 RX ORDER — SACUBITRIL AND VALSARTAN 24; 26 MG/1; MG/1
1 TABLET, FILM COATED ORAL
Refills: 0 | Status: DISCONTINUED | OUTPATIENT
Start: 2023-03-14 | End: 2023-03-15

## 2023-03-14 RX ADMIN — TAMSULOSIN HYDROCHLORIDE 0.4 MILLIGRAM(S): 0.4 CAPSULE ORAL at 21:22

## 2023-03-14 RX ADMIN — ISOSORBIDE MONONITRATE 30 MILLIGRAM(S): 60 TABLET, EXTENDED RELEASE ORAL at 18:02

## 2023-03-14 RX ADMIN — HEPARIN SODIUM 5000 UNIT(S): 5000 INJECTION INTRAVENOUS; SUBCUTANEOUS at 14:12

## 2023-03-14 RX ADMIN — ATORVASTATIN CALCIUM 40 MILLIGRAM(S): 80 TABLET, FILM COATED ORAL at 21:22

## 2023-03-14 RX ADMIN — ISOSORBIDE MONONITRATE 30 MILLIGRAM(S): 60 TABLET, EXTENDED RELEASE ORAL at 06:34

## 2023-03-14 RX ADMIN — Medication 50 MILLIGRAM(S): at 06:32

## 2023-03-14 RX ADMIN — HEPARIN SODIUM 5000 UNIT(S): 5000 INJECTION INTRAVENOUS; SUBCUTANEOUS at 21:21

## 2023-03-14 RX ADMIN — Medication 667 MILLIGRAM(S): at 19:45

## 2023-03-14 RX ADMIN — SACUBITRIL AND VALSARTAN 1 TABLET(S): 24; 26 TABLET, FILM COATED ORAL at 18:03

## 2023-03-14 RX ADMIN — HEPARIN SODIUM 5000 UNIT(S): 5000 INJECTION INTRAVENOUS; SUBCUTANEOUS at 06:32

## 2023-03-14 RX ADMIN — PANTOPRAZOLE SODIUM 40 MILLIGRAM(S): 20 TABLET, DELAYED RELEASE ORAL at 06:32

## 2023-03-14 RX ADMIN — SENNA PLUS 2 TABLET(S): 8.6 TABLET ORAL at 21:22

## 2023-03-14 RX ADMIN — Medication 50 MILLIGRAM(S): at 18:04

## 2023-03-14 RX ADMIN — Medication 7 UNIT(S): at 19:45

## 2023-03-14 RX ADMIN — Medication 10 UNIT(S): at 09:12

## 2023-03-14 RX ADMIN — Medication 25 MILLIGRAM(S): at 23:36

## 2023-03-14 RX ADMIN — INSULIN GLARGINE 25 UNIT(S): 100 INJECTION, SOLUTION SUBCUTANEOUS at 13:19

## 2023-03-14 RX ADMIN — Medication 3: at 13:19

## 2023-03-14 RX ADMIN — Medication 7 UNIT(S): at 13:18

## 2023-03-14 RX ADMIN — Medication 325 MILLIGRAM(S): at 18:02

## 2023-03-14 RX ADMIN — Medication 81 MILLIGRAM(S): at 13:21

## 2023-03-14 RX ADMIN — Medication 1 DROP(S): at 23:37

## 2023-03-14 RX ADMIN — CALCITRIOL 0.25 MICROGRAM(S): 0.5 CAPSULE ORAL at 13:21

## 2023-03-14 RX ADMIN — Medication 1: at 09:14

## 2023-03-14 NOTE — PROGRESS NOTE ADULT - PROBLEM SELECTOR PLAN 9
All brand name Persian.  We do not have all of them.  Meds are: Ligenta-M 500 (Linagliptin 2.5 and metoformin 500mg), [place] card MR (trimetazidine hydrochloride 35 mg),  Ecosprin 75 (aspirin 75 mg), Duralax, Ketoalfa (amio acid Keto Analogues 600 mg), Nidocard-Retard (nitroglycerin 2.6 mg), Hypophos (calcium acetate 667 mg), Tamisol D (Tamsulosin 0.4 mg and Dutasteride 0.5 mg), Mydocalm (tolperisone 50 mg), Raditrol (calcitriol 0.25 microgram),  NovoRapid 16 units w/ lunch, Manuel nordisk insulin 16am/14pm, Atova 20 (atorvastatin 20 mg), Nebita 2.5 (nevivolol 2.5 mg), Tamisol (tamsulosin 0.4 mg), Pregaba 50 (pregabalin 50 mg), Febustat 40 (febuxostat 40 mg), Lozixum (lorazapam 1 mg), Lasix 40 mg, Sabitar 50 mg, Anazol 0.1%, Tearfresh

## 2023-03-14 NOTE — PROGRESS NOTE ADULT - SUBJECTIVE AND OBJECTIVE BOX
HPI:  67 yo M with DMII, CAD s/p CABG, CKD, chronic back pain, and HTN presents to the ED with worsening back pain. Patient is AAOX2 and somnolent, not able to provide any hx. Most of the information was supplemented by the son. As per son, pt is AAOx 3 and "mentally stable." He recently came from [facility] about 2 weeks ago and has " multiple medical complaints." But this morning he told his son that he is feeling weak and his back pain is getting worse. The pain is localized in the lower back region. It is burning is sensation and constant. Unclear if it radiates down the leg. It worsens with movement and walking. He can only walk "10 meters" with a cane. He is also incontinent of urine but denies any recent fever, chills, bowel incontinence, or lower extremities weakness/motor deficits. He reportedly had MRIs in the past but son does not know what it showed. He also has other chronic medical problems such as CKD, DMII and CAD and his son thinks pt needs management for them as well.   In the ED, his vitals were notable for hyperglycemia, elevated BUN/Scr, and hypomagnesemia. EKG showed bradycardia with T wave changes in the lateral leads.  (21 Feb 2023 18:36)    No issues overnight  NPO for MRI with sedation today  Vital Signs Last 24 Hrs  T(C): 36.4 (13 Mar 2023 00:10), Max: 36.9 (12 Mar 2023 03:35)  T(F): 97.5 (13 Mar 2023 00:10), Max: 98.5 (12 Mar 2023 19:42)  HR: 70 (13 Mar 2023 00:10) (64 - 80)  BP: 129/44 (13 Mar 2023 00:10) (107/39 - 139/45)  BP(mean): --  RR: 18 (13 Mar 2023 00:10) (17 - 20)  SpO2: 99% (13 Mar 2023 00:10) (99% - 100%)    Parameters below as of 13 Mar 2023 00:10  Patient On (Oxygen Delivery Method): room air    AAO X 3  PERRLA, EOMI  Face symmetric  bilateral UE 5/5  LE proximally 5/5 distally Right DF 0/5 PF 1/5  Left PF 4/5 DF 0/5    Right JOVITA: 5cc    MEDICATIONS  (STANDING):  atorvastatin 40 milliGRAM(s) Oral at bedtime  bisacodyl Suppository 10 milliGRAM(s) Rectal daily  calcitriol   Capsule 0.25 MICROGram(s) Oral daily  calcium acetate 667 milliGRAM(s) Oral with dinner  heparin   Injectable 5000 Unit(s) SubCutaneous every 8 hours  insulin glargine Injectable (LANTUS) 20 Unit(s) SubCutaneous once  insulin lispro (ADMELOG) corrective regimen sliding scale   SubCutaneous every 6 hours  insulin lispro Injectable (ADMELOG) 7 Unit(s) SubCutaneous before lunch  insulin lispro Injectable (ADMELOG) 7 Unit(s) SubCutaneous before dinner  insulin lispro Injectable (ADMELOG) 10 Unit(s) SubCutaneous before breakfast  isosorbide    mononitrate Tablet (ISMO) 30 milliGRAM(s) Oral two times a day  melatonin 6 milliGRAM(s) Oral at bedtime  pantoprazole    Tablet 40 milliGRAM(s) Oral before breakfast  polyethylene glycol 3350 17 Gram(s) Oral two times a day  pregabalin 50 milliGRAM(s) Oral two times a day  psyllium Powder 1 Packet(s) Oral two times a day  senna 2 Tablet(s) Oral at bedtime  tamsulosin 0.4 milliGRAM(s) Oral at bedtime    MEDICATIONS  (PRN):  acetaminophen     Tablet .. 650 milliGRAM(s) Oral every 6 hours PRN Mild Pain (1 - 3)  calcium carbonate    500 mG (Tums) Chewable 1 Tablet(s) Chew every 4 hours PRN Heartburn  hydrOXYzine hydrochloride 25 milliGRAM(s) Oral at bedtime PRN sleep  nitroglycerin     SubLingual 0.4 milliGRAM(s) SubLingual every 5 minutes PRN Chest Pain  ondansetron Injectable 4 milliGRAM(s) IV Push every 6 hours PRN Nausea and/or Vomiting  oxyCODONE    IR 5 milliGRAM(s) Oral every 4 hours PRN Moderate Pain (4 - 6)  oxyCODONE    IR 10 milliGRAM(s) Oral every 4 hours PRN Severe Pain (7 - 10)  saline laxative (FLEET) Rectal Enema 1 Enema Rectal daily PRN no BM                          7.9    12.04 )-----------( 474      ( 12 Mar 2023 06:31 )             24.7     03-12    131<L>  |  100  |  40<H>  ----------------------------<  124<H>  4.6   |  19<L>  |  1.54<H>    Ca    8.6      12 Mar 2023 06:31  Phos  2.8     03-12  Mg     2.10     03-12    < from: Xray Abdomen 1 View PORTABLE -Routine (Xray Abdomen 1 View PORTABLE -Routine .) (03.11.23 @ 08:48) >  COMPARISON: 3/7/23 plain abdominal films.    CLINICAL INDICATION: S/P lumbar fusion. Vomiting    TECHNIQUE: 2 flat abdominal films.    FINDINGS:  Redemonstration of orthopedic hardware involving the lower lumbar   vertebrae.  Interval decrease in gaseous distention of small and large bowel.  Decrease in colonic fecal burden.  No intra-abdominal free air.  Redemonstration of surgical drains.    IMPRESSION:  Interval improvement/decrease in postoperative ileus.    < end of copied text >    < from: MR Lumbar Spine No Cont (03.13.23 @ 13:33) >  IMPRESSION:   Revision of the lower lumbar posterior stabilization, with   pedicle screws removed from L4 and placed within the L3. The posterior   paraspinal appearance is routine noting upper extension of the L4   laminectomy.   The central canal at L4-L5 level demonstrates indistinct   hyperintensity suggesting hemorrhage or other debris within the thecal   sac, without apparent critical stenosis. Consider follow-up evaluation   with gadolinium-enhanced imaging to clarify.    < end of copied text >

## 2023-03-14 NOTE — PROGRESS NOTE ADULT - SUBJECTIVE AND OBJECTIVE BOX
Chief Complaint: DM 2 with hyperglycemia     History: Patient seen at bedside, At time of visit patient was sleeping.  Received premeal insulin but lunch tray was still full.  Woke patient up to eat.  Per RN, patient has been eating food from home     MEDICATIONS  (STANDING):  atorvastatin 40 milliGRAM(s) Oral at bedtime  bisacodyl Suppository 10 milliGRAM(s) Rectal daily  calcitriol   Capsule 0.25 MICROGram(s) Oral daily  calcium acetate 667 milliGRAM(s) Oral with dinner  heparin   Injectable 5000 Unit(s) SubCutaneous every 8 hours  insulin glargine Injectable (LANTUS) 25 Unit(s) SubCutaneous every morning  insulin lispro (ADMELOG) corrective regimen sliding scale   SubCutaneous three times a day before meals  insulin lispro (ADMELOG) corrective regimen sliding scale   SubCutaneous at bedtime  insulin lispro Injectable (ADMELOG) 9 Unit(s) SubCutaneous before breakfast  insulin lispro Injectable (ADMELOG) 7 Unit(s) SubCutaneous before lunch  insulin lispro Injectable (ADMELOG) 7 Unit(s) SubCutaneous before dinner  insulin lispro Injectable (ADMELOG) 2 Unit(s) SubCutaneous at bedtime  isosorbide    mononitrate Tablet (ISMO) 30 milliGRAM(s) Oral two times a day  melatonin 6 milliGRAM(s) Oral at bedtime  pantoprazole    Tablet 40 milliGRAM(s) Oral before breakfast  polyethylene glycol 3350 17 Gram(s) Oral two times a day  pregabalin 50 milliGRAM(s) Oral two times a day  psyllium Powder 1 Packet(s) Oral two times a day  senna 2 Tablet(s) Oral at bedtime  tamsulosin 0.4 milliGRAM(s) Oral at bedtime        No Known Allergies    Review of Systems:  Cardiovascular: No chest pain  Respiratory: No SOB  GI: No nausea, vomiting  Endocrine: no hypoglycemia     PHYSICAL EXAM:  Vital Signs Last 24 Hrs  T(C): 37.1 (14 Mar 2023 12:21), Max: 37.2 (13 Mar 2023 19:40)  T(F): 98.7 (14 Mar 2023 12:21), Max: 98.9 (13 Mar 2023 19:40)  HR: 82 (14 Mar 2023 12:21) (63 - 95)  BP: 125/51 (14 Mar 2023 12:21) (111/46 - 141/51)  BP(mean): --  RR: 18 (14 Mar 2023 12:21) (16 - 18)  SpO2: 100% (14 Mar 2023 12:21) (99% - 100%)    Parameters below as of 14 Mar 2023 08:22  Patient On (Oxygen Delivery Method): room air      GENERAL: NAD  EYES: No proptosis, no lid lag, anicteric  HEENT:  Atraumatic, Normocephalic, moist mucous membranes  RESPIRATORY: unlabored respirations     CAPILLARY BLOOD GLUCOSE      POCT Blood Glucose.: 253 mg/dL (14 Mar 2023 12:50)  POCT Blood Glucose.: 178 mg/dL (14 Mar 2023 08:37)  POCT Blood Glucose.: 220 mg/dL (13 Mar 2023 20:08)  POCT Blood Glucose.: 166 mg/dL (13 Mar 2023 17:56)  POCT Blood Glucose.: 214 mg/dL (12 Mar 2023 12:27)  POCT Blood Glucose.: 163 mg/dL (12 Mar 2023 08:09)  POCT Blood Glucose.: 173 mg/dL (11 Mar 2023 22:11)  POCT Blood Glucose.: 169 mg/dL (11 Mar 2023 18:20)      03-14    132<L>  |  99  |  33<H>  ----------------------------<  130<H>  4.9   |  19<L>  |  1.49<H>    Ca    9.4      14 Mar 2023 06:50  Phos  3.2     03-14  Mg     2.30     03-14        A1C with Estimated Average Glucose Result: 8.3 % (02-22-23 @ 05:13)    Diet, NPO after Midnight:      NPO Start Date: 12-Mar-2023,   NPO Start Time: 23:59  Except Medications (03-12-23 @ 08:56) [Active]  Diet, Regular:   Consistent Carbohydrate Evening Snack (CSTCHOSN) (03-07-23 @ 10:19) [Active]

## 2023-03-14 NOTE — PROGRESS NOTE ADULT - ASSESSMENT
68M Greek speaking, HTN, DM2 on insulin, CKD3, systolic HF (EF 45%), CAD s/p CABG 2005, RSA s/p L renal stent, and PAD s/p PTA to R common iliac and L common iliac s/p stent who presents to the ED with worsening back pain in context of known L4-L5 cord compression/cauda equina since 8/2022. now s/p l4-L5 spinal fusion by neurosurgery; Medicine following for comanagement.

## 2023-03-14 NOTE — PROGRESS NOTE ADULT - ASSESSMENT
67 yo M with DMII, CAD s/p CABG, CKD, chronic back pain, and HTN presents to the ED with worsening back pain.  He recently came from [facility] about 2 weeks ago and has " multiple medical complaints."  he told his son that he is feeling weak and his back pain is getting worse.   In the ED, his vitals were notable for hyperglycemia, elevated BUN/Scr, and hypomagnesemia. Endocrine called for DM 2, A1c 8.3    1. DM 2  A1c 8.3%  Home Regimen: Ligenta-M 500 (Linagliptin 2.5 and metformin 500mg) 1 tablet once daily, NovoRapid (insulin aspart) 14 units w/ lunch, NovoMix 30 insulin 14 units before breakfast, 14-16 units before dinner    While inpatient:  BG target 100-180 mg/dl  Patient was on Dexamethasone, last dose 3/6  Continue Lantus 25 units every morning (1100) - received 20 units yesterday AM, and received 25 units this AM.  Will trend data  Continue Admelog to 10 units SQ TID before breakfast (Hold if NPO/skips meal)  Continue Ademlog 7 units SQ before lunch and dinner (Hold if NPO/skips meal)   Will discontinue bedtime snack Admelog 2 units, patient has not received this dose recently   Continue Admelog low dose correctional scale before meals, continue Admelog LOW dose scale at bedtime  Chek FS before meals and at bedtime  Hypoglycemia protocol    Carb Consistent Diet   Nutrition consult completed   Provider to RN for diabetes/insulin pen teaching, AB pharmacist following (see pharmacy intervention notes)     Discharge Plan:  Medicaid pending - currently uninsured   Likely stop prior outpatient regimen and continue with mixed insulin (70/30) only  Consider Novolog 70/30: doses TBD close to discharge: before breakfast and before dinner via pen (see pharmacy liaison note: patient can qualify for coupon), so could use insulin PENS if that is easier for patient  Rapid acting insulin should NOT be ordered in conjunction with mixed insulin for risk of hypoglycemia   Ensure he has glucometer, glucose test strips, lancets, alcohol swabs and insulin PEN needles vs syringes   Followup with [facility], [address] [place] [address], [phone], [phone]    2. HTN  Goal BP in DM <130/80  Outpatient mc/cr ratio  Titration as per primary team     3. HLD  LDL 75 above goal; LDL <70   Continue Atorvastatin 40mg daily      Jo Ann Ballard  Nurse Practitioner  Division of Endocrinology & Diabetes  Available via  Teams      If after 6PM or before 9AM, or on weekends/holidays, please call endocrine answering service for assistance ([phone]).  For nonurgent matters email [email or web address] for assistance.

## 2023-03-14 NOTE — CHART NOTE - NSCHARTNOTEFT_GEN_A_CORE
Source: Patient [x]   Family [ ]     RN [x ]    Chart [x ]    Reason for Follow-Up Assessment:     Pt is a 68M Lithuanian speaking, HTN, DM2 on insulin, CKD3, systolic HF (EF 45%), CAD s/p CABG 2005, RSA s/p L renal stent, and PAD s/p PTA to R common iliac and L common iliac s/p stent who presents to the ED with worsening back pain in context of known L4-L5 cord compression/cauda equina since 8/2022. now s/p l4-L5 spinal fusion by neurosurgery.    Pt seen for nutrition follow up.   Spoke with Pt and his wife using  # [id number]. Pt states he appetite has not been very good. He states he has not been hungry due to gas. He had no other complaints of GI distress (nausea, vomiting, diarrhea, constipation).  Pt's last BM was 3/13/23.  Pt states he knows and follows a consistent carb diet at home. Pt was instructed on diet on previous visit. Pt had no questions concerning diet.  Discussed food preferences with Pt and will provide as able.      Diet, Regular:   Consistent Carbohydrate {Evening Snack} (CSTCHOSN) (03-07-23 @ 10:19)      GI: WDL. Last BM     PO intake:  < 50% [ ]  50-75% [x ]   % [ ]  other :    Anthropometrics: Height (cm): 170.2 (03-06)  Weight (kg): 72.8 (03-07)  BMI (kg/m2): 26.2 (03-06)    Edema: No edema noted    Skin: Surgical incision midline back    __________________ Pertinent Medications__________________   MEDICATIONS  (STANDING):  aspirin enteric coated 81 milliGRAM(s) Oral daily  atorvastatin 40 milliGRAM(s) Oral at bedtime  bisacodyl Suppository 10 milliGRAM(s) Rectal daily  calcitriol   Capsule 0.25 MICROGram(s) Oral daily  calcium acetate 667 milliGRAM(s) Oral with dinner  heparin   Injectable 5000 Unit(s) SubCutaneous every 8 hours  insulin glargine Injectable (LANTUS) 25 Unit(s) SubCutaneous <User Schedule>  insulin lispro (ADMELOG) corrective regimen sliding scale   SubCutaneous three times a day before meals  insulin lispro Injectable (ADMELOG) 10 Unit(s) SubCutaneous before breakfast  insulin lispro Injectable (ADMELOG) 7 Unit(s) SubCutaneous before lunch  insulin lispro Injectable (ADMELOG) 7 Unit(s) SubCutaneous before dinner  isosorbide    mononitrate Tablet (ISMO) 30 milliGRAM(s) Oral two times a day  melatonin 6 milliGRAM(s) Oral at bedtime  pantoprazole    Tablet 40 milliGRAM(s) Oral before breakfast  polyethylene glycol 3350 17 Gram(s) Oral two times a day  pregabalin 50 milliGRAM(s) Oral two times a day  psyllium Powder 1 Packet(s) Oral two times a day  sacubitril 24 mG/valsartan 26 mG 1 Tablet(s) Oral two times a day  senna 2 Tablet(s) Oral at bedtime  sodium bicarbonate 325 milliGRAM(s) Oral two times a day  tamsulosin 0.4 milliGRAM(s) Oral at bedtime    MEDICATIONS  (PRN):  acetaminophen     Tablet .. 650 milliGRAM(s) Oral every 6 hours PRN Mild Pain (1 - 3)  aluminum hydroxide/magnesium hydroxide/simethicone Suspension 30 milliLiter(s) Oral every 6 hours PRN Dyspepsia  calcium carbonate    500 mG (Tums) Chewable 1 Tablet(s) Chew every 4 hours PRN Heartburn  hydrOXYzine hydrochloride 25 milliGRAM(s) Oral at bedtime PRN sleep  nitroglycerin     SubLingual 0.4 milliGRAM(s) SubLingual every 5 minutes PRN Chest Pain  ondansetron Injectable 4 milliGRAM(s) IV Push every 6 hours PRN Nausea and/or Vomiting  oxyCODONE    IR 5 milliGRAM(s) Oral every 4 hours PRN Moderate Pain (4 - 6)  oxyCODONE    IR 10 milliGRAM(s) Oral every 4 hours PRN Severe Pain (7 - 10)  saline laxative (FLEET) Rectal Enema 1 Enema Rectal daily PRN no BM      __________________ Pertinent Labs__________________   03-14 Na132 mmol/L<L> Glu 130 mg/dL<H> K+ 4.9 mmol/L Cr  1.49 mg/dL<H> BUN 33 mg/dL<H> 03-14 Phos 3.2 mg/dL 02-22 Chol 136 mg/dL LDL --    HDL 29 mg/dL<L> Trig 162 mg/dL<H>        POCT Blood Glucose.: 253 mg/dL (03-14-23 @ 12:50)  POCT Blood Glucose.: 178 mg/dL (03-14-23 @ 08:37)  POCT Blood Glucose.: 220 mg/dL (03-13-23 @ 20:08)  POCT Blood Glucose.: 166 mg/dL (03-13-23 @ 17:56)      Estimated Needs:     melvin/d - 1610 to 1932  gm pro/d - 52 to 65    [x ] no change since previous assessment      Previous Nutrition Diagnosis: Moderate Malnutrition    Nutrition Diagnosis is [x ] ongoing  [ ] resolved [ ] not applicable     Recommendations:  1. Glucerna Shake 1x/day     Monitoring and Evaluation:     [ x] Tolerance to diet prescription [x ] weights [x ] follow up per protocol  [ ] other:

## 2023-03-14 NOTE — PROGRESS NOTE ADULT - PROBLEM SELECTOR PLAN 6
- per chart review ranging from 1.8-2.3  - Currently 1.42. Monitor renal function.   - Given stable creatine. will re-start entresto.   Hold lasix for now,   - trend daily  - avoid nephrotoxic agents and renally dose medications

## 2023-03-14 NOTE — CHART NOTE - NSCHARTNOTESELECT_GEN_ALL_CORE
Elevated creatine/Event Note
Endocrinology
operative vs nonop conversation/Event Note
Cardiology fellow/Event Note
Chart Note/Nutrition Services
Endocrinology
Event Note
Nutrition Services
cardiology fellow/Event Note

## 2023-03-14 NOTE — PROGRESS NOTE ADULT - ASSESSMENT
69 yo M with DMII, CAD s/p CABG, CKD, chronic back pain, and HTN presents to the ED with worsening back pain. Patient is AAOX2 and somnolent, not able to provide any hx. Most of the information was supplemented by the son. As per son, pt is AAOx 3 and "mentally stable." He recently came from [facility] about 2 weeks ago and has " multiple medical complaints." But this morning he told his son that he is feeling weak and his back pain is getting worse. The pain is localized in the lower back region. Pain is worse when standing. Denies radiculopathy. Intermittent urinary and bowel incontinence for 1 year. Was admitted to a hospital in [facility] in Aug 2022 for pain, MRI was completed that showed a L4-5 spondylolisthesis. Patient was offered surgery but told he has too may medical issues and discharged him.   Patient here here visiting his son when back pain exacerbated and came to ER for evaluation with his MRI on paper films     MRI reviewed, L4-5 disk dessication and cauda equina compression , Right grade II and Left Grade I spondylolisthesis     3/1; s/p L4-5 TLIF pod #1, HMV drain, requiring bola post op for bp support, albumin given x 1, sicu consulted, neuro exam stable  3/2: POD # 2 S/P L4-5 TLIF, 1 HMV in place. Off phenylepherine. CT lumbar spine, screws ok  3/3: POD # 3 S/P L4-5 TLIF. Having episodes of chest pain with elevated troponins, cardiology evaluating. Will transfer to telemetry floor, serial CE and EKG. 1 HMV in place  3/4: POD # 4 S/P L4-5 TLIF. No more chest pain. HMV in place. Seen by endocrine for DM management  3/5: POD # 5 S/P L4-5 TLIF. No more chest pain. HMV in place. Increased back pain with radiation to legs, MRI ordered obtained by evening with anesthesia, possible ventral epidural collection. Imaging reviewed with Dr. Avalos  3/6: pod #6 s/p L4-5 TLIF, HMV in place, mri L/s w/ sed done no major compressive hematoma, enlarged neurogenic bladder, crowder placed  3/7: PAtient noted to have new Left food drop, s/p emergent RTOR for Lef. foot drop  cement augmentation for L4 pedicle fx 2/2 to screw pod #1, post op exam stable , HMV, 2JPs's vel, CT L/s done- screws ok, needs brace  3/9-11: Stable exam POD # 4 S/P cement augmentation for L4 pedicle fx 2/2 to screw. HMV X 1, JOVITA X 1. MRI L spine ordered. Episode of vomiting on 3/10, AXR ordered  3/12: Superficial HMV removed, Exam stable, MR pending, pain improved  1/13: Stable exam. 1 JOVITA in place. Sedated MRI done  3/14: PT, d/c planning, 1 JOVITA in placce

## 2023-03-14 NOTE — PROGRESS NOTE ADULT - SUBJECTIVE AND OBJECTIVE BOX
[facility]  Division of Hospital Medicine  Michelle Cho MD  Pager: [phone]      Patient is a 68y old  Male who presents with a chief complaint of Back pain (14 Mar 2023 02:40)      SUBJECTIVE / OVERNIGHT EVENTS: patient is working with PT. Creatine stable   ADDITIONAL REVIEW OF SYSTEMS:    MEDICATIONS  (STANDING):  aspirin enteric coated 81 milliGRAM(s) Oral daily  atorvastatin 40 milliGRAM(s) Oral at bedtime  bisacodyl Suppository 10 milliGRAM(s) Rectal daily  calcitriol   Capsule 0.25 MICROGram(s) Oral daily  calcium acetate 667 milliGRAM(s) Oral with dinner  heparin   Injectable 5000 Unit(s) SubCutaneous every 8 hours  insulin glargine Injectable (LANTUS) 25 Unit(s) SubCutaneous <User Schedule>  insulin lispro (ADMELOG) corrective regimen sliding scale   SubCutaneous three times a day before meals  insulin lispro Injectable (ADMELOG) 7 Unit(s) SubCutaneous before lunch  insulin lispro Injectable (ADMELOG) 7 Unit(s) SubCutaneous before dinner  insulin lispro Injectable (ADMELOG) 10 Unit(s) SubCutaneous before breakfast  isosorbide    mononitrate Tablet (ISMO) 30 milliGRAM(s) Oral two times a day  melatonin 6 milliGRAM(s) Oral at bedtime  pantoprazole    Tablet 40 milliGRAM(s) Oral before breakfast  polyethylene glycol 3350 17 Gram(s) Oral two times a day  pregabalin 50 milliGRAM(s) Oral two times a day  psyllium Powder 1 Packet(s) Oral two times a day  sacubitril 24 mG/valsartan 26 mG 1 Tablet(s) Oral two times a day  senna 2 Tablet(s) Oral at bedtime  sodium bicarbonate 325 milliGRAM(s) Oral two times a day  tamsulosin 0.4 milliGRAM(s) Oral at bedtime    MEDICATIONS  (PRN):  acetaminophen     Tablet .. 650 milliGRAM(s) Oral every 6 hours PRN Mild Pain (1 - 3)  aluminum hydroxide/magnesium hydroxide/simethicone Suspension 30 milliLiter(s) Oral every 6 hours PRN Dyspepsia  calcium carbonate    500 mG (Tums) Chewable 1 Tablet(s) Chew every 4 hours PRN Heartburn  hydrOXYzine hydrochloride 25 milliGRAM(s) Oral at bedtime PRN sleep  nitroglycerin     SubLingual 0.4 milliGRAM(s) SubLingual every 5 minutes PRN Chest Pain  ondansetron Injectable 4 milliGRAM(s) IV Push every 6 hours PRN Nausea and/or Vomiting  oxyCODONE    IR 5 milliGRAM(s) Oral every 4 hours PRN Moderate Pain (4 - 6)  oxyCODONE    IR 10 milliGRAM(s) Oral every 4 hours PRN Severe Pain (7 - 10)  saline laxative (FLEET) Rectal Enema 1 Enema Rectal daily PRN no BM      CAPILLARY BLOOD GLUCOSE      POCT Blood Glucose.: 253 mg/dL (14 Mar 2023 12:50)  POCT Blood Glucose.: 178 mg/dL (14 Mar 2023 08:37)  POCT Blood Glucose.: 220 mg/dL (13 Mar 2023 20:08)  POCT Blood Glucose.: 166 mg/dL (13 Mar 2023 17:56)    I&O's Summary    13 Mar 2023 07:01  -  14 Mar 2023 07:00  --------------------------------------------------------  IN: 350 mL / OUT: 1625 mL / NET: -1275 mL    14 Mar 2023 07:01  -  14 Mar 2023 15:26  --------------------------------------------------------  IN: 0 mL / OUT: 1800 mL / NET: -1800 mL        PHYSICAL EXAM:  Vital Signs Last 24 Hrs  T(C): 37.1 (14 Mar 2023 12:21), Max: 37.2 (13 Mar 2023 19:40)  T(F): 98.7 (14 Mar 2023 12:21), Max: 98.9 (13 Mar 2023 19:40)  HR: 82 (14 Mar 2023 12:21) (63 - 95)  BP: 125/51 (14 Mar 2023 12:21) (111/46 - 141/51)  BP(mean): --  RR: 18 (14 Mar 2023 12:21) (16 - 18)  SpO2: 100% (14 Mar 2023 12:21) (99% - 100%)    Parameters below as of 14 Mar 2023 08:22  Patient On (Oxygen Delivery Method): room air    CONSTITUTIONAL: Elderly man in  NAD, well-developed, well-groomed  RESPIRATORY: Normal respiratory effort; lungs are clear to auscultation bilaterally  CARDIOVASCULAR: Regular rate and rhythm, normal S1 and S2, no murmur/rub/gallop; No lower extremity edema; Peripheral pulses are 2+ bilaterally  ABDOMEN: Nontender to palpation, normoactive bowel sounds, no rebound/guarding; No hepatosplenomegaly  PSYCH: A+O to person, place, and time; affect appropriate  SKIN: No rashes; no palpable lesions    LABS:                        9.3    10.40 )-----------( 506      ( 14 Mar 2023 06:50 )             28.2     03-14    132<L>  |  99  |  33<H>  ----------------------------<  130<H>  4.9   |  19<L>  |  1.49<H>    Ca    9.4      14 Mar 2023 06:50  Phos  3.2     03-14  Mg     2.30     03-14                  RADIOLOGY & ADDITIONAL TESTS:  Results Reviewed:   Imaging Personally Reviewed:  Electrocardiogram Personally Reviewed:    COORDINATION OF CARE:  Care Discussed with Consultants/Other Providers [Y/N]:  Prior or Outpatient Records Reviewed [Y/N]:

## 2023-03-15 ENCOUNTER — TRANSCRIPTION ENCOUNTER (OUTPATIENT)
Age: 68
End: 2023-03-15

## 2023-03-15 VITALS
TEMPERATURE: 98 F | OXYGEN SATURATION: 100 % | RESPIRATION RATE: 18 BRPM | DIASTOLIC BLOOD PRESSURE: 52 MMHG | HEART RATE: 80 BPM | SYSTOLIC BLOOD PRESSURE: 112 MMHG

## 2023-03-15 LAB
ANION GAP SERPL CALC-SCNC: 10 MMOL/L — SIGNIFICANT CHANGE UP (ref 7–14)
BUN SERPL-MCNC: 32 MG/DL — HIGH (ref 7–23)
CALCIUM SERPL-MCNC: 9.6 MG/DL — SIGNIFICANT CHANGE UP (ref 8.4–10.5)
CHLORIDE SERPL-SCNC: 98 MMOL/L — SIGNIFICANT CHANGE UP (ref 98–107)
CO2 SERPL-SCNC: 22 MMOL/L — SIGNIFICANT CHANGE UP (ref 22–31)
CREAT SERPL-MCNC: 1.59 MG/DL — HIGH (ref 0.5–1.3)
EGFR: 46 ML/MIN/1.73M2 — LOW
GLUCOSE BLDC GLUCOMTR-MCNC: 147 MG/DL — HIGH (ref 70–99)
GLUCOSE BLDC GLUCOMTR-MCNC: 159 MG/DL — HIGH (ref 70–99)
GLUCOSE BLDC GLUCOMTR-MCNC: 203 MG/DL — HIGH (ref 70–99)
GLUCOSE BLDC GLUCOMTR-MCNC: 226 MG/DL — HIGH (ref 70–99)
GLUCOSE SERPL-MCNC: 147 MG/DL — HIGH (ref 70–99)
HCT VFR BLD CALC: 30.4 % — LOW (ref 39–50)
HGB BLD-MCNC: 9.6 G/DL — LOW (ref 13–17)
MAGNESIUM SERPL-MCNC: 2 MG/DL — SIGNIFICANT CHANGE UP (ref 1.6–2.6)
MCHC RBC-ENTMCNC: 29.4 PG — SIGNIFICANT CHANGE UP (ref 27–34)
MCHC RBC-ENTMCNC: 31.6 GM/DL — LOW (ref 32–36)
MCV RBC AUTO: 93.3 FL — SIGNIFICANT CHANGE UP (ref 80–100)
NRBC # BLD: 0 /100 WBCS — SIGNIFICANT CHANGE UP (ref 0–0)
NRBC # FLD: 0 K/UL — SIGNIFICANT CHANGE UP (ref 0–0)
PHOSPHATE SERPL-MCNC: 3.4 MG/DL — SIGNIFICANT CHANGE UP (ref 2.5–4.5)
PLATELET # BLD AUTO: 488 K/UL — HIGH (ref 150–400)
POTASSIUM SERPL-MCNC: 5.2 MMOL/L — SIGNIFICANT CHANGE UP (ref 3.5–5.3)
POTASSIUM SERPL-SCNC: 5.2 MMOL/L — SIGNIFICANT CHANGE UP (ref 3.5–5.3)
RBC # BLD: 3.26 M/UL — LOW (ref 4.2–5.8)
RBC # FLD: 13.4 % — SIGNIFICANT CHANGE UP (ref 10.3–14.5)
SARS-COV-2 RNA SPEC QL NAA+PROBE: SIGNIFICANT CHANGE UP
SODIUM SERPL-SCNC: 130 MMOL/L — LOW (ref 135–145)
WBC # BLD: 12 K/UL — HIGH (ref 3.8–10.5)
WBC # FLD AUTO: 12 K/UL — HIGH (ref 3.8–10.5)

## 2023-03-15 PROCEDURE — 99232 SBSQ HOSP IP/OBS MODERATE 35: CPT

## 2023-03-15 RX ORDER — INSULIN ASPART 100 [IU]/ML
16 INJECTION, SOLUTION SUBCUTANEOUS
Qty: 0 | Refills: 0 | DISCHARGE

## 2023-03-15 RX ORDER — SENNA PLUS 8.6 MG/1
2 TABLET ORAL
Qty: 0 | Refills: 0 | DISCHARGE
Start: 2023-03-15

## 2023-03-15 RX ORDER — INSULIN LISPRO 100/ML
1 VIAL (ML) SUBCUTANEOUS
Qty: 0 | Refills: 0 | DISCHARGE
Start: 2023-03-15

## 2023-03-15 RX ORDER — INSULIN LISPRO 100/ML
12 VIAL (ML) SUBCUTANEOUS
Refills: 0 | Status: DISCONTINUED | OUTPATIENT
Start: 2023-03-16 | End: 2023-03-15

## 2023-03-15 RX ORDER — ONDANSETRON 8 MG/1
0 TABLET, FILM COATED ORAL
Qty: 0 | Refills: 0 | DISCHARGE
Start: 2023-03-15

## 2023-03-15 RX ORDER — INSULIN LISPRO 100/ML
7 VIAL (ML) SUBCUTANEOUS
Qty: 0 | Refills: 0 | DISCHARGE
Start: 2023-03-15

## 2023-03-15 RX ORDER — CALCIUM CARBONATE 500(1250)
1 TABLET ORAL
Qty: 0 | Refills: 0 | DISCHARGE
Start: 2023-03-15

## 2023-03-15 RX ORDER — FEBUXOSTAT 40 MG/1
1 TABLET ORAL
Qty: 0 | Refills: 0 | DISCHARGE

## 2023-03-15 RX ORDER — POLYETHYLENE GLYCOL 3350 17 G/17G
17 POWDER, FOR SOLUTION ORAL
Qty: 0 | Refills: 0 | DISCHARGE
Start: 2023-03-15

## 2023-03-15 RX ORDER — LINAGLIPTIN AND METFORMIN HYDROCHLORIDE 2.5; 85 MG/1; MG/1
1 TABLET, FILM COATED ORAL
Qty: 0 | Refills: 0 | DISCHARGE

## 2023-03-15 RX ORDER — INSULIN GLARGINE 100 [IU]/ML
25 INJECTION, SOLUTION SUBCUTANEOUS
Qty: 0 | Refills: 0 | DISCHARGE
Start: 2023-03-15

## 2023-03-15 RX ORDER — PANTOPRAZOLE SODIUM 20 MG/1
1 TABLET, DELAYED RELEASE ORAL
Qty: 0 | Refills: 0 | DISCHARGE
Start: 2023-03-15

## 2023-03-15 RX ORDER — HYDROXYZINE HCL 10 MG
1 TABLET ORAL
Qty: 0 | Refills: 0 | DISCHARGE
Start: 2023-03-15

## 2023-03-15 RX ORDER — HEPARIN SODIUM 5000 [USP'U]/ML
5000 INJECTION INTRAVENOUS; SUBCUTANEOUS
Qty: 0 | Refills: 0 | DISCHARGE
Start: 2023-03-15

## 2023-03-15 RX ORDER — LANOLIN ALCOHOL/MO/W.PET/CERES
2 CREAM (GRAM) TOPICAL
Qty: 0 | Refills: 0 | DISCHARGE
Start: 2023-03-15

## 2023-03-15 RX ORDER — SODIUM BICARBONATE 1 MEQ/ML
1 SYRINGE (ML) INTRAVENOUS
Qty: 0 | Refills: 0 | DISCHARGE
Start: 2023-03-15

## 2023-03-15 RX ORDER — INSULIN LISPRO 100/ML
12 VIAL (ML) SUBCUTANEOUS
Qty: 0 | Refills: 0 | DISCHARGE
Start: 2023-03-15

## 2023-03-15 RX ORDER — ISOSORBIDE MONONITRATE 60 MG/1
1.5 TABLET, EXTENDED RELEASE ORAL
Qty: 0 | Refills: 0 | DISCHARGE
Start: 2023-03-15

## 2023-03-15 RX ORDER — ACETAMINOPHEN 500 MG
2 TABLET ORAL
Qty: 0 | Refills: 0 | DISCHARGE
Start: 2023-03-15

## 2023-03-15 RX ADMIN — HEPARIN SODIUM 5000 UNIT(S): 5000 INJECTION INTRAVENOUS; SUBCUTANEOUS at 13:55

## 2023-03-15 RX ADMIN — Medication 325 MILLIGRAM(S): at 05:13

## 2023-03-15 RX ADMIN — Medication 2: at 17:51

## 2023-03-15 RX ADMIN — Medication 10 MILLIGRAM(S): at 12:39

## 2023-03-15 RX ADMIN — SACUBITRIL AND VALSARTAN 1 TABLET(S): 24; 26 TABLET, FILM COATED ORAL at 05:13

## 2023-03-15 RX ADMIN — HEPARIN SODIUM 5000 UNIT(S): 5000 INJECTION INTRAVENOUS; SUBCUTANEOUS at 05:12

## 2023-03-15 RX ADMIN — ISOSORBIDE MONONITRATE 30 MILLIGRAM(S): 60 TABLET, EXTENDED RELEASE ORAL at 05:12

## 2023-03-15 RX ADMIN — CALCITRIOL 0.25 MICROGRAM(S): 0.5 CAPSULE ORAL at 12:39

## 2023-03-15 RX ADMIN — Medication 50 MILLIGRAM(S): at 05:15

## 2023-03-15 RX ADMIN — Medication 7 UNIT(S): at 17:52

## 2023-03-15 RX ADMIN — Medication 7 UNIT(S): at 12:38

## 2023-03-15 RX ADMIN — POLYETHYLENE GLYCOL 3350 17 GRAM(S): 17 POWDER, FOR SOLUTION ORAL at 05:13

## 2023-03-15 RX ADMIN — Medication 81 MILLIGRAM(S): at 12:39

## 2023-03-15 RX ADMIN — Medication 667 MILLIGRAM(S): at 17:52

## 2023-03-15 RX ADMIN — Medication 325 MILLIGRAM(S): at 17:52

## 2023-03-15 RX ADMIN — Medication 1 PACKET(S): at 05:13

## 2023-03-15 RX ADMIN — ISOSORBIDE MONONITRATE 30 MILLIGRAM(S): 60 TABLET, EXTENDED RELEASE ORAL at 17:53

## 2023-03-15 RX ADMIN — Medication 50 MILLIGRAM(S): at 17:51

## 2023-03-15 RX ADMIN — Medication 1 DROP(S): at 12:37

## 2023-03-15 RX ADMIN — SACUBITRIL AND VALSARTAN 1 TABLET(S): 24; 26 TABLET, FILM COATED ORAL at 17:53

## 2023-03-15 RX ADMIN — Medication 10 UNIT(S): at 08:50

## 2023-03-15 RX ADMIN — INSULIN GLARGINE 25 UNIT(S): 100 INJECTION, SOLUTION SUBCUTANEOUS at 12:37

## 2023-03-15 RX ADMIN — Medication 2: at 12:38

## 2023-03-15 RX ADMIN — PANTOPRAZOLE SODIUM 40 MILLIGRAM(S): 20 TABLET, DELAYED RELEASE ORAL at 05:12

## 2023-03-15 RX ADMIN — Medication 1: at 08:50

## 2023-03-15 NOTE — PROGRESS NOTE ADULT - PROBLEM SELECTOR PROBLEM 3
HLD (hyperlipidemia)
Chronic systolic heart failure
HLD (hyperlipidemia)
CAD (coronary artery disease)
HLD (hyperlipidemia)
Chronic systolic heart failure
HLD (hyperlipidemia)
Stage 3 chronic kidney disease
Stage 3 chronic kidney disease
Chronic systolic heart failure
CAD (coronary artery disease)
Stage 3 chronic kidney disease
CAD (coronary artery disease)
Stage 3 chronic kidney disease
Chronic systolic heart failure
Stage 3 chronic kidney disease
Stage 3 chronic kidney disease
Chronic systolic heart failure
Chronic systolic heart failure
Stage 3 chronic kidney disease
Chronic systolic heart failure

## 2023-03-15 NOTE — PROGRESS NOTE ADULT - PROBLEM SELECTOR PLAN 10
[facility]  DM/DASH halal diet  PT erna BLAKE dc pending: plan for OR with NSY on Tuesday
[facility]  DM/DASH halal diet  dispo: rehab
[facility]  DM/DASH halal diet  dispo: rehab
[facility]  DM/DASH halal diet  dc pending: [facility] um[facility]or
[facility]  DM/DASH halal diet  dispo: rehab
[facility]  DM/DASH halal diet  PT erna BLAKE dc pending: plan for OR with NSY on Tuesday

## 2023-03-15 NOTE — PROGRESS NOTE ADULT - PROBLEM SELECTOR PROBLEM 6
Chronic systolic heart failure
Stage 3 chronic kidney disease
Stage 3 chronic kidney disease
Type 2 diabetes mellitus
Chronic systolic heart failure
Chronic systolic heart failure
Anemia
Chronic systolic heart failure
Stage 3 chronic kidney disease
Chronic systolic heart failure
Chronic systolic heart failure
Stage 3 chronic kidney disease
Stage 3 chronic kidney disease
Anemia
Stage 3 chronic kidney disease
Chronic systolic heart failure
Stage 3 chronic kidney disease

## 2023-03-15 NOTE — PROGRESS NOTE ADULT - SUBJECTIVE AND OBJECTIVE BOX
[facility]  Division of Hospital Medicine  Michelle Cho MD  Pager: [phone]      Patient is a 68y old  Male who presents with a chief complaint of Back pain (15 Mar 2023 10:09)      SUBJECTIVE / OVERNIGHT EVENTS: Patient reports he is constipated. and has difficulty sleeping. Open to enema   ADDITIONAL REVIEW OF SYSTEMS:    MEDICATIONS  (STANDING):  aspirin enteric coated 81 milliGRAM(s) Oral daily  atorvastatin 40 milliGRAM(s) Oral at bedtime  bisacodyl Suppository 10 milliGRAM(s) Rectal daily  calcitriol   Capsule 0.25 MICROGram(s) Oral daily  calcium acetate 667 milliGRAM(s) Oral with dinner  heparin   Injectable 5000 Unit(s) SubCutaneous every 8 hours  insulin glargine Injectable (LANTUS) 25 Unit(s) SubCutaneous <User Schedule>  insulin lispro (ADMELOG) corrective regimen sliding scale   SubCutaneous three times a day before meals  insulin lispro Injectable (ADMELOG) 10 Unit(s) SubCutaneous before breakfast  insulin lispro Injectable (ADMELOG) 7 Unit(s) SubCutaneous before lunch  insulin lispro Injectable (ADMELOG) 7 Unit(s) SubCutaneous before dinner  isosorbide    mononitrate Tablet (ISMO) 30 milliGRAM(s) Oral two times a day  melatonin 6 milliGRAM(s) Oral at bedtime  pantoprazole    Tablet 40 milliGRAM(s) Oral before breakfast  polyethylene glycol 3350 17 Gram(s) Oral two times a day  pregabalin 50 milliGRAM(s) Oral two times a day  psyllium Powder 1 Packet(s) Oral two times a day  sacubitril 24 mG/valsartan 26 mG 1 Tablet(s) Oral two times a day  senna 2 Tablet(s) Oral at bedtime  sodium bicarbonate 325 milliGRAM(s) Oral two times a day  sorbitol 70%/mineral oil/magnesium hydroxide/glycerin Enema 120 milliLiter(s) Rectal once  tamsulosin 0.4 milliGRAM(s) Oral at bedtime    MEDICATIONS  (PRN):  acetaminophen     Tablet .. 650 milliGRAM(s) Oral every 6 hours PRN Mild Pain (1 - 3)  aluminum hydroxide/magnesium hydroxide/simethicone Suspension 30 milliLiter(s) Oral every 6 hours PRN Dyspepsia  artificial  tears Solution 1 Drop(s) Both EYES every 4 hours PRN Dry Eyes  calcium carbonate    500 mG (Tums) Chewable 1 Tablet(s) Chew every 4 hours PRN Heartburn  hydrOXYzine hydrochloride 25 milliGRAM(s) Oral at bedtime PRN sleep  nitroglycerin     SubLingual 0.4 milliGRAM(s) SubLingual every 5 minutes PRN Chest Pain  ondansetron Injectable 4 milliGRAM(s) IV Push every 6 hours PRN Nausea and/or Vomiting  saline laxative (FLEET) Rectal Enema 1 Enema Rectal daily PRN no BM      CAPILLARY BLOOD GLUCOSE      POCT Blood Glucose.: 159 mg/dL (15 Mar 2023 08:47)  POCT Blood Glucose.: 147 mg/dL (15 Mar 2023 00:41)  POCT Blood Glucose.: 119 mg/dL (14 Mar 2023 19:42)  POCT Blood Glucose.: 110 mg/dL (14 Mar 2023 17:28)  POCT Blood Glucose.: 253 mg/dL (14 Mar 2023 12:50)    I&O's Summary    14 Mar 2023 07:01  -  15 Mar 2023 07:00  --------------------------------------------------------  IN: 740 mL / OUT: 3050 mL / NET: -2310 mL    15 Mar 2023 07:01  -  15 Mar 2023 11:34  --------------------------------------------------------  IN: 460 mL / OUT: 250 mL / NET: 210 mL        PHYSICAL EXAM:  Vital Signs Last 24 Hrs  T(C): 36.3 (15 Mar 2023 09:00), Max: 37.1 (14 Mar 2023 12:21)  T(F): 97.4 (15 Mar 2023 09:00), Max: 98.7 (14 Mar 2023 12:21)  HR: 85 (15 Mar 2023 09:00) (82 - 85)  BP: 108/53 (15 Mar 2023 09:00) (108/53 - 130/60)  BP(mean): --  RR: 17 (15 Mar 2023 09:00) (17 - 18)  SpO2: 96% (15 Mar 2023 09:00) (96% - 100%)    Parameters below as of 15 Mar 2023 09:00  Patient On (Oxygen Delivery Method): room air      CONSTITUTIONAL: Elderly man in  NAD, well-developed, well-groomed  RESPIRATORY: Normal respiratory effort; lungs are clear to auscultation bilaterally  CARDIOVASCULAR: Regular rate and rhythm, normal S1 and S2, no murmur/rub/gallop; No lower extremity edema; Peripheral pulses are 2+ bilaterally  ABDOMEN: Nontender to palpation, normoactive bowel sounds, no rebound/guarding; No hepatosplenomegaly  PSYCH: A+O to person, place, and time; affect appropriate  SKIN: No rashes; no palpable lesions    LABS:                        9.6    12.00 )-----------( 488      ( 15 Mar 2023 06:14 )             30.4     03-15    130<L>  |  98  |  32<H>  ----------------------------<  147<H>  5.2   |  22  |  1.59<H>    Ca    9.6      15 Mar 2023 06:14  Phos  3.4     03-15  Mg     2.00     03-15                  RADIOLOGY & ADDITIONAL TESTS:  Results Reviewed: < from: MR Lumbar Spine No Cont (03.13.23 @ 13:33) >  :   Revision of the lower lumbar posterior stabilization, with   pedicle screws removed from L4 and placed within the L3. The posterior   paraspinal appearance is routine noting upper extension of the L4   laminectomy.   The central canal at L4-L5 level demonstrates indistinct   hyperintensity suggesting hemorrhage or other debris within the thecal   sac, without apparent critical stenosis. Consider follow-up evaluation   with gadolinium-enhanced imaging to clarify.    < end of copied text >    Imaging Personally Reviewed:  Electrocardiogram Personally Reviewed:    COORDINATION OF CARE:  Care Discussed with Consultants/Other Providers [Y/N]:  Prior or Outpatient Records Reviewed [Y/N]:

## 2023-03-15 NOTE — PROGRESS NOTE ADULT - PROBLEM SELECTOR PLAN 1
-s/p OR on 2/28 with L4-L5 spinal fusion with neurosurgery  -MRI concerning for compressive fluid pocket at the surgical bed s/p OR on 3/6  -drain management per neurosurgery  -course complicated by b/l foot drop. Seen by neurology.   - PT/OT/PM&R following and recommending rehab  - c/w pain control per primary team and pain mgmt

## 2023-03-15 NOTE — PROGRESS NOTE ADULT - ASSESSMENT
69 yo M with DMII, CAD s/p CABG, CKD, chronic back pain, and HTN presents to the ED with worsening back pain.  He recently came from [facility] about 2 weeks ago and has " multiple medical complaints."  he told his son that he is feeling weak and his back pain is getting worse.   In the ED, his vitals were notable for hyperglycemia, elevated BUN/Scr, and hypomagnesemia. Endocrine called for DM 2, A1c 8.3    1. DM 2  A1c 8.3%  Home Regimen: Ligenta-M 500 (Linagliptin 2.5 and metformin 500mg) 1 tablet once daily, NovoRapid (insulin aspart) 14 units w/ lunch, NovoMix 30 insulin 14 units before breakfast, 14-16 units before dinner    While inpatient:  BG target 100-180 mg/dl  Patient was on Dexamethasone, last dose 3/6  Continue Lantus 25 units every morning (1100)   Increase Admelog to 12 units SQ before breakfast (Hold if NPO/skips meal)  Continue Ademlog 7 units SQ before lunch and dinner (Hold if NPO/skips meal)    Continue Admelog low dose correctional scale before meals, continue Admelog LOW dose scale at bedtime  Chek FS before meals and at bedtime  Hypoglycemia protocol    Carb Consistent Diet   Nutrition consult completed   Provider to RN for diabetes/insulin pen teaching, AB pharmacist following (see pharmacy intervention notes)     Discharge Plan:  Medicaid pending - currently uninsured   Likely stop prior outpatient regimen and continue with mixed insulin (70/30) only  Consider Novolog 70/30: doses TBD close to discharge: before breakfast and before dinner via pen (see pharmacy liaison note: patient can qualify for coupon), so could use insulin PENS if that is easier for patient  Rapid acting insulin should NOT be ordered in conjunction with mixed insulin for risk of hypoglycemia   Ensure he has glucometer, glucose test strips, lancets, alcohol swabs and insulin PEN needles vs syringes   Followup with [facility], [phone] [place] [address], [phone], [phone]    2. HTN  Goal BP in DM <130/80  Outpatient mc/cr ratio  Titration as per primary team     3. HLD  LDL 75 above goal; LDL <70   Continue Atorvastatin 40mg daily      Noemi Carlisle  Nurse Practitioner  Division of Endocrinology & Diabetes  In house pager #[phone]/long range pager #[phone]    If before 9AM or after 6PM, or on weekends/holidays, please call endocrine answering service for assistance ([phone]).  For nonurgent matters email [email or web address] for assistance.   67 yo M with DMII, CAD s/p CABG, CKD, chronic back pain, and HTN presents to the ED with worsening back pain.  He recently came from [facility] about 2 weeks ago and has " multiple medical complaints."  he told his son that he is feeling weak and his back pain is getting worse.   In the ED, his vitals were notable for hyperglycemia, elevated BUN/Scr, and hypomagnesemia. Endocrine called for DM 2, A1c 8.3    1. DM 2  A1c 8.3%  Home Regimen: Ligenta-M 500 (Linagliptin 2.5 and metformin 500mg) 1 tablet once daily, NovoRapid (insulin aspart) 14 units w/ lunch, NovoMix 30 insulin 14 units before breakfast, 14-16 units before dinner    While inpatient:  BG target 100-180 mg/dl  Patient was on Dexamethasone, last dose 3/6  Continue Lantus 25 units every morning (1100)   Increase Admelog to 12 units SQ before breakfast (Hold if NPO/skips meal)  Continue Ademlog 7 units SQ before lunch and dinner (Hold if NPO/skips meal)    Continue Admelog low dose correctional scale before meals, continue Admelog LOW dose scale at bedtime  Chek FS before meals and at bedtime  Hypoglycemia protocol    Carb Consistent Diet   Nutrition consult completed   Provider to RN for diabetes/insulin pen teaching, AB pharmacist following (see pharmacy intervention notes)     Discharge Plan:  Medicaid pending - currently uninsured   Likely stop prior outpatient regimen and continue with mixed insulin (70/30) only  Consider Novolog 70/30: doses TBD close to discharge: before breakfast and before dinner via pen (see pharmacy liaison note: patient can qualify for coupon), so could use insulin PENS if that is easier for patient  Rapid acting insulin should NOT be ordered in conjunction with mixed insulin for risk of hypoglycemia   Ensure he has glucometer, glucose test strips, lancets, alcohol swabs and insulin PEN needles vs syringes   Followup with [facility], [phone] [place] [address], [phone], [phone]    2. HTN  Goal BP in DM <130/80  Outpatient mc/cr ratio  Titration as per primary team     3. HLD  LDL 75 above goal; LDL <70   Continue Atorvastatin 40mg daily    Noemi Carlisle  Nurse Practitioner  Division of Endocrinology & Diabetes  In house pager #[phone]/long range pager #[phone]    If before 9AM or after 6PM, or on weekends/holidays, please call endocrine answering service for assistance ([phone]).  For nonurgent matters email [email or web address] for assistance.

## 2023-03-15 NOTE — PROGRESS NOTE ADULT - PROBLEM SELECTOR PLAN 5
- Na range 129-134 throughout hospitalization  - stable at 132
Cr 2.25 today, per chart review ranging from 1.8-2.3  - stable in baseline range  - trend daily  - avoid nephrotoxic agents and renally dose medications
Normocytic anemia likely 2/2 CKD  - Hgb remains stable  - continue to monitor
- Na range 129-134 throughout hospitalization  CTM
- Na range 129-134 throughout hospitalization  CTM
Normocytic anemia likely 2/2 CKD  - trend
- Na range 129-134 throughout hospitalization  - stable at 134
- Na range 129-134 throughout hospitalization  - stable at 134
Normocytic anemia likely 2/2 CKD  - trend
- Na range 129-134 throughout hospitalization  - stable at 132
Normocytic anemia likely 2/2 CKD  - trend
- per chart review ranging from 1.8-2.3  -SANDRA on CKD. Cr worse today at 2.4  - stop lasix and monitor   -if continues to rise, would stop entresto as well.   - trend daily  - avoid nephrotoxic agents and renally dose medications
Normocytic anemia likely 2/2 CKD  - trend
- Na range 129-134 throughout hospitalization  CTM

## 2023-03-15 NOTE — PROGRESS NOTE ADULT - PROBLEM SELECTOR PLAN 2
-on miralax BID  -Senna   -metamucil  -dulcolax   -Enema PRN  -monitor bowel function - reports no BM in 3 days   -monitor stool counts. Abd soft. No N/V.

## 2023-03-15 NOTE — PROGRESS NOTE ADULT - ASSESSMENT
67 yo M with DMII, CAD s/p CABG, CKD, chronic back pain, and HTN presents to the ED with worsening back pain. Patient is AAOX2 and somnolent, not able to provide any hx. Most of the information was supplemented by the son. As per son, pt is AAOx 3 and "mentally stable." He recently came from [facility] about 2 weeks ago and has " multiple medical complaints." But this morning he told his son that he is feeling weak and his back pain is getting worse. The pain is localized in the lower back region. Pain is worse when standing. Denies radiculopathy. Intermittent urinary and bowel incontinence for 1 year. Was admitted to a hospital in [facility] in Aug 2022 for pain, MRI was completed that showed a L4-5 spondylolisthesis. Patient was offered surgery but told he has too may medical issues and discharged him.   Patient here here visiting his son when back pain exacerbated and came to ER for evaluation with his MRI on paper films     MRI reviewed, L4-5 disk dessication and cauda equina compression , Right grade II and Left Grade I spondylolisthesis     3/1; s/p L4-5 TLIF pod #1, HMV drain, requiring bola post op for bp support, albumin given x 1, sicu consulted, neuro exam stable  3/2: POD # 2 S/P L4-5 TLIF, 1 HMV in place. Off phenylepherine. CT lumbar spine, screws ok  3/3: POD # 3 S/P L4-5 TLIF. Having episodes of chest pain with elevated troponins, cardiology evaluating. Will transfer to telemetry floor, serial CE and EKG. 1 HMV in place  3/4: POD # 4 S/P L4-5 TLIF. No more chest pain. HMV in place. Seen by endocrine for DM management  3/5: POD # 5 S/P L4-5 TLIF. No more chest pain. HMV in place. Increased back pain with radiation to legs, MRI ordered obtained by evening with anesthesia, possible ventral epidural collection. Imaging reviewed with Dr. Avalos  3/6: pod #6 s/p L4-5 TLIF, HMV in place, mri L/s w/ sed done no major compressive hematoma, enlarged neurogenic bladder, crowder placed  3/7: PAtient noted to have new Left food drop, s/p emergent RTOR for Lef. foot drop  cement augmentation for L4 pedicle fx 2/2 to screw pod #1, post op exam stable , HMV, 2JPs's crowder, CT L/s done- screws ok, needs brace  3/9-11: Stable exam POD # 4 S/P cement augmentation for L4 pedicle fx 2/2 to screw. HMV X 1, JOVITA X 1. MRI L spine ordered. Episode of vomiting on 3/10, AXR ordered  3/12: Superficial HMV removed, Exam stable, MR pending, pain improved  1/13: Stable exam. 1 JOVITA in place. Sedated MRI done  3/14: PT, d/c planning, 1 JOVITA in placce  3/15: Stable. JOVITA D/C'd. Await rehab placement

## 2023-03-15 NOTE — PROGRESS NOTE ADULT - PROBLEM SELECTOR PROBLEM 1
Back pain
S/P lumbar spinal fusion
S/P lumbar spinal fusion
Spondylolisthesis at L4-L5 level
Type 2 diabetes mellitus
Back pain
S/P lumbar spinal fusion
Type 2 diabetes mellitus
Type 2 diabetes mellitus
Back pain
S/P lumbar spinal fusion
Back pain
S/P lumbar spinal fusion
Type 2 diabetes mellitus
S/P lumbar spinal fusion
S/P lumbar spinal fusion
Spondylolisthesis at L4-L5 level
Type 2 diabetes mellitus
S/P lumbar spinal fusion
S/P lumbar spinal fusion
Type 2 diabetes mellitus
Back pain

## 2023-03-15 NOTE — PROGRESS NOTE ADULT - PROBLEM SELECTOR PROBLEM 4
CAD (coronary artery disease)
Hyponatremia
Type 2 diabetes mellitus
CAD (coronary artery disease)
CAD (coronary artery disease)
Stage 3 chronic kidney disease
Type 2 diabetes mellitus
Type 2 diabetes mellitus
CAD (coronary artery disease)
Type 2 diabetes mellitus
CAD (coronary artery disease)
Hyponatremia
Type 2 diabetes mellitus
Type 2 diabetes mellitus
CAD (coronary artery disease)
Type 2 diabetes mellitus
CAD (coronary artery disease)

## 2023-03-15 NOTE — PROGRESS NOTE ADULT - NUTRITIONAL ASSESSMENT
This patient has been assessed with a concern for Malnutrition and has been determined to have a diagnosis/diagnoses of Moderate protein-calorie malnutrition.    This patient is being managed with:   Diet Regular-  Consistent Carbohydrate {Evening Snack} (CSTCHOSN)  Entered: Mar  7 2023 10:19AM    
This patient has been assessed with a concern for Malnutrition and has been determined to have a diagnosis/diagnoses of Moderate protein-calorie malnutrition.    This patient is being managed with:   Diet Regular-  Consistent Carbohydrate {Evening Snack} (CSTCHOSN)  Entered: Mar  7 2023 10:19AM    
This patient has been assessed with a concern for Malnutrition and has been determined to have a diagnosis/diagnoses of Moderate protein-calorie malnutrition.    This patient is being managed with:   Diet NPO after Midnight-     NPO Start Date: 12-Mar-2023   NPO Start Time: 23:59  Except Medications  Entered: Mar 12 2023  8:56AM    Diet Regular-  Consistent Carbohydrate {Evening Snack} (CSTCHOSN)  Entered: Mar  7 2023 10:19AM    
This patient has been assessed with a concern for Malnutrition and has been determined to have a diagnosis/diagnoses of Moderate protein-calorie malnutrition.    This patient is being managed with:   Diet NPO after Midnight-     NPO Start Date: 12-Mar-2023   NPO Start Time: 23:59  Except Medications  Entered: Mar 12 2023  8:56AM    Diet Regular-  Consistent Carbohydrate {Evening Snack} (CSTCHOSN)  Entered: Mar  7 2023 10:19AM    
This patient has been assessed with a concern for Malnutrition and has been determined to have a diagnosis/diagnoses of Moderate protein-calorie malnutrition.    This patient is being managed with:   Diet Regular-  Consistent Carbohydrate {Evening Snack} (CSTCHOSN)  Entered: Mar  7 2023 10:19AM    

## 2023-03-15 NOTE — PROGRESS NOTE ADULT - SUBJECTIVE AND OBJECTIVE BOX
Patient is a 68y old  Male who presents with a chief complaint of Back pain (15 Mar 2023 02:04)      HPI:  69 yo M with DMII, CAD s/p CABG, CKD, chronic back pain, and HTN presents to the ED with worsening back pain. Patient is AAOX2 and somnolent, not able to provide any hx. Most of the information was supplemented by the son. As per son, pt is AAOx 3 and "mentally stable." He recently came from [facility] about 2 weeks ago and has " multiple medical complaints." But this morning he told his son that he is feeling weak and his back pain is getting worse. The pain is localized in the lower back region. It is burning is sensation and constant. Unclear if it radiates down the leg. It worsens with movement and walking. He can only walk "10 meters" with a cane. He is also incontinent of urine but denies any recent fever, chills, bowel incontinence, or lower extremities weakness/motor deficits. He reportedly had MRIs in the past but son does not know what it showed. He also has other chronic medical problems such as CKD, DMII and CAD and his son thinks pt needs management for them as well.   In the ED, his vitals were notable for hyperglycemia, elevated BUN/Scr, and hypomagnesemia. EKG showed bradycardia with T wave changes in the lateral leads.  (21 Feb 2023 18:36)    wearing tlso  has afos at bedside but does not have shoes    REVIEW OF SYSTEMS  weakness  sensory changes    PAST MEDICAL & SURGICAL HISTORY  Diabetes mellitus    Essential hypertension    CAD (coronary artery disease)    Chronic kidney disease, unspecified CKD stage    S/P CABG x 2      CURRENT FUNCTIONAL STATUS  transfers: min assist  ambulated with mod assist approx 15 ft    FAMILY HISTORY   FH: heart disease      RECENT LABS/IMAGING  < from: MR Lumbar Spine No Cont (03.13.23 @ 13:33) >    ACC: [id number] EXAM:  MR SPINE LUMBAR   ORDERED BY: MERCED COMBS     PROCEDURE DATE:  03/13/2023          INTERPRETATION:  MR lumbar spine without gadolinium contrast    CLINICAL INFORMATION:    Bilateral foot drop  ADDITIONAL CLINICAL INFORMATION:Not Applicable      TECHNIQUE:   Sagittal and axial T2-weighted images, sagittal and axial   T1-weighted and sagittal STIR images of the lumbar spine were obtained.  CONTRAST AGENT:    NONE    COMPARISON:    MR lumbar 3/5/2023    FINDINGS:    OSSEOUSSTRUCTURES AND ALIGNMENT:    Lumbar stabilization has been modified. Pedicle screws have been placed   within L3 on each side and removed from L4. L5 screws are retained. These   are secured by vertical rods. This posterior stabilization secures grade   1 anterolisthesis L4 on L5, unchanged. This hardware causes   susceptibility artifact partially obscuring adjacent structures. Allowing   for this artifact, the hardware appears intact and appropriate in   position. The L4 laminectomy was extended. The posterior paraspinal soft   tissues have a routine appearance. There is a small subcutaneous fluid   collection at the L3-L5 vertebral level. Spacing device is again noted at   L4-L5. This device also appears intact and appropriate in position. There   is some mild edema at the L5 superior endplate within the L3 and L5   central indistinct regions of low signal intensity are consistent with   vertebroplasty material. The central canal at L4-L5 is indistinct, with   nerve roots of cauda equina are not well discriminated. Posterior to the   L3 vertebra the dura is elevated at its inferior endplate at the location   of previous upward L4-L5 disc extrusion, somewhat less prominent than on   previous. The L5-S1 level remains intact without central canal or   foraminal compromise. The exiting S1 nerve roots and more distal sacral   nerve roots appear intact. The thecal sac is attenuated with epidural fat   surrounding the exiting sacral nerve roots.    Above this lumbar vertebral alignmentis preserved. The remaining prior   lumbar vertebral body heights are maintained.  Marrow signal intensity   within lumbar vertebra is intact.   No osseous expansion or epidural   disease is identified.  SACRUM/SACROILIAC JOINTS/VISUALIZED PELVIC BONES:  The sacrum and visualized pelvic bones appear intact.  The sacroiliac   joints are incompletely visualized, as seen intact.    PARASPINAL SOFT TISSUES:    No lesion is identified.  VISUALIZED ABDOMINAL AND PELVIC SOFT TISSUES:   No lesion is identified.    LOWER THORACIC SPINE:  Levels adequately evaluated:   T11 and T12  No significant abnormality in the visualized lower thoracic spine    LUMBAR INTERVERTEBRAL DISC LEVELS:    General comments:   Lumbar intervertebral discs T12-L1 through L3-L4   preserved height and signal intensity.    T12-L1:   No central canal or foraminal stenosis.  L1-L2:    No central canal or foraminal stenosis.  L2-L3:    No central canal or foraminal stenosis.  L3-L4:    No central canal or foraminal stenosis.    L4-L5:    See above  L5-S1:   See above    CNS STRUCTURES:  The distal cord maintains intact morphology and signal intensity.  The   conus is  normally positioned at L1.   Nerve roots of the cauda equina   appear intact.      IMPRESSION:   Revision of the lower lumbar posterior stabilization, with   pedicle screws removed from L4 and placed within the L3. The posterior   paraspinal appearance is routine noting upper extension of the L4   laminectomy.   The central canal at L4-L5 level demonstrates indistinct   hyperintensity suggesting hemorrhage or other debris within the thecal   sac, without apparent critical stenosis. Consider follow-up evaluation   with gadolinium-enhanced imaging to clarify.    --- End of Report ---    < end of copied text >    CBC Full  -  ( 15 Mar 2023 06:14 )  WBC Count : 12.00 K/uL  RBC Count : 3.26 M/uL  Hemoglobin : 9.6 g/dL  Hematocrit : 30.4 %  Platelet Count - Automated : 488 K/uL  Mean Cell Volume : 93.3 fL  Mean Cell Hemoglobin : 29.4 pg  Mean Cell Hemoglobin Concentration : 31.6 gm/dL  Auto Neutrophil # : x  Auto Lymphocyte # : x  Auto Monocyte # : x  Auto Eosinophil # : x  Auto Basophil # : x  Auto Neutrophil % : x  Auto Lymphocyte % : x  Auto Monocyte % : x  Auto Eosinophil % : x  Auto Basophil % : x    03-15    130<L>  |  98  |  32<H>  ----------------------------<  147<H>  5.2   |  22  |  1.59<H>    Ca    9.6      15 Mar 2023 06:14  Phos  3.4     03-15  Mg     2.00     03-15          VITALS  T(C): 36.3 (03-15-23 @ 09:00), Max: 37.1 (03-14-23 @ 12:21)  HR: 85 (03-15-23 @ 09:00) (82 - 85)  BP: 108/53 (03-15-23 @ 09:00) (108/53 - 130/60)  RR: 17 (03-15-23 @ 09:00) (17 - 18)  SpO2: 96% (03-15-23 @ 09:00) (96% - 100%)  Wt(kg): --    ALLERGIES  No Known Allergies      MEDICATIONS   acetaminophen     Tablet .. 650 milliGRAM(s) Oral every 6 hours PRN  aluminum hydroxide/magnesium hydroxide/simethicone Suspension 30 milliLiter(s) Oral every 6 hours PRN  artificial  tears Solution 1 Drop(s) Both EYES every 4 hours PRN  aspirin enteric coated 81 milliGRAM(s) Oral daily  atorvastatin 40 milliGRAM(s) Oral at bedtime  bisacodyl Suppository 10 milliGRAM(s) Rectal daily  calcitriol   Capsule 0.25 MICROGram(s) Oral daily  calcium acetate 667 milliGRAM(s) Oral with dinner  calcium carbonate    500 mG (Tums) Chewable 1 Tablet(s) Chew every 4 hours PRN  heparin   Injectable 5000 Unit(s) SubCutaneous every 8 hours  hydrOXYzine hydrochloride 25 milliGRAM(s) Oral at bedtime PRN  insulin glargine Injectable (LANTUS) 25 Unit(s) SubCutaneous <User Schedule>  insulin lispro (ADMELOG) corrective regimen sliding scale   SubCutaneous three times a day before meals  insulin lispro Injectable (ADMELOG) 7 Unit(s) SubCutaneous before lunch  insulin lispro Injectable (ADMELOG) 7 Unit(s) SubCutaneous before dinner  insulin lispro Injectable (ADMELOG) 10 Unit(s) SubCutaneous before breakfast  isosorbide    mononitrate Tablet (ISMO) 30 milliGRAM(s) Oral two times a day  melatonin 6 milliGRAM(s) Oral at bedtime  nitroglycerin     SubLingual 0.4 milliGRAM(s) SubLingual every 5 minutes PRN  ondansetron Injectable 4 milliGRAM(s) IV Push every 6 hours PRN  pantoprazole    Tablet 40 milliGRAM(s) Oral before breakfast  polyethylene glycol 3350 17 Gram(s) Oral two times a day  pregabalin 50 milliGRAM(s) Oral two times a day  psyllium Powder 1 Packet(s) Oral two times a day  sacubitril 24 mG/valsartan 26 mG 1 Tablet(s) Oral two times a day  saline laxative (FLEET) Rectal Enema 1 Enema Rectal daily PRN  senna 2 Tablet(s) Oral at bedtime  sodium bicarbonate 325 milliGRAM(s) Oral two times a day  tamsulosin 0.4 milliGRAM(s) Oral at bedtime      ----------------------------------------------------------------------------------------  PHYSICAL EXAM  Constitutional - NAD, vel MILLER - NCAT, EOMI   Chest - no respiratory distress  Cardiovascular - s1 s2  Abdomen -  Soft, NTND  Extremities - No C/C/E, No calf tenderness   Neurologic Exam -                    Cognitive - Awake, Alert, AAO to self, place, date, year, situation     Communication - Fluent, No dysarthria     Cranial Nerves - CN 2-12 intact     Motor -                      LEFT    UE - ShAB 5/5, EF 5/5, EE 5/5, WE 5/5,  5/5                    RIGHT UE - ShAB 5/5, EF 5/5, EE 5/5, WE 5/5,  5/5                    LEFT    LE - HF 4-/5, KE 3/5, DF 0/5, PF 0/5                    RIGHT LE - HF 4-/5, KE 3/5, DF 0/5, PF 0/5     Sensory - impaired sensation b/l LE      Balance - WNL Static  Psychiatric - Mood stable, Affect WNL  ----------------------------------------------------------------------------------------  ASSESSMENT/PLAN  69 yo m s/p TLIF  vanessa drain removed  MRI done 3/13, recommended consider follow up imaging with gayathri  Pain - oxy ir prn, bowel regimen  turn and position q 2 to prevent skin breakdown  DVT PPX - heparin  continue bedside PT and OT   Rehab -   recommend acute inpatient rehab when medically cleared, can tolerate 3 hours per day with medical supervision Patient is a 68y old  Male who presents with a chief complaint of Back pain (15 Mar 2023 02:04)      HPI:  69 yo M with DMII, CAD s/p CABG, CKD, chronic back pain, and HTN presents to the ED with worsening back pain. Patient is AAOX2 and somnolent, not able to provide any hx. Most of the information was supplemented by the son. As per son, pt is AAOx 3 and "mentally stable." He recently came from [facility] about 2 weeks ago and has " multiple medical complaints." But this morning he told his son that he is feeling weak and his back pain is getting worse. The pain is localized in the lower back region. It is burning is sensation and constant. Unclear if it radiates down the leg. It worsens with movement and walking. He can only walk "10 meters" with a cane. He is also incontinent of urine but denies any recent fever, chills, bowel incontinence, or lower extremities weakness/motor deficits. He reportedly had MRIs in the past but son does not know what it showed. He also has other chronic medical problems such as CKD, DMII and CAD and his son thinks pt needs management for them as well.   In the ED, his vitals were notable for hyperglycemia, elevated BUN/Scr, and hypomagnesemia. EKG showed bradycardia with T wave changes in the lateral leads.  (21 Feb 2023 18:36)    wearing tlso  has afos at bedside but does not have shoes    REVIEW OF SYSTEMS  weakness  sensory changes    PAST MEDICAL & SURGICAL HISTORY  Diabetes mellitus    Essential hypertension    CAD (coronary artery disease)    Chronic kidney disease, unspecified CKD stage    S/P CABG x 2      CURRENT FUNCTIONAL STATUS  transfers: min assist  ambulated with mod assist approx 15 ft    FAMILY HISTORY   FH: heart disease      RECENT LABS/IMAGING  < from: MR Lumbar Spine No Cont (03.13.23 @ 13:33) >    ACC: [id number] EXAM:  MR SPINE LUMBAR   ORDERED BY: MERCED COMBS     PROCEDURE DATE:  03/13/2023          INTERPRETATION:  MR lumbar spine without gadolinium contrast    CLINICAL INFORMATION:    Bilateral foot drop  ADDITIONAL CLINICAL INFORMATION:Not Applicable      TECHNIQUE:   Sagittal and axial T2-weighted images, sagittal and axial   T1-weighted and sagittal STIR images of the lumbar spine were obtained.  CONTRAST AGENT:    NONE    COMPARISON:    MR lumbar 3/5/2023    FINDINGS:    OSSEOUSSTRUCTURES AND ALIGNMENT:    Lumbar stabilization has been modified. Pedicle screws have been placed   within L3 on each side and removed from L4. L5 screws are retained. These   are secured by vertical rods. This posterior stabilization secures grade   1 anterolisthesis L4 on L5, unchanged. This hardware causes   susceptibility artifact partially obscuring adjacent structures. Allowing   for this artifact, the hardware appears intact and appropriate in   position. The L4 laminectomy was extended. The posterior paraspinal soft   tissues have a routine appearance. There is a small subcutaneous fluid   collection at the L3-L5 vertebral level. Spacing device is again noted at   L4-L5. This device also appears intact and appropriate in position. There   is some mild edema at the L5 superior endplate within the L3 and L5   central indistinct regions of low signal intensity are consistent with   vertebroplasty material. The central canal at L4-L5 is indistinct, with   nerve roots of cauda equina are not well discriminated. Posterior to the   L3 vertebra the dura is elevated at its inferior endplate at the location   of previous upward L4-L5 disc extrusion, somewhat less prominent than on   previous. The L5-S1 level remains intact without central canal or   foraminal compromise. The exiting S1 nerve roots and more distal sacral   nerve roots appear intact. The thecal sac is attenuated with epidural fat   surrounding the exiting sacral nerve roots.    Above this lumbar vertebral alignmentis preserved. The remaining prior   lumbar vertebral body heights are maintained.  Marrow signal intensity   within lumbar vertebra is intact.   No osseous expansion or epidural   disease is identified.  SACRUM/SACROILIAC JOINTS/VISUALIZED PELVIC BONES:  The sacrum and visualized pelvic bones appear intact.  The sacroiliac   joints are incompletely visualized, as seen intact.    PARASPINAL SOFT TISSUES:    No lesion is identified.  VISUALIZED ABDOMINAL AND PELVIC SOFT TISSUES:   No lesion is identified.    LOWER THORACIC SPINE:  Levels adequately evaluated:   T11 and T12  No significant abnormality in the visualized lower thoracic spine    LUMBAR INTERVERTEBRAL DISC LEVELS:    General comments:   Lumbar intervertebral discs T12-L1 through L3-L4   preserved height and signal intensity.    T12-L1:   No central canal or foraminal stenosis.  L1-L2:    No central canal or foraminal stenosis.  L2-L3:    No central canal or foraminal stenosis.  L3-L4:    No central canal or foraminal stenosis.    L4-L5:    See above  L5-S1:   See above    CNS STRUCTURES:  The distal cord maintains intact morphology and signal intensity.  The   conus is  normally positioned at L1.   Nerve roots of the cauda equina   appear intact.      IMPRESSION:   Revision of the lower lumbar posterior stabilization, with   pedicle screws removed from L4 and placed within the L3. The posterior   paraspinal appearance is routine noting upper extension of the L4   laminectomy.   The central canal at L4-L5 level demonstrates indistinct   hyperintensity suggesting hemorrhage or other debris within the thecal   sac, without apparent critical stenosis. Consider follow-up evaluation   with gadolinium-enhanced imaging to clarify.    --- End of Report ---    < end of copied text >    CBC Full  -  ( 15 Mar 2023 06:14 )  WBC Count : 12.00 K/uL  RBC Count : 3.26 M/uL  Hemoglobin : 9.6 g/dL  Hematocrit : 30.4 %  Platelet Count - Automated : 488 K/uL  Mean Cell Volume : 93.3 fL  Mean Cell Hemoglobin : 29.4 pg  Mean Cell Hemoglobin Concentration : 31.6 gm/dL  Auto Neutrophil # : x  Auto Lymphocyte # : x  Auto Monocyte # : x  Auto Eosinophil # : x  Auto Basophil # : x  Auto Neutrophil % : x  Auto Lymphocyte % : x  Auto Monocyte % : x  Auto Eosinophil % : x  Auto Basophil % : x    03-15    130<L>  |  98  |  32<H>  ----------------------------<  147<H>  5.2   |  22  |  1.59<H>    Ca    9.6      15 Mar 2023 06:14  Phos  3.4     03-15  Mg     2.00     03-15          VITALS  T(C): 36.3 (03-15-23 @ 09:00), Max: 37.1 (03-14-23 @ 12:21)  HR: 85 (03-15-23 @ 09:00) (82 - 85)  BP: 108/53 (03-15-23 @ 09:00) (108/53 - 130/60)  RR: 17 (03-15-23 @ 09:00) (17 - 18)  SpO2: 96% (03-15-23 @ 09:00) (96% - 100%)  Wt(kg): --    ALLERGIES  No Known Allergies      MEDICATIONS   acetaminophen     Tablet .. 650 milliGRAM(s) Oral every 6 hours PRN  aluminum hydroxide/magnesium hydroxide/simethicone Suspension 30 milliLiter(s) Oral every 6 hours PRN  artificial  tears Solution 1 Drop(s) Both EYES every 4 hours PRN  aspirin enteric coated 81 milliGRAM(s) Oral daily  atorvastatin 40 milliGRAM(s) Oral at bedtime  bisacodyl Suppository 10 milliGRAM(s) Rectal daily  calcitriol   Capsule 0.25 MICROGram(s) Oral daily  calcium acetate 667 milliGRAM(s) Oral with dinner  calcium carbonate    500 mG (Tums) Chewable 1 Tablet(s) Chew every 4 hours PRN  heparin   Injectable 5000 Unit(s) SubCutaneous every 8 hours  hydrOXYzine hydrochloride 25 milliGRAM(s) Oral at bedtime PRN  insulin glargine Injectable (LANTUS) 25 Unit(s) SubCutaneous <User Schedule>  insulin lispro (ADMELOG) corrective regimen sliding scale   SubCutaneous three times a day before meals  insulin lispro Injectable (ADMELOG) 7 Unit(s) SubCutaneous before lunch  insulin lispro Injectable (ADMELOG) 7 Unit(s) SubCutaneous before dinner  insulin lispro Injectable (ADMELOG) 10 Unit(s) SubCutaneous before breakfast  isosorbide    mononitrate Tablet (ISMO) 30 milliGRAM(s) Oral two times a day  melatonin 6 milliGRAM(s) Oral at bedtime  nitroglycerin     SubLingual 0.4 milliGRAM(s) SubLingual every 5 minutes PRN  ondansetron Injectable 4 milliGRAM(s) IV Push every 6 hours PRN  pantoprazole    Tablet 40 milliGRAM(s) Oral before breakfast  polyethylene glycol 3350 17 Gram(s) Oral two times a day  pregabalin 50 milliGRAM(s) Oral two times a day  psyllium Powder 1 Packet(s) Oral two times a day  sacubitril 24 mG/valsartan 26 mG 1 Tablet(s) Oral two times a day  saline laxative (FLEET) Rectal Enema 1 Enema Rectal daily PRN  senna 2 Tablet(s) Oral at bedtime  sodium bicarbonate 325 milliGRAM(s) Oral two times a day  tamsulosin 0.4 milliGRAM(s) Oral at bedtime      ----------------------------------------------------------------------------------------  PHYSICAL EXAM  Constitutional - NAD, vel MILLER - NCAT, EOMI   Chest - no respiratory distress  Cardiovascular - s1 s2  Abdomen -  Soft, NTND  Extremities - No C/C/E, No calf tenderness   Neurologic Exam -                    Cognitive - Awake, Alert, AAO to self, place, date, year, situation     Communication - Fluent, No dysarthria     Cranial Nerves - CN 2-12 intact     Motor -                      LEFT    UE - ShAB 5/5, EF 5/5, EE 5/5, WE 5/5,  5/5                    RIGHT UE - ShAB 5/5, EF 5/5, EE 5/5, WE 5/5,  5/5                    LEFT    LE - HF 4-/5, KE 4/5, DF 0/5, PF 0/5                    RIGHT LE - HF 4-/5, KE 4/5, DF 0/5, PF 0/5     Sensory - impaired sensation b/l LE      Balance - WNL Static  Psychiatric - Mood stable, Affect WNL  ----------------------------------------------------------------------------------------  ASSESSMENT/PLAN  69 yo m s/p TLIF  vanessa drain removed  MRI done 3/13, recommended to consider follow up imaging with gayathri  Pain - oxy ir prn, bowel regimen  turn and position q 2 to prevent skin breakdown  DVT PPX - heparin  continue bedside PT and OT   Rehab -   recommend acute inpatient rehab when medically cleared, can tolerate 3 hours per day with medical supervision

## 2023-03-15 NOTE — DISCHARGE NOTE NURSING/CASE MANAGEMENT/SOCIAL WORK - PATIENT PORTAL LINK FT
You can access the FollowMyHealth Patient Portal offered by [facility] by registering at the following website: [email or web address]. By joining [employer]’s FollowMyHealth portal, you will also be able to view your health information using other applications (apps) compatible with our system.

## 2023-03-15 NOTE — PROGRESS NOTE ADULT - REASON FOR ADMISSION
Back pain
21-Sep-2018 20:35
Back pain
Back pain

## 2023-03-15 NOTE — PROGRESS NOTE ADULT - PROBLEM SELECTOR PROBLEM 2
HTN (hypertension)
HTN (hypertension)
Acute encephalopathy
HTN (hypertension)
Acute encephalopathy
Ileus, postoperative
HTN (hypertension)
Chest pain
Ileus, postoperative
Chronic systolic heart failure
Chronic systolic heart failure
Acute encephalopathy
Acute encephalopathy
Chronic systolic heart failure
Ileus, postoperative
Acute encephalopathy
Acute encephalopathy
Ileus, postoperative
Acute encephalopathy
Ileus, postoperative

## 2023-03-15 NOTE — DISCHARGE NOTE NURSING/CASE MANAGEMENT/SOCIAL WORK - NSDCPEFALRISK_GEN_ALL_CORE
For information on Fall & Injury Prevention, visit: [email or web address] OR  [email or web address] OR  https://www.cdc.gov/steadi/patient.html

## 2023-03-15 NOTE — PROGRESS NOTE ADULT - ASSESSMENT
68M Danish speaking, HTN, DM2 on insulin, CKD3, systolic HF (EF 45%), CAD s/p CABG 2005, RSA s/p L renal stent, and PAD s/p PTA to R common iliac and L common iliac s/p stent who presents to the ED with worsening back pain in context of known L4-L5 cord compression/cauda equina since 8/2022. now s/p l4-L5 spinal fusion by neurosurgery; Medicine following for comanagement.

## 2023-03-15 NOTE — PROGRESS NOTE ADULT - SUBJECTIVE AND OBJECTIVE BOX
HPI:  69 yo M with DMII, CAD s/p CABG, CKD, chronic back pain, and HTN presents to the ED with worsening back pain. Patient is AAOX2 and somnolent, not able to provide any hx. Most of the information was supplemented by the son. As per son, pt is AAOx 3 and "mentally stable." He recently came from [facility] about 2 weeks ago and has " multiple medical complaints." But this morning he told his son that he is feeling weak and his back pain is getting worse. The pain is localized in the lower back region. It is burning is sensation and constant. Unclear if it radiates down the leg. It worsens with movement and walking. He can only walk "10 meters" with a cane. He is also incontinent of urine but denies any recent fever, chills, bowel incontinence, or lower extremities weakness/motor deficits. He reportedly had MRIs in the past but son does not know what it showed. He also has other chronic medical problems such as CKD, DMII and CAD and his son thinks pt needs management for them as well.   In the ED, his vitals were notable for hyperglycemia, elevated BUN/Scr, and hypomagnesemia. EKG showed bradycardia with T wave changes in the lateral leads.  (21 Feb 2023 18:36)    No issues overnight  Vital Signs Last 24 Hrs  T(C): 36.4 (14 Mar 2023 17:35), Max: 37.1 (14 Mar 2023 12:21)  T(F): 97.6 (14 Mar 2023 17:35), Max: 98.7 (14 Mar 2023 12:21)  HR: 82 (14 Mar 2023 17:35) (63 - 82)  BP: 129/55 (14 Mar 2023 17:35) (116/52 - 141/51)  BP(mean): --  RR: 17 (14 Mar 2023 17:35) (16 - 18)  SpO2: 100% (14 Mar 2023 17:35) (99% - 100%)    Parameters below as of 14 Mar 2023 17:35  Patient On (Oxygen Delivery Method): room air    AAO X 3  PERRLA, EOMI  Face symmetric  bilateral UE 5/5  LE proximally 5/5 distally Right DF 0/5 PF 1/5  Left PF 4/5 DF 0/5    MEDICATIONS  (STANDING):  aspirin enteric coated 81 milliGRAM(s) Oral daily  atorvastatin 40 milliGRAM(s) Oral at bedtime  bisacodyl Suppository 10 milliGRAM(s) Rectal daily  calcitriol   Capsule 0.25 MICROGram(s) Oral daily  calcium acetate 667 milliGRAM(s) Oral with dinner  heparin   Injectable 5000 Unit(s) SubCutaneous every 8 hours  insulin glargine Injectable (LANTUS) 25 Unit(s) SubCutaneous <User Schedule>  insulin lispro (ADMELOG) corrective regimen sliding scale   SubCutaneous three times a day before meals  insulin lispro Injectable (ADMELOG) 7 Unit(s) SubCutaneous before lunch  insulin lispro Injectable (ADMELOG) 7 Unit(s) SubCutaneous before dinner  insulin lispro Injectable (ADMELOG) 10 Unit(s) SubCutaneous before breakfast  isosorbide    mononitrate Tablet (ISMO) 30 milliGRAM(s) Oral two times a day  melatonin 6 milliGRAM(s) Oral at bedtime  pantoprazole    Tablet 40 milliGRAM(s) Oral before breakfast  polyethylene glycol 3350 17 Gram(s) Oral two times a day  pregabalin 50 milliGRAM(s) Oral two times a day  psyllium Powder 1 Packet(s) Oral two times a day  sacubitril 24 mG/valsartan 26 mG 1 Tablet(s) Oral two times a day  senna 2 Tablet(s) Oral at bedtime  sodium bicarbonate 325 milliGRAM(s) Oral two times a day  tamsulosin 0.4 milliGRAM(s) Oral at bedtime    MEDICATIONS  (PRN):  acetaminophen     Tablet .. 650 milliGRAM(s) Oral every 6 hours PRN Mild Pain (1 - 3)  aluminum hydroxide/magnesium hydroxide/simethicone Suspension 30 milliLiter(s) Oral every 6 hours PRN Dyspepsia  artificial  tears Solution 1 Drop(s) Both EYES every 4 hours PRN Dry Eyes  calcium carbonate    500 mG (Tums) Chewable 1 Tablet(s) Chew every 4 hours PRN Heartburn  hydrOXYzine hydrochloride 25 milliGRAM(s) Oral at bedtime PRN sleep  nitroglycerin     SubLingual 0.4 milliGRAM(s) SubLingual every 5 minutes PRN Chest Pain  ondansetron Injectable 4 milliGRAM(s) IV Push every 6 hours PRN Nausea and/or Vomiting  oxyCODONE    IR 5 milliGRAM(s) Oral every 4 hours PRN Moderate Pain (4 - 6)  oxyCODONE    IR 10 milliGRAM(s) Oral every 4 hours PRN Severe Pain (7 - 10)  saline laxative (FLEET) Rectal Enema 1 Enema Rectal daily PRN no BM                          9.3    10.40 )-----------( 506      ( 14 Mar 2023 06:50 )             28.2     03-14    132<L>  |  99  |  33<H>  ----------------------------<  130<H>  4.9   |  19<L>  |  1.49<H>    Ca    9.4      14 Mar 2023 06:50  Phos  3.2     03-14  Mg     2.30     03-14

## 2023-03-15 NOTE — PROGRESS NOTE ADULT - SUBJECTIVE AND OBJECTIVE BOX
Chief Complaint: DM2    History: Patient seen at the bedside. Patient had a toast, porridge and milk for breakfast. C/o constipation, no BM for 4 days. Patient denies nausea, vomiting, no hypoglycemia.  before breakfast, 229 before lunch.     MEDICATIONS  (STANDING):  aspirin enteric coated 81 milliGRAM(s) Oral daily  atorvastatin 40 milliGRAM(s) Oral at bedtime  bisacodyl Suppository 10 milliGRAM(s) Rectal daily  calcitriol   Capsule 0.25 MICROGram(s) Oral daily  calcium acetate 667 milliGRAM(s) Oral with dinner  heparin   Injectable 5000 Unit(s) SubCutaneous every 8 hours  insulin glargine Injectable (LANTUS) 25 Unit(s) SubCutaneous <User Schedule>  insulin lispro (ADMELOG) corrective regimen sliding scale   SubCutaneous three times a day before meals  insulin lispro Injectable (ADMELOG) 10 Unit(s) SubCutaneous before breakfast  insulin lispro Injectable (ADMELOG) 7 Unit(s) SubCutaneous before lunch  insulin lispro Injectable (ADMELOG) 7 Unit(s) SubCutaneous before dinner  isosorbide    mononitrate Tablet (ISMO) 30 milliGRAM(s) Oral two times a day  melatonin 6 milliGRAM(s) Oral at bedtime  pantoprazole    Tablet 40 milliGRAM(s) Oral before breakfast  polyethylene glycol 3350 17 Gram(s) Oral two times a day  pregabalin 50 milliGRAM(s) Oral two times a day  psyllium Powder 1 Packet(s) Oral two times a day  sacubitril 24 mG/valsartan 26 mG 1 Tablet(s) Oral two times a day  senna 2 Tablet(s) Oral at bedtime  sodium bicarbonate 325 milliGRAM(s) Oral two times a day  tamsulosin 0.4 milliGRAM(s) Oral at bedtime    MEDICATIONS  (PRN):  acetaminophen     Tablet .. 650 milliGRAM(s) Oral every 6 hours PRN Mild Pain (1 - 3)  aluminum hydroxide/magnesium hydroxide/simethicone Suspension 30 milliLiter(s) Oral every 6 hours PRN Dyspepsia  artificial  tears Solution 1 Drop(s) Both EYES every 4 hours PRN Dry Eyes  calcium carbonate    500 mG (Tums) Chewable 1 Tablet(s) Chew every 4 hours PRN Heartburn  hydrOXYzine hydrochloride 25 milliGRAM(s) Oral at bedtime PRN sleep  nitroglycerin     SubLingual 0.4 milliGRAM(s) SubLingual every 5 minutes PRN Chest Pain  ondansetron Injectable 4 milliGRAM(s) IV Push every 6 hours PRN Nausea and/or Vomiting  saline laxative (FLEET) Rectal Enema 1 Enema Rectal daily PRN no BM    Allergies  No Known Allergies    Review of Systems:  Cardiovascular: No chest pain  Respiratory: No SOB  GI: No nausea, vomiting  Endocrine: no hypoglycemia    PHYSICAL EXAM:  VITALS: T(C): 36.3 (03-15-23 @ 09:00)  T(F): 97.4 (03-15-23 @ 09:00), Max: 98.3 (03-15-23 @ 05:10)  HR: 85 (03-15-23 @ 09:00) (82 - 85)  BP: 108/53 (03-15-23 @ 09:00) (108/53 - 130/60)  RR:  (17 - 18)  SpO2:  (96% - 100%)  Wt(kg): --  GENERAL: NAD  EYES: No proptosis, anicteric  HEENT:  Atraumatic, Normocephalic  RESPIRATORY: Unlabored respirations      CAPILLARY BLOOD GLUCOSE  POCT Blood Glucose.: 226 mg/dL (15 Mar 2023 12:24)  POCT Blood Glucose.: 159 mg/dL (15 Mar 2023 08:47)  POCT Blood Glucose.: 147 mg/dL (15 Mar 2023 00:41)  POCT Blood Glucose.: 119 mg/dL (14 Mar 2023 19:42)  POCT Blood Glucose.: 110 mg/dL (14 Mar 2023 17:28)      03-15    130<L>  |  98  |  32<H>  ----------------------------<  147<H>  5.2   |  22  |  1.59<H>    eGFR: 46<L>    Ca    9.6      03-15  Mg     2.00     03-15  Phos  3.4     03-15  Assessment         Thyroid Function Tests:          Anion Gap, Serum: 10 mmol/L (03-15-23 @ 06:14)  Anion Gap, Serum: 14 mmol/L (03-14-23 @ 06:50)  Anion Gap, Serum: 10 mmol/L (03-13-23 @ 06:30)          A1C with Estimated Average Glucose Result: 8.3 % (02-22-23 @ 05:13)      Diet, Regular:   Consistent Carbohydrate Evening Snack (CSTCHOSN) (03-07-23 @ 10:19)       Chief Complaint: DM2    History: Patient seen at the bedside. Patient had a toast, porridge and milk for breakfast. C/o constipation, no BM for 4 days. Patient denies nausea, vomiting, no hypoglycemia.  before breakfast, 229 before lunch.     MEDICATIONS  (STANDING):  aspirin enteric coated 81 milliGRAM(s) Oral daily  atorvastatin 40 milliGRAM(s) Oral at bedtime  bisacodyl Suppository 10 milliGRAM(s) Rectal daily  calcitriol   Capsule 0.25 MICROGram(s) Oral daily  calcium acetate 667 milliGRAM(s) Oral with dinner  heparin   Injectable 5000 Unit(s) SubCutaneous every 8 hours  insulin glargine Injectable (LANTUS) 25 Unit(s) SubCutaneous <User Schedule>  insulin lispro (ADMELOG) corrective regimen sliding scale   SubCutaneous three times a day before meals  insulin lispro Injectable (ADMELOG) 10 Unit(s) SubCutaneous before breakfast  insulin lispro Injectable (ADMELOG) 7 Unit(s) SubCutaneous before lunch  insulin lispro Injectable (ADMELOG) 7 Unit(s) SubCutaneous before dinner  isosorbide    mononitrate Tablet (ISMO) 30 milliGRAM(s) Oral two times a day  melatonin 6 milliGRAM(s) Oral at bedtime  pantoprazole    Tablet 40 milliGRAM(s) Oral before breakfast  polyethylene glycol 3350 17 Gram(s) Oral two times a day  pregabalin 50 milliGRAM(s) Oral two times a day  psyllium Powder 1 Packet(s) Oral two times a day  sacubitril 24 mG/valsartan 26 mG 1 Tablet(s) Oral two times a day  senna 2 Tablet(s) Oral at bedtime  sodium bicarbonate 325 milliGRAM(s) Oral two times a day  tamsulosin 0.4 milliGRAM(s) Oral at bedtime    MEDICATIONS  (PRN):  acetaminophen     Tablet .. 650 milliGRAM(s) Oral every 6 hours PRN Mild Pain (1 - 3)  aluminum hydroxide/magnesium hydroxide/simethicone Suspension 30 milliLiter(s) Oral every 6 hours PRN Dyspepsia  artificial  tears Solution 1 Drop(s) Both EYES every 4 hours PRN Dry Eyes  calcium carbonate    500 mG (Tums) Chewable 1 Tablet(s) Chew every 4 hours PRN Heartburn  hydrOXYzine hydrochloride 25 milliGRAM(s) Oral at bedtime PRN sleep  nitroglycerin     SubLingual 0.4 milliGRAM(s) SubLingual every 5 minutes PRN Chest Pain  ondansetron Injectable 4 milliGRAM(s) IV Push every 6 hours PRN Nausea and/or Vomiting  saline laxative (FLEET) Rectal Enema 1 Enema Rectal daily PRN no BM    Allergies  No Known Allergies    Review of Systems:  Cardiovascular: No chest pain  Respiratory: No SOB  GI: No nausea, vomiting  Endocrine: no hypoglycemia    PHYSICAL EXAM:  VITALS: T(C): 36.3 (03-15-23 @ 09:00)  T(F): 97.4 (03-15-23 @ 09:00), Max: 98.3 (03-15-23 @ 05:10)  HR: 85 (03-15-23 @ 09:00) (82 - 85)  BP: 108/53 (03-15-23 @ 09:00) (108/53 - 130/60)  RR:  (17 - 18)  SpO2:  (96% - 100%)  Wt(kg): --  GENERAL: NAD  EYES: No proptosis, anicteric  HEENT:  Atraumatic, Normocephalic  RESPIRATORY: Unlabored respirations    CAPILLARY BLOOD GLUCOSE    POCT Blood Glucose.: 226 mg/dL (15 Mar 2023 12:24)  POCT Blood Glucose.: 159 mg/dL (15 Mar 2023 08:47)  POCT Blood Glucose.: 147 mg/dL (15 Mar 2023 00:41)  POCT Blood Glucose.: 119 mg/dL (14 Mar 2023 19:42)  POCT Blood Glucose.: 110 mg/dL (14 Mar 2023 17:28)      03-15    130<L>  |  98  |  32<H>  ----------------------------<  147<H>  5.2   |  22  |  1.59<H>    eGFR: 46<L>    Ca    9.6      03-15  Mg     2.00     03-15  Phos  3.4     03-15    Anion Gap, Serum: 10 mmol/L (03-15-23 @ 06:14)  Anion Gap, Serum: 14 mmol/L (03-14-23 @ 06:50)  Anion Gap, Serum: 10 mmol/L (03-13-23 @ 06:30)    A1C with Estimated Average Glucose Result: 8.3 % (02-22-23 @ 05:13)      Diet, Regular:   Consistent Carbohydrate Evening Snack (CSTCHOSN) (03-07-23 @ 10:19)

## 2023-03-15 NOTE — PROGRESS NOTE ADULT - PROBLEM SELECTOR PLAN 9
All brand name Sami.  We do not have all of them.  Meds are: Ligenta-M 500 (Linagliptin 2.5 and metoformin 500mg), [place] card MR (trimetazidine hydrochloride 35 mg),  Ecosprin 75 (aspirin 75 mg), Duralax, Ketoalfa (amio acid Keto Analogues 600 mg), Nidocard-Retard (nitroglycerin 2.6 mg), Hypophos (calcium acetate 667 mg), Tamisol D (Tamsulosin 0.4 mg and Dutasteride 0.5 mg), Mydocalm (tolperisone 50 mg), Raditrol (calcitriol 0.25 microgram),  NovoRapid 16 units w/ lunch, Manuel nordisk insulin 16am/14pm, Atova 20 (atorvastatin 20 mg), Nebita 2.5 (nevivolol 2.5 mg), Tamisol (tamsulosin 0.4 mg), Pregaba 50 (pregabalin 50 mg), Febustat 40 (febuxostat 40 mg), Lozixum (lorazapam 1 mg), Lasix 40 mg, Sabitar 50 mg, Anazol 0.1%, Tearfresh

## 2023-04-12 ENCOUNTER — APPOINTMENT (OUTPATIENT)
Dept: NEUROSURGERY | Facility: CLINIC | Age: 68
End: 2023-04-12
Payer: MEDICAID

## 2023-04-12 VITALS
DIASTOLIC BLOOD PRESSURE: 54 MMHG | BODY MASS INDEX: 24.73 KG/M2 | HEART RATE: 83 BPM | HEIGHT: 69 IN | SYSTOLIC BLOOD PRESSURE: 92 MMHG | WEIGHT: 167 LBS | OXYGEN SATURATION: 96 %

## 2023-04-12 DIAGNOSIS — M21.371 FOOT DROP, RIGHT FOOT: ICD-10-CM

## 2023-04-12 DIAGNOSIS — Z78.9 OTHER SPECIFIED HEALTH STATUS: ICD-10-CM

## 2023-04-12 DIAGNOSIS — M21.372 FOOT DROP, LEFT FOOT: ICD-10-CM

## 2023-04-12 DIAGNOSIS — Z86.39 PERSONAL HISTORY OF OTHER ENDOCRINE, NUTRITIONAL AND METABOLIC DISEASE: ICD-10-CM

## 2023-04-12 DIAGNOSIS — Z86.79 PERSONAL HISTORY OF OTHER DISEASES OF THE CIRCULATORY SYSTEM: ICD-10-CM

## 2023-04-12 DIAGNOSIS — Z83.3 FAMILY HISTORY OF DIABETES MELLITUS: ICD-10-CM

## 2023-04-12 DIAGNOSIS — Z82.49 FAMILY HISTORY OF ISCHEMIC HEART DISEASE AND OTHER DISEASES OF THE CIRCULATORY SYSTEM: ICD-10-CM

## 2023-04-12 DIAGNOSIS — T84.498A OTHER MECHANICAL COMPLICATION OF OTHER INTERNAL ORTHOPEDIC DEVICES, IMPLANTS AND GRAFTS, INITIAL ENCOUNTER: ICD-10-CM

## 2023-04-12 PROCEDURE — 99024 POSTOP FOLLOW-UP VISIT: CPT

## 2023-04-12 RX ORDER — TRAZODONE HYDROCHLORIDE 50 MG/1
50 TABLET ORAL
Refills: 0 | Status: ACTIVE | COMMUNITY

## 2023-04-12 RX ORDER — ACETAMINOPHEN 325 MG/1
325 TABLET ORAL
Refills: 0 | Status: ACTIVE | COMMUNITY

## 2023-04-12 RX ORDER — SALINE NASAL SPRAY 1.5 OZ
0.65 SOLUTION NASAL
Refills: 0 | Status: ACTIVE | COMMUNITY

## 2023-04-12 RX ORDER — NITROGLYCERIN 2.5 MG/1
2.5 CAPSULE ORAL
Refills: 0 | Status: ACTIVE | COMMUNITY

## 2023-04-12 RX ORDER — PANTOPRAZOLE 40 MG/1
40 TABLET, DELAYED RELEASE ORAL
Refills: 0 | Status: ACTIVE | COMMUNITY

## 2023-04-12 RX ORDER — ALUMINA, MAGNESIA, AND SIMETHICONE ORAL SUSPENSION REGULAR STRENGTH 1200; 1200; 120 MG/30ML; MG/30ML; MG/30ML
200-200-20 SUSPENSION ORAL
Refills: 0 | Status: ACTIVE | COMMUNITY

## 2023-04-12 RX ORDER — SODIUM PHOSPHATE, DIBASIC AND SODIUM PHOSPHATE, MONOBASIC 7; 19 G/230ML; G/230ML
ENEMA RECTAL
Refills: 0 | Status: ACTIVE | COMMUNITY

## 2023-04-12 RX ORDER — POLYETHYLENE GLYCOL 3350 17 G/17G
17 POWDER, FOR SOLUTION ORAL
Refills: 0 | Status: ACTIVE | COMMUNITY

## 2023-04-12 RX ORDER — INSULIN GLARGINE 100 [IU]/ML
100 INJECTION, SOLUTION SUBCUTANEOUS
Refills: 0 | Status: ACTIVE | COMMUNITY

## 2023-04-12 RX ORDER — GLUCOSAMINE HCL/CHONDROITIN SU 500-400 MG
3 CAPSULE ORAL
Refills: 0 | Status: ACTIVE | COMMUNITY

## 2023-04-12 RX ORDER — ISOSORBIDE MONONITRATE 10 MG/1
10 TABLET ORAL
Refills: 0 | Status: ACTIVE | COMMUNITY

## 2023-04-12 RX ORDER — DUTASTERIDE AND TAMSULOSIN HYDROCHLORIDE .5; .4 MG/1; MG/1
0.5-0.4 CAPSULE ORAL
Refills: 0 | Status: ACTIVE | COMMUNITY

## 2023-04-13 PROBLEM — Z78.9 NON-SMOKER: Status: ACTIVE | Noted: 2023-04-12

## 2023-04-13 PROBLEM — Z83.3 FAMILY HISTORY OF DIABETES MELLITUS: Status: ACTIVE | Noted: 2023-04-12

## 2023-04-13 PROBLEM — Z86.79 HISTORY OF HYPOTENSION: Status: RESOLVED | Noted: 2023-04-12 | Resolved: 2023-04-13

## 2023-04-13 PROBLEM — Z86.39 HISTORY OF DIABETES MELLITUS: Status: RESOLVED | Noted: 2023-04-12 | Resolved: 2023-04-13

## 2023-04-13 PROBLEM — Z82.49 FAMILY HISTORY OF HYPERTENSION: Status: ACTIVE | Noted: 2023-04-12

## 2023-04-13 NOTE — ASSESSMENT
[FreeTextEntry1] : Mr. CARLOS MOLINA is presenting S/P Revision of posterolateral fusion and reexploration of fusion and hardware failure\par The recommendation is for the following:\par Progressive ambulation and PT recommended with Brace in place at rehab facility\par Work on sit and stand transfer techniques\par CT LUMBAR and MRI LUMBAR SPINE to assess for residual stenosis or recurrent hardware failure.\par Return to office when images are completed.\par \par Please see Dr. Avalos's dictation for details.\par I, Dr. Kelsey Avalos evaluated the patient with the nurse practitioner Dick Juarez and established the plan of care. I personally discuss this patient with the nurse practitioner at the time of the visit. I agree with the assessment and plan as written, unless noted below.\par \par \par

## 2023-04-13 NOTE — HISTORY OF PRESENT ILLNESS
[FreeTextEntry1] : Hospital Course: \par Discharge Date	15-Mar-2023 \par Admission Date	21-Feb-2023 15:29 \par Reason for Admission	Back pain \par Hospital Course	 \par 68M with DM2 on insulin, HTN, CAD s/p CABG 2005, CKD3, chronic back pain w/ \par known L4-L5 disc herniation and  cauda equina presents to the ED with worsening \par back pain. Patient is AAOX2 and somnolent, not able to provide any hx. Most of \par the information was supplemented by the son. As per son, pt is AAOx 3 and \par "mentally stable." He recently came from [facility] about 2 weeks ago and has " \par multiple medical complaints." But this morning he told his son that he is \par feeling weak and his back pain is getting worse. The pain is localized in the \par lower back region. It is burning is sensation and constant. Unclear if it \par radiates down the leg. It worsens with movement and walking. He can only walk \par "10 meters" with a cane. He is also incontinent of urine but denies any recent \par fever, chills, bowel incontinence, or lower extremities weakness/motor \par deficits. He reportedly had MRIs in the past but son does not know what it \par showed. He also has other chronic medical problems such as CKD, DMII and CAD \par and his son thinks pt needs management for them as well. \par In the ED, his vitals were notable for hyperglycemia, elevated BUN/Scr, and \par hypomagnesemia. EKG showed bradycardia with T wave changes in the lateral \par leads. \par \par Hospital Course: \par \par #Back pain: acute on chronic, known L4-5 spinal stenosis/cauda equina, chronic \par bowel and bladders sc \par - MRI from [facility] from 8/21/22: diffuse posterior disc bulge causing spinal \par canal stenosis and cauda equina compression \par - X-ray lumbar spine showed no acute lesion or fracture \par - CT lumbar spine: Large disc herniation at L5-S1 level causing severe canal \par stenosis. \par - MRI reviewed, L4-5 disk dessication and cauda equina compression , Right \par grade II and Left Grade I spondylolisthesis \par 2/28: OR For L4-L5 TLIF \par 3/1; s/p L4-5 TLIF pod #1, HMV drain, requiring bola post op for bp support, \par albumin given x 1, sicu consulted, neuro exam stable \par 3/2: POD # 2 S/P L4-5 TLIF, 1 HMV in place. Off phenylepherine. CT lumbar \par spine, screws ok \par 3/3: POD # 3 S/P L4-5 TLIF. Having episodes of chest pain with elevated \par troponins, cardiology evaluating. transferred to telemetry floor, serial CE and \par EKG. 1 HMV in place \par 3/4: POD # 4 S/P L4-5 TLIF. No more chest pain. HMV in place. Seen by endocrine \par for DM management \par 3/5: POD # 5 S/P L4-5 TLIF. No more chest pain. HMV in place. Increased back \par pain with radiation to legs, MRI ordered obtained by evening with anesthesia, \par possible ventral epidural collection. Imaging reviewed with Dr. Avalos \par 3/6: pod #6 s/p L4-5 TLIF, HMV in place, mri L/s w/ sed done no major \par compressive hematoma, enlarged neurogenic bladder, crowder placed \par 3/7: PAtient noted to have new Left food drop, s/p emergent RTOR for Lef. foot \par drop  cement augmentation for L4 pedicle fx 2/2 to screw pod #1, post op exam \par stable , HMV, 2JPs's crowder, CT L/s done- screws ok, needs brace \par 3/9-11: Stable exam POD # 4 S/P cement augmentation for L4 pedicle fx 2/2 to \par screw. HMV X 1, JOVITA X 1. MRI L spine ordered. Episode of vomiting on 3/10, AXR \par ordered \par 3/12: Superficial HMV removed, Jovita removed Exam stable, MR pending, pain \par improved \par 3/13: Stable exam. Sedated MRI done \par 3/14: PT, d/c planning \par 3/15: Stable. Awaiting rehab placement \par \par

## 2023-04-13 NOTE — PHYSICAL EXAM
[General Appearance - Alert] : alert [General Appearance - In No Acute Distress] : in no acute distress [Oriented To Time, Place, And Person] : oriented to person, place, and time [Impaired Insight] : insight and judgment were intact [No Visual Abnormalities] : no visible abnormailities [Non- Antalgic] : non-antalgic [Sclera] : the sclera and conjunctiva were normal [Outer Ear] : the ears and nose were normal in appearance [Neck Appearance] : the appearance of the neck was normal [] : no respiratory distress [Heart Rate And Rhythm] : heart rate was normal and rhythm regular [FreeTextEntry1] : Wheelchair dependent; Bilateral foot drop

## 2023-04-13 NOTE — REASON FOR VISIT
[Family Member] : family member [de-identified] : S/P Emergent L4L5 posterior exposure for epidural exploration; revision decompression and skeletonization of the bilateral L4 and L5 nerve roots; confirmation of fracture of pedicles  bilaterally at L4 with removal of bilateral L4 pedicle screws; freehand placement of bilateral L3 pedicle screws; upsizing and conversion to fenestrated screws bilaterally at L5; bilateral L3 and L5 vertebroplasty  through our fenestrated screws; L3 to L5 posterolateral arthrodesis and instrumented fusion; use of morselized allograft and bone morphogenetic protein.\par \par  for the treatment of :Fracture of bilateral L4 pedicles with L4 nerve impingement with spinal instability.\par  [de-identified] : 3/6/2023 [de-identified] : Today he resides in [facility] for continuity of care postop. He comes via ambulance services, wheelchair dependent. He is presenting with bilateral foot drop with braces in place to support function. [TWNoteComboBox1] : Daniel

## 2023-04-27 ENCOUNTER — APPOINTMENT (OUTPATIENT)
Dept: CT IMAGING | Facility: CLINIC | Age: 68
End: 2023-04-27

## 2023-04-27 ENCOUNTER — APPOINTMENT (OUTPATIENT)
Dept: MRI IMAGING | Facility: CLINIC | Age: 68
End: 2023-04-27

## 2023-07-05 ENCOUNTER — APPOINTMENT (OUTPATIENT)
Dept: NEUROSURGERY | Facility: CLINIC | Age: 68
End: 2023-07-05
Payer: MEDICAID

## 2023-07-05 VITALS
HEART RATE: 77 BPM | OXYGEN SATURATION: 96 % | WEIGHT: 167 LBS | DIASTOLIC BLOOD PRESSURE: 48 MMHG | TEMPERATURE: 98.2 F | BODY MASS INDEX: 24.73 KG/M2 | SYSTOLIC BLOOD PRESSURE: 89 MMHG | HEIGHT: 69 IN

## 2023-07-05 DIAGNOSIS — S34.3XXA: ICD-10-CM

## 2023-07-05 PROCEDURE — 99212 OFFICE O/P EST SF 10 MIN: CPT

## 2023-07-07 NOTE — ASSESSMENT
[FreeTextEntry1] : \par \par \par \par \par \par 1) Bilateral ASFO's please\par \par 2) Fawad at home to evaluate for social needs as well as physical therapy needs\par \par 3) F/u 3 months

## 2023-07-07 NOTE — HISTORY OF PRESENT ILLNESS
[FreeTextEntry1] : April 12, 2023:\par \par Hospital Course: \par Discharge Date	15-Mar-2023 \par Admission Date	21-Feb-2023 15:29 \par Reason for Admission	Back pain \par Hospital Course	 \par 68M with DM2 on insulin, HTN, CAD s/p CABG 2005, CKD3, chronic back pain w/ \par known L4-L5 disc herniation and  cauda equina presents to the ED with worsening \par back pain. Patient is AAOX2 and somnolent, not able to provide any hx. Most of \par the information was supplemented by the son. As per son, pt is AAOx 3 and \par "mentally stable." He recently came from [facility] about 2 weeks ago and has " \par multiple medical complaints." But this morning he told his son that he is \par feeling weak and his back pain is getting worse. The pain is localized in the \par lower back region. It is burning is sensation and constant. Unclear if it \par radiates down the leg. It worsens with movement and walking. He can only walk \par "10 meters" with a cane. He is also incontinent of urine but denies any recent \par fever, chills, bowel incontinence, or lower extremities weakness/motor \par deficits. He reportedly had MRIs in the past but son does not know what it \par showed. He also has other chronic medical problems such as CKD, DMII and CAD \par and his son thinks pt needs management for them as well. \par In the ED, his vitals were notable for hyperglycemia, elevated BUN/Scr, and \par hypomagnesemia. EKG showed bradycardia with T wave changes in the lateral \par leads. \par \par Hospital Course: \par \par #Back pain: acute on chronic, known L4-5 spinal stenosis/cauda equina, chronic \par bowel and bladders sc \par - MRI from [facility] from 8/21/22: diffuse posterior disc bulge causing spinal \par canal stenosis and cauda equina compression \par - X-ray lumbar spine showed no acute lesion or fracture \par - CT lumbar spine: Large disc herniation at L5-S1 level causing severe canal \par stenosis. \par - MRI reviewed, L4-5 disk dessication and cauda equina compression , Right \par grade II and Left Grade I spondylolisthesis \par 2/28: OR For L4-L5 TLIF \par 3/1; s/p L4-5 TLIF pod #1, HMV drain, requiring bola post op for bp support, \par albumin given x 1, sicu consulted, neuro exam stable \par 3/2: POD # 2 S/P L4-5 TLIF, 1 HMV in place. Off phenylepherine. CT lumbar \par spine, screws ok \par 3/3: POD # 3 S/P L4-5 TLIF. Having episodes of chest pain with elevated \par troponins, cardiology evaluating. transferred to telemetry floor, serial CE and \par EKG. 1 HMV in place \par 3/4: POD # 4 S/P L4-5 TLIF. No more chest pain. HMV in place. Seen by endocrine \par for DM management \par 3/5: POD # 5 S/P L4-5 TLIF. No more chest pain. HMV in place. Increased back \par pain with radiation to legs, MRI ordered obtained by evening with anesthesia, \par possible ventral epidural collection. Imaging reviewed with Dr. Avalos \par 3/6: pod #6 s/p L4-5 TLIF, HMV in place, mri L/s w/ sed done no major \par compressive hematoma, enlarged neurogenic bladder, crowder placed \par 3/7: PAtient noted to have new Left food drop, s/p emergent RTOR for Lef. foot \par drop  cement augmentation for L4 pedicle fx 2/2 to screw pod #1, post op exam \par stable , HMV, 2JPs's crowder, CT L/s done- screws ok, needs brace \par 3/9-11: Stable exam POD # 4 S/P cement augmentation for L4 pedicle fx 2/2 to \par screw. HMV X 1, JOVITA X 1. MRI L spine ordered. Episode of vomiting on 3/10, AXR \par ordered \par 3/12: Superficial HMV removed, Jovita removed Exam stable, MR pending, pain \par improved \par 3/13: Stable exam. Sedated MRI done \par 3/14: PT, d/c planning \par 3/15: Stable. Awaiting rehab placement \par \par

## 2023-07-07 NOTE — PHYSICAL EXAM
[General Appearance - Alert] : alert [General Appearance - Well Nourished] : well nourished [General Appearance - Well Developed] : well developed [Longitudinal] : longitudinal [Healing Well] : healing well [No Drainage] : without drainage [Normal Skin] : normal [Person] : oriented to person [Place] : oriented to place [Time] : oriented to time [Short Term Intact] : short term memory intact [Remote Intact] : remote memory intact [Span Intact] : the attention span was normal [Concentration Intact] : normal concentrating ability [Fluency] : fluency intact [Comprehension] : comprehension intact [Current Events] : adequate knowledge of current events [Past History] : adequate knowledge of personal past history [Vocabulary] : adequate range of vocabulary [Cranial Nerves Oculomotor (III)] : extraocular motion intact [Cranial Nerves Optic (II)] : visual acuity intact bilaterally,  pupils equal round and reactive to light [Cranial Nerves Trigeminal (V)] : facial sensation intact symmetrically [Cranial Nerves Facial (VII)] : face symmetrical [Cranial Nerves Vestibulocochlear (VIII)] : hearing was intact bilaterally [Cranial Nerves Glossopharyngeal (IX)] : tongue and palate midline [Cranial Nerves Hypoglossal (XII)] : there was no tongue deviation with protrusion [Cranial Nerves Accessory (XI - Cranial And Spinal)] : head turning and shoulder shrug symmetric [Motor Tone] : muscle tone was normal in all four extremities [Motor Strength] : muscle strength was normal in all four extremities [No Muscle Atrophy] : normal bulk in all four extremities [Abnormal Walk] : normal gait [Sensation Tactile Decrease] : light touch was intact [Balance] : balance was intact [2+] : Patella left 2+ [No Visual Abnormalities] : no visible abnormailities [Outer Ear] : the ears and nose were normal in appearance [Oropharynx] : the oropharynx was normal [] : no respiratory distress [Auscultation Breath Sounds / Voice Sounds] : lungs were clear to auscultation bilaterally [Heart Rate And Rhythm] : heart rate was normal and rhythm regular [Heart Sounds] : normal S1 and S2 [Heart Sounds Gallop] : no gallops [Murmurs] : no murmurs [Heart Sounds Pericardial Friction Rub] : no pericardial rub [Full Pulse] : the pedal pulses are present [Edema] : there was no peripheral edema [Past-pointing] : there was no past-pointing [Tremor] : no tremor present [FreeTextEntry1] : patient is non-ambulatory

## 2023-07-07 NOTE — REASON FOR VISIT
[Follow-Up: _____] : a [unfilled] follow-up visit [Family Member] : family member [FreeTextEntry1] : 68 year old male who presents today with his daughter in law. Patient had emergency surgery in March for a revision of lumbar fusion. He presents today in a wheelchair and states that he need to "crawl" to move, he states that he is "unable to walk at all". He states that he cant even get out of bed to go to the bathroom. He states that he goes to the bathroom in bed.\par \par Patient was suppose to have a MRI done prior to the visit but he did not do it because he stated it was too difficult to get out of his house.

## 2023-07-26 NOTE — PROGRESS NOTE ADULT - PROVIDER SPECIALTY LIST ADULT
Detail Level: Detailed Add 91081 Cpt? (Important Note: In 2017 The Use Of 01853 Is Being Tracked By Cms To Determine Future Global Period Reimbursement For Global Periods): yes Neurosurgery

## 2023-08-02 NOTE — PATIENT PROFILE ADULT - DO YOU FEEL UNSAFE AT HOME, WORK, OR SCHOOL?
no 53-year-old male past history of hypertension, chronic back pain on opioids, chronic wounds to bilateral lower extremities referred to the ED by his podiatrist for evaluation of nonhealing wound to right lower extremity despite outpatient antibiotic treatment.     Assessment:  # Chronic bilateral non healing wounds suspect due to PVD-Wound Cx grew Alcaligenes faecalis (S to Bactrim), MDR enterobacter cloacae (S to Bactrim), and E. avium  # Chronic lower back pain sp spinal stimulator with revisions x3 complicated by mrsa infection on chronic opoid medication management  # H/O  HTN  #H/O Morbid obesity BMI >40    PLAN:   - no sepsis present on admission  - XRAY RLE - no acute fx  - blood clx NGTD  - duplex venous and arterial unremarkable   - repeat RLE venous duplex neg for DVT  - dressing changes per podiatry    - WBAT per podiatry    - bowel regimen   - s/p surgical debridement for Friday 7/28   -ID consult appreciated:  - Discharge on PO Bactrim 2 DS q12hrs for 14 days post-debridement (until 8/10)  - After completion of Bactrim, he will need to go back to PO doxycycline 100mg q24hrs for prophylaxis of skin infection due to prior MRSA infection  Per pain mgmt:  We can use oxycodone 10mg q4h PRN (vs home dose q6h PRN) for the additional pain s/p debridement

## 2023-09-25 ENCOUNTER — APPOINTMENT (OUTPATIENT)
Dept: CARDIOLOGY | Facility: CLINIC | Age: 68
End: 2023-09-25
Payer: MEDICAID

## 2023-09-25 ENCOUNTER — NON-APPOINTMENT (OUTPATIENT)
Age: 68
End: 2023-09-25

## 2023-09-25 VITALS
SYSTOLIC BLOOD PRESSURE: 114 MMHG | BODY MASS INDEX: 24.73 KG/M2 | TEMPERATURE: 98 F | OXYGEN SATURATION: 98 % | WEIGHT: 167 LBS | DIASTOLIC BLOOD PRESSURE: 70 MMHG | HEIGHT: 69 IN | HEART RATE: 89 BPM

## 2023-09-25 DIAGNOSIS — I35.0 NONRHEUMATIC AORTIC (VALVE) STENOSIS: ICD-10-CM

## 2023-09-25 PROCEDURE — 99215 OFFICE O/P EST HI 40 MIN: CPT | Mod: 25

## 2023-09-25 PROCEDURE — 93000 ELECTROCARDIOGRAM COMPLETE: CPT

## 2023-10-04 ENCOUNTER — APPOINTMENT (OUTPATIENT)
Dept: NEUROSURGERY | Facility: CLINIC | Age: 68
End: 2023-10-04

## 2023-10-04 ENCOUNTER — NON-APPOINTMENT (OUTPATIENT)
Age: 68
End: 2023-10-04

## 2023-11-01 NOTE — CHART NOTE - NSCHARTNOTEFT_GEN_A_CORE
67 yo M with DMII, CAD s/p CABG, CKD, chronic back pain, and HTN presents to the ED with worsening back pain.  He recently came from [facility] about 2 weeks ago.   endocrine called for DM, a1c 8.3    Unable to see patient at time of visit - OFF UNIT  Per notes, patient is NPO today, in OR  Patient was due to receive Lantus 18 units for NPO status; however noted he received Lantus 22 units last night  Fasting glucose 148 mg/dl, stable  Would monitor closely while in OR/PACU, recommend to start dextrose IVF (D10 or D5) continuously while NPO if FS less than 100 mg/dl   When diet is resumed, continue Lantus 22 units qHS, Admelog 7 units SQ TID before meals (Hold if skips meal) and Admelog LOW correctional scale before meals and bedtime   Hypoglycemia protocol    Carb Consistent Diet when diet resumed, Nutrition consult - completed   Pharmacy liaison following for education and coordination of care as outpatient  See prior endocrine progress notes for details and discharge planning  Will follow     CAPILLARY BLOOD GLUCOSE    POCT Blood Glucose.: 148 mg/dL (28 Feb 2023 06:07)  POCT Blood Glucose.: 156 mg/dL (27 Feb 2023 22:14)  POCT Blood Glucose.: 167 mg/dL (27 Feb 2023 16:55)      02-28    133<L>  |  100  |  61<H>  ----------------------------<  164<H>  4.0   |  21<L>  |  2.33<H>    Ca    10.1      28 Feb 2023 06:00  Phos  3.9     02-28  Mg     1.60     02-28      MEDICATIONS  (STANDING):  artificial tears (preservative free) Ophthalmic Solution 1 Drop(s) Both EYES four times a day  atorvastatin 40 milliGRAM(s) Oral at bedtime  calcitriol   Capsule 0.25 MICROGram(s) Oral daily  calcium acetate 667 milliGRAM(s) Oral with dinner  dextrose 50% Injectable 25 Gram(s) IV Push once  dextrose 50% Injectable 12.5 Gram(s) IV Push once  dextrose 50% Injectable 25 Gram(s) IV Push once  furosemide    Tablet 40 milliGRAM(s) Oral daily  insulin glargine Injectable (LANTUS) 22 Unit(s) SubCutaneous at bedtime  insulin lispro (ADMELOG) corrective regimen sliding scale   SubCutaneous at bedtime  insulin lispro (ADMELOG) corrective regimen sliding scale   SubCutaneous every 6 hours  insulin lispro Injectable (ADMELOG) 7 Unit(s) SubCutaneous three times a day before meals  lactated ringers. 1000 milliLiter(s) (75 mL/Hr) IV Continuous <Continuous>  melatonin 6 milliGRAM(s) Oral at bedtime  pantoprazole    Tablet 40 milliGRAM(s) Oral before breakfast  pregabalin 50 milliGRAM(s) Oral two times a day  psyllium Powder 1 Packet(s) Oral two times a day  sacubitril 24 mG/valsartan 26 mG 1 Tablet(s) Oral two times a day  senna 2 Tablet(s) Oral at bedtime  tamsulosin 0.4 milliGRAM(s) Oral at bedtime    Diet, NPO after Midnight:      NPO Start Date: 27-Feb-2023,   NPO Start Time: 23:59 (02-27-23 @ 19:55)  Diet, DASH/TLC:   Sodium & Cholesterol Restricted  Consistent Carbohydrate {Evening Snack} (CSTCHOSN)  Halal  No Caffeine (02-23-23 @ 15:04)    A1C with Estimated Average Glucose Result: 8.3 % (02-22-23 @ 05:13)    Noemi Carlisle  Nurse Practitioner  Division of Endocrinology & Diabetes  In house pager #[phone]/long range pager #[phone]    If before 9AM or after 6PM, or on weekends/holidays, please call endocrine answering service for assistance ([phone]).  For nonurgent matters email [email or web address] for assistance. X Size Of Lesion In Cm (Optional): 0 Introduction Text (Please End With A Colon): The following procedure was deferred: PDT ON FACE AND SCALP Detail Level: Detailed

## 2023-11-09 NOTE — PHYSICAL THERAPY INITIAL EVALUATION ADULT - SOCIAL CONCERNS
Vaccination Request Form: [id number] aka SARS-CoV-2 (Genice Arg[place] or [place] or J [employer] J)      Date Requested: 23  Patient: Trina Buck  Patient : 2006   Referring Provider: Griselda Bald, MD       The above patient has informed us that they have had their   most recent COVID-19 aka SARS-CoV-2 (Genice Argyle or [place] or [employer]) administered at your facility. Please   complete this form and attach all corresponding documentation. Date of Vaccine(s) Given  5-36-55______________________________    Lot Number(s) _____[id number]__________________________________    Manufacture(s) ______PFIZER________________________________    Dose Amount (s) ___________________________________________    Expiration Date(s) ____7-64-03________________________________    Comments __________________________________________________________  ____________________________________________________________________  ____________________________________________________________________  ____________________________________________________________________    Administering Facility  ________________________________________________    Vaccine Administered By (print name) ___________________________________      Form Completed By (print name) _______________________________________      Signature ___________________________________________________________      These reports are needed for  compliance. Please fax this completed form and a copy of the Vaccine Document(s) to our office located at [address] [place] [address] as soon as possible to Fax [phone] attention Shireen Bonds: Phone [phone]    We thank you for your assistance in treating our mutual patient. None

## 2023-11-29 ENCOUNTER — APPOINTMENT (OUTPATIENT)
Dept: CARDIOLOGY | Facility: CLINIC | Age: 68
End: 2023-11-29

## 2023-12-19 NOTE — PROGRESS NOTE ADULT - PROBLEM SELECTOR PLAN 4
- Na range 129-134 throughout hospitalization  - today 128 however when corrected for hyperglycemia - na level is 133-135  - treat hyperglycemia and assess sodium level. Stop salt tabs. T(C): 36.2 (12-19-23 @ 04:25), Max: 37.1 (12-18-23 @ 12:44)  HR: 90 (12-19-23 @ 04:25) (78 - 91)  BP: 125/80 (12-19-23 @ 04:25) (123/71 - 143/64)  RR: 18 (12-19-23 @ 04:25) (16 - 20)  SpO2: 97% (12-19-23 @ 04:25) (95% - 99%)    GEN: female in NAD, appears comfortable, no diaphoresis  EYES: No scleral injection, PERRL, EOMI  ENTM: neck supple & symmetric without tracheal deviation, moist membranes, no gross hearing impairment, thyroid gland not enlarged  CV: +S1/S2, no m/r/g, no abdominal bruit, no LE edema  RESP: breathing comfortably, no respiratory accessory muscle use, CTAB, no w/r/r  GI: normoactive BS, soft, NTND, no rebounding/guarding, no palpable masses  LYMPHATICS: no LAD or tenderness to palpation  NEURO: no focal deficits, CNII-XII grossly intact  PSYCH: flat affect, no insight/judgment   SKIN: no petechiae, ecchymosis or maculopapular rash noted

## 2024-02-17 NOTE — ASU PREOP CHECKLIST - LATEX ALLERGY
Attending Only no This was a shared visit with the KERRIE. I reviewed and verified the documentation.

## 2024-09-13 NOTE — PROGRESS NOTE ADULT - PROBLEM SELECTOR PROBLEM 5
Stage 3 chronic kidney disease
Hyponatremia
Anemia
Hyponatremia
Anemia
Stage 3 chronic kidney disease
Anemia
Hyponatremia
Statement Selected

## 2024-10-15 ENCOUNTER — EMERGENCY (EMERGENCY)
Facility: HOSPITAL | Age: 69
LOS: 1 days | Discharge: ANOTHER HOSPITAL | End: 2024-10-15
Attending: EMERGENCY MEDICINE | Admitting: EMERGENCY MEDICINE
Payer: MEDICAID

## 2024-10-15 VITALS
TEMPERATURE: 99 F | DIASTOLIC BLOOD PRESSURE: 61 MMHG | OXYGEN SATURATION: 96 % | HEART RATE: 114 BPM | RESPIRATION RATE: 20 BRPM | SYSTOLIC BLOOD PRESSURE: 99 MMHG

## 2024-10-15 VITALS
TEMPERATURE: 100 F | RESPIRATION RATE: 19 BRPM | HEART RATE: 92 BPM | OXYGEN SATURATION: 100 % | SYSTOLIC BLOOD PRESSURE: 167 MMHG | DIASTOLIC BLOOD PRESSURE: 100 MMHG

## 2024-10-15 DIAGNOSIS — Z95.1 PRESENCE OF AORTOCORONARY BYPASS GRAFT: Chronic | ICD-10-CM

## 2024-10-15 LAB
ALBUMIN SERPL ELPH-MCNC: 3 G/DL — LOW (ref 3.3–5)
ALP SERPL-CCNC: 82 U/L — SIGNIFICANT CHANGE UP (ref 40–120)
ALT FLD-CCNC: 9 U/L — SIGNIFICANT CHANGE UP (ref 4–41)
ANION GAP SERPL CALC-SCNC: 13 MMOL/L — SIGNIFICANT CHANGE UP (ref 7–14)
AST SERPL-CCNC: 22 U/L — SIGNIFICANT CHANGE UP (ref 4–40)
BASOPHILS # BLD AUTO: 0.06 K/UL — SIGNIFICANT CHANGE UP (ref 0–0.2)
BASOPHILS NFR BLD AUTO: 0.5 % — SIGNIFICANT CHANGE UP (ref 0–2)
BILIRUB SERPL-MCNC: 0.2 MG/DL — SIGNIFICANT CHANGE UP (ref 0.2–1.2)
BUN SERPL-MCNC: 27 MG/DL — HIGH (ref 7–23)
CALCIUM SERPL-MCNC: 9 MG/DL — SIGNIFICANT CHANGE UP (ref 8.4–10.5)
CHLORIDE SERPL-SCNC: 100 MMOL/L — SIGNIFICANT CHANGE UP (ref 98–107)
CO2 SERPL-SCNC: 22 MMOL/L — SIGNIFICANT CHANGE UP (ref 22–31)
CREAT SERPL-MCNC: 1.92 MG/DL — HIGH (ref 0.5–1.3)
EGFR: 37 ML/MIN/1.73M2 — LOW
EOSINOPHIL # BLD AUTO: 0.23 K/UL — SIGNIFICANT CHANGE UP (ref 0–0.5)
EOSINOPHIL NFR BLD AUTO: 1.8 % — SIGNIFICANT CHANGE UP (ref 0–6)
GLUCOSE SERPL-MCNC: 202 MG/DL — HIGH (ref 70–99)
HCT VFR BLD CALC: 28.5 % — LOW (ref 39–50)
HGB BLD-MCNC: 9.2 G/DL — LOW (ref 13–17)
IANC: 9.69 K/UL — HIGH (ref 1.8–7.4)
IMM GRANULOCYTES NFR BLD AUTO: 0.4 % — SIGNIFICANT CHANGE UP (ref 0–0.9)
LIDOCAIN IGE QN: 13 U/L — SIGNIFICANT CHANGE UP (ref 7–60)
LYMPHOCYTES # BLD AUTO: 1.49 K/UL — SIGNIFICANT CHANGE UP (ref 1–3.3)
LYMPHOCYTES # BLD AUTO: 11.9 % — LOW (ref 13–44)
MAGNESIUM SERPL-MCNC: 2.2 MG/DL — SIGNIFICANT CHANGE UP (ref 1.6–2.6)
MCHC RBC-ENTMCNC: 28.4 PG — SIGNIFICANT CHANGE UP (ref 27–34)
MCHC RBC-ENTMCNC: 32.3 GM/DL — SIGNIFICANT CHANGE UP (ref 32–36)
MCV RBC AUTO: 88 FL — SIGNIFICANT CHANGE UP (ref 80–100)
MONOCYTES # BLD AUTO: 1.05 K/UL — HIGH (ref 0–0.9)
MONOCYTES NFR BLD AUTO: 8.4 % — SIGNIFICANT CHANGE UP (ref 2–14)
NEUTROPHILS # BLD AUTO: 9.69 K/UL — HIGH (ref 1.8–7.4)
NEUTROPHILS NFR BLD AUTO: 77 % — SIGNIFICANT CHANGE UP (ref 43–77)
NRBC # BLD: 0 /100 WBCS — SIGNIFICANT CHANGE UP (ref 0–0)
NRBC # FLD: 0 K/UL — SIGNIFICANT CHANGE UP (ref 0–0)
NT-PROBNP SERPL-SCNC: HIGH PG/ML
PLATELET # BLD AUTO: 175 K/UL — SIGNIFICANT CHANGE UP (ref 150–400)
POTASSIUM SERPL-MCNC: 4 MMOL/L — SIGNIFICANT CHANGE UP (ref 3.5–5.3)
POTASSIUM SERPL-SCNC: 4 MMOL/L — SIGNIFICANT CHANGE UP (ref 3.5–5.3)
PROT SERPL-MCNC: 6.8 G/DL — SIGNIFICANT CHANGE UP (ref 6–8.3)
RBC # BLD: 3.24 M/UL — LOW (ref 4.2–5.8)
RBC # FLD: 15.2 % — HIGH (ref 10.3–14.5)
SODIUM SERPL-SCNC: 135 MMOL/L — SIGNIFICANT CHANGE UP (ref 135–145)
TROPONIN T, HIGH SENSITIVITY RESULT: 233 NG/L — CRITICAL HIGH
WBC # BLD: 12.57 K/UL — HIGH (ref 3.8–10.5)
WBC # FLD AUTO: 12.57 K/UL — HIGH (ref 3.8–10.5)

## 2024-10-15 PROCEDURE — 99285 EMERGENCY DEPT VISIT HI MDM: CPT

## 2024-10-15 PROCEDURE — 71045 X-RAY EXAM CHEST 1 VIEW: CPT | Mod: 26

## 2024-10-15 PROCEDURE — 93010 ELECTROCARDIOGRAM REPORT: CPT

## 2024-10-15 NOTE — ED PROVIDER NOTE - CLINICAL SUMMARY MEDICAL DECISION MAKING FREE TEXT BOX
Katiuska VASQUEZ: Patient is a 69-year-old male with a history of type 2 diabetes mellitus, hypertension, CKD, CAD, CABG x 2, Slovak speaking here for evaluation of chest pain. I spoke to patient via Slovak , [phone].  Accordingly, patient states that he had about 30 minutes of central chest pain this morning  while at home.  He states pain lasted about 30 minutes and he was diaphoretic.  He denies any nausea or vomiting.  He denies any abdominal pain.  Patient states he recently had a surgery for a valve issue but he cannot name where the surgery was.  He denies any fevers, chills.   During my assessment, patient denies any active chest pain.  He states he was short of breath during the episode.  He denies any shortness of breath at this time.  Of note, patient states that he has a spinal cord injury and cannot move his legs.   On exam, patient has a regular rate and rhythm, lungs are clear to auscultation.  He reports that he has a spinal cord injury cannot move his legs.  Given episode of chest pain for 30 minutes with associated diaphoresis, patient will likely need to be admitted for monitoring and cardiac evaluation.  He has significant risk factors as well as a history of CABG x 2.

## 2024-10-15 NOTE — ED PROVIDER NOTE - OBJECTIVE STATEMENT
Katiuska VASQUEZ: Patient is a 69-year-old male with a history of type 2 diabetes mellitus, hypertension, CKD, CAD, CABG x 2, [place] speaking here for evaluation of chest pain. I spoke to patient via Tamaz[place] , [phone].  Accordingly, patient states that he had about 30 minutes of central chest pain this morning  while at home.  He states pain lasted about 30 minutes and he was diaphoretic.  He denies any nausea or vomiting.  He denies any abdominal pain.  Patient states he recently had a surgery for a valve issue but he cannot name where the surgery was.  He denies any fevers, chills.   During my assessment, patient denies any active chest pain.  He states he was short of breath during the episode.  He denies any shortness of breath at this time.  Of note, patient states that he has a spinal cord injury and cannot move his legs.

## 2024-10-15 NOTE — ED PROVIDER NOTE - PROGRESS NOTE DETAILS
I spoke to patient's son, Adelita Dee, [phone]. Patient had valve surgery at [facility] I spoke to patient's son, Adelita Dee, [phone]. Patient had valve surgery at [facility]. Son states he is on the way. Tamiko Lozada PGY3 spoke to Dr. Baptiste from [facility] pt got TAVR (has baseline ischemic cardiomyopathy) troponinemia 200, pt has no chest pain at this time, resting comfortably, will transfer to [facility]. will ensure pt took asa/plavix.

## 2024-10-15 NOTE — ED ADULT TRIAGE NOTE - CHIEF COMPLAINT QUOTE
Pt 4 days s/p valve replacement c/o non-radiating chest pain. Pt took 2 nitro sublingual. C/o pain to surgical site in the groin.

## 2024-10-15 NOTE — ED ADULT NURSE REASSESSMENT NOTE - NS ED NURSE REASSESS COMMENT FT1
Patient is being discharged today with senior care, Education provided via teachback method and written materials to patient. Patient verbalize understanding. No complaints/signs/symptoms of pain, distress, or discomfort at this time.

## 2024-10-15 NOTE — ED ADULT NURSE NOTE - OBJECTIVE STATEMENT
pt received to trauma room B. Pt is AxO 3. pt endorses he is not able to ambulating  due to spinal cord surgery in the past. pt has a hx of valve replacement 5 days ago. pt endorses chest pain non-radiaiting. pt endorses pain to the surgical site tot the right groin. Pt normal sinus on the tele monitor. pt denies headaches, dizziness, n/v/d, abdominal pin, SOB, or fever like symptoms. pt has redness noted tot eh surgical site and tenderness to surgical area. pt has a 20G by EMS to the left AC. pt labs obtained. Plan of care ongoing.

## 2024-10-23 ENCOUNTER — TRANSCRIPTION ENCOUNTER (OUTPATIENT)
Age: 69
End: 2024-10-23

## 2024-10-23 ENCOUNTER — NON-APPOINTMENT (OUTPATIENT)
Age: 69
End: 2024-10-23

## 2024-10-23 ENCOUNTER — APPOINTMENT (OUTPATIENT)
Dept: SURGICAL ONCOLOGY | Facility: CLINIC | Age: 69
End: 2024-10-23

## 2024-10-23 PROCEDURE — XXXXX: CPT | Mod: 1L

## 2024-10-29 ENCOUNTER — APPOINTMENT (OUTPATIENT)
Dept: OTOLARYNGOLOGY | Facility: CLINIC | Age: 69
End: 2024-10-29

## 2024-12-27 NOTE — CONSULT NOTE ADULT - CONSULT REASON
Pre-operative risk stratification
b/l foot drop
post-op pain
DM
eval for rehab
hemodynamic management
worsening back pain
Neuro checks and insulin gtt
Chest Pain
1 or 2

## 2025-06-11 NOTE — ED PROVIDER NOTE - CARE PLAN
Procedure Note    Patient Name: Jermain Mann   YOB: 1966  Room/Bed: [facility] Pool/NONE  Medical Record Number: [id number]  Date: 6/11/25      Indication:  L-spondylosis    Consent: On file.    Vital Signs:   Vitals:    06/11/25 0838   BP: (!) 122/58   Pulse: 73   Resp: 16   SpO2: 96%       Pre-Sedation Documentation and Exam:   Vital signs have been reviewed (see flow sheet for vitals).    Sedation/ Anesthesia Plan:   IV Sedation    Patient is an appropriate candidate for plan of sedation: yes    Preoperative Diagnosis:  L-spondylosis    Postoperative Diagnosis:   as above    Procedure Performed:  Diagnostic/Confirmatory median branch blocks at the levels of L4-5 and L5-S1 bilateral under fluoroscopic guidance with IV sedation.    Indication for the Procedure:  The patient has a history of chronic low back pain that is not responding well to the conservative treatment.  The patient's pain is mostly axial in nature.  Pain is interfering with the activities of daily living.  Physical examination revealed facet tenderness and facet loading is positive. The procedure and risks  were discussed with the patient and an informed consent was obtained.    Procedure:     Patient is placed in prone position and skin over  the back was prepped and draped in sterile manner. Then using fluoroscopy the junction of the transverse process of the vertebra with the superior process of the facet joint was observed and the view was optimized. The skin and deep tissues posterior were infiltrated with  20  ml of 2% lidocaine. Then a #22-gauge  5-1/2 inch spinal needle was introduced through the skin wheal under fluoroscopy guidance such that the tip of the needle lies at the junction of the transverse process  L3-L4-L5 with the superior processes of the facet joint. . Then after negative aspiration a total of 12 ml of 0.25% Marcaine  was injected through the needles in divided doses.        For L5 Median branch block the  Principal Discharge DX:	Chest pain   1

## (undated) DEVICE — LIJ-KMEDIC KERRISON RONGUER: Type: DURABLE MEDICAL EQUIPMENT

## (undated) DEVICE — GLV 7.5 PROTEXIS (BLUE)

## (undated) DEVICE — SUCTION YANKAUER NO CONTROL VENT

## (undated) DEVICE — DRAPE LAPAROTOMY W VELCRO CORD TABS

## (undated) DEVICE — DRSG CURITY GAUZE SPONGE 4 X 4" 12-PLY

## (undated) DEVICE — LABELS BLANK W PEN

## (undated) DEVICE — SUT VICRYL 0 18" OS-6 (POP-OFF)

## (undated) DEVICE — Device

## (undated) DEVICE — DRSG AQUACEL 3.5 X 10"

## (undated) DEVICE — TAP DBL LEAD 4.35MM

## (undated) DEVICE — POSITIONER JACKSON TABLE CHEST, HIP, THIGH PADS, ARM CRADLE

## (undated) DEVICE — DRAPE COVER SNAP 36X30"

## (undated) DEVICE — DRSG TELFA 3 X 8

## (undated) DEVICE — DRAPE STERILE-Z PATIENT

## (undated) DEVICE — MIDAS REX LEGEND BALL FLUTED SM BORE 4.0MM X 10CM

## (undated) DEVICE — MIDAS REX LEGEND MATCH HEAD FLUTED LG BORE 3.0MM X 14CM

## (undated) DEVICE — SOL IRR BAG NS 0.9% 1000ML

## (undated) DEVICE — POSITIONER CUSHION INSERT PRONE VIEW LG

## (undated) DEVICE — DRAPE C ARM C-ARMOUR

## (undated) DEVICE — COVER ULTRASOUND PROBE T-SHAPED INTRAOP SM

## (undated) DEVICE — SPONGE DISSECTOR PEANUT

## (undated) DEVICE — GLV 7.5 PROTEXIS (WHITE)

## (undated) DEVICE — STRYKER BONE MILL BLADE FINE 3.2MM

## (undated) DEVICE — SUT VICRYL 3-0 18" SH UNDYED (POP-OFF)

## (undated) DEVICE — DRAPE BACK TABLE COVER 44X90"

## (undated) DEVICE — WOUND IRR SURGIPHOR

## (undated) DEVICE — MISONIX BONESCALPEL BLUNT BLADE & TUBESET 20MM

## (undated) DEVICE — PREP DURAPREP 26CC

## (undated) DEVICE — PROBE RHYTHMLINK BALL TIP 100MM 2.5

## (undated) DEVICE — SOL IRR POUR NS 0.9% 500ML

## (undated) DEVICE — ELCTR BOVIE TIP BLADE INSULATED 2.75" EDGE

## (undated) DEVICE — PACK NEURO MINOR

## (undated) DEVICE — STAPLER SKIN VISI-STAT 35 WIDE

## (undated) DEVICE — SOL IRR POUR NS 0.9% 1500ML

## (undated) DEVICE — DRAPE C ARM 41X74"

## (undated) DEVICE — NDL HYPO SAFE 21G X 1.5" (GREEN)

## (undated) DEVICE — DRAIN RESERVOIR FOR JACKSON PRATT 100CC CARDINAL

## (undated) DEVICE — TAP DUAL LEAD 7MM

## (undated) DEVICE — SOL IRR POUR H2O 500ML

## (undated) DEVICE — CANISTER DISPOSABLE THIN WALL 3000CC

## (undated) DEVICE — DRSG BIOPATCH DISK W CHG 1" W 4.0MM HOLE

## (undated) DEVICE — WARMING BLANKET LOWER ADULT

## (undated) DEVICE — SUT VICRYL 2-0 18" CT-2 (POP-OFF)

## (undated) DEVICE — DRAPE SURGICAL #1010

## (undated) DEVICE — SUT MONOCRYL 3-0 18" PS-1

## (undated) DEVICE — DRAIN JACKSON PRATT 3 SPRING RESERVOIR W 10FR PVC DRAIN

## (undated) DEVICE — VENODYNE/SCD SLEEVE CALF MEDIUM

## (undated) DEVICE — ELCTR GROUNDING PAD ADULT COVIDIEN

## (undated) DEVICE — TUBING CODMAN INTEGRATED BIPOLAR CORD & TUBING SET FLYING LEADS

## (undated) DEVICE — TAP DUAL LEAD 6MM

## (undated) DEVICE — SYR ASEPTO

## (undated) DEVICE — DRAPE 3/4 SHEET 52X76"

## (undated) DEVICE — SUT VICRYL 0 18" CT-1 UNDYED (POP-OFF)

## (undated) DEVICE — TAPE SILK 3"

## (undated) DEVICE — ELCTR BOVIE PENCIL BLADE 10FT

## (undated) DEVICE — DEPUY CANNULA FEN OPEN STERILE

## (undated) DEVICE — TAP DUAL LEAD 5MM